# Patient Record
Sex: MALE | Race: WHITE | Employment: OTHER | URBAN - METROPOLITAN AREA
[De-identification: names, ages, dates, MRNs, and addresses within clinical notes are randomized per-mention and may not be internally consistent; named-entity substitution may affect disease eponyms.]

---

## 2021-09-14 ENCOUNTER — HOSPITAL ENCOUNTER (OUTPATIENT)
Dept: GENERAL RADIOLOGY | Age: 71
Discharge: HOME | End: 2021-09-16
Payer: MEDICARE

## 2021-09-14 ENCOUNTER — HOSPITAL ENCOUNTER (OUTPATIENT)
Age: 71
Discharge: HOME | End: 2021-09-16
Payer: MEDICARE

## 2021-09-14 DIAGNOSIS — R22.2 MASS IN CHEST: ICD-10-CM

## 2021-09-14 PROCEDURE — 71046 X-RAY EXAM CHEST 2 VIEWS: CPT

## 2021-10-08 ENCOUNTER — HOSPITAL ENCOUNTER (OUTPATIENT)
Dept: NON INVASIVE DIAGNOSTICS | Age: 71
Discharge: HOME | End: 2021-10-08
Payer: MEDICARE

## 2021-10-08 LAB
LV EF: 65 %
LVEF MODALITY: NORMAL

## 2021-10-08 PROCEDURE — 93306 TTE W/DOPPLER COMPLETE: CPT

## 2021-10-13 ENCOUNTER — HOSPITAL ENCOUNTER (OUTPATIENT)
Dept: CT IMAGING | Age: 71
Discharge: HOME | End: 2021-10-13
Payer: MEDICARE

## 2021-10-13 DIAGNOSIS — R22.2 CHEST WALL MASS: ICD-10-CM

## 2021-10-13 PROCEDURE — 6360000004 HC RX CONTRAST MEDICATION: Performed by: RADIOLOGY

## 2021-10-13 PROCEDURE — 71260 CT THORAX DX C+: CPT

## 2021-10-13 RX ADMIN — IOPAMIDOL 75 ML: 755 INJECTION, SOLUTION INTRAVENOUS at 11:00

## 2021-10-14 ENCOUNTER — OFFICE VISIT (OUTPATIENT)
Dept: ONCOLOGY | Age: 71
End: 2021-10-14
Payer: MEDICARE

## 2021-10-14 ENCOUNTER — TELEPHONE (OUTPATIENT)
Dept: CASE MANAGEMENT | Age: 71
End: 2021-10-14

## 2021-10-14 ENCOUNTER — HOSPITAL ENCOUNTER (OUTPATIENT)
Dept: INFUSION THERAPY | Age: 71
Discharge: HOME | End: 2021-10-14
Payer: MEDICARE

## 2021-10-14 VITALS
OXYGEN SATURATION: 96 % | WEIGHT: 120 LBS | BODY MASS INDEX: 18.19 KG/M2 | HEART RATE: 57 BPM | HEIGHT: 68 IN | TEMPERATURE: 97.8 F

## 2021-10-14 DIAGNOSIS — C64.1 RENAL CELL CANCER, RIGHT (HCC): ICD-10-CM

## 2021-10-14 DIAGNOSIS — C64.1 RENAL CELL CANCER, RIGHT (HCC): Primary | ICD-10-CM

## 2021-10-14 LAB
ALBUMIN SERPL-MCNC: 4 G/DL (ref 3.5–5.2)
ALP BLD-CCNC: 135 U/L (ref 40–129)
ALT SERPL-CCNC: 13 U/L (ref 0–40)
ANION GAP SERPL CALCULATED.3IONS-SCNC: 10 MMOL/L (ref 7–16)
AST SERPL-CCNC: 14 U/L (ref 0–39)
BASOPHILS ABSOLUTE: 0.09 E9/L (ref 0–0.2)
BASOPHILS RELATIVE PERCENT: 1.1 % (ref 0–2)
BILIRUB SERPL-MCNC: <0.2 MG/DL (ref 0–1.2)
BUN BLDV-MCNC: 4 MG/DL (ref 6–23)
CALCIUM SERPL-MCNC: 9.1 MG/DL (ref 8.6–10.2)
CHLORIDE BLD-SCNC: 102 MMOL/L (ref 98–107)
CO2: 27 MMOL/L (ref 22–29)
CREAT SERPL-MCNC: 0.8 MG/DL (ref 0.7–1.2)
EOSINOPHILS ABSOLUTE: 0.19 E9/L (ref 0.05–0.5)
EOSINOPHILS RELATIVE PERCENT: 2.3 % (ref 0–6)
GFR AFRICAN AMERICAN: >60
GFR NON-AFRICAN AMERICAN: >60 ML/MIN/1.73
GLUCOSE BLD-MCNC: 102 MG/DL (ref 74–99)
HCT VFR BLD CALC: 38.8 % (ref 37–54)
HEMOGLOBIN: 13 G/DL (ref 12.5–16.5)
IMMATURE GRANULOCYTES #: 0.01 E9/L
IMMATURE GRANULOCYTES %: 0.1 % (ref 0–5)
LACTATE DEHYDROGENASE: 176 U/L (ref 135–225)
LYMPHOCYTES ABSOLUTE: 2.63 E9/L (ref 1.5–4)
LYMPHOCYTES RELATIVE PERCENT: 32.2 % (ref 20–42)
MCH RBC QN AUTO: 32.3 PG (ref 26–35)
MCHC RBC AUTO-ENTMCNC: 33.5 % (ref 32–34.5)
MCV RBC AUTO: 96.3 FL (ref 80–99.9)
MONOCYTES ABSOLUTE: 0.51 E9/L (ref 0.1–0.95)
MONOCYTES RELATIVE PERCENT: 6.3 % (ref 2–12)
NEUTROPHILS ABSOLUTE: 4.73 E9/L (ref 1.8–7.3)
NEUTROPHILS RELATIVE PERCENT: 58 % (ref 43–80)
PDW BLD-RTO: 14.9 FL (ref 11.5–15)
PLATELET # BLD: 224 E9/L (ref 130–450)
PMV BLD AUTO: 10.2 FL (ref 7–12)
POTASSIUM SERPL-SCNC: 3.9 MMOL/L (ref 3.5–5)
RBC # BLD: 4.03 E12/L (ref 3.8–5.8)
SODIUM BLD-SCNC: 139 MMOL/L (ref 132–146)
TOTAL PROTEIN: 7 G/DL (ref 6.4–8.3)
WBC # BLD: 8.2 E9/L (ref 4.5–11.5)

## 2021-10-14 PROCEDURE — 85025 COMPLETE CBC W/AUTO DIFF WBC: CPT

## 2021-10-14 PROCEDURE — 36415 COLL VENOUS BLD VENIPUNCTURE: CPT

## 2021-10-14 PROCEDURE — 99214 OFFICE O/P EST MOD 30 MIN: CPT | Performed by: INTERNAL MEDICINE

## 2021-10-14 PROCEDURE — 80053 COMPREHEN METABOLIC PANEL: CPT

## 2021-10-14 PROCEDURE — 83615 LACTATE (LD) (LDH) ENZYME: CPT

## 2021-10-14 RX ORDER — ASPIRIN 81 MG/1
81 TABLET ORAL DAILY
COMMUNITY

## 2021-10-14 RX ORDER — LISINOPRIL 20 MG/1
20 TABLET ORAL DAILY
COMMUNITY

## 2021-10-14 RX ORDER — SIMVASTATIN 80 MG
80 TABLET ORAL NIGHTLY
COMMUNITY

## 2021-10-14 NOTE — PROGRESS NOTES
[address]  [place] , [facility]   Hematology/Oncology  Consult      Patient Name: Chester Black  YOB: 1950  PCP: SHI Eduardo NP   Referring Provider:      Reason for Consultation:   Chief Complaint   Patient presents with    New Patient        History of Present Illness:  73years old male referred for possible metastatic renal cell carcinoma. Patient presented to his PCP, HETAL Pathak, complaining of right side chest wall mass which he noticed about a month ago and had been gradually increasing in size. CT chest from October 2021 showed mltiple heterogenous and necrotic masses in the right kidney with possible tumor invasion of the right renal vein. In addition to this there was a large 4 by 9 cm soft tissue necrotic mass involving the fourth rib. There were other lytic destructive lesions in the right eighth and ninth rib. There were multiple right lung nodules ranging upto 1.5 cm. Few liver nodules were noted as well the largest being 7 mm. 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 Patient reports pain in the right side chest wall mass which is well controlled with Tylenol as needed. Denies recent weight loss, loss of appetite night sweats, back pain. Review of systems: Over 10 systems were reviewed and all were negative except as mentioned above. Past medical history: Hypertension. Coronary artery disease, peripheral arterial disease, hyperlipidemia. Past surgical history: History of CABG, femoral arterial bypass, hernia repair. Social history: Smokes half a pack a day, denies alcohol or drug abuse. Family history Sister had breast cancer, father had colon cancer. Diagnostic Data:     History reviewed. No pertinent past medical history. There are no problems to display for this patient. History reviewed. No pertinent surgical history. Family History  History reviewed.  No pertinent family history. Social History    TOBACCO:   has no history on file for tobacco use. ETOH:   has no history on file for alcohol use. Home Medications  Prior to Admission medications    Medication Sig Start Date End Date Taking? Authorizing Provider   aspirin 81 MG EC tablet Take 81 mg by mouth daily   Yes Historical Provider, MD   simvastatin (ZOCOR) 80 MG tablet Take 80 mg by mouth nightly   Yes Historical Provider, MD   lisinopril (PRINIVIL;ZESTRIL) 20 MG tablet Take 20 mg by mouth daily   Yes Historical Provider, MD       Allergies  No Known Allergies    Review of Systems:      Objective  Pulse 57   Temp 97.8 °F (36.6 °C)   Ht 5' 8\" (1.727 m)   Wt 120 lb (54.4 kg)   SpO2 96%   BMI 18.25 kg/m²     Physical Performance Status    Physical Exam:  General: AAO to person, place, time, and purpose, appears stated age, cooperative, no acute distress, pleasant   Head and neck : PERRLA, EOMI . Sclera non icteric. Oropharynx : Clear  Neck: no JVD,  no adenopathy, no carotid bruit  LYMPHATICS : no lap  Lungs: Clear to auscultation. Right side chest wall globular mass, 4-5 cm in diamter, soft. Heart: Regular rate and regular rhythm, no murmur, normal S1, S2  Abdomen: Soft, non-tender;no masses, no organomegaly  Extremities: No edema,no cyanosis, no clubbing. Skin:  No rash. Neurologic:Cranial nerves grossly intact. No focal motor or sensory deficits .     Recent Laboratory Data-   No results found for: WBC, HGB, HCT, MCV, PLT, LABLYMP, MID, GRAN, LYMPHOPCT, MIDPERCENT, GRANULOCYTES, RBC, MCH, MCHC, RDW, NEUTOPHILPCT, MONOPCT, EOSPCT, BASOPCT, NEUTROABS, LYMPHSABS, MONOSABS, EOSABS, BASOSABS    No results found for: NA, K, CL, CO2, BUN, CREATININE, GLUCOSE, CALCIUM, PROT, LABALBU, BILITOT, ALKPHOS, AST, ALT, LABGLOM, GFRAA, AGRATIO, GLOB    No results found for: IRON, TIBC, FERRITIN        Radiology-    Echo Complete    Result Date: 10/8/2021  Transthoracic Echocardiography Report (TTE)  Demographics   Patient Name    [facility]        Gender            Male                  [facility]   Medical Record  [id number]      Room Number  Number   Account #       [de-identified]     Procedure Date    10/08/2021   Corporate ID                  Ordering          Yoana Garcia DO                                Physician   Accession       [id number]    Referring         Yoana Garcia DO  Number                        Physician   Date of Birth   1950    Sonographer       Darlyn Kumari RDCS   Age             73 year(s)    Interpreting      Himanshu 66                                Physician         Physician Cardiology                                                  Biju Childs MD                                 Any Other  Procedure Type of Study   TTE procedure:Echo Complete W/Doppler & Color Flow. Procedure Date Date: 10/08/2021 Start: 09:57 AM Study Location: Echo Lab Technical Quality: Poor visualization due to poor acoustical window. Indications:Heart murmur. Patient Status: Routine Height: 68 inches Weight: 120 pounds BSA: 1.65 m^2 BMI: 18.25 kg/m^2  Findings   Left Ventricle  Normal left ventricular chamber size. Normal left ventricular systolic function. Visually estimated LVEF 65%. Mild left ventricular concentric hypertrophy noted. Normal diastolic function. Right Ventricle  Normal right ventricle size and function. Left Atrium  The left atrium is mildly dilated. Mildly increased left atrial volume. Interatrial septum not well visualized but appears intact. Right Atrium  Normal right atrium. Mitral Valve  Normal mitral valve structure. There is moderate mitral regurgitation - ERO 0.23cm2, PISA 0.9cm, RV 51cc. No mitral valve prolapse. Tricuspid Valve  Normal tricuspid valve structure. There is mild tricuspid regurgitation. Mild pulmonary hypertension, RVSP 39mmHg. Aortic Valve  Normal aortic valve structure. There is trace to mild aortic regurgitation, pressure 1/2 time 718ms.    Pulmonic Valve  The pulmonic valve was not well visualized. Pericardial Effusion  No evidence of pericardial effusion. Pericardium appears normal.   Pleural Effusion  No evidence of pleural effusion. Aorta  Aortic root 3.5cm. Miscellaneous  The inferior vena cava diameter is normal with normal respiratory  variation. Conclusions   Summary  Normal left ventricular chamber size. Normal left ventricular systolic function, LVEF 84%. Mild left ventricular concentric hypertrophy noted. Normal diastolic function. The left atrium is mildly dilated. Mildly increased left atrial volume. Interatrial septum not well visualized but appears intact. Normal right ventricle size and function. There is moderate mitral regurgitation - ERO 0.23cm2, PISA 0.9cm, RV 51cc. No mitral valve prolapse. There is trace to mild aortic regurgitation, pressure 1/2 time 718ms. There is mild tricuspid regurgitation. Mild pulmonary hypertension, RVSP 39mmHg. The pulmonic valve was not well visualized. Aortic root 3.5cm. No evidence of pericardial effusion. No intra cardiac mass or thrombus. No comparison study available.    Signature   ----------------------------------------------------------------  Electronically signed by Debora Balderas MD(Interpreting  physician) on 10/08/2021 03:52 PM  ----------------------------------------------------------------  M-Mode/2D Measurements & Calculations   LV Diastolic    LV Systolic Dimension: 3.2   AV Cusp Separation: 1.9 cmLA  Dimension: 5.3  cm                           Dimension: 3.9 cmAO Root  cm              LV Volume Diastolic: 203.3   Dimension: 3.5 cm  LV FS:39.6 %    ml  LV PW           LV Volume Systolic: 73.2 ml  Diastolic: 1.2  LV EDV/LV EDV Index: 134.5  cm              ml/82 ml/m^2LV ESV/LV ESV    RV Diastolic Dimension: 2.8  LV PW Systolic: Index: 88.6 CQ/94NP/ m^2     cm  1.5 cm          EF Calculated: 70 %          RV Systolic Dimension: 2.4 cm  Septum          LV Mass Index: 147 l/min*m^2 LA/Aorta: 1.2  Diastolic: 1.1  cm                                           LA volume/Index: 79.7 ml  Septum          LVOT: 2 cm  Systolic: 1.2  cm   LV Mass: 241.96  g  Doppler Measurements & Calculations   MV Peak E-Wave: 1.42   AV Peak Velocity: 1.87 m/s  LVOT Peak Velocity:  m/s                    AV Peak Gradient: 14.05     1.55 m/s  MV Peak A-Wave: 0.82   mmHg                        LVOT Mean Velocity:  m/s                    AV Mean Velocity: 1.27 m/s  0.81 m/s  MV E/A Ratio: 1.73     AV Mean Gradient: 7.2 mmHg  LVOT Peak Gradient: 9.6  MV Peak Gradient: 9.2  AV VTI: 42.1 cm             mmHgLVOT Mean Gradient:  mmHg                   AV Area (Continuity):2.37   3.5 mmHg  MV Mean Gradient: 2.9  cm^2                        Estimated RVSP: 39 mmHg  mmHg                   AV Deceleration Time:       Estimated RAP:10 mmHg  MV Mean Velocity: 0.75 2474.3 msec  m/s                    LVOT VTI: 31.8 cm  MV Deceleration Time:                              TR Velocity:2.69 m/s  271.4 msec             Estimated PASP: 39.03 mmHg  TR Gradient:29.03 mmHg  MV P1/2t: 101.8 msec   Pulm. Vein A Reversal       PV Peak Velocity: 0.51  MVA by PHT:2.16 cm^2   Duration:175.3 msec         m/s  MV Area (continuity):  Pulm. Vein D Velocity:0.74  PV Peak Gradient: 1.02  1.9 cm^2               m/sPulm. Vein A Reversal    mmHg                         Velocity:0.38 m/s           PV Mean Velocity: 0.36                         Pulm. Vein S Velocity: 0.44 m/s  MR Velocity: 5.37 m/s  m/s                         PV Mean Gradient: 0.6  MV RICARDO PISA: 0.23 cm^2                             mmHg  MR VTI: 221.9 cm  Alias Velocity: 0.25  m/sPISA Radius: 0.9 cm   PISA area: 5.09 cm^2MR  flow rate: 125.72  ml/sMR volume:51.04 ml  [email or web address] [id number]    CT CHEST W CONTRAST    Addendum Date: 10/13/2021    ADDENDUM: 6 mm indeterminate hypodense lesions are identified in the body and tail of the pancreas. Consider surveillance     Result Date: 10/13/2021  EXAMINATION: CT OF THE CHEST WITH CONTRAST 10/13/2021 10:59 am TECHNIQUE: CT of the chest was performed with the administration of intravenous contrast. Multiplanar reformatted images are provided for review. Dose modulation, iterative reconstruction, and/or weight based adjustment of the mA/kV was utilized to reduce the radiation dose to as low as reasonably achievable. COMPARISON: None. HISTORY: ORDERING SYSTEM PROVIDED HISTORY: Chest wall mass FINDINGS: There is cardiomegaly. There is coronary artery calcification. The great vessels are normal.  Moderately enlarged mediastinal and hilar lymph nodes are noted with lymph nodes measuring up to 1.3 cm in the right hilum. Trachea and major bronchi are patent. Multiple bilateral pulmonary nodules are identified with nodules measuring up to 1.5 cm in the right lower lobe and smaller ones in the right middle lobe and right upper lobe. The pulmonary nodules in the left lower lobe ranges from 5 mm up to 8 mm. There is no focal consolidation or pleural effusion. Punctate enhancing nodules are identified in the posterior right hepatic lobe, largest 1 measuring up to 7 mm. Multiple heterogeneously enhancing right renal masses are identified largest 1 measuring 3.8 x 3.3 cm which is partially necrotic with additional smaller nodules concerning for multifocal malignancy like renal cell carcinoma. There is filling defects in the right renal vein concerning for tumor invasion versus tumor embolism of the renal vein. There is bilateral adrenal metastasis with the largest 1 in the left adrenal gland measuring 1.6 x 2.7 cm. There is bone metastasis with the destructive soft tissue mass involving the right 4th rib anterolaterally with adjacent soft tissue mass which is partially necrotic measuring 4.5 x 9.2 cm.   Lytic destructive lesions are also identified in the right 8th and 9th rib near the costo vertebral junction. Multiple heterogeneous  enhancing and necrotic masses in the right kidney concerning for multifocal renal cell carcinoma with  possible tumor invasion or tumor embolism of the right renal vein. There is diffuse metastasis with the involvement of the right and left adrenal glands, possible hepatic metastasis, widespread pulmonary and rib involvement as noted. Further assessment by PET-CT scan is recommended. Findings were telephoned to licensed caregiver. ASSESSMENT/PLAN :    Kvng Rodriguez was seen today for new patient. Diagnoses and all orders for this visit:    Renal cell cancer, right ([address]  -     CBC Auto Differential; Future  -     Comprehensive Metabolic Panel; Future  -     LACTATE DEHYDROGENASE; Future  -     PET CT SKULL BASE TO MID THIGH; Future  -     MRI BRAIN W WO CONTRAST; Future  -     [address]    Other orders  -     CT GUIDED NEEDLE PLACEMENT; Standing  -     CT GUIDED NEEDLE PLACEMENT    73years old male with recently noted right chest wall mass found to have multiple right kidney necrotic lesions in addition to large soft tissue destructive mass of right fourth rib, multiple lung nodules up to 1.5 cm, bilateral adrenal metastasis up to 2.5 cm, possible liver metastasis. Reviewed the CT scan images and agree with the reported findings. Discussed findings with the patient. Imaging consistent with stage IV renal cell carcinoma. Performance status seems fair Karnofsky performance status 80/70. We will get CBC CMP LDH to help assess MSKCC risk factors. The disease seems to have progressed rapidly over the past month. His rapid progression as well as renal vein involvement probably precludes debulking nephrectomy. We will get a percutaneous biopsy of the right-sided chest wall mass. PET scan, MRI brain.   Pain at the right side chest wall mass is well controlled with Tylenol as needed. Put in a palliative consult. Return to clinic in 1 to 2 weeks to review biopsy and imaging. Thank you for the consult. Return in about 2 weeks (around 10/28/2021).      Ines Villasenor MD   Electronically signed 10/14/2021 at 9:37 AM

## 2021-10-14 NOTE — PROGRESS NOTES
Transportation (Non-Medical):    Physical Activity:     Days of Exercise per Week:     Minutes of Exercise per Session:    Stress:     Feeling of Stress :    Social Connections:     Frequency of Communication with Friends and Family:     Frequency of Social Gatherings with Friends and Family:     Attends Baptism Services:     Active Member of Clubs or Organizations:     Attends Club or Organization Meetings:     Marital Status:    Intimate Partner Violence:     Fear of Current or Ex-Partner:     Emotionally Abused:     Physically Abused:     Sexually Abused:            Occupation:   Retired:  YES: Patient is retired from [employer]. REVIEW OF SYSTEMS:     Pacemaker/Defibulator/ICD:  No    Mediport: No           FALLS RISK SCREENING ASSESSMENT    Instructions:  Assess the patient and [place] the appropriate indicators that are present for fall risk identification. Total the numbers circled and assign a fall risk score from Table 2.  Reassess patient at a minimum every 12 weeks or with status change. Assessment   Date  10/14/2021     1. Mental Ability: confusion/cognitively impaired No - 0       2. Elimination Issues: incontinence, frequency No - 0       3. Ambulatory: use of assistive devices (walker, cane, off-loading devices), attached to equipment (IV pole, oxygen) No - 0     4. Sensory Limitations: dizziness, vertigo, impaired vision No - 0       5. Age 72 years or greater - 1       10. Medication: diuretics, strong analgesics, hypnotics, sedatives, antihypertensive agents   Yes - 3   7. Falls:  recent history of falls within the last 3 months (not to include slipping or tripping)   No - 0   TOTAL 4    If score of 4 or greater was education given? Yes       TABLE 2   Risk Score Risk Level Plan of Care   0-3 Little or  No Risk 1. Provide assistance as indicated for ambulation activities  2. Reorient confused/cognitively impaired patient  3.   Call-light/bell within patient's reach  4. Chair/bed in low position, stretcher/bed with siderails up except when performing patient care activities  5. Educate patient/family/caregiver on falls prevention  6.  Reassess in 12 weeks or with any noted change in patient condition which places them at a risk for a fall   4-6 Moderate Risk 1. Provide assistance as indicated for ambulation activities  2. Reorient confused/cognitively impaired patient  3. Call-light/bell within patient's reach  4. Chair/bed in low position, stretcher/bed with siderails up except when performing patient care activities  5. Educate patient/family/caregiver on falls prevention  6. Falls risk precaution (Yellow sticker Level II) placed on patient chart   7 or   Higher High Risk 1. Place patient in easily observable treatment room  2. Patient attended at all times by family member or staff  3. Provide assistance as indicated for ambulation activities  4. Reorient confused/cognitively impaired patient  5. Call-light/bell within patient's reach  6. Chair/bed in low position, stretcher/bed with siderails up except when performing patient care activities  7. Educate patient/family/caregiver on falls prevention  8. Falls risk precaution (Yellow sticker Level III) placed on patient chart           MALNUTRITION RISK SCREENING ASSESSMENT    Instructions:  Assess the patient and enter the appropriate indicators that are present for nutrition risk identification. Total the numbers entered and assign a risk score. Follow the appropriate action for total score listed below. Assessment   Date  10/14/2021     1. Have you lost weight without trying? 1- Yes, 0.5 kg to 5 kg     2. Have you been eating poorly because of a decreased appetite? 0- No   3. Do you have a diagnosis of head and neck cancer?       0- No                                                                                    TOTAL 1        Score of 0-1: No action  Score 2 or greater:  · For Non-Diabetic Patient: Recommend adding Ensure Enlive 2 x daily and provide patient with Ensure wellness bag with coupons  · For Diabetic Patient: Recommend adding Glucerna Shake 2 x daily and provide patient with Glucerna Wellness bag with coupons  · Route to the dietitian via salgomed    · Are you having  difficulty performing daily routine tasks  due to fatigue or weakness (ie: bathing/showering, dressing, housework, meal prep, work, child Erika Quivers): No     · Do you have any arm flexibility/ROM restrictions, swelling or pain that limit activity: No     · Any changes in memory, attention/focus that impact daily activities: No     · Do you avoid participation in leisure/social activity due weakness, fatigue or pain: No     ARE ANY OF THE ABOVE ARE ANSWERED YES: No          PT ASSESSMENT FOR REFERRAL    · Have you had any recent falls in past 2 months: No     · Do you have difficulty  going up/down stairs: No     · Are you having difficulty walking: No     · Do you often hold onto furniture/environmental supports or feel off balance when you are walking: No     · Do you need to take rest breaks when you are walking: No     · Any pain on scale of 1-10 that limits your mobility: No 0/10    ARE ANY OF THE ABOVE ARE ANSWERED YES: No           PREHAB AUDIOLOGY REFERRAL    - Is patient planned to receive Cisplatin? No. This patient is not planned to start Cisplatin. - Is patient complaining of new onset hearing loss? No. Patient is not complaining of new onset hearing loss. Has hearing loss bilaterally for about 1 year, wears bilateral hearing aides.         Denver Bridges, RN

## 2021-10-14 NOTE — TELEPHONE ENCOUNTER
Met with patient during his initial consult with Dr. Marce Matamoros for right chest wall mass. Introduced nurse navigator role, gave contact information and informed of other supportive services in clinic: , nutrition and financial navigator. Patient is supported by his family and denied issues with living arrangements, transportation, insurance and prescription coverage. He reported his appetite as \"good\", has had 10 lb unplanned weight loss in 1 month. Informed the clinic dietitian will be consulted for additional support, patient was agreeable. Patient smokes, encouraged to stop, informed of [facility] Cessation Program and gave pamphlet. He stated he's ready to quit. Gave patient a list of orders Dr. Marce Matamoros wants done prior to returning to the clinic on 10/28/2021. These include a biopsy of the chest wall, PET scan and brain MRI and palliative medicine. He would like his niece contacted with his appointments. Reviewed and gave patient the PET scan prep instructions, thoroughly reviewed all dietary instructions with him. Patient will be scheduled to follow up with Dr. Marce Matamoros on 10/28/2021 to review scan results and discuss his plan of care. Patient denied needs today, encouraged him to call should any arise, he verbalized understanding. Marck Villafana RN, ADN, BSE, OCN Patient Nurse Navigator

## 2021-10-19 ENCOUNTER — TELEPHONE (OUTPATIENT)
Dept: PALLATIVE CARE | Age: 71
End: 2021-10-19

## 2021-10-19 NOTE — TELEPHONE ENCOUNTER
Called and spoke with Jose zapata regarding referral for Palliative medicine. Leela sets up appointments for her Uncle and is his transportation. Leela shares that patient is Developmentally disabled. He is having a hard time understanding that he has stage IV cancer when he doesn't feel sick. Appointment set for 10/27/21.

## 2021-10-20 DIAGNOSIS — C64.1 RENAL CELL CANCER, RIGHT (HCC): Primary | ICD-10-CM

## 2021-10-22 ENCOUNTER — TELEPHONE (OUTPATIENT)
Dept: CASE MANAGEMENT | Age: 71
End: 2021-10-22

## 2021-10-22 NOTE — TELEPHONE ENCOUNTER
Patient's niece, Anca Mg, called stating patient's CT guide biopsy is scheduled for 11/02/2021 and asked if his 10/28/2021 appointment will need moved out. Informed her since patient is having is PET scan and brain MRI done on 10/25/2021, he can keep his 10/28/2021 appointment with Dr. Ralph Funez so he can discuss the results. Informed her as soon as the biopsy results are available, patient will be scheduled to see Dr. Ralph Funez appropriately, Leela verbalized understanding. Samantha Landin  RN, ADN, BSE, OCN  Patient Nurse Navigator
I have personally evaluated and examined the patient. The Attending was available to me as a supervising provider if needed.

## 2021-10-25 ENCOUNTER — HOSPITAL ENCOUNTER (OUTPATIENT)
Dept: NUCLEAR MEDICINE | Age: 71
Discharge: HOME | End: 2021-10-25
Payer: MEDICARE

## 2021-10-25 ENCOUNTER — HOSPITAL ENCOUNTER (OUTPATIENT)
Dept: MRI IMAGING | Age: 71
Discharge: HOME | End: 2021-10-27
Payer: MEDICARE

## 2021-10-25 DIAGNOSIS — R22.2 CHEST WALL MASS: Primary | ICD-10-CM

## 2021-10-25 DIAGNOSIS — C64.1 RENAL CELL CANCER, RIGHT (HCC): ICD-10-CM

## 2021-10-25 PROCEDURE — 78815 PET IMAGE W/CT SKULL-THIGH: CPT

## 2021-10-25 PROCEDURE — 70553 MRI BRAIN STEM W/O & W/DYE: CPT

## 2021-10-25 PROCEDURE — A9577 INJ MULTIHANCE: HCPCS | Performed by: RADIOLOGY

## 2021-10-25 PROCEDURE — A9552 F18 FDG: HCPCS | Performed by: RADIOLOGY

## 2021-10-25 PROCEDURE — 3430000000 HC RX DIAGNOSTIC RADIOPHARMACEUTICAL: Performed by: RADIOLOGY

## 2021-10-25 PROCEDURE — 6360000004 HC RX CONTRAST MEDICATION: Performed by: RADIOLOGY

## 2021-10-25 RX ORDER — FLUDEOXYGLUCOSE F 18 200 MCI/ML
15 INJECTION, SOLUTION INTRAVENOUS
Status: COMPLETED | OUTPATIENT
Start: 2021-10-25 | End: 2021-10-25

## 2021-10-25 RX ADMIN — GADOBENATE DIMEGLUMINE 11 ML: 529 INJECTION, SOLUTION INTRAVENOUS at 14:40

## 2021-10-25 RX ADMIN — FLUDEOXYGLUCOSE F 18 15 MILLICURIE: 200 INJECTION, SOLUTION INTRAVENOUS at 08:52

## 2021-10-26 ENCOUNTER — HOSPITAL ENCOUNTER (OUTPATIENT)
Age: 71
Discharge: HOME | End: 2021-10-28
Payer: MEDICARE

## 2021-10-26 PROCEDURE — U0003 INFECTIOUS AGENT DETECTION BY NUCLEIC ACID (DNA OR RNA); SEVERE ACUTE RESPIRATORY SYNDROME CORONAVIRUS 2 (SARS-COV-2) (CORONAVIRUS DISEASE [COVID-19]), AMPLIFIED PROBE TECHNIQUE, MAKING USE OF HIGH THROUGHPUT TECHNOLOGIES AS DESCRIBED BY CMS-2020-01-R: HCPCS

## 2021-10-26 PROCEDURE — U0005 INFEC AGEN DETEC AMPLI PROBE: HCPCS

## 2021-10-27 ENCOUNTER — OFFICE VISIT (OUTPATIENT)
Dept: PALLATIVE CARE | Age: 71
End: 2021-10-27
Payer: MEDICARE

## 2021-10-27 VITALS
HEART RATE: 65 BPM | BODY MASS INDEX: 18.61 KG/M2 | DIASTOLIC BLOOD PRESSURE: 60 MMHG | SYSTOLIC BLOOD PRESSURE: 144 MMHG | WEIGHT: 122.4 LBS

## 2021-10-27 DIAGNOSIS — Z51.5 PALLIATIVE CARE BY SPECIALIST: ICD-10-CM

## 2021-10-27 PROCEDURE — 99203 OFFICE O/P NEW LOW 30 MIN: CPT | Performed by: NURSE PRACTITIONER

## 2021-10-27 NOTE — PROGRESS NOTES
Department of Palliative Medicine  Ambulatory Note  Provider: SHI Vargas - CNP      Location of service: [place] [address]  Chief Complaint: Vero Paul is a 73 y.o. male with no complaints    Assessment/Plan      Possible metastatic renal cell carcinoma  -Follows with Dr. Re Buchanan  -Recently diagnosed has an upcoming appointment with oncologist to determine plan    Palliative Medicine Encounter   Goals: The patient expressed that his goal is to live longer and maintain quality of life    Follow Up:  PRN. Encouraged to call with any questions, concerns, needs, or changes in symptoms. Subjective:     Vero Paul is a 73 y.o. male with significant past medical history of hypertension, hyperlipidemia, coronary artery disease who was referred to [address] [place] [address]. Patient had went to his PCP with right-sided chest pain and mass that was growing. 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 MRI of the brain showed a concerning lesion of the right parietal skull. Met with Adonay Ponce and his niece and introduced palliative medicine. His niece explains that he has an appointment with the oncologist tomorrow to go over his scans and discuss treatment options. Currently Adonay Ponce is feeling great, he states that he can't even believe that he has cancer because he feels so good. He denies any pain, nausea, depression, anxiety, loss of appetite, shortness of breath, or constipation. He started taking Tylenol for the chest pain few months ago, which helped relieve his pain. He states that now he is no longer getting that pain. His niece brought up that they have been trying to discuss quality versus quantity of life with him and asked to help him understand the side effects of treatment.  We discussed side effects of treatments and how it may affect his quality of life. Weighing whether treatments will cause more harmful effects than benefit. I explained that the oncologist will explain his options, and that comfort care is always an option. We discussed hospice care and the difference between palliative care. He expressed that he has a lot to think about and speak to with his wife. Encouraged him to call and we can talk further after their appointment with the oncologist to assist with future goals. He has a good support system from his family. Past Medical History:   Diagnosis Date    Anticoagulant long-term use 2007    aspirin    CAD (coronary artery disease)     Deep vein thrombosis (DVT) (HCC)     leg    [place] (hard of hearing)     Hyperlipidemia     Hypertension     Mentally challenged     information per patient's niece. Past Surgical History:   Procedure Laterality Date   Yonathan Méndez    patient's sister said they were told a twin was removed from paitent's abdomen.     CARDIAC SURGERY  2007    bypass    FEMORAL BYPASS      bifemoral       Family History   Problem Relation Age of Onset    High Blood Pressure Mother     Heart Disease Mother     Heart Attack Mother     Cancer Father 64        colon    Arthritis Sister     Heart Attack Brother     No Known Problems Maternal Uncle     No Known Problems Paternal Aunt     No Known Problems Paternal Uncle     No Known Problems Maternal Grandmother     Cancer Maternal Grandfather         throat    Heart Attack Paternal Grandmother     No Known Problems Paternal Grandfather     Cirrhosis Paternal Cousin     Deep Vein Thrombosis Sister     Diabetes Sister     Heart Disease Sister         triple bypass    Breast Cancer Sister 61    High Blood Pressure Sister     Diabetes Sister     High Cholesterol Sister     Arthritis Sister        ROS: UNLESS STATED ABOVE PATIENT DENIES:  CONSTITUTIONAL:  fever, chill, rigors, nausea, vomiting, fatigue. HEENT: blurry vision, double vision, hearing problem, tinnitus, hoarseness, dysphagia, odynophagia  RESPIRATORY: cough, shortness of breath, sputum expectoration. CARDIOVASCULAR:  Chest pain/pressure, palpitation, syncope, irregular beats  GASTROINTESTINAL:  abdominal or rectal pain, diarrhea, constipation, . GENITOURINARY:  Burning, frequency, urgency, incontinence, discharge  INTEGUMENTARY: rash, wound, pruritis  HEMATOLOGIC/LYMPHATIC:  Swelling, sores, gum bleeding, easy bruising, pica. MUSCULOSKELETAL:  pain, edema, joint swelling or redness  NEUROLOGICAL:  light headed, dizziness, loss of consciousness, weakness, change in memory, seizures, tremors    Objective:     Physical Exam  Wt Readings from Last 3 Encounters:   10/27/21 122 lb 6.4 oz (55.5 kg)   10/14/21 120 lb (54.4 kg)     BP (!) 144/60   Pulse 65   Wt 122 lb 6.4 oz (55.5 kg)   BMI 18.61 kg/m²     Gen:  Alert, appears stated age, thin, in no acute distress  HEENT:  Normocephalic, no drainage,  Neck:  Supple  Lungs:  non labored breathing  Heart[de-identified]  Regular rate  Abd:  Soft,   M/S/Ext:  moving all extremities   Skin:  no visible lesions  Neuro:  PERRL, Alert, oriented x 3; following commands    [place] Symptom Assessment Score   [place] Score 10/27/2021   Pain Score No pain   Tiredness Score Not tired   Nausea Score Not nauseated   Depression Score Not depressed   Anxiety Score Not anxious   Drowsiness Score Not drowsy   Appetite Score Best appetite   Wellbeing Score Best feeling of wellbeing   Dyspnea Score No shortness of breath   Other Problem Score Best possible response   Total Assessment Score(calculated) 0     Assessed by: patient and provider. Current Medications:  Medications reviewed: yes    Controlled Substances Monitoring: [place]AR[place] reviewed 10/27/21. No flowsheet data found.       SHI Saravia - CNP   Palliative Care Department     Time/Communication  Greater than 50% of time spent, total 40 minutes in face-to-face counseling and coordination of care regarding goals of care and symptom management. Note: This report was completed using computerJellycoaster voiced recognition software. Every effort has been made to ensure accuracy; however, inadvertent computerized transcription errors may be present.

## 2021-10-28 ENCOUNTER — OFFICE VISIT (OUTPATIENT)
Dept: ONCOLOGY | Age: 71
End: 2021-10-28
Payer: MEDICARE

## 2021-10-28 VITALS
TEMPERATURE: 96.8 F | BODY MASS INDEX: 18.11 KG/M2 | SYSTOLIC BLOOD PRESSURE: 135 MMHG | DIASTOLIC BLOOD PRESSURE: 73 MMHG | HEIGHT: 68 IN | OXYGEN SATURATION: 99 % | HEART RATE: 65 BPM | WEIGHT: 119.5 LBS

## 2021-10-28 DIAGNOSIS — C64.1 RENAL CELL CANCER, RIGHT (HCC): Primary | ICD-10-CM

## 2021-10-28 PROCEDURE — 99212 OFFICE O/P EST SF 10 MIN: CPT

## 2021-10-28 NOTE — PROGRESS NOTES
[address]  [id number]   Hematology/Oncology  Consult      Patient Name: Tonio Owens  YOB: 1950  PCP: SHI Mancuso NP   Referring Provider:      Reason for Consultation:   Chief Complaint   Patient presents with    Follow-up     RENAL CELL CANCER, RIGHT ([address]        History of Present Illness:  73years old male referred for possible metastatic renal cell carcinoma. Patient presented to his PCP, HETAL Pathak, complaining of right side chest wall mass which he noticed about a month ago and had been gradually increasing in size. CT chest from October 2021 showed mltiple heterogenous and necrotic masses in the right kidney with possible tumor invasion of the right renal vein. In addition to this there was a large 4 by 9 cm soft tissue necrotic mass involving the fourth rib. There were other lytic destructive lesions in the right eighth and ninth rib. There were multiple right lung nodules ranging upto 1.5 cm. Few liver nodules were noted as well the largest being 7 mm. 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 Patient reports pain in the right side chest wall mass which is well controlled with Tylenol as needed. Denies recent weight loss, loss of appetite night sweats, back pain. Review of systems: Over 10 systems were reviewed and all were negative except as mentioned above. Past medical history: Hypertension. Coronary artery disease, peripheral arterial disease, hyperlipidemia. Past surgical history: History of CABG, femoral arterial bypass, hernia repair. Social history: Smokes half a pack a day, denies alcohol or drug abuse. Family history Sister had breast cancer, father had colon cancer.       Diagnostic Data:     Past Medical History:   Diagnosis Date    Anticoagulant long-term use 2007    aspirin    CAD (coronary artery disease)     Deep vein thrombosis (DVT) (HCC)     leg    [place] (hard of hearing)     Hyperlipidemia     Hypertension     Mentally challenged     information per patient's niece. There are no problems to display for this patient. Past Surgical History:   Procedure Laterality Date   Yonathan Méndez    patient's sister said they were told a twin was removed from paitent's abdomen.  CARDIAC SURGERY  2007    bypass    FEMORAL BYPASS      bifemoral       Family History  Family History   Problem Relation Age of Onset    High Blood Pressure Mother     Heart Disease Mother     Heart Attack Mother     Cancer Father 64        colon    Arthritis Sister     Heart Attack Brother     No Known Problems Maternal Uncle     No Known Problems Paternal Aunt     No Known Problems Paternal Uncle     No Known Problems Maternal Grandmother     Cancer Maternal Grandfather         throat    Heart Attack Paternal Grandmother     No Known Problems Paternal Grandfather     Cirrhosis Paternal Cousin     Deep Vein Thrombosis Sister     Diabetes Sister     Heart Disease Sister         triple bypass    Breast Cancer Sister 61    High Blood Pressure Sister     Diabetes Sister     High Cholesterol Sister     Arthritis Sister        Social History    TOBACCO:   reports that he has been smoking cigarettes. He has a 27.00 pack-year smoking history. He has never used smokeless tobacco.  ETOH:   reports previous alcohol use. Home Medications  Prior to Admission medications    Medication Sig Start Date End Date Taking?  Authorizing Provider   metoprolol tartrate (LOPRESSOR) 25 MG tablet Take 25 mg by mouth 2 times daily   Yes Historical Provider, MD   aspirin 81 MG EC tablet Take 81 mg by mouth daily   Yes Historical Provider, MD   simvastatin (ZOCOR) 80 MG tablet Take 80 mg by mouth nightly   Yes Historical Provider, MD   lisinopril (PRINIVIL;ZESTRIL) 20 MG tablet Take 20 mg by mouth daily   Yes Historical Provider, MD       Allergies  No Known Allergies    Review of Systems:      Objective  /73   Pulse 65   Temp 96.8 °F (36 °C)   Ht 5' 8\" (1.727 m)   Wt 119 lb 8 oz (54.2 kg)   SpO2 99%   BMI 18.17 kg/m²     Physical Performance Status    Physical Exam:  General: AAO to person, place, time, and purpose, appears stated age, cooperative, no acute distress, pleasant   Head and neck : PERRLA, EOMI . Sclera non icteric. Oropharynx : Clear  Neck: no JVD,  no adenopathy, no carotid bruit  LYMPHATICS : no lap  Lungs: Clear to auscultation. Right side chest wall globular mass, 4-5 cm in diamter, soft. Heart: Regular rate and regular rhythm, no murmur, normal S1, S2  Abdomen: Soft, non-tender;no masses, no organomegaly  Extremities: No edema,no cyanosis, no clubbing. Skin:  No rash. Neurologic:Cranial nerves grossly intact. No focal motor or sensory deficits .     Recent Laboratory Data-   Lab Results   Component Value Date    WBC 8.2 10/14/2021    HGB 13.0 10/14/2021    HCT 38.8 10/14/2021    MCV 96.3 10/14/2021     10/14/2021    LYMPHOPCT 32.2 10/14/2021    RBC 4.03 10/14/2021    MCH 32.3 10/14/2021    MCHC 33.5 10/14/2021    RDW 14.9 10/14/2021    NEUTOPHILPCT 58.0 10/14/2021    MONOPCT 6.3 10/14/2021    BASOPCT 1.1 10/14/2021    NEUTROABS 4.73 10/14/2021    LYMPHSABS 2.63 10/14/2021    MONOSABS 0.51 10/14/2021    EOSABS 0.19 10/14/2021    BASOSABS 0.09 10/14/2021       Lab Results   Component Value Date     10/14/2021    K 3.9 10/14/2021     10/14/2021    CO2 27 10/14/2021    BUN 4 (L) 10/14/2021    CREATININE 0.8 10/14/2021    GLUCOSE 102 (H) 10/14/2021    CALCIUM 9.1 10/14/2021    PROT 7.0 10/14/2021    LABALBU 4.0 10/14/2021    BILITOT <0.2 10/14/2021    ALKPHOS 135 (H) 10/14/2021    AST 14 10/14/2021    ALT 13 10/14/2021    LABGLOM >60 10/14/2021    GFRAA >60 10/14/2021       No results found for: IRON, TIBC, FERRITIN        Radiology-    Echo Complete    Result Date: 10/8/2021  Transthoracic Echocardiography Report (TTE)  Demographics   Patient Name    [facility]        Gender            Male                  [facility]   Medical Record  [id number]      Room Number  Number   Account #       [de-identified]     Procedure Date    10/08/2021   Corporate ID                  Ordering          Yoana Garcia DO                                Physician   Accession       [id number]    Referring         Yoana Garcia DO  Number                        Physician   Date of Birth   1950    Sonographer       Darlyn Kumari RDCS   Age             73 year(s)    Interpreting      Himanshu 66                                Physician         Physician Cardiology                                                  Biju Childs MD                                 Any Other  Procedure Type of Study   TTE procedure:Echo Complete W/Doppler & Color Flow. Procedure Date Date: 10/08/2021 Start: 09:57 AM Study Location: Echo Lab Technical Quality: Poor visualization due to poor acoustical window. Indications:Heart murmur. Patient Status: Routine Height: 68 inches Weight: 120 pounds BSA: 1.65 m^2 BMI: 18.25 kg/m^2  Findings   Left Ventricle  Normal left ventricular chamber size. Normal left ventricular systolic function. Visually estimated LVEF 65%. Mild left ventricular concentric hypertrophy noted. Normal diastolic function. Right Ventricle  Normal right ventricle size and function. Left Atrium  The left atrium is mildly dilated. Mildly increased left atrial volume. Interatrial septum not well visualized but appears intact. Right Atrium  Normal right atrium. Mitral Valve  Normal mitral valve structure. There is moderate mitral regurgitation - ERO 0.23cm2, PISA 0.9cm, RV 51cc. No mitral valve prolapse. Tricuspid Valve  Normal tricuspid valve structure. There is mild tricuspid regurgitation. Mild pulmonary hypertension, RVSP 39mmHg. Aortic Valve  Normal aortic valve structure. There is trace to mild aortic regurgitation, pressure 1/2 time 718ms.    Pulmonic Valve  The pulmonic valve was not well visualized. Pericardial Effusion  No evidence of pericardial effusion. Pericardium appears normal.   Pleural Effusion  No evidence of pleural effusion. Aorta  Aortic root 3.5cm. Miscellaneous  The inferior vena cava diameter is normal with normal respiratory  variation. Conclusions   Summary  Normal left ventricular chamber size. Normal left ventricular systolic function, LVEF 90%. Mild left ventricular concentric hypertrophy noted. Normal diastolic function. The left atrium is mildly dilated. Mildly increased left atrial volume. Interatrial septum not well visualized but appears intact. Normal right ventricle size and function. There is moderate mitral regurgitation - ERO 0.23cm2, PISA 0.9cm, RV 51cc. No mitral valve prolapse. There is trace to mild aortic regurgitation, pressure 1/2 time 718ms. There is mild tricuspid regurgitation. Mild pulmonary hypertension, RVSP 39mmHg. The pulmonic valve was not well visualized. Aortic root 3.5cm. No evidence of pericardial effusion. No intra cardiac mass or thrombus. No comparison study available.    Signature   ----------------------------------------------------------------  Electronically signed by Domenica Gallegos MD(Interpreting  physician) on 10/08/2021 03:52 PM  ----------------------------------------------------------------  M-Mode/2D Measurements & Calculations   LV Diastolic    LV Systolic Dimension: 3.2   AV Cusp Separation: 1.9 cmLA  Dimension: 5.3  cm                           Dimension: 3.9 cmAO Root  cm              LV Volume Diastolic: 123.7   Dimension: 3.5 cm  LV FS:39.6 %    ml  LV PW           LV Volume Systolic: 13.1 ml  Diastolic: 1.2  LV EDV/LV EDV Index: 134.5  cm              ml/82 ml/m^2LV ESV/LV ESV    RV Diastolic Dimension: 2.8  LV PW Systolic: Index: 17.1 AG/70WC/ m^2     cm  1.5 cm          EF Calculated: 70 %          RV Systolic Dimension: 2.4 cm  Septum          LV Mass Index: 147 l/min*m^2 LA/Aorta: 1.2  Diastolic: 1.1  cm                                           LA volume/Index: 79.7 ml  Septum          LVOT: 2 cm  Systolic: 1.2  cm   LV Mass: 241.96  g  Doppler Measurements & Calculations   MV Peak E-Wave: 1.42   AV Peak Velocity: 1.87 m/s  LVOT Peak Velocity:  m/s                    AV Peak Gradient: 14.05     1.55 m/s  MV Peak A-Wave: 0.82   mmHg                        LVOT Mean Velocity:  m/s                    AV Mean Velocity: 1.27 m/s  0.81 m/s  MV E/A Ratio: 1.73     AV Mean Gradient: 7.2 mmHg  LVOT Peak Gradient: 9.6  MV Peak Gradient: 9.2  AV VTI: 42.1 cm             mmHgLVOT Mean Gradient:  mmHg                   AV Area (Continuity):2.37   3.5 mmHg  MV Mean Gradient: 2.9  cm^2                        Estimated RVSP: 39 mmHg  mmHg                   AV Deceleration Time:       Estimated RAP:10 mmHg  MV Mean Velocity: 0.75 2474.3 msec  m/s                    LVOT VTI: 31.8 cm  MV Deceleration Time:                              TR Velocity:2.69 m/s  271.4 msec             Estimated PASP: 39.03 mmHg  TR Gradient:29.03 mmHg  MV P1/2t: 101.8 msec   Pulm. Vein A Reversal       PV Peak Velocity: 0.51  MVA by PHT:2.16 cm^2   Duration:175.3 msec         m/s  MV Area (continuity):  Pulm. Vein D Velocity:0.74  PV Peak Gradient: 1.02  1.9 cm^2               m/sPulm. Vein A Reversal    mmHg                         Velocity:0.38 m/s           PV Mean Velocity: 0.36                         Pulm. Vein S Velocity: 0.44 m/s  MR Velocity: 5.37 m/s  m/s                         PV Mean Gradient: 0.6  MV RICARDO PISA: 0.23 cm^2                             mmHg  MR VTI: 221.9 cm  Alias Velocity: 0.25  m/sPISA Radius: 0.9 cm   PISA area: 5.09 cm^2MR  flow rate: 125.72  ml/sMR volume:51.04 ml  [email or web address] [id number]    CT CHEST W CONTRAST    Addendum Date: 10/13/2021    ADDENDUM: 6 mm indeterminate hypodense lesions are identified in the body and tail of the pancreas. Consider surveillance     Result Date: 10/13/2021  EXAMINATION: CT OF THE CHEST WITH CONTRAST 10/13/2021 10:59 am TECHNIQUE: CT of the chest was performed with the administration of intravenous contrast. Multiplanar reformatted images are provided for review. Dose modulation, iterative reconstruction, and/or weight based adjustment of the mA/kV was utilized to reduce the radiation dose to as low as reasonably achievable. COMPARISON: None. HISTORY: ORDERING SYSTEM PROVIDED HISTORY: Chest wall mass FINDINGS: There is cardiomegaly. There is coronary artery calcification. The great vessels are normal.  Moderately enlarged mediastinal and hilar lymph nodes are noted with lymph nodes measuring up to 1.3 cm in the right hilum. Trachea and major bronchi are patent. Multiple bilateral pulmonary nodules are identified with nodules measuring up to 1.5 cm in the right lower lobe and smaller ones in the right middle lobe and right upper lobe. The pulmonary nodules in the left lower lobe ranges from 5 mm up to 8 mm. There is no focal consolidation or pleural effusion. Punctate enhancing nodules are identified in the posterior right hepatic lobe, largest 1 measuring up to 7 mm. Multiple heterogeneously enhancing right renal masses are identified largest 1 measuring 3.8 x 3.3 cm which is partially necrotic with additional smaller nodules concerning for multifocal malignancy like renal cell carcinoma. There is filling defects in the right renal vein concerning for tumor invasion versus tumor embolism of the renal vein. There is bilateral adrenal metastasis with the largest 1 in the left adrenal gland measuring 1.6 x 2.7 cm. There is bone metastasis with the destructive soft tissue mass involving the right 4th rib anterolaterally with adjacent soft tissue mass which is partially necrotic measuring 4.5 x 9.2 cm.   Lytic destructive lesions are also identified in the right 8th and 9th rib near the costo vertebral junction. Multiple heterogeneous  enhancing and necrotic masses in the right kidney concerning for multifocal renal cell carcinoma with  possible tumor invasion or tumor embolism of the right renal vein. There is diffuse metastasis with the involvement of the right and left adrenal glands, possible hepatic metastasis, widespread pulmonary and rib involvement as noted. Further assessment by PET-CT scan is recommended. Findings were telephoned to licensed caregiver. ASSESSMENT/PLAN :    There are no diagnoses linked to this encounter. 73years old male with recently noted right chest wall mass found to have multiple right kidney necrotic lesions in addition to large soft tissue destructive mass of right fourth rib, multiple lung nodules up to 1.5 cm, bilateral adrenal metastasis up to 2.5 cm, possible liver metastasis. CT biopsy of chest wall lesion was requested. Pending. Scheduled for next week. PET scan reviewed which showed hypermetabolic lesions in the right kidney, left adrenal diffuse skeletal metastasis, scattered pulmonary nodules most of them subcentimeter except for 1 right lung hypermetabolic nodule. MRI brain reviewed no intracranial metastasis. Imaging consistent with stage IV renal cell carcinoma. Performance status seems fair Karnofsky performance status 80/70. Low risk based on [facility] risk factors although the disease seems to have progressed rapidly over the past month. His rapid progression as well as renal vein involvement probably precludes debulking nephrectomy. Pain at the right side chest wall mass is well controlled with Tylenol as needed. Follows with palliative consult. Return to clinic in 2 weeks. Return in about 2 weeks (around 11/11/2021).      Dorene Pretty MD   Electronically signed 10/28/2021 at 1:26 PM

## 2021-11-02 ENCOUNTER — HOSPITAL ENCOUNTER (OUTPATIENT)
Dept: CT IMAGING | Age: 71
Discharge: HOME | End: 2021-11-02
Payer: MEDICARE

## 2021-11-02 VITALS
RESPIRATION RATE: 20 BRPM | OXYGEN SATURATION: 98 % | SYSTOLIC BLOOD PRESSURE: 160 MMHG | TEMPERATURE: 97.2 F | BODY MASS INDEX: 18.09 KG/M2 | HEIGHT: 68 IN | HEART RATE: 65 BPM | DIASTOLIC BLOOD PRESSURE: 71 MMHG

## 2021-11-02 DIAGNOSIS — R22.2 CHEST WALL MASS: ICD-10-CM

## 2021-11-02 LAB
APTT: 34.1 SEC (ref 24.5–35.1)
HCT VFR BLD CALC: 40.4 % (ref 37–54)
HEMOGLOBIN: 13.1 G/DL (ref 12.5–16.5)
INR BLD: 1
MCH RBC QN AUTO: 32.2 PG (ref 26–35)
MCHC RBC AUTO-ENTMCNC: 32.4 % (ref 32–34.5)
MCV RBC AUTO: 99.3 FL (ref 80–99.9)
PDW BLD-RTO: 15.3 FL (ref 11.5–15)
PLATELET # BLD: 233 E9/L (ref 130–450)
PMV BLD AUTO: 9.9 FL (ref 7–12)
PROTHROMBIN TIME: 11.2 SEC (ref 9.3–12.4)
RBC # BLD: 4.07 E12/L (ref 3.8–5.8)
WBC # BLD: 8.6 E9/L (ref 4.5–11.5)

## 2021-11-02 PROCEDURE — 20206 BIOPSY MUSCLE PERQ NEEDLE: CPT | Performed by: RADIOLOGY

## 2021-11-02 PROCEDURE — 77012 CT SCAN FOR NEEDLE BIOPSY: CPT | Performed by: RADIOLOGY

## 2021-11-02 PROCEDURE — 88341 IMHCHEM/IMCYTCHM EA ADD ANTB: CPT

## 2021-11-02 PROCEDURE — 7100000011 HC PHASE II RECOVERY - ADDTL 15 MIN

## 2021-11-02 PROCEDURE — 88305 TISSUE EXAM BY PATHOLOGIST: CPT

## 2021-11-02 PROCEDURE — 88342 IMHCHEM/IMCYTCHM 1ST ANTB: CPT

## 2021-11-02 PROCEDURE — 6360000002 HC RX W HCPCS: Performed by: RADIOLOGY

## 2021-11-02 PROCEDURE — 32408 CORE NDL BX LNG/MED PERQ: CPT

## 2021-11-02 PROCEDURE — 7100000010 HC PHASE II RECOVERY - FIRST 15 MIN

## 2021-11-02 PROCEDURE — 85610 PROTHROMBIN TIME: CPT

## 2021-11-02 PROCEDURE — 36415 COLL VENOUS BLD VENIPUNCTURE: CPT

## 2021-11-02 PROCEDURE — 85730 THROMBOPLASTIN TIME PARTIAL: CPT

## 2021-11-02 PROCEDURE — 85027 COMPLETE CBC AUTOMATED: CPT

## 2021-11-02 PROCEDURE — 2580000003 HC RX 258: Performed by: RADIOLOGY

## 2021-11-02 RX ORDER — SODIUM CHLORIDE 0.9 % (FLUSH) 0.9 %
5-40 SYRINGE (ML) INJECTION PRN
Status: DISCONTINUED | OUTPATIENT
Start: 2021-11-02 | End: 2021-11-03 | Stop reason: HOSPADM

## 2021-11-02 RX ORDER — FENTANYL CITRATE 50 UG/ML
INJECTION, SOLUTION INTRAMUSCULAR; INTRAVENOUS
Status: COMPLETED | OUTPATIENT
Start: 2021-11-02 | End: 2021-11-02

## 2021-11-02 RX ORDER — MIDAZOLAM HYDROCHLORIDE 1 MG/ML
INJECTION INTRAMUSCULAR; INTRAVENOUS
Status: COMPLETED | OUTPATIENT
Start: 2021-11-02 | End: 2021-11-02

## 2021-11-02 RX ADMIN — MIDAZOLAM 1 MG: 1 INJECTION INTRAMUSCULAR; INTRAVENOUS at 08:54

## 2021-11-02 RX ADMIN — MIDAZOLAM 1 MG: 1 INJECTION INTRAMUSCULAR; INTRAVENOUS at 08:52

## 2021-11-02 RX ADMIN — FENTANYL CITRATE 50 MCG: 50 INJECTION INTRAMUSCULAR; INTRAVENOUS at 08:52

## 2021-11-02 RX ADMIN — SODIUM CHLORIDE, PRESERVATIVE FREE 10 ML: 5 INJECTION INTRAVENOUS at 07:32

## 2021-11-02 ASSESSMENT — PAIN SCALES - GENERAL
PAINLEVEL_OUTOF10: 0

## 2021-11-02 ASSESSMENT — PAIN - FUNCTIONAL ASSESSMENT: PAIN_FUNCTIONAL_ASSESSMENT: 0-10

## 2021-11-11 ENCOUNTER — TELEPHONE (OUTPATIENT)
Dept: CASE MANAGEMENT | Age: 71
End: 2021-11-11

## 2021-11-11 ENCOUNTER — OFFICE VISIT (OUTPATIENT)
Dept: ONCOLOGY | Age: 71
End: 2021-11-11
Payer: MEDICARE

## 2021-11-11 VITALS
HEIGHT: 68 IN | BODY MASS INDEX: 18.87 KG/M2 | WEIGHT: 124.5 LBS | OXYGEN SATURATION: 98 % | TEMPERATURE: 97.9 F | HEART RATE: 63 BPM | DIASTOLIC BLOOD PRESSURE: 71 MMHG | SYSTOLIC BLOOD PRESSURE: 156 MMHG

## 2021-11-11 DIAGNOSIS — C64.1 RENAL CELL CANCER, RIGHT (HCC): Primary | ICD-10-CM

## 2021-11-11 PROCEDURE — 99213 OFFICE O/P EST LOW 20 MIN: CPT

## 2021-11-11 RX ORDER — METHYLPREDNISOLONE SODIUM SUCCINATE 125 MG/2ML
125 INJECTION, POWDER, LYOPHILIZED, FOR SOLUTION INTRAMUSCULAR; INTRAVENOUS ONCE
Status: CANCELLED | OUTPATIENT
Start: 2021-11-18 | End: 2021-11-18

## 2021-11-11 RX ORDER — EPINEPHRINE 1 MG/ML
0.3 INJECTION, SOLUTION, CONCENTRATE INTRAVENOUS PRN
Status: CANCELLED | OUTPATIENT
Start: 2021-11-18

## 2021-11-11 RX ORDER — MEPERIDINE HYDROCHLORIDE 25 MG/ML
12.5 INJECTION INTRAMUSCULAR; INTRAVENOUS; SUBCUTANEOUS ONCE
Status: CANCELLED | OUTPATIENT
Start: 2021-11-18 | End: 2021-11-18

## 2021-11-11 RX ORDER — SODIUM CHLORIDE 9 MG/ML
20 INJECTION, SOLUTION INTRAVENOUS ONCE
Status: CANCELLED | OUTPATIENT
Start: 2021-11-18 | End: 2021-11-18

## 2021-11-11 RX ORDER — SODIUM CHLORIDE 0.9 % (FLUSH) 0.9 %
5-40 SYRINGE (ML) INJECTION PRN
Status: CANCELLED | OUTPATIENT
Start: 2021-11-18

## 2021-11-11 RX ORDER — SODIUM CHLORIDE 9 MG/ML
25 INJECTION, SOLUTION INTRAVENOUS PRN
Status: CANCELLED | OUTPATIENT
Start: 2021-11-18

## 2021-11-11 RX ORDER — DIPHENHYDRAMINE HYDROCHLORIDE 50 MG/ML
50 INJECTION INTRAMUSCULAR; INTRAVENOUS ONCE
Status: CANCELLED | OUTPATIENT
Start: 2021-11-18 | End: 2021-11-18

## 2021-11-11 RX ORDER — PROCHLORPERAZINE EDISYLATE 5 MG/ML
10 INJECTION INTRAMUSCULAR; INTRAVENOUS ONCE
Status: CANCELLED | OUTPATIENT
Start: 2021-11-18

## 2021-11-11 RX ORDER — HEPARIN SODIUM (PORCINE) LOCK FLUSH IV SOLN 100 UNIT/ML 100 UNIT/ML
500 SOLUTION INTRAVENOUS PRN
Status: CANCELLED | OUTPATIENT
Start: 2021-11-18

## 2021-11-11 RX ORDER — SODIUM CHLORIDE 9 MG/ML
5-40 INJECTION INTRAVENOUS PRN
Status: CANCELLED | OUTPATIENT
Start: 2021-11-18

## 2021-11-11 RX ORDER — PROCHLORPERAZINE EDISYLATE 5 MG/ML
10 INJECTION INTRAMUSCULAR; INTRAVENOUS
Status: CANCELLED | OUTPATIENT
Start: 2021-11-18

## 2021-11-11 RX ORDER — SODIUM CHLORIDE 9 MG/ML
INJECTION, SOLUTION INTRAVENOUS CONTINUOUS
Status: CANCELLED | OUTPATIENT
Start: 2021-11-18

## 2021-11-11 NOTE — TELEPHONE ENCOUNTER
Met with patient and his family during his office visit with Dr. Jess Balderas as they discussed his plan of care. Patient stated he would like time to think and discuss wit his family about receiving the immunotherapy treatments. Patient was agreeable with his niece, Kitty Marin, calling the clinic with his decision, provided her the contact number. Patient denied issues with his appetite or losing additional weight. He stated he's unable to drink Ensure/Boost because it causes diarrhea, but is open with suggestions from the clinic dietitian. Informed she will be updated, he asked for her to call his niece Leela. Prior to patient leaving the clinic, his niece stated he is starting to receive medical bills and asked for the financial navigator to look into it. Made a copy of the bill she had with her, placed it on the financial navigator's desk. Patient's family is very supportive and they all denied further needs at this time. Encouraged to call clinic prior to next week's appointment if they have any questions, they verbalized understanding . Isak Bishop  RN, ADN, BSE, OCN  Patient Nurse Navigator

## 2021-11-11 NOTE — PROGRESS NOTES
Sister had breast cancer, father had colon cancer. Diagnostic Data:     Past Medical History:   Diagnosis Date    Anticoagulant long-term use 2007    aspirin    CAD (coronary artery disease)     Cancer (HCC)     Deep vein thrombosis (DVT) (HCC)     leg    [place] (hard of hearing)     Hx of blood clots     Hyperlipidemia     Hypertension     Mentally challenged     information per patient's niece. There are no problems to display for this patient. Past Surgical History:   Procedure Laterality Date   Yonathan Méndez    patient's sister said they were told a twin was removed from paitent's abdomen. Aasa 45  2007    bypass    FEMORAL BYPASS      bifemoral    IR PERCUT BX LUNG/MEDIASTINUM  11/2/2021    IR PERCUT BX LUNG/MEDIASTINUM 11/2/2021 [facility] CT       Family History  Family History   Problem Relation Age of Onset    High Blood Pressure Mother     Heart Disease Mother     Heart Attack Mother     Cancer Father 64        colon    Arthritis Sister     Heart Attack Brother     No Known Problems Maternal Uncle     No Known Problems Paternal Aunt     No Known Problems Paternal Uncle     No Known Problems Maternal Grandmother     Cancer Maternal Grandfather         throat    Heart Attack Paternal Grandmother     No Known Problems Paternal Grandfather     Cirrhosis Paternal Cousin     Deep Vein Thrombosis Sister     Diabetes Sister     Heart Disease Sister         triple bypass    Breast Cancer Sister 61    High Blood Pressure Sister     Diabetes Sister     High Cholesterol Sister     Arthritis Sister        Social History    TOBACCO:   reports that he has been smoking cigarettes. He has a 27.00 pack-year smoking history. He has never used smokeless tobacco.  ETOH:   reports previous alcohol use. Home Medications  Prior to Admission medications    Medication Sig Start Date End Date Taking?  Authorizing Provider   metoprolol tartrate (LOPRESSOR) 25 MG tablet Take 25 mg by mouth 2 times daily   Yes Historical Provider, MD   aspirin 81 MG EC tablet Take 81 mg by mouth daily LD 10/26/21   Yes Historical Provider, MD   simvastatin (ZOCOR) 80 MG tablet Take 80 mg by mouth nightly   Yes Historical Provider, MD   lisinopril (PRINIVIL;ZESTRIL) 20 MG tablet Take 20 mg by mouth daily   Yes Historical Provider, MD       Allergies  No Known Allergies    Review of Systems:      Objective  BP (!) 156/71   Pulse 63   Temp 97.9 °F (36.6 °C)   Ht 5' 8\" (1.727 m)   Wt 124 lb 8 oz (56.5 kg)   SpO2 98%   BMI 18.93 kg/m²     Physical Performance Status    Physical Exam:  General: AAO to person, place, time, and purpose, appears stated age, cooperative, no acute distress, pleasant   Head and neck : PERRLA, EOMI . Sclera non icteric. Oropharynx : Clear  Neck: no JVD,  no adenopathy, no carotid bruit  LYMPHATICS : no lap  Lungs: Clear to auscultation. Right side chest wall globular mass, 4-5 cm in diamter, soft. Heart: Regular rate and regular rhythm, no murmur, normal S1, S2  Abdomen: Soft, non-tender;no masses, no organomegaly  Extremities: No edema,no cyanosis, no clubbing. Skin:  No rash. Neurologic:Cranial nerves grossly intact. No focal motor or sensory deficits .     Recent Laboratory Data-   Lab Results   Component Value Date    WBC 8.6 11/02/2021    HGB 13.1 11/02/2021    HCT 40.4 11/02/2021    MCV 99.3 11/02/2021     11/02/2021    LYMPHOPCT 32.2 10/14/2021    RBC 4.07 11/02/2021    MCH 32.2 11/02/2021    MCHC 32.4 11/02/2021    RDW 15.3 (H) 11/02/2021    NEUTOPHILPCT 58.0 10/14/2021    MONOPCT 6.3 10/14/2021    BASOPCT 1.1 10/14/2021    NEUTROABS 4.73 10/14/2021    LYMPHSABS 2.63 10/14/2021    MONOSABS 0.51 10/14/2021    EOSABS 0.19 10/14/2021    BASOSABS 0.09 10/14/2021       Lab Results   Component Value Date     10/14/2021    K 3.9 10/14/2021     10/14/2021    CO2 27 10/14/2021    BUN 4 (L) 10/14/2021    CREATININE 0.8 10/14/2021    GLUCOSE 102 (H) 10/14/2021    CALCIUM 9.1 10/14/2021    PROT 7.0 10/14/2021    LABALBU 4.0 10/14/2021    BILITOT <0.2 10/14/2021    ALKPHOS 135 (H) 10/14/2021    AST 14 10/14/2021    ALT 13 10/14/2021    LABGLOM >60 10/14/2021    GFRAA >60 10/14/2021       No results found for: IRON, TIBC, FERRITIN        Radiology-    Echo Complete    Result Date: 10/8/2021  Transthoracic Echocardiography Report (TTE)  Demographics   Patient Name    [facility]        Gender            Male                  Marcio Prater  [id number]      Room Number  Number   Account #       [de-identified]     Procedure Date    10/08/2021   Corporate ID                  Ordering          Jennifer Faustin                                 Physician   Accession       [id number]    Referring         Jennifer Faustin   Number                        Physician   Date of Birth   1950    Sonographer       Joe Sweeney RDCS   Age             73 year(s)    Interpreting      Himanshu 66                                Physician         Physician Cardiology                                                  Adam Cruz MD                                 Any Other  Procedure Type of Study   TTE procedure:Echo Complete W/Doppler & Color Flow. Procedure Date Date: 10/08/2021 Start: 09:57 AM Study Location: Echo Lab Technical Quality: Poor visualization due to poor acoustical window. Indications:Heart murmur. Patient Status: Routine Height: 68 inches Weight: 120 pounds BSA: 1.65 m^2 BMI: 18.25 kg/m^2  Findings   Left Ventricle  Normal left ventricular chamber size. Normal left ventricular systolic function. Visually estimated LVEF 65%. Mild left ventricular concentric hypertrophy noted. Normal diastolic function. Right Ventricle  Normal right ventricle size and function. Left Atrium  The left atrium is mildly dilated. Mildly increased left atrial volume. Interatrial septum not well visualized but appears intact. Right Atrium  Normal right atrium. Mitral Valve  Normal mitral valve structure. There is moderate mitral regurgitation - ERO 0.23cm2, PISA 0.9cm, RV 51cc. No mitral valve prolapse. Tricuspid Valve  Normal tricuspid valve structure. There is mild tricuspid regurgitation. Mild pulmonary hypertension, RVSP 39mmHg. Aortic Valve  Normal aortic valve structure. There is trace to mild aortic regurgitation, pressure 1/2 time 718ms. Pulmonic Valve  The pulmonic valve was not well visualized. Pericardial Effusion  No evidence of pericardial effusion. Pericardium appears normal.   Pleural Effusion  No evidence of pleural effusion. Aorta  Aortic root 3.5cm. Miscellaneous  The inferior vena cava diameter is normal with normal respiratory  variation. Conclusions   Summary  Normal left ventricular chamber size. Normal left ventricular systolic function, LVEF 35%. Mild left ventricular concentric hypertrophy noted. Normal diastolic function. The left atrium is mildly dilated. Mildly increased left atrial volume. Interatrial septum not well visualized but appears intact. Normal right ventricle size and function. There is moderate mitral regurgitation - ERO 0.23cm2, PISA 0.9cm, RV 51cc. No mitral valve prolapse. There is trace to mild aortic regurgitation, pressure 1/2 time 718ms. There is mild tricuspid regurgitation. Mild pulmonary hypertension, RVSP 39mmHg. The pulmonic valve was not well visualized. Aortic root 3.5cm. No evidence of pericardial effusion. No intra cardiac mass or thrombus. No comparison study available.    Signature   ----------------------------------------------------------------  Electronically signed by Angelica Goodwin MD(Interpreting  physician) on 10/08/2021 03:52 PM  ----------------------------------------------------------------  M-Mode/2D Measurements & Calculations   LV Diastolic    LV Systolic Dimension: 3.2   AV Cusp Separation: 1.9 cmLA  Dimension: 5.3  cm                           Dimension: 3.9 cmAO Root  cm              LV Volume Diastolic: 413.4   Dimension: 3.5 cm  LV FS:39.6 %    ml  LV PW           LV Volume Systolic: 72.9 ml  Diastolic: 1.2  LV EDV/LV EDV Index: 134.5  cm              ml/82 ml/m^2LV ESV/LV ESV    RV Diastolic Dimension: 2.8  LV PW Systolic: Index: 27.2 KS/78KT/ m^2     cm  1.5 cm          EF Calculated: 70 %          RV Systolic Dimension: 2.4 cm  Septum          LV Mass Index: 147 l/min*m^2 LA/Aorta: 1.2  Diastolic: 1.1  cm                                           LA volume/Index: 79.7 ml  Septum          LVOT: 2 cm  Systolic: 1.2  cm   LV Mass: 241.96  g  Doppler Measurements & Calculations   MV Peak E-Wave: 1.42   AV Peak Velocity: 1.87 m/s  LVOT Peak Velocity:  m/s                    AV Peak Gradient: 14.05     1.55 m/s  MV Peak A-Wave: 0.82   mmHg                        LVOT Mean Velocity:  m/s                    AV Mean Velocity: 1.27 m/s  0.81 m/s  MV E/A Ratio: 1.73     AV Mean Gradient: 7.2 mmHg  LVOT Peak Gradient: 9.6  MV Peak Gradient: 9.2  AV VTI: 42.1 cm             mmHgLVOT Mean Gradient:  mmHg                   AV Area (Continuity):2.37   3.5 mmHg  MV Mean Gradient: 2.9  cm^2                        Estimated RVSP: 39 mmHg  mmHg                   AV Deceleration Time:       Estimated RAP:10 mmHg  MV Mean Velocity: 0.75 2474.3 msec  m/s                    LVOT VTI: 31.8 cm  MV Deceleration Time:                              TR Velocity:2.69 m/s  271.4 msec             Estimated PASP: 39.03 mmHg  TR Gradient:29.03 mmHg  MV P1/2t: 101.8 msec   Pulm. Vein A Reversal       PV Peak Velocity: 0.51  MVA by PHT:2.16 cm^2   Duration:175.3 msec         m/s  MV Area (continuity):  Pulm. Vein D Velocity:0.74  PV Peak Gradient: 1.02  1.9 cm^2               m/sPulm. Vein A Reversal    mmHg                         Velocity:0.38 m/s           PV Mean Velocity: 0.36                         Pulm.  Vein S Velocity: 0.44 m/s  MR Velocity: 5.37 m/s  m/s                         PV Mean Gradient: 0.6  MV RICARDO PISA: 0.23 cm^2                             mmHg  MR VTI: 221.9 cm  Alias Velocity: 0.25  m/sPISA Radius: 0.9 cm   PISA area: 5.09 cm^2MR  flow rate: 125.72  ml/sMR volume:51.04 ml  [id number]    CT CHEST W CONTRAST    Addendum Date: 10/13/2021    ADDENDUM: 6 mm indeterminate hypodense lesions are identified in the body and tail of the pancreas. Consider surveillance     Result Date: 10/13/2021  EXAMINATION: CT OF THE CHEST WITH CONTRAST 10/13/2021 10:59 am TECHNIQUE: CT of the chest was performed with the administration of intravenous contrast. Multiplanar reformatted images are provided for review. Dose modulation, iterative reconstruction, and/or weight based adjustment of the mA/kV was utilized to reduce the radiation dose to as low as reasonably achievable. COMPARISON: None. HISTORY: ORDERING SYSTEM PROVIDED HISTORY: Chest wall mass FINDINGS: There is cardiomegaly. There is coronary artery calcification. The great vessels are normal.  Moderately enlarged mediastinal and hilar lymph nodes are noted with lymph nodes measuring up to 1.3 cm in the right hilum. Trachea and major bronchi are patent. Multiple bilateral pulmonary nodules are identified with nodules measuring up to 1.5 cm in the right lower lobe and smaller ones in the right middle lobe and right upper lobe. The pulmonary nodules in the left lower lobe ranges from 5 mm up to 8 mm. There is no focal consolidation or pleural effusion. Punctate enhancing nodules are identified in the posterior right hepatic lobe, largest 1 measuring up to 7 mm. Multiple heterogeneously enhancing right renal masses are identified largest 1 measuring 3.8 x 3.3 cm which is partially necrotic with additional smaller nodules concerning for multifocal malignancy like renal cell carcinoma.   There is filling defects in the right renal vein concerning for tumor invasion versus tumor embolism of the renal vein. There is bilateral adrenal metastasis with the largest 1 in the left adrenal gland measuring 1.6 x 2.7 cm. There is bone metastasis with the destructive soft tissue mass involving the right 4th rib anterolaterally with adjacent soft tissue mass which is partially necrotic measuring 4.5 x 9.2 cm. Lytic destructive lesions are also identified in the right 8th and 9th rib near the costo vertebral junction. Multiple heterogeneous  enhancing and necrotic masses in the right kidney concerning for multifocal renal cell carcinoma with  possible tumor invasion or tumor embolism of the right renal vein. There is diffuse metastasis with the involvement of the right and left adrenal glands, possible hepatic metastasis, widespread pulmonary and rib involvement as noted. Further assessment by PET-CT scan is recommended. Findings were telephoned to licensed caregiver. ASSESSMENT/PLAN :    Fabiola Aguirre was seen today for cancer. 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 Stage IV renal cell carcinoma. ECOG 1/2. S/p right chest wall mass biopsy on 11/2/2021. Consistent with clear-cell renal cell carcinoma. Performance status seems fair Karnofsky performance status 80/70. Intermediate risk based on [facility] risk factors although the disease seems to have progressed rapidly over the past month. His rapid progression as well as renal vein involvement probably precludes debulking nephrectomy. Tumor burden is not high and patient is asymptomatic. Recommended treatment with ipilimumab/nivolumab combination. Discussed pathology results, treatment options risks and benefits associated with immunotherapy. Patient wants to go home and decide after discussing with his family members if he wants to go ahead with treatment. Pain at the right side chest wall mass is well controlled with Tylenol as needed. Return to clinic in a week. Will start Xgeva if he decides to start treatment. He is edentulous. Return in about 1 week (around 11/18/2021).      Page Palafox MD   Electronically signed 11/11/2021 at 10:08 AM

## 2021-11-12 ENCOUNTER — TELEPHONE (OUTPATIENT)
Dept: SURGERY | Age: 71
End: 2021-11-12

## 2021-11-12 NOTE — TELEPHONE ENCOUNTER
Prior Authorization Form:      DEMOGRAPHICS:                     Patient Name:  Epi Connelly  Patient :  1950            Insurance:  Payor: Evita Parker / Plan: Alex Barker ESSENTIAL/PLUS / Product Type: *No Product type* /   Insurance ID Number:    Payor/Plan Subscr  Sex Relation Sub. Ins. ID Effective Group Num   1.  [id number] 1950 Male [facility] [id number] [id number]                                    [id number]         DIAGNOSIS & PROCEDURE:                       Procedure/Operation: mediport insertion           CPT Code: 40296    Diagnosis:  Poor venous access    ICD10 Code: I87.8    [facility]  [facility]    Surgeon:  Becki Necessary INFORMATION:                          Date: 2021    Time:               Anesthesia:                                                         Status:  Outpatient        Special Comments:           Electronically signed by Jeni Esvin, [address] on 2021 at 3:07 PM

## 2021-11-15 ENCOUNTER — PREP FOR PROCEDURE (OUTPATIENT)
Dept: SURGERY | Age: 71
End: 2021-11-15

## 2021-11-15 ENCOUNTER — FOLLOWUP TELEPHONE ENCOUNTER (OUTPATIENT)
Dept: INFUSION THERAPY | Age: 71
End: 2021-11-15

## 2021-11-15 RX ORDER — SODIUM CHLORIDE 0.9 % (FLUSH) 0.9 %
5-40 SYRINGE (ML) INJECTION PRN
Status: CANCELLED | OUTPATIENT
Start: 2021-11-15

## 2021-11-15 RX ORDER — SODIUM CHLORIDE 0.9 % (FLUSH) 0.9 %
5-40 SYRINGE (ML) INJECTION EVERY 12 HOURS SCHEDULED
Status: CANCELLED | OUTPATIENT
Start: 2021-11-15

## 2021-11-15 RX ORDER — SODIUM CHLORIDE, SODIUM LACTATE, POTASSIUM CHLORIDE, CALCIUM CHLORIDE 600; 310; 30; 20 MG/100ML; MG/100ML; MG/100ML; MG/100ML
INJECTION, SOLUTION INTRAVENOUS CONTINUOUS
Status: CANCELLED | OUTPATIENT
Start: 2021-11-15

## 2021-11-15 RX ORDER — SODIUM CHLORIDE 9 MG/ML
25 INJECTION, SOLUTION INTRAVENOUS PRN
Status: CANCELLED | OUTPATIENT
Start: 2021-11-15

## 2021-11-15 NOTE — TELEPHONE ENCOUNTER
I reached out to patient based off of a referral from Bria Brooks, RN Nurse Navigator, who informed me that patient has some financial concerns. I called to see what I could help them with and they were interested in filing for financial assistance. Patient's niece was provided with the information that was needed in order to file. She is going to gather the information and call me back to schedule an appointment to complete the application process.  Electronically signed by Allison Triplett on 11/15/2021 at 11:23 AM

## 2021-11-15 NOTE — PROGRESS NOTES
[address] [facility]                                                                                                                    PRE OP INSTRUCTIONS FOR  Chester Black        Date: 11/15/2021    Date of surgery: 11/16/21  Arrival Time: Hospital will call you between 5pm and 7pm with your final arrival time for surgery    1. Do not eat or drink anything after 0630 prior to surgery. This includes no water, chewing gum, mints or ice chips. 2. Take the following medications with a small sip of water on the morning of Surgery: Metoprolol     3. Diabetics may take evening dose of insulin but none after midnight. If you feel symptomatic or low blood sugar morning of surgery drink 1-2 ounces of apple juice only. 4. Aspirin, Ibuprofen, Advil, Naproxen, Vitamin E and other Anti-inflammatory products should be stopped  before surgery  as directed by your physician. Take Tylenol only unless instructed otherwise by your surgeon. 5. Check with your Doctor regarding stopping Plavix, Coumadin, Lovenox, Eliquis, Effient, or other blood thinners. 6. Do not smoke,use illicit drugs and do not drink any alcoholic beverages 24 hours prior to surgery. 7. You may brush your teeth the morning of surgery. DO NOT SWALLOW WATER    8. You MUST make arrangements for a responsible adult to take you home after your surgery. You will not be allowed to leave alone or drive yourself home. It is strongly suggested someone stay with you the first 24 hrs. Your surgery will be cancelled if you do not have a ride home. 9. PEDIATRIC PATIENTS ONLY:  A parent/legal guardian must accompany a child scheduled for surgery and plan to stay at the hospital until the child is discharged. Please do not bring other children with you.     10. Please wear simple, loose fitting clothing to the hospital.  Dorinda Borjas not bring valuables (money, credit cards, checkbooks, etc.) Do not wear any makeup (including no eye makeup) or nail

## 2021-11-16 ENCOUNTER — HOSPITAL ENCOUNTER (OUTPATIENT)
Dept: GENERAL RADIOLOGY | Age: 71
Discharge: HOME | End: 2021-11-18
Attending: SURGERY
Payer: MEDICARE

## 2021-11-16 ENCOUNTER — APPOINTMENT (OUTPATIENT)
Dept: GENERAL RADIOLOGY | Age: 71
End: 2021-11-16
Attending: SURGERY
Payer: MEDICARE

## 2021-11-16 ENCOUNTER — ANESTHESIA EVENT (OUTPATIENT)
Dept: OPERATING ROOM | Age: 71
End: 2021-11-16
Payer: MEDICARE

## 2021-11-16 ENCOUNTER — ANESTHESIA (OUTPATIENT)
Dept: OPERATING ROOM | Age: 71
End: 2021-11-16
Payer: MEDICARE

## 2021-11-16 ENCOUNTER — HOSPITAL ENCOUNTER (OUTPATIENT)
Age: 71
Setting detail: OUTPATIENT SURGERY
Discharge: HOME | End: 2021-11-16
Attending: SURGERY | Admitting: SURGERY
Payer: MEDICARE

## 2021-11-16 VITALS
SYSTOLIC BLOOD PRESSURE: 104 MMHG | DIASTOLIC BLOOD PRESSURE: 52 MMHG | OXYGEN SATURATION: 98 % | RESPIRATION RATE: 19 BRPM

## 2021-11-16 VITALS
TEMPERATURE: 97 F | HEIGHT: 68 IN | SYSTOLIC BLOOD PRESSURE: 157 MMHG | WEIGHT: 121 LBS | DIASTOLIC BLOOD PRESSURE: 63 MMHG | OXYGEN SATURATION: 97 % | HEART RATE: 71 BPM | RESPIRATION RATE: 16 BRPM | BODY MASS INDEX: 18.34 KG/M2

## 2021-11-16 DIAGNOSIS — I87.8 POOR VENOUS ACCESS: ICD-10-CM

## 2021-11-16 DIAGNOSIS — I87.8 POOR VENOUS ACCESS: Primary | ICD-10-CM

## 2021-11-16 LAB
ANION GAP SERPL CALCULATED.3IONS-SCNC: 12 MMOL/L (ref 7–16)
APTT: 35.1 SEC (ref 24.5–35.1)
BUN BLDV-MCNC: 6 MG/DL (ref 6–23)
CALCIUM SERPL-MCNC: 9.7 MG/DL (ref 8.6–10.2)
CHLORIDE BLD-SCNC: 102 MMOL/L (ref 98–107)
CO2: 27 MMOL/L (ref 22–29)
CREAT SERPL-MCNC: 0.8 MG/DL (ref 0.7–1.2)
EKG ATRIAL RATE: 60 BPM
EKG P AXIS: 68 DEGREES
EKG P-R INTERVAL: 156 MS
EKG Q-T INTERVAL: 428 MS
EKG QRS DURATION: 92 MS
EKG QTC CALCULATION (BAZETT): 428 MS
EKG R AXIS: 57 DEGREES
EKG T AXIS: 64 DEGREES
EKG VENTRICULAR RATE: 60 BPM
GFR AFRICAN AMERICAN: >60
GFR NON-AFRICAN AMERICAN: >60 ML/MIN/1.73
GLUCOSE BLD-MCNC: 92 MG/DL (ref 74–99)
INR BLD: 1
POTASSIUM REFLEX MAGNESIUM: 3.9 MMOL/L (ref 3.5–5)
PROTHROMBIN TIME: 11.8 SEC (ref 9.3–12.4)
SARS-COV-2, NAAT: NOT DETECTED
SODIUM BLD-SCNC: 141 MMOL/L (ref 132–146)

## 2021-11-16 PROCEDURE — 6360000002 HC RX W HCPCS: Performed by: NURSE ANESTHETIST, CERTIFIED REGISTERED

## 2021-11-16 PROCEDURE — 2709999900 HC NON-CHARGEABLE SUPPLY: Performed by: SURGERY

## 2021-11-16 PROCEDURE — 99203 OFFICE O/P NEW LOW 30 MIN: CPT | Performed by: SURGERY

## 2021-11-16 PROCEDURE — 6360000002 HC RX W HCPCS: Performed by: SURGERY

## 2021-11-16 PROCEDURE — 7100000011 HC PHASE II RECOVERY - ADDTL 15 MIN: Performed by: SURGERY

## 2021-11-16 PROCEDURE — 36415 COLL VENOUS BLD VENIPUNCTURE: CPT

## 2021-11-16 PROCEDURE — 3600000013 HC SURGERY LEVEL 3 ADDTL 15MIN: Performed by: SURGERY

## 2021-11-16 PROCEDURE — 80048 BASIC METABOLIC PNL TOTAL CA: CPT

## 2021-11-16 PROCEDURE — 85730 THROMBOPLASTIN TIME PARTIAL: CPT

## 2021-11-16 PROCEDURE — 93005 ELECTROCARDIOGRAM TRACING: CPT | Performed by: ANESTHESIOLOGY

## 2021-11-16 PROCEDURE — C1788 PORT, INDWELLING, IMP: HCPCS | Performed by: SURGERY

## 2021-11-16 PROCEDURE — 77001 FLUOROGUIDE FOR VEIN DEVICE: CPT | Performed by: SURGERY

## 2021-11-16 PROCEDURE — 2580000003 HC RX 258: Performed by: SURGERY

## 2021-11-16 PROCEDURE — 2500000003 HC RX 250 WO HCPCS: Performed by: SURGERY

## 2021-11-16 PROCEDURE — 36561 INSERT TUNNELED CV CATH: CPT | Performed by: SURGERY

## 2021-11-16 PROCEDURE — 7100000010 HC PHASE II RECOVERY - FIRST 15 MIN: Performed by: SURGERY

## 2021-11-16 PROCEDURE — C1881 DIALYSIS ACCESS SYSTEM: HCPCS | Performed by: SURGERY

## 2021-11-16 PROCEDURE — 6370000000 HC RX 637 (ALT 250 FOR IP): Performed by: SURGERY

## 2021-11-16 PROCEDURE — 87635 SARS-COV-2 COVID-19 AMP PRB: CPT

## 2021-11-16 PROCEDURE — 3209999900 FLUORO FOR SURGICAL PROCEDURES

## 2021-11-16 PROCEDURE — 3700000000 HC ANESTHESIA ATTENDED CARE: Performed by: SURGERY

## 2021-11-16 PROCEDURE — 71045 X-RAY EXAM CHEST 1 VIEW: CPT

## 2021-11-16 PROCEDURE — 85610 PROTHROMBIN TIME: CPT

## 2021-11-16 PROCEDURE — 3600000003 HC SURGERY LEVEL 3 BASE: Performed by: SURGERY

## 2021-11-16 PROCEDURE — 3700000001 HC ADD 15 MINUTES (ANESTHESIA): Performed by: SURGERY

## 2021-11-16 PROCEDURE — 2580000003 HC RX 258: Performed by: NURSE ANESTHETIST, CERTIFIED REGISTERED

## 2021-11-16 DEVICE — PORT SMARTPORT 8FR CT TI W/VLV SHTH: Type: IMPLANTABLE DEVICE | Site: CHEST | Status: FUNCTIONAL

## 2021-11-16 RX ORDER — SODIUM CHLORIDE, SODIUM LACTATE, POTASSIUM CHLORIDE, CALCIUM CHLORIDE 600; 310; 30; 20 MG/100ML; MG/100ML; MG/100ML; MG/100ML
INJECTION, SOLUTION INTRAVENOUS CONTINUOUS
Status: DISCONTINUED | OUTPATIENT
Start: 2021-11-16 | End: 2021-11-16 | Stop reason: HOSPADM

## 2021-11-16 RX ORDER — ACETAMINOPHEN 650 MG/1
TABLET, FILM COATED, EXTENDED RELEASE ORAL
COMMUNITY
Start: 2021-09-21

## 2021-11-16 RX ORDER — SODIUM CHLORIDE, SODIUM LACTATE, POTASSIUM CHLORIDE, CALCIUM CHLORIDE 600; 310; 30; 20 MG/100ML; MG/100ML; MG/100ML; MG/100ML
INJECTION, SOLUTION INTRAVENOUS CONTINUOUS PRN
Status: DISCONTINUED | OUTPATIENT
Start: 2021-11-16 | End: 2021-11-16 | Stop reason: SDUPTHER

## 2021-11-16 RX ORDER — TRAMADOL HYDROCHLORIDE 50 MG/1
50 TABLET ORAL EVERY 6 HOURS PRN
Qty: 6 TABLET | Refills: 0 | Status: SHIPPED | OUTPATIENT
Start: 2021-11-16 | End: 2021-11-19

## 2021-11-16 RX ORDER — HEPARIN SODIUM (PORCINE) LOCK FLUSH IV SOLN 100 UNIT/ML 100 UNIT/ML
SOLUTION INTRAVENOUS PRN
Status: DISCONTINUED | OUTPATIENT
Start: 2021-11-16 | End: 2021-11-16 | Stop reason: ALTCHOICE

## 2021-11-16 RX ORDER — FENTANYL CITRATE 50 UG/ML
INJECTION, SOLUTION INTRAMUSCULAR; INTRAVENOUS PRN
Status: DISCONTINUED | OUTPATIENT
Start: 2021-11-16 | End: 2021-11-16 | Stop reason: SDUPTHER

## 2021-11-16 RX ORDER — LIDOCAINE HYDROCHLORIDE 20 MG/ML
INJECTION, SOLUTION INTRAVENOUS PRN
Status: DISCONTINUED | OUTPATIENT
Start: 2021-11-16 | End: 2021-11-16 | Stop reason: SDUPTHER

## 2021-11-16 RX ORDER — SODIUM CHLORIDE 9 MG/ML
INJECTION INTRAVENOUS PRN
Status: DISCONTINUED | OUTPATIENT
Start: 2021-11-16 | End: 2021-11-16 | Stop reason: ALTCHOICE

## 2021-11-16 RX ORDER — MIDAZOLAM HYDROCHLORIDE 1 MG/ML
INJECTION INTRAMUSCULAR; INTRAVENOUS PRN
Status: DISCONTINUED | OUTPATIENT
Start: 2021-11-16 | End: 2021-11-16 | Stop reason: SDUPTHER

## 2021-11-16 RX ORDER — BUPIVACAINE HYDROCHLORIDE AND EPINEPHRINE 2.5; 5 MG/ML; UG/ML
INJECTION, SOLUTION EPIDURAL; INFILTRATION; INTRACAUDAL; PERINEURAL PRN
Status: DISCONTINUED | OUTPATIENT
Start: 2021-11-16 | End: 2021-11-16 | Stop reason: ALTCHOICE

## 2021-11-16 RX ORDER — SODIUM CHLORIDE 0.9 % (FLUSH) 0.9 %
5-40 SYRINGE (ML) INJECTION EVERY 12 HOURS SCHEDULED
Status: DISCONTINUED | OUTPATIENT
Start: 2021-11-16 | End: 2021-11-16 | Stop reason: HOSPADM

## 2021-11-16 RX ORDER — PROPOFOL 10 MG/ML
INJECTION, EMULSION INTRAVENOUS PRN
Status: DISCONTINUED | OUTPATIENT
Start: 2021-11-16 | End: 2021-11-16

## 2021-11-16 RX ORDER — SODIUM CHLORIDE 9 MG/ML
25 INJECTION, SOLUTION INTRAVENOUS PRN
Status: DISCONTINUED | OUTPATIENT
Start: 2021-11-16 | End: 2021-11-16 | Stop reason: HOSPADM

## 2021-11-16 RX ORDER — PROPOFOL 10 MG/ML
INJECTION, EMULSION INTRAVENOUS CONTINUOUS PRN
Status: DISCONTINUED | OUTPATIENT
Start: 2021-11-16 | End: 2021-11-16 | Stop reason: SDUPTHER

## 2021-11-16 RX ORDER — SODIUM CHLORIDE 0.9 % (FLUSH) 0.9 %
SYRINGE (ML) INJECTION
Status: DISCONTINUED
Start: 2021-11-16 | End: 2021-11-16 | Stop reason: HOSPADM

## 2021-11-16 RX ORDER — SODIUM CHLORIDE 0.9 % (FLUSH) 0.9 %
5-40 SYRINGE (ML) INJECTION PRN
Status: DISCONTINUED | OUTPATIENT
Start: 2021-11-16 | End: 2021-11-16 | Stop reason: HOSPADM

## 2021-11-16 RX ADMIN — CEFAZOLIN 2000 MG: 10 INJECTION, POWDER, FOR SOLUTION INTRAVENOUS at 14:29

## 2021-11-16 RX ADMIN — PROPOFOL 75 MCG/KG/MIN: 10 INJECTION, EMULSION INTRAVENOUS at 14:30

## 2021-11-16 RX ADMIN — MIDAZOLAM 2 MG: 1 INJECTION INTRAMUSCULAR; INTRAVENOUS at 14:21

## 2021-11-16 RX ADMIN — LIDOCAINE HYDROCHLORIDE 30 MG: 20 INJECTION, SOLUTION INTRAVENOUS at 14:30

## 2021-11-16 RX ADMIN — SODIUM CHLORIDE, POTASSIUM CHLORIDE, SODIUM LACTATE AND CALCIUM CHLORIDE: 600; 310; 30; 20 INJECTION, SOLUTION INTRAVENOUS at 12:50

## 2021-11-16 RX ADMIN — SODIUM CHLORIDE, POTASSIUM CHLORIDE, SODIUM LACTATE AND CALCIUM CHLORIDE: 600; 310; 30; 20 INJECTION, SOLUTION INTRAVENOUS at 14:20

## 2021-11-16 RX ADMIN — FENTANYL CITRATE 50 MCG: 50 INJECTION, SOLUTION INTRAMUSCULAR; INTRAVENOUS at 14:30

## 2021-11-16 ASSESSMENT — PULMONARY FUNCTION TESTS
PIF_VALUE: 0
PIF_VALUE: 19
PIF_VALUE: 1
PIF_VALUE: 17
PIF_VALUE: 7
PIF_VALUE: 17
PIF_VALUE: 19
PIF_VALUE: 1
PIF_VALUE: 0
PIF_VALUE: 17
PIF_VALUE: 20
PIF_VALUE: 0
PIF_VALUE: 0
PIF_VALUE: 17
PIF_VALUE: 0
PIF_VALUE: 17
PIF_VALUE: 19
PIF_VALUE: 17
PIF_VALUE: 18

## 2021-11-16 ASSESSMENT — LIFESTYLE VARIABLES: SMOKING_STATUS: 1

## 2021-11-16 NOTE — H&P
Done day of due to covid 19 risk  General Surgery History and Physical    Patient's Name/Date of Birth: Rick Case / 1950    Date: November 16, 2021     Surgeon: Tobias Argueta M.D.    PCP: SHI Perrin NP     Chief Complaint: poor venous access with renal cancer     HPI:   Rick Case is a 73 y.o. male who presents for evaluation of cancer and poor venous access for port placement. Past Medical History:   Diagnosis Date    Anticoagulant long-term use 2007    aspirin    CAD (coronary artery disease)     Cancer (HCC)     kidney    Deep vein thrombosis (DVT) (HCC)     leg    [place] (hard of hearing)     Hx of blood clots     Hyperlipidemia     Hypertension     Mentally challenged     information per patient's niece. Past Surgical History:   Procedure Laterality Date   Yonathan Méndez    patient's sister said they were told a twin was removed from paitent's abdomen. [address] 45  2007    bypass    FEMORAL BYPASS      bifemoral    IR PERCUT BX LUNG/MEDIASTINUM  11/2/2021    IR PERCUT BX LUNG/MEDIASTINUM 11/2/2021 [facility] CT       Current Facility-Administered Medications   Medication Dose Route Frequency Provider Last Rate Last Admin    0.9 % sodium chloride infusion  25 mL IntraVENous PRN Jesika Cook MD        ceFAZolin (ANCEF) 2000 mg in sterile water 20 mL IV syringe  2,000 mg IntraVENous On Call to [address] [place] [address], MD        lactated ringers infusion   IntraVENous Continuous Jesika Cook MD        sodium chloride flush 0.9 % injection 5-40 mL  5-40 mL IntraVENous 2 times per day Jesika Cook MD        sodium chloride flush 0.9 % injection 5-40 mL  5-40 mL IntraVENous PRN Jesika Cook MD           No Known Allergies    The patient has a family history that is negative for severe cardiovascular or respiratory issues, negative for reaction to anesthesia.     Social History     Socioeconomic History    Marital status:      Spouse name: Not on file    Number of children: Not on file    Years of education: Not on file    Highest education level: Not on file   Occupational History    Not on file   Tobacco Use    Smoking status: Current Every Day Smoker     Packs/day: 0.25     Years: 54.00     Pack years: 13.50     Types: Cigarettes    Smokeless tobacco: Never Used    Tobacco comment: Informed patient of [place] Tobacco Cessation program, gave pamphlet. Vaping Use    Vaping Use: Never used   Substance and Sexual Activity    Alcohol use: Not Currently    Drug use: Not Currently    Sexual activity: Not on file   Other Topics Concern    Not on file   Social History Narrative    Not on file     Social Determinants of Health     Financial Resource Strain:     Difficulty of Paying Living Expenses: Not on file   Food Insecurity:     Worried About Running Out of Food in the Last Year: Not on file    Evelin of Food in the Last Year: Not on file   Transportation Needs:     Lack of Transportation (Medical): Not on file    Lack of Transportation (Non-Medical):  Not on file   Physical Activity:     Days of Exercise per Week: Not on file    Minutes of Exercise per Session: Not on file   Stress:     Feeling of Stress : Not on file   Social Connections:     Frequency of Communication with Friends and Family: Not on file    Frequency of Social Gatherings with Friends and Family: Not on file    Attends Taoist Services: Not on file    Active Member of [address] [place] [address] or Organizations: Not on file    Attends Club or Organization Meetings: Not on file    Marital Status: Not on file   Intimate Partner Violence:     Fear of Current or Ex-Partner: Not on file    Emotionally Abused: Not on file    Physically Abused: Not on file    Sexually Abused: Not on file   Housing Stability:     Unable to Pay for Housing in the Last Year: Not on file    Number of [place] in the Last Year: Not on file    Unstable Housing in the Last Year: Not on file Review of Systems  Review of Systems -  General ROS: negative for - chills, fatigue or malaise  ENT ROS: negative for - hearing change, nasal congestion or nasal discharge  Allergy and Immunology ROS: negative for - hives, itchy/watery eyes or nasal congestion  Hematological and Lymphatic ROS: negative for - blood clots, blood transfusions, bruising or fatigue  Endocrine ROS: negative for - malaise/lethargy, mood swings, palpitations or polydipsia/polyuria  Breast ROS: negative for - new or changing breast lumps or nipple changes  Respiratory ROS: negative for - sputum changes, stridor, tachypnea or wheezing  Cardiovascular ROS: negative for - irregular heartbeat, loss of consciousness, murmur or orthopnea  Gastrointestinal ROS: negative for - constipation, diarrhea, gas/bloating, heartburn or hematemesis  Genito-Urinary ROS: negative for -  genital discharge, genital ulcers or hematuria  Musculoskeletal ROS: negative for - gait disturbance, muscle pain or muscular weakness    Physical exam:   BP (!) 156/71   Pulse 63   Temp 97.9 °F (36.6 °C)   Ht 5' 8\" (1.727 m)   Wt 124 lb 8 oz (56.5 kg)   SpO2 98%   BMI 18.93 kg/m²  General appearance:  NAD  Pyscho/social: negative for tremors and hallucinations  Head: NCAT, PERRLA, EOMI, red conjunctiva  Neck: supple, no masses  Lungs: CTAB, equal chest rise bilateral  Heart: Reg rate  Abdomen: soft, nontender, nondistended  Skin; no lesions  Gu: no cva tenderness  Extremities: extremities normal, atraumatic, no cyanosis or edema      Assessment:  73 y.o. male with poor venous access and renal cancer for mediport     Plan: To OR for placement  Discussed the risk, benefits and alternatives of surgery including wound infections, bleeding, scar  and the risks of  anesthetic including MI, CVA, sudden death or reactions to anesthetic medications. The patient understands the risks and alternatives.  All questions were answered to the patient's satisfaction and they freely signed the consent.     Preston Valdez MD  12:27 PM  11/16/2021

## 2021-11-16 NOTE — ANESTHESIA PRE PROCEDURE
Department of Anesthesiology  Preprocedure Note       Name:  Antonella Boss   Age:  73 y.o.  :  1950                                          MRN:  [id number]         Date:  2021      Surgeon: Petr Floyd):  Jerry Benitez MD    Procedure: Procedure(s): MEDIPORT CATHETER INSERTION    Medications prior to admission:   Prior to Admission medications    Medication Sig Start Date End Date Taking?  Authorizing Provider   metoprolol tartrate (LOPRESSOR) 25 MG tablet Take 25 mg by mouth 2 times daily   Yes Historical Provider, MD   aspirin 81 MG EC tablet Take 81 mg by mouth daily LD 10/26/21   Yes Historical Provider, MD   simvastatin (ZOCOR) 80 MG tablet Take 80 mg by mouth nightly   Yes Historical Provider, MD   lisinopril (PRINIVIL;ZESTRIL) 20 MG tablet Take 20 mg by mouth daily   Yes Historical Provider, MD   ARTHRITIS PAIN RELIEF 650 MG extended release tablet TAKE TWO TABLETS BY MOUTH AT BEDTIME 21   Historical Provider, MD       Current medications:    Current Facility-Administered Medications   Medication Dose Route Frequency Provider Last Rate Last Admin    0.9 % sodium chloride infusion  25 mL IntraVENous PRN Jerry Benitez MD        ceFAZolin (ANCEF) 2000 mg in sterile water 20 mL IV syringe  2,000 mg IntraVENous On Call to [address] [place] [address], MD        lactated ringers infusion   IntraVENous Continuous Jerry Benitez  mL/hr at 21 1250 New Bag at 21 1250    sodium chloride flush 0.9 % injection 5-40 mL  5-40 mL IntraVENous 2 times per day Jerry Benitez MD        sodium chloride flush 0.9 % injection 5-40 mL  5-40 mL IntraVENous PRN Jerry Benitez MD           Allergies:  No Known Allergies    Problem List:    Patient Active Problem List   Diagnosis Code    Renal cell cancer, right ([facility] [address] C64.1       Past Medical History:        Diagnosis Date    Anticoagulant long-term use     aspirin    CAD (coronary artery disease)     Cancer ([facility] [address]     kidney    Deep vein thrombosis (DVT) (HCC)     leg    [place]     Hx of blood clots     Hyperlipidemia     Hypertension     Mentally challenged     information per patient's niece. Past Surgical History:        Procedure Laterality Date   Yonathan Méndez    patient's sister said they were told a twin was removed from paitent's abdomen. [address]  2007    bypass    FEMORAL BYPASS      bifemoral    IR PERCUT BX LUNG/MEDIASTINUM  11/2/2021    IR PERCUT BX LUNG/MEDIASTINUM 11/2/2021 [facility]       Social History:    Social History     Tobacco Use    Smoking status: Current Every Day Smoker     Packs/day: 0.25     Years: 54.00     Pack years: 13.50     Types: Cigarettes    Smokeless tobacco: Never Used    Tobacco comment: Informed patient of [facility] Tobacco Cessation program, gave pamphlet. Substance Use Topics    Alcohol use: Not Currently                                Ready to quit: Not Answered  Counseling given: Not Answered  Comment: Informed patient of [facility] Tobacco Cessation program, gave pamphlet. Vital Signs (Current):   Vitals:    11/16/21 1240   BP: 132/64   Pulse: 61   Resp: 16   Temp: 97.9 °F (36.6 °C)   SpO2: 99%   Weight: 121 lb (54.9 kg)   Height: 5' 8\" (1.727 m)                                              BP Readings from Last 3 Encounters:   11/16/21 132/64   11/11/21 (!) 156/71   11/02/21 (!) 160/71       NPO Status: Time of last liquid consumption: 0001                        Time of last solid consumption: 1700                        Date of last liquid consumption: 11/16/21                        Date of last solid food consumption: 11/15/21    BMI:   Wt Readings from Last 3 Encounters:   11/16/21 121 lb (54.9 kg)   11/11/21 124 lb 8 oz (56.5 kg)   11/01/21 119 lb (54 kg)     Body mass index is 18.4 kg/m².     CBC:   Lab Results   Component Value Date    WBC 8.6 11/02/2021    RBC 4.07 11/02/2021    HGB 13.1 11/02/2021    HCT 40.4 11/02/2021    MCV 99.3 11/02/2021    RDW 15.3 11/02/2021     11/02/2021       CMP:   Lab Results   Component Value Date     11/16/2021    K 3.9 11/16/2021     11/16/2021    CO2 27 11/16/2021    BUN 6 11/16/2021    CREATININE 0.8 11/16/2021    GFRAA >60 11/16/2021    LABGLOM >60 11/16/2021    GLUCOSE 92 11/16/2021    PROT 7.0 10/14/2021    CALCIUM 9.7 11/16/2021    BILITOT <0.2 10/14/2021    ALKPHOS 135 10/14/2021    AST 14 10/14/2021    ALT 13 10/14/2021       POC Tests: No results for input(s): POCGLU, POCNA, POCK, POCCL, POCBUN, POCHEMO, POCHCT in the last 72 hours. Coags:   Lab Results   Component Value Date    PROTIME 11.8 11/16/2021    INR 1.0 11/16/2021    APTT 35.1 11/16/2021       HCG (If Applicable): No results found for: PREGTESTUR, PREGSERUM, HCG, HCGQUANT     ABGs: No results found for: PHART, PO2ART, MNT8IAP, APJ2NIM, BEART, O7DFZGNG     Type & Screen (If Applicable):  No results found for: LABABO, LABRH    Drug/Infectious Status (If Applicable):  No results found for: HIV, HEPCAB    COVID-19 Screening (If Applicable):   Lab Results   Component Value Date    COVID19 Not Detected 11/16/2021    COVID19 Not Detected 10/26/2021           Anesthesia Evaluation  Patient summary reviewed  Airway: Mallampati: II  TM distance: >3 FB   Neck ROM: full  Mouth opening: > = 3 FB Dental:          Pulmonary: breath sounds clear to auscultation  (+) COPD:  current smoker          Patient smoked on day of surgery. Cardiovascular:    (+) hypertension:, CAD:,         Rhythm: regular  Rate: normal           Beta Blocker:  Dose within 24 Hrs         Neuro/Psych:   (+) psychiatric history:            GI/Hepatic/Renal: Neg GI/Hepatic/Renal ROS            Endo/Other:    (+) malignancy/cancer. Abdominal:       Abdomen: soft. Vascular: Other Findings:             Anesthesia Plan      MAC     ASA 3       Induction: intravenous.       Anesthetic plan and risks discussed with patient. Plan discussed with CRNA.                   Anastasiya Aldridge MD   11/16/2021

## 2021-11-16 NOTE — OP NOTE
Mayoaries Orozco  1950      DATE OF PROCEDURE: 11/16/2021     SURGEON: LEN Wang     PREOPERATIVE DIAGNOSIS: poor venous access and renal cancer. POSTOPERATIVE DIAGNOSIS: Same    OPERATION: Insertion of left subclavian central venous catheter with subcutaneous reservoir, fluoroscopy     ANESTHESIA: LMAC     ESTIMATED BLOOD LOSS: Minimal     COMPLICATIONS: None    DESCRIPTION OF PROCEDURE: The patient was identified and the procedure was confirmed. The left neck and chest were prepped and draped in the usual sterile fashion. Next, local anesthesic was used for local anesthesia. The left subclavian vein was percutaneously entered and a guidewire was advanced into the superior vena cava under fluoroscopic guidance. A small incision was made around the wire. A second skin incision was made below the clavicle and a pocket was made in the subcutaneous tissue for the reservoir. The catheter was pulled through a subcutaneous tunnel from the inferior chest incision to the access incision. The introducer was passed over the wire then the catheter was passed through the introducer and the tip was positioned in the superior vena cava right atrial junction under fluoroscopic guidance. The catheter was connected to the reservoir and the locking hub was engaged. The port was noted to withdrawal and flush blood easily and was flushed with heparinized saline solution. The reservoir was secured in the pocket with vicryl sutures. Hemostasis was assured and the incisions were irrigated. The incisions were approximated with Vicryl sutures and Dermabond was applied to the incisions in the operating room. Needle, sponge, and instrument counts were reported as correct times two. The patient tolerated the procedure and was transferred back to the floor in satisfactory condition.        Alla Christie MD  2:40 PM  11/16/2021

## 2021-11-16 NOTE — ANESTHESIA POSTPROCEDURE EVALUATION
Department of Anesthesiology  Postprocedure Note    Patient: Kasey García  MRN: [id number]  YOB: 1950  Date of evaluation: 11/16/2021  Time:  6:01 PM     Procedure Summary     Date: 11/16/21 Room / Location: [address] [place] [address] / [facility] AT [facility] ([facility])    Anesthesia Start: 4376 Anesthesia Stop: 9223    Procedure: [address] (Left Chest) Diagnosis: (POOR VENOUS ACCESS)    Surgeons: Gill Banegas MD Responsible Provider: Silvia Taylor MD    Anesthesia Type: MAC ASA Status: 3          Anesthesia Type: MAC    Cathie Phase I: Cathie Score: 10    Cathie Phase II: Cathie Score: 10    Last vitals: Reviewed and per EMR flowsheets.        Anesthesia Post Evaluation    Patient location during evaluation: PACU  Patient participation: complete - patient participated  Level of consciousness: awake  Pain score: 2  Nausea & Vomiting: no nausea  Complications: no  Cardiovascular status: blood pressure returned to baseline  Respiratory status: acceptable  Hydration status: euvolemic

## 2021-11-17 ENCOUNTER — TELEPHONE (OUTPATIENT)
Dept: INFUSION THERAPY | Age: 71
End: 2021-11-17

## 2021-11-17 ENCOUNTER — HOSPITAL ENCOUNTER (OUTPATIENT)
Dept: INFUSION THERAPY | Age: 71
Discharge: HOME | End: 2021-11-17
Payer: MEDICARE

## 2021-11-17 DIAGNOSIS — C64.1 RENAL CELL CANCER, RIGHT (HCC): ICD-10-CM

## 2021-11-17 LAB
ALBUMIN SERPL-MCNC: 3.9 G/DL (ref 3.5–5.2)
ALP BLD-CCNC: 151 U/L (ref 40–129)
ALT SERPL-CCNC: 11 U/L (ref 0–40)
ANION GAP SERPL CALCULATED.3IONS-SCNC: 9 MMOL/L (ref 7–16)
AST SERPL-CCNC: 14 U/L (ref 0–39)
BASOPHILS ABSOLUTE: 0.07 E9/L (ref 0–0.2)
BASOPHILS RELATIVE PERCENT: 0.8 % (ref 0–2)
BILIRUB SERPL-MCNC: 0.4 MG/DL (ref 0–1.2)
BUN BLDV-MCNC: 5 MG/DL (ref 6–23)
CALCIUM SERPL-MCNC: 9.5 MG/DL (ref 8.6–10.2)
CHLORIDE BLD-SCNC: 100 MMOL/L (ref 98–107)
CO2: 30 MMOL/L (ref 22–29)
CREAT SERPL-MCNC: 0.8 MG/DL (ref 0.7–1.2)
EOSINOPHILS ABSOLUTE: 0.2 E9/L (ref 0.05–0.5)
EOSINOPHILS RELATIVE PERCENT: 2.3 % (ref 0–6)
GFR AFRICAN AMERICAN: >60
GFR NON-AFRICAN AMERICAN: >60 ML/MIN/1.73
GLUCOSE BLD-MCNC: 93 MG/DL (ref 74–99)
HCT VFR BLD CALC: 37.3 % (ref 37–54)
HEMOGLOBIN: 12.3 G/DL (ref 12.5–16.5)
IMMATURE GRANULOCYTES #: 0.02 E9/L
IMMATURE GRANULOCYTES %: 0.2 % (ref 0–5)
LYMPHOCYTES ABSOLUTE: 2.95 E9/L (ref 1.5–4)
LYMPHOCYTES RELATIVE PERCENT: 33.6 % (ref 20–42)
MCH RBC QN AUTO: 31.9 PG (ref 26–35)
MCHC RBC AUTO-ENTMCNC: 33 % (ref 32–34.5)
MCV RBC AUTO: 96.6 FL (ref 80–99.9)
MONOCYTES ABSOLUTE: 0.81 E9/L (ref 0.1–0.95)
MONOCYTES RELATIVE PERCENT: 9.2 % (ref 2–12)
NEUTROPHILS ABSOLUTE: 4.73 E9/L (ref 1.8–7.3)
NEUTROPHILS RELATIVE PERCENT: 53.9 % (ref 43–80)
PDW BLD-RTO: 15.6 FL (ref 11.5–15)
PLATELET # BLD: 246 E9/L (ref 130–450)
PMV BLD AUTO: 10 FL (ref 7–12)
POTASSIUM SERPL-SCNC: 3.8 MMOL/L (ref 3.5–5)
RBC # BLD: 3.86 E12/L (ref 3.8–5.8)
SODIUM BLD-SCNC: 139 MMOL/L (ref 132–146)
TOTAL PROTEIN: 6.8 G/DL (ref 6.4–8.3)
WBC # BLD: 8.8 E9/L (ref 4.5–11.5)

## 2021-11-17 PROCEDURE — 85025 COMPLETE CBC W/AUTO DIFF WBC: CPT

## 2021-11-17 PROCEDURE — 36415 COLL VENOUS BLD VENIPUNCTURE: CPT

## 2021-11-17 PROCEDURE — 80053 COMPREHEN METABOLIC PANEL: CPT

## 2021-11-17 NOTE — PROGRESS NOTES
Spoke with patient about treatment medications (nivolumab/ipilimumab). We went over the protocol to be used, what to expect as far as side effects, and when to call with problems. This was intended to reinforce the education done by Dr. Sylvia Jones. Patient was provided medication handout to take home and patient was told to call if there are any questions once they had a chance to absorb the information. Patient had the opportunity to ask questions with all questions being answered to their satisfaction. The patient expressed understanding and acceptance of instructions.     [place], [place] 11/17/2021 3:08 PM

## 2021-11-18 ENCOUNTER — HOSPITAL ENCOUNTER (OUTPATIENT)
Dept: INFUSION THERAPY | Age: 71
Discharge: HOME | End: 2021-11-18
Payer: MEDICARE

## 2021-11-18 ENCOUNTER — OFFICE VISIT (OUTPATIENT)
Dept: ONCOLOGY | Age: 71
End: 2021-11-18

## 2021-11-18 VITALS
SYSTOLIC BLOOD PRESSURE: 157 MMHG | TEMPERATURE: 96.6 F | HEART RATE: 74 BPM | WEIGHT: 123.1 LBS | BODY MASS INDEX: 18.66 KG/M2 | DIASTOLIC BLOOD PRESSURE: 70 MMHG | OXYGEN SATURATION: 96 % | HEIGHT: 68 IN

## 2021-11-18 VITALS — HEART RATE: 58 BPM | SYSTOLIC BLOOD PRESSURE: 128 MMHG | DIASTOLIC BLOOD PRESSURE: 60 MMHG

## 2021-11-18 DIAGNOSIS — C64.1 RENAL CELL CANCER, RIGHT (HCC): Primary | ICD-10-CM

## 2021-11-18 PROCEDURE — 2580000003 HC RX 258: Performed by: INTERNAL MEDICINE

## 2021-11-18 PROCEDURE — 96417 CHEMO IV INFUS EACH ADDL SEQ: CPT

## 2021-11-18 PROCEDURE — 6360000002 HC RX W HCPCS: Performed by: INTERNAL MEDICINE

## 2021-11-18 PROCEDURE — 96375 TX/PRO/DX INJ NEW DRUG ADDON: CPT

## 2021-11-18 PROCEDURE — 96413 CHEMO IV INFUSION 1 HR: CPT

## 2021-11-18 PROCEDURE — 96372 THER/PROPH/DIAG INJ SC/IM: CPT

## 2021-11-18 RX ORDER — SODIUM CHLORIDE 9 MG/ML
20 INJECTION, SOLUTION INTRAVENOUS ONCE
Status: COMPLETED | OUTPATIENT
Start: 2021-11-18 | End: 2021-11-18

## 2021-11-18 RX ORDER — PROCHLORPERAZINE EDISYLATE 5 MG/ML
10 INJECTION INTRAMUSCULAR; INTRAVENOUS ONCE
Status: COMPLETED | OUTPATIENT
Start: 2021-11-18 | End: 2021-11-18

## 2021-11-18 RX ADMIN — DENOSUMAB 120 MG: 120 INJECTION SUBCUTANEOUS at 13:36

## 2021-11-18 RX ADMIN — SODIUM CHLORIDE 180 MG: 9 INJECTION, SOLUTION INTRAVENOUS at 11:26

## 2021-11-18 RX ADMIN — PROCHLORPERAZINE EDISYLATE 10 MG: 5 INJECTION INTRAMUSCULAR; INTRAVENOUS at 10:28

## 2021-11-18 RX ADMIN — SODIUM CHLORIDE 50 MG: 9 INJECTION, SOLUTION INTRAVENOUS at 12:18

## 2021-11-18 RX ADMIN — SODIUM CHLORIDE 20 ML/HR: 9 INJECTION, SOLUTION INTRAVENOUS at 10:05

## 2021-11-18 NOTE — PROGRESS NOTES
history: History of CABG, femoral arterial bypass, hernia repair. Social history: Smokes half a pack a day, denies alcohol or drug abuse. Family history Sister had breast cancer, father had colon cancer. Diagnostic Data:     Past Medical History:   Diagnosis Date    Anticoagulant long-term use 2007    aspirin    CAD (coronary artery disease)     Cancer (HCC)     kidney    Deep vein thrombosis (DVT) (HCC)     leg    [place] IRA (hard of hearing)     Hx of blood clots     Hyperlipidemia     Hypertension     Mentally challenged     information per patient's niece. Patient Active Problem List    Diagnosis Date Noted    Poor venous access     Renal cell cancer, right ([address] 11/11/2021        Past Surgical History:   Procedure Laterality Date   Yonathan Méndez    patient's sister said they were told a twin was removed from paitent's abdomen.    [address] 45  2007    bypass    CATHETER INSERTION Left 11/16/2021    MEDIPORT CATHETER INSERTION performed by Bernice Jensen MD at [facility]      bifemoral    IR PERCUT BX LUNG/MEDIASTINUM  11/2/2021    IR PERCUT BX LUNG/MEDIASTINUM 11/2/2021 [facility]       Family History  Family History   Problem Relation Age of Onset    High Blood Pressure Mother     Heart Disease Mother     Heart Attack Mother     Cancer Father 64        colon    Arthritis Sister     Heart Attack Brother     No Known Problems Maternal Uncle     No Known Problems Paternal Aunt     No Known Problems Paternal Uncle     No Known Problems Maternal Grandmother     Cancer Maternal Grandfather         throat    Heart Attack Paternal Grandmother     No Known Problems Paternal Grandfather     Cirrhosis Paternal Cousin     Deep Vein Thrombosis Sister     Diabetes Sister     Heart Disease Sister         triple bypass    Breast Cancer Sister 61    High Blood Pressure Sister     Diabetes Sister     High Cholesterol Sister     Arthritis Sister        Social History    TOBACCO:   reports that he has been smoking cigarettes. He has a 13.50 pack-year smoking history. He has never used smokeless tobacco.  ETOH:   reports previous alcohol use. Home Medications  Prior to Admission medications    Medication Sig Start Date End Date Taking? Authorizing Provider   ARTHRITIS PAIN RELIEF 650 MG extended release tablet TAKE TWO TABLETS BY MOUTH AT BEDTIME 9/21/21  Yes Historical Provider, MD   traMADol (ULTRAM) 50 MG tablet Take 1 tablet by mouth every 6 hours as needed for Pain for up to 3 days. Intended supply: 3 days. Take lowest dose possible to manage pain 11/16/21 11/19/21 Yes Marc Villalta MD   metoprolol tartrate (LOPRESSOR) 25 MG tablet Take 25 mg by mouth 2 times daily   Yes Historical Provider, MD   aspirin 81 MG EC tablet Take 81 mg by mouth daily LD 10/26/21   Yes Historical Provider, MD   simvastatin (ZOCOR) 80 MG tablet Take 80 mg by mouth nightly   Yes Historical Provider, MD   lisinopril (PRINIVIL;ZESTRIL) 20 MG tablet Take 20 mg by mouth daily   Yes Historical Provider, MD       Allergies  No Known Allergies    Review of Systems:      Objective  BP (!) 157/70   Pulse 74   Temp 96.6 °F (35.9 °C)   Ht 5' 8\" (1.727 m)   Wt 123 lb 1.6 oz (55.8 kg)   SpO2 96%   BMI 18.72 kg/m²     Physical Performance Status    Physical Exam:  General: AAO to person, place, time, and purpose, appears stated age, cooperative, no acute distress, pleasant   Head and neck : PERRLA, EOMI . Sclera non icteric. Oropharynx : Clear  Neck: no JVD,  no adenopathy, no carotid bruit  LYMPHATICS : no lap  Lungs: Clear to auscultation. Right side chest wall globular mass, 4-5 cm in diamter, soft. Heart: Regular rate and regular rhythm, no murmur, normal S1, S2  Abdomen: Soft, non-tender;no masses, no organomegaly  Extremities: No edema,no cyanosis, no clubbing. Skin:  No rash. Neurologic:Cranial nerves grossly intact. No focal motor or sensory deficits .     Recent Laboratory Data- Lab Results   Component Value Date    WBC 8.8 11/17/2021    HGB 12.3 (L) 11/17/2021    HCT 37.3 11/17/2021    MCV 96.6 11/17/2021     11/17/2021    LYMPHOPCT 33.6 11/17/2021    RBC 3.86 11/17/2021    MCH 31.9 11/17/2021    MCHC 33.0 11/17/2021    RDW 15.6 (H) 11/17/2021    NEUTOPHILPCT 53.9 11/17/2021    MONOPCT 9.2 11/17/2021    BASOPCT 0.8 11/17/2021    NEUTROABS 4.73 11/17/2021    LYMPHSABS 2.95 11/17/2021    MONOSABS 0.81 11/17/2021    EOSABS 0.20 11/17/2021    BASOSABS 0.07 11/17/2021       Lab Results   Component Value Date     11/17/2021    K 3.8 11/17/2021     11/17/2021    CO2 30 (H) 11/17/2021    BUN 5 (L) 11/17/2021    CREATININE 0.8 11/17/2021    GLUCOSE 93 11/17/2021    CALCIUM 9.5 11/17/2021    PROT 6.8 11/17/2021    LABALBU 3.9 11/17/2021    BILITOT 0.4 11/17/2021    ALKPHOS 151 (H) 11/17/2021    AST 14 11/17/2021    ALT 11 11/17/2021    LABGLOM >60 11/17/2021    GFRAA >60 11/17/2021       No results found for: IRON, TIBC, FERRITIN        Radiology-    Echo Complete    Result Date: 10/8/2021  Transthoracic Echocardiography Report (TTE)  Demographics   Patient Name    [facility]        Gender            Male                  Marcio Prater  [id number]      Room Number  Number   Account #       [de-identified]     Procedure Date    10/08/2021   Corporate ID                  Ordering          Watt Sarah DO                                Physician   Accession       [id number]    Referring         Watt Sarah DO  Number                        Physician   Date of Birth   1950    Sonographer       Arnoldo Roth RDCS   Age             73 year(s)    Interpreting      Himanshu 66                                Physician         Physician Cardiology                                                  Azell Cassette MD                                 Any Other  Procedure Type of Study   TTE procedure:Echo Complete W/Doppler & Color Flow.   Procedure Date Date: 10/08/2021 Start: 09:57 AM Study Location: Echo Lab Technical Quality: Poor visualization due to poor acoustical window. Indications:Heart murmur. Patient Status: Routine Height: 68 inches Weight: 120 pounds BSA: 1.65 m^2 BMI: 18.25 kg/m^2  Findings   Left Ventricle  Normal left ventricular chamber size. Normal left ventricular systolic function. Visually estimated LVEF 65%. Mild left ventricular concentric hypertrophy noted. Normal diastolic function. Right Ventricle  Normal right ventricle size and function. Left Atrium  The left atrium is mildly dilated. Mildly increased left atrial volume. Interatrial septum not well visualized but appears intact. Right Atrium  Normal right atrium. Mitral Valve  Normal mitral valve structure. There is moderate mitral regurgitation - ERO 0.23cm2, PISA 0.9cm, RV 51cc. No mitral valve prolapse. Tricuspid Valve  Normal tricuspid valve structure. There is mild tricuspid regurgitation. Mild pulmonary hypertension, RVSP 39mmHg. Aortic Valve  Normal aortic valve structure. There is trace to mild aortic regurgitation, pressure 1/2 time 718ms. Pulmonic Valve  The pulmonic valve was not well visualized. Pericardial Effusion  No evidence of pericardial effusion. Pericardium appears normal.   Pleural Effusion  No evidence of pleural effusion. Aorta  Aortic root 3.5cm. Miscellaneous  The inferior vena cava diameter is normal with normal respiratory  variation. Conclusions   Summary  Normal left ventricular chamber size. Normal left ventricular systolic function, LVEF 83%. Mild left ventricular concentric hypertrophy noted. Normal diastolic function. The left atrium is mildly dilated. Mildly increased left atrial volume. Interatrial septum not well visualized but appears intact. Normal right ventricle size and function. There is moderate mitral regurgitation - ERO 0.23cm2, PISA 0.9cm, RV 51cc. No mitral valve prolapse.   There is trace to mild aortic regurgitation, pressure 1/2 time 718ms. There is mild tricuspid regurgitation. Mild pulmonary hypertension, RVSP 39mmHg. The pulmonic valve was not well visualized. Aortic root 3.5cm. No evidence of pericardial effusion. No intra cardiac mass or thrombus. No comparison study available.    Signature   ----------------------------------------------------------------  Electronically signed by Sara Prado MD(Interpreting  physician) on 10/08/2021 03:52 PM  ----------------------------------------------------------------  M-Mode/2D Measurements & Calculations   LV Diastolic    LV Systolic Dimension: 3.2   AV Cusp Separation: 1.9 cmLA  Dimension: 5.3  cm                           Dimension: 3.9 cmAO Root  cm              LV Volume Diastolic: 566.5   Dimension: 3.5 cm  LV FS:39.6 %    ml  LV PW           LV Volume Systolic: 25.6 ml  Diastolic: 1.2  LV EDV/LV EDV Index: 134.5  cm              ml/82 ml/m^2LV ESV/LV ESV    RV Diastolic Dimension: 2.8  LV PW Systolic: Index: 10.9 BS/08YD/ m^2     cm  1.5 cm          EF Calculated: 70 %          RV Systolic Dimension: 2.4 cm  Septum          LV Mass Index: 147 l/min*m^2 LA/Aorta: 1.2  Diastolic: 1.1  cm                                           LA volume/Index: 79.7 ml  Septum          LVOT: 2 cm  Systolic: 1.2  cm   LV Mass: 241.96  g  Doppler Measurements & Calculations   MV Peak E-Wave: 1.42   AV Peak Velocity: 1.87 m/s  LVOT Peak Velocity:  m/s                    AV Peak Gradient: 14.05     1.55 m/s  MV Peak A-Wave: 0.82   mmHg                        LVOT Mean Velocity:  m/s                    AV Mean Velocity: 1.27 m/s  0.81 m/s  MV E/A Ratio: 1.73     AV Mean Gradient: 7.2 mmHg  LVOT Peak Gradient: 9.6  MV Peak Gradient: 9.2  AV VTI: 42.1 cm             mmHgLVOT Mean Gradient:  mmHg                   AV Area (Continuity):2.37   3.5 mmHg  MV Mean Gradient: 2.9  cm^2                        Estimated RVSP: 39 mmHg  mmHg                   AV Deceleration Time: Estimated RAP:10 mmHg  MV Mean Velocity: 0.75 2474.3 msec  m/s                    LVOT VTI: 31.8 cm  MV Deceleration Time:                              TR Velocity:2.69 m/s  271.4 msec             Estimated PASP: 39.03 mmHg  TR Gradient:29.03 mmHg  MV P1/2t: 101.8 msec   Pulm. Vein A Reversal       PV Peak Velocity: 0.51  MVA by PHT:2.16 cm^2   Duration:175.3 msec         m/s  MV Area (continuity):  Pulm. Vein D Velocity:0.74  PV Peak Gradient: 1.02  1.9 cm^2               m/sPulm. Vein A Reversal    mmHg                         Velocity:0.38 m/s           PV Mean Velocity: 0.36                         Pulm. Vein S Velocity: 0.44 m/s  MR Velocity: 5.37 m/s  m/s                         PV Mean Gradient: 0.6  MV RICARDO PISA: 0.23 cm^2                             mmHg  MR VTI: 221.9 cm  Alias Velocity: 0.25  m/sPISA Radius: 0.9 cm   PISA area: 5.09 cm^2MR  flow rate: 125.72  ml/sMR volume:51.04 ml  [email or web address] [id number]    CT CHEST W CONTRAST    Addendum Date: 10/13/2021    ADDENDUM: 6 mm indeterminate hypodense lesions are identified in the body and tail of the pancreas. Consider surveillance     Result Date: 10/13/2021  EXAMINATION: CT OF THE CHEST WITH CONTRAST 10/13/2021 10:59 am TECHNIQUE: CT of the chest was performed with the administration of intravenous contrast. Multiplanar reformatted images are provided for review. Dose modulation, iterative reconstruction, and/or weight based adjustment of the mA/kV was utilized to reduce the radiation dose to as low as reasonably achievable. COMPARISON: None. HISTORY: ORDERING SYSTEM PROVIDED HISTORY: Chest wall mass FINDINGS: There is cardiomegaly. There is coronary artery calcification. The great vessels are normal.  Moderately enlarged mediastinal and hilar lymph nodes are noted with lymph nodes measuring up to 1.3 cm in the right hilum.  Trachea and major bronchi are patent. Multiple bilateral pulmonary nodules are identified with nodules measuring up to 1.5 cm in the right lower lobe and smaller ones in the right middle lobe and right upper lobe. The pulmonary nodules in the left lower lobe ranges from 5 mm up to 8 mm. There is no focal consolidation or pleural effusion. Punctate enhancing nodules are identified in the posterior right hepatic lobe, largest 1 measuring up to 7 mm. Multiple heterogeneously enhancing right renal masses are identified largest 1 measuring 3.8 x 3.3 cm which is partially necrotic with additional smaller nodules concerning for multifocal malignancy like renal cell carcinoma. There is filling defects in the right renal vein concerning for tumor invasion versus tumor embolism of the renal vein. There is bilateral adrenal metastasis with the largest 1 in the left adrenal gland measuring 1.6 x 2.7 cm. There is bone metastasis with the destructive soft tissue mass involving the right 4th rib anterolaterally with adjacent soft tissue mass which is partially necrotic measuring 4.5 x 9.2 cm. Lytic destructive lesions are also identified in the right 8th and 9th rib near the costo vertebral junction. Multiple heterogeneous  enhancing and necrotic masses in the right kidney concerning for multifocal renal cell carcinoma with  possible tumor invasion or tumor embolism of the right renal vein. There is diffuse metastasis with the involvement of the right and left adrenal glands, possible hepatic metastasis, widespread pulmonary and rib involvement as noted. Further assessment by PET-CT scan is recommended. Findings were telephoned to licensed caregiver. ASSESSMENT/PLAN :    Donis Sharma was seen today for follow-up. 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 ECOG 1/2. Performance status seems fair Karnofsky performance status 80/70. Intermediate risk based on [facility] risk factors although the disease seems to have progressed rapidly over the past month. His rapid progression as well as renal vein involvement probably precludes debulking nephrectomy. Tumor burden is not high and patient is asymptomatic. Recommended treatment with ipilimumab/nivolumab combination. Discussed pathology results, treatment options risks and benefits associated with immunotherapy. Patient understood and agreed for treatment. Reviewed labs from yesterday which were unremarkable. We will start treatment today. Xgeva to start today. Patient is edentulous. Pain at the right side chest wall mass is well controlled with Tylenol as needed. Return to clinic in 3 weeks. Return in about 3 weeks (around 12/9/2021).      Imtiaz Bejarano MD   Electronically signed 11/18/2021 at 9:46 AM

## 2021-11-18 NOTE — PROGRESS NOTES
Patient presents to clinic today for Xgeva injection. Patient's labs monitored, specifically, Calcium level, to ensure no increased risk of hypocalcemia with administration of the medication. Patient's calcium level is 9.5 mg/dL. Patient received therapy and will continue to be monitored prior to each dose.

## 2021-11-19 ENCOUNTER — TELEPHONE (OUTPATIENT)
Dept: INFUSION THERAPY | Age: 71
End: 2021-11-19

## 2021-11-19 NOTE — TELEPHONE ENCOUNTER
Called pt's niece, Arnulfo Rudolph, today per referral for dietary questions. Leela stated that today is busy for her and requested that this clinician call her back next Tuesday. Will call Leela next Tuesday.  Electronically signed by Steven Stein RD, LD on 11/19/2021 at 1:44 PM

## 2021-11-23 ENCOUNTER — TELEPHONE (OUTPATIENT)
Dept: SURGERY | Age: 71
End: 2021-11-23

## 2021-11-23 NOTE — TELEPHONE ENCOUNTER
Per Dr. An Chin, it is acceptable to use the port as is.     Electronically signed by Cammy Eddy MA on 11/23/2021 at 9:29 AM

## 2021-11-24 ENCOUNTER — TELEPHONE (OUTPATIENT)
Dept: INFUSION THERAPY | Age: 71
End: 2021-11-24

## 2021-11-24 NOTE — TELEPHONE ENCOUNTER
Jamil Orozco  11/24/2021  Wt Readings from Last 10 Encounters:   11/18/21 123 lb 1.6 oz (55.8 kg)   11/16/21 121 lb (54.9 kg)   11/11/21 124 lb 8 oz (56.5 kg)   11/01/21 119 lb (54 kg)   10/28/21 119 lb 8 oz (54.2 kg)   10/27/21 122 lb 6.4 oz (55.5 kg)   10/14/21 120 lb (54.4 kg)     Ht Readings from Last 1 Encounters:   11/18/21 5' 8\" (1.727 m)     Called pt's niece, Phillip Rojas, per referral for pt having difficulty maintaining his weight. Pt is receiving Xgeva for renal cancer. She noted that pt has always been thin and is now having increased difficulty maintaining his wt. Leela reported that the pt consumes 3 meals and a couple snacks daily. She was not sure what pt generally eats for breakfast but stated that he usually has ramen noodles for lunch and a balanced dinner. He snacks on typical snack foods such as crackers and chips between meals. He did try Boost but commented that this caused diarrhea. He does not care for water and drinks diet Pepsi as well as hot tea instead. Leela was unsure of how much of these he drinks in a day. Discussed increased nutritional needs and increasing calorie/protein content of meals. Leela noted that the pt and the pt's wife are both developmentally disabled but that the pt's wife is able to prepare meals for the pt. Leela reported interest in information regarding meal programs in the area. Will send handouts with recipes, shakes/smoothies, snack ideas, pt's estimated nutritional needs, and meal programs in the area to Leela so that she can discuss these with the pt and pt's wife. Leela was receptive of information provided, all questions were answered, and will continue to follow.      Weight change: Pt more or less maintaining his wt, however pt is 80% of IBW  Appetite: good  N/V/D/C: pt denying any at this time per Leela   Calculated Needs if applicable: 8508-6652 kcal (33-35 kcal/kg),  gm protein (1.5-1.8 gm/kg), 0191-8467 ml FW (1 ml/kcal)    Recommendations: Pt is to consume at least 3 meals and 2 snacks/shakes daily with high calorie/protein foods at every meal/snack. Pt is also to drink at least 64 oz of fluid daily. [employer] GUIDELINES FOR CLINICAL CHARACTERISTICS OF MALNUTRITION IN CHRONIC ILLNESS     Moderate Malnutrition  Severe Malnutrition    Energy intake  <75% energy intake compared to estimated needs for >1month <75% energy intake compared to estimated needs for >1month   Weight changes  5% x 1 month  7.5% x 3 months   10% x 6 months   20% x 1 year  >5% x 1 month  >7.5% x 3 months   >10% x 6 months   >20% x 1 year    Physical findings  Mild   Decrease subcutaneous fat    Decrease muscle mass     Increase fluid/edema   Severe  Decrease subcutaneous fat    Decrease muscle mass     Increase fluid/edema    Insufficient information to diagnose malnutrition at this time.      Leah Rogers, RD, LD

## 2021-11-29 ENCOUNTER — OFFICE VISIT (OUTPATIENT)
Dept: SURGERY | Age: 71
End: 2021-11-29

## 2021-11-29 VITALS
SYSTOLIC BLOOD PRESSURE: 125 MMHG | DIASTOLIC BLOOD PRESSURE: 67 MMHG | WEIGHT: 123 LBS | TEMPERATURE: 98.1 F | HEART RATE: 62 BPM | HEIGHT: 68 IN | BODY MASS INDEX: 18.64 KG/M2

## 2021-11-29 DIAGNOSIS — L76.82 INCISIONAL PAIN: Primary | ICD-10-CM

## 2021-11-29 PROCEDURE — 99024 POSTOP FOLLOW-UP VISIT: CPT | Performed by: SURGERY

## 2021-11-29 NOTE — PROGRESS NOTES
Surgery Progress Note            Chief complaint:   Patient Active Problem List   Diagnosis    Renal cell cancer, right ([address]    Poor venous access       S: doing well    O:   Vitals:    11/29/21 1517   BP: 125/67   Pulse: 62   Temp: 98.1 °F (36.7 °C)     No intake or output data in the 24 hours ending 11/29/21 1527        Labs:  No results for input(s): WBC, HGB, HCT in the last 72 hours. Invalid input(s): PLR  Lab Results   Component Value Date    CREATININE 0.8 11/17/2021    BUN 5 (L) 11/17/2021     11/17/2021    K 3.8 11/17/2021     11/17/2021    CO2 30 (H) 11/17/2021     No results for input(s): LIPASE, AMYLASE in the last 72 hours. Physical exam:   /67 (Site: Right Upper Arm, Position: Sitting, Cuff Size: Medium Adult)   Pulse 62   Temp 98.1 °F (36.7 °C)   Ht 5' 8\" (1.727 m)   Wt 123 lb (55.8 kg)   BMI 18.70 kg/m²   General appearance: NAD  Head: NCAT  Neck: supple, no masses  Lungs: equal chest rise bilateral  Heart: S1S2 present  Abdomen: soft, nontender, nondistended  Skin; no new lesions, incisions clean and intact  Gu: no cva tenderness  Extremities: extremities normal, atraumatic, no cyanosis or edema    A:  Post op mediport with some old blood at incision but no signs of infection or skin breakdown, ok to  Use. P: follow up as needed.      Tamika Hayden MD, MD  11/29/2021

## 2021-12-08 ENCOUNTER — HOSPITAL ENCOUNTER (OUTPATIENT)
Dept: INFUSION THERAPY | Age: 71
Discharge: HOME | End: 2021-12-08
Payer: MEDICARE

## 2021-12-08 DIAGNOSIS — C64.1 RENAL CELL CANCER, RIGHT (HCC): ICD-10-CM

## 2021-12-08 LAB
ALBUMIN SERPL-MCNC: 4.1 G/DL (ref 3.5–5.2)
ALP BLD-CCNC: 156 U/L (ref 40–129)
ALT SERPL-CCNC: 7 U/L (ref 0–40)
ANION GAP SERPL CALCULATED.3IONS-SCNC: 13 MMOL/L (ref 7–16)
AST SERPL-CCNC: 10 U/L (ref 0–39)
BASOPHILS ABSOLUTE: 0.13 E9/L (ref 0–0.2)
BASOPHILS RELATIVE PERCENT: 1.6 % (ref 0–2)
BILIRUB SERPL-MCNC: 0.3 MG/DL (ref 0–1.2)
BUN BLDV-MCNC: 5 MG/DL (ref 6–23)
CALCIUM SERPL-MCNC: 8.8 MG/DL (ref 8.6–10.2)
CHLORIDE BLD-SCNC: 96 MMOL/L (ref 98–107)
CO2: 23 MMOL/L (ref 22–29)
CREAT SERPL-MCNC: 0.7 MG/DL (ref 0.7–1.2)
EOSINOPHILS ABSOLUTE: 0.36 E9/L (ref 0.05–0.5)
EOSINOPHILS RELATIVE PERCENT: 4.4 % (ref 0–6)
GFR AFRICAN AMERICAN: >60
GFR NON-AFRICAN AMERICAN: >60 ML/MIN/1.73
GLUCOSE BLD-MCNC: 100 MG/DL (ref 74–99)
HCT VFR BLD CALC: 39.7 % (ref 37–54)
HEMOGLOBIN: 13.3 G/DL (ref 12.5–16.5)
IMMATURE GRANULOCYTES #: 0.03 E9/L
IMMATURE GRANULOCYTES %: 0.4 % (ref 0–5)
LYMPHOCYTES ABSOLUTE: 2.6 E9/L (ref 1.5–4)
LYMPHOCYTES RELATIVE PERCENT: 32.1 % (ref 20–42)
MCH RBC QN AUTO: 32.6 PG (ref 26–35)
MCHC RBC AUTO-ENTMCNC: 33.5 % (ref 32–34.5)
MCV RBC AUTO: 97.3 FL (ref 80–99.9)
MONOCYTES ABSOLUTE: 0.65 E9/L (ref 0.1–0.95)
MONOCYTES RELATIVE PERCENT: 8 % (ref 2–12)
NEUTROPHILS ABSOLUTE: 4.32 E9/L (ref 1.8–7.3)
NEUTROPHILS RELATIVE PERCENT: 53.5 % (ref 43–80)
PDW BLD-RTO: 14.7 FL (ref 11.5–15)
PLATELET # BLD: 226 E9/L (ref 130–450)
PMV BLD AUTO: 9.4 FL (ref 7–12)
POTASSIUM SERPL-SCNC: 4.1 MMOL/L (ref 3.5–5)
RBC # BLD: 4.08 E12/L (ref 3.8–5.8)
SODIUM BLD-SCNC: 132 MMOL/L (ref 132–146)
TOTAL PROTEIN: 6.9 G/DL (ref 6.4–8.3)
TSH SERPL DL<=0.05 MIU/L-ACNC: 0.15 UIU/ML (ref 0.27–4.2)
WBC # BLD: 8.1 E9/L (ref 4.5–11.5)

## 2021-12-08 PROCEDURE — 36415 COLL VENOUS BLD VENIPUNCTURE: CPT

## 2021-12-08 PROCEDURE — 80053 COMPREHEN METABOLIC PANEL: CPT

## 2021-12-08 PROCEDURE — 84443 ASSAY THYROID STIM HORMONE: CPT

## 2021-12-08 PROCEDURE — 85025 COMPLETE CBC W/AUTO DIFF WBC: CPT

## 2021-12-09 ENCOUNTER — HOSPITAL ENCOUNTER (OUTPATIENT)
Dept: INFUSION THERAPY | Age: 71
Discharge: HOME | End: 2021-12-09
Payer: MEDICARE

## 2021-12-09 ENCOUNTER — OFFICE VISIT (OUTPATIENT)
Dept: ONCOLOGY | Age: 71
End: 2021-12-09

## 2021-12-09 ENCOUNTER — TELEPHONE (OUTPATIENT)
Dept: CASE MANAGEMENT | Age: 71
End: 2021-12-09

## 2021-12-09 VITALS
DIASTOLIC BLOOD PRESSURE: 48 MMHG | HEART RATE: 73 BPM | SYSTOLIC BLOOD PRESSURE: 123 MMHG | WEIGHT: 118.9 LBS | HEIGHT: 68 IN | TEMPERATURE: 97.1 F | OXYGEN SATURATION: 96 % | BODY MASS INDEX: 18.02 KG/M2

## 2021-12-09 VITALS — HEART RATE: 62 BPM | SYSTOLIC BLOOD PRESSURE: 122 MMHG | RESPIRATION RATE: 18 BRPM | DIASTOLIC BLOOD PRESSURE: 71 MMHG

## 2021-12-09 DIAGNOSIS — C64.1 RENAL CELL CANCER, RIGHT (HCC): Primary | ICD-10-CM

## 2021-12-09 PROCEDURE — 96417 CHEMO IV INFUS EACH ADDL SEQ: CPT

## 2021-12-09 PROCEDURE — 6360000002 HC RX W HCPCS: Performed by: INTERNAL MEDICINE

## 2021-12-09 PROCEDURE — 96374 THER/PROPH/DIAG INJ IV PUSH: CPT

## 2021-12-09 PROCEDURE — 96413 CHEMO IV INFUSION 1 HR: CPT

## 2021-12-09 PROCEDURE — 2580000003 HC RX 258: Performed by: INTERNAL MEDICINE

## 2021-12-09 RX ORDER — HEPARIN SODIUM (PORCINE) LOCK FLUSH IV SOLN 100 UNIT/ML 100 UNIT/ML
500 SOLUTION INTRAVENOUS PRN
Status: DISCONTINUED | OUTPATIENT
Start: 2021-12-09 | End: 2021-12-10 | Stop reason: HOSPADM

## 2021-12-09 RX ORDER — PROCHLORPERAZINE EDISYLATE 5 MG/ML
10 INJECTION INTRAMUSCULAR; INTRAVENOUS ONCE
Status: COMPLETED | OUTPATIENT
Start: 2021-12-09 | End: 2021-12-09

## 2021-12-09 RX ORDER — EPINEPHRINE 1 MG/ML
0.3 INJECTION, SOLUTION, CONCENTRATE INTRAVENOUS PRN
Status: CANCELLED | OUTPATIENT
Start: 2021-12-09

## 2021-12-09 RX ORDER — SODIUM CHLORIDE 0.9 % (FLUSH) 0.9 %
5-40 SYRINGE (ML) INJECTION PRN
Status: CANCELLED | OUTPATIENT
Start: 2021-12-09

## 2021-12-09 RX ORDER — SODIUM CHLORIDE 9 MG/ML
20 INJECTION, SOLUTION INTRAVENOUS ONCE
Status: COMPLETED | OUTPATIENT
Start: 2021-12-09 | End: 2021-12-09

## 2021-12-09 RX ORDER — PROCHLORPERAZINE EDISYLATE 5 MG/ML
10 INJECTION INTRAMUSCULAR; INTRAVENOUS ONCE
Status: CANCELLED | OUTPATIENT
Start: 2021-12-09

## 2021-12-09 RX ORDER — SODIUM CHLORIDE 9 MG/ML
25 INJECTION, SOLUTION INTRAVENOUS PRN
Status: CANCELLED | OUTPATIENT
Start: 2021-12-09

## 2021-12-09 RX ORDER — SODIUM CHLORIDE 9 MG/ML
20 INJECTION, SOLUTION INTRAVENOUS ONCE
Status: CANCELLED | OUTPATIENT
Start: 2021-12-09 | End: 2021-12-09

## 2021-12-09 RX ORDER — MEPERIDINE HYDROCHLORIDE 25 MG/ML
12.5 INJECTION INTRAMUSCULAR; INTRAVENOUS; SUBCUTANEOUS ONCE
Status: CANCELLED | OUTPATIENT
Start: 2021-12-09 | End: 2021-12-09

## 2021-12-09 RX ORDER — SODIUM CHLORIDE 9 MG/ML
5-40 INJECTION INTRAVENOUS PRN
Status: CANCELLED | OUTPATIENT
Start: 2021-12-09

## 2021-12-09 RX ORDER — DIPHENHYDRAMINE HYDROCHLORIDE 50 MG/ML
50 INJECTION INTRAMUSCULAR; INTRAVENOUS ONCE
Status: CANCELLED | OUTPATIENT
Start: 2021-12-09 | End: 2021-12-09

## 2021-12-09 RX ORDER — METHYLPREDNISOLONE SODIUM SUCCINATE 125 MG/2ML
125 INJECTION, POWDER, LYOPHILIZED, FOR SOLUTION INTRAMUSCULAR; INTRAVENOUS ONCE
Status: CANCELLED | OUTPATIENT
Start: 2021-12-09 | End: 2021-12-09

## 2021-12-09 RX ORDER — LIDOCAINE AND PRILOCAINE 25; 25 MG/G; MG/G
CREAM TOPICAL
Qty: 30 G | Refills: 1 | Status: SHIPPED | OUTPATIENT
Start: 2021-12-09

## 2021-12-09 RX ORDER — SODIUM CHLORIDE 9 MG/ML
INJECTION, SOLUTION INTRAVENOUS CONTINUOUS
Status: CANCELLED | OUTPATIENT
Start: 2021-12-09

## 2021-12-09 RX ORDER — HEPARIN SODIUM (PORCINE) LOCK FLUSH IV SOLN 100 UNIT/ML 100 UNIT/ML
500 SOLUTION INTRAVENOUS PRN
Status: CANCELLED | OUTPATIENT
Start: 2021-12-09

## 2021-12-09 RX ADMIN — SODIUM CHLORIDE 20 ML/HR: 9 INJECTION, SOLUTION INTRAVENOUS at 10:00

## 2021-12-09 RX ADMIN — SODIUM CHLORIDE 50 MG: 9 INJECTION, SOLUTION INTRAVENOUS at 11:34

## 2021-12-09 RX ADMIN — SODIUM CHLORIDE 180 MG: 9 INJECTION, SOLUTION INTRAVENOUS at 10:43

## 2021-12-09 RX ADMIN — PROCHLORPERAZINE EDISYLATE 10 MG: 5 INJECTION INTRAMUSCULAR; INTRAVENOUS at 10:26

## 2021-12-09 RX ADMIN — Medication 500 UNITS: at 12:22

## 2021-12-09 NOTE — TELEPHONE ENCOUNTER
Met with patient and his wife during cycle 2 day 1 Opdivo and Yervoy treatment to review his treatment regime and provide a Patient Resource booklet on Renal Cancer and Immunotherapy and a hydration option list prepared by the clinic dietitians. Patient has an immunotherapy wallet card, reinforced to notify ER staff if he needs to go there for adverse effects, he verbalized understanding. Patient denied issues with shortness of breath or diarrhea after his 1st Opdivo and Yervoy treatment, but reported feeling very achy for about 2 days. He denied medical bills, transportation or his appetite. He stated his niece, Anca Mg, will be bringing in his paperwork for the financial navigator so he can apply for financial assistance for medical bills he has issues paying. Patient expressed concerns over what he should be eating even after speaking with the clinic dietitian yesterday. Read her note and reinforced what she recommended. Patient asked if he's able to add extra calories and protein to the food his wife prepares after she eats what she wants so she's not constantly cooking separate meals. Informed him this should be fine and that the clinic dietitian will be updated for additional recommendations. Contacted corazon Chakraborty, clinic dietitian, she gave additional food suggestions what patient can add to his wife's cooking, provided patient with this information. Reviewed patient's updated clinic calendar with him and his wife and when he'll return to the clinic for his next appointments, they verbalized understanding. Patient and his wife denied further needs today, encouraged them to contact the clinic with questions, they verbalized understanding. Samantha Landin  RN, ADN, BSE, OCN  Patient Nurse Navigator

## 2021-12-09 NOTE — PROGRESS NOTES
[address]  [address] [place] [address]   Hematology/Oncology  Consult      Patient Name: Doreen Nearing  YOB: 1950  PCP: SHI Leon NP   Referring Provider:      Reason for Consultation:   Chief Complaint   Patient presents with    Follow-up     Renal cell ancer, right [facility])    Cancer      Interval history: No new complaints. History of Present Illness:  73years old male referred for possible metastatic renal cell carcinoma. Patient presented to his PCP, HETAL Pathak, complaining of right side chest wall mass which he noticed about a month ago and had been gradually increasing in size. CT chest from October 2021 showed mltiple heterogenous and necrotic masses in the right kidney with possible tumor invasion of the right renal vein. In addition to this there was a large 4 by 9 cm soft tissue necrotic mass involving the fourth rib. There were other lytic destructive lesions in the right eighth and ninth rib. There were multiple right lung nodules ranging upto 1.5 cm. Few liver nodules were noted as well the largest being 7 mm. 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 PET scan showed hypermetabolic lesions in the right kidney, left adrenal diffuse skeletal metastasis, scattered pulmonary nodules most of them subcentimeter except for 1 right lung hypermetabolic nodule. MRI brain reviewed no intracranial metastasis. S/p right chest wall mass biopsy on 11/2/2021. Consistent with clear-cell renal cell carcinoma. Performance status seems fair Karnofsky performance status 80/70. Intermediate risk based on MSKCC risk factors although the disease seems to have progressed rapidly over the past month. His rapid progression as well as renal vein involvement probably precludes debulking nephrectomy. Tumor burden is not high and patient is asymptomatic. Recommended treatment with ipilimumab/nivolumab combination. Started ipilimumab/nivolumab in November 2021. Patient reports pain in the right side chest wall mass which is well controlled with Tylenol as needed. Denies recent weight loss, loss of appetite night sweats, back pain. Review of systems: Over 10 systems were reviewed and all were negative except as mentioned above. Past medical history: Hypertension. Coronary artery disease, peripheral arterial disease, hyperlipidemia. Past surgical history: History of CABG, femoral arterial bypass, hernia repair. Social history: Smokes half a pack a day, denies alcohol or drug abuse. Family history Sister had breast cancer, father had colon cancer. Diagnostic Data:     Past Medical History:   Diagnosis Date    Anticoagulant long-term use 2007    aspirin    CAD (coronary artery disease)     Cancer (HCC)     kidney    Deep vein thrombosis (DVT) (HCC)     leg    Ramona (hard of hearing)     Hx of blood clots     Hyperlipidemia     Hypertension     Mentally challenged     information per patient's niece. Patient Active Problem List    Diagnosis Date Noted    Poor venous access     Renal cell cancer, right ([address] 11/11/2021        Past Surgical History:   Procedure Laterality Date   [facility]    patient's sister said they were told a twin was removed from paitent's abdomen.    [address]  2007    bypass    CATHETER INSERTION Left 11/16/2021    MEDIPORT CATHETER [facility]ION [facility]      bifemoral    IR PERCUT BX LUNG/MEDIASTINUM  11/2/2021    IR PERCUT BX LUNG/MEDIASTINUM 11/2/2021 W CT       Family History  Family History   Problem Relation Age of Onset    High Blood Pressure Mother     Heart Disease Mother     Heart Attack Mother     Cancer Father 64        colon    Arthritis Sister     Heart Attack Brother     No Known Problems Maternal Uncle     No Known Problems Paternal Aunt     No Known Problems Paternal Uncle     No Known Problems Maternal Grandmother     Cancer Maternal Grandfather         throat    Heart Attack Paternal Grandmother     No Known Problems Paternal Grandfather     Cirrhosis Paternal Cousin     Deep Vein Thrombosis Sister     Diabetes Sister     Heart Disease Sister         triple bypass    Breast Cancer Sister 61    High Blood Pressure Sister     Diabetes Sister     High Cholesterol Sister     Arthritis Sister        Social History    TOBACCO:   reports that he has been smoking cigarettes. He has a 13.50 pack-year smoking history. He has never used smokeless tobacco.  ETOH:   reports previous alcohol use. Home Medications  Prior to Admission medications    Medication Sig Start Date End Date Taking? Authorizing Provider   ARTHRITIS PAIN RELIEF 650 MG extended release tablet TAKE TWO TABLETS BY MOUTH AT BEDTIME 9/21/21  Yes Historical Provider, MD   metoprolol tartrate (LOPRESSOR) 25 MG tablet Take 25 mg by mouth 2 times daily   Yes Historical Provider, MD   aspirin 81 MG EC tablet Take 81 mg by mouth daily LD 10/26/21   Yes Historical Provider, MD   simvastatin (ZOCOR) 80 MG tablet Take 80 mg by mouth nightly   Yes Historical Provider, MD   lisinopril (PRINIVIL;ZESTRIL) 20 MG tablet Take 20 mg by mouth daily   Yes Historical Provider, MD       Allergies  No Known Allergies    Review of Systems:      Objective  BP (!) 123/48   Pulse 73   Temp 97.1 °F (36.2 °C)   Ht 5' 8\" (1.727 m)   Wt 118 lb 14.4 oz (53.9 kg)   SpO2 96%   BMI 18.08 kg/m²     Physical Performance Status    Physical Exam:  General: AAO to person, place, time, and purpose, appears stated age, cooperative, no acute distress, pleasant   Head and neck : PERRLA, EOMI . Sclera non icteric. Oropharynx : Clear  Neck: no JVD,  no adenopathy, no carotid bruit  LYMPHATICS : no lap  Lungs: Clear to auscultation. Right side chest wall globular mass, 4-5 cm in diamter, soft.    Heart: Regular rate and regular rhythm, no murmur, normal S1, S2  Abdomen: Soft, non-tender;no masses, no organomegaly  Extremities: No edema,no cyanosis, no clubbing. Skin:  No rash. Neurologic:Cranial nerves grossly intact. No focal motor or sensory deficits .     Recent Laboratory Data-   Lab Results   Component Value Date    WBC 8.1 12/08/2021    HGB 13.3 12/08/2021    HCT 39.7 12/08/2021    MCV 97.3 12/08/2021     12/08/2021    LYMPHOPCT 32.1 12/08/2021    RBC 4.08 12/08/2021    MCH 32.6 12/08/2021    MCHC 33.5 12/08/2021    RDW 14.7 12/08/2021    NEUTOPHILPCT 53.5 12/08/2021    MONOPCT 8.0 12/08/2021    BASOPCT 1.6 12/08/2021    NEUTROABS 4.32 12/08/2021    LYMPHSABS 2.60 12/08/2021    MONOSABS 0.65 12/08/2021    EOSABS 0.36 12/08/2021    BASOSABS 0.13 12/08/2021       Lab Results   Component Value Date     12/08/2021    K 4.1 12/08/2021    CL 96 (L) 12/08/2021    CO2 23 12/08/2021    BUN 5 (L) 12/08/2021    CREATININE 0.7 12/08/2021    GLUCOSE 100 (H) 12/08/2021    CALCIUM 8.8 12/08/2021    PROT 6.9 12/08/2021    LABALBU 4.1 12/08/2021    BILITOT 0.3 12/08/2021    ALKPHOS 156 (H) 12/08/2021    AST 10 12/08/2021    ALT 7 12/08/2021    LABGLOM >60 12/08/2021    GFRAA >60 12/08/2021       No results found for: IRON, TIBC, FERRITIN        Radiology-    Echo Complete    Result Date: 10/8/2021  Transthoracic Echocardiography Report (TTE)  Demographics   Patient Name    [facility]        Gender            Male                  Marcio Prater  [id number]      Room Number  Number   Account #       [de-identified]     Procedure Date    10/08/2021   Corporate ID                  Ordering          Mikie Ruck DO                                Physician   Accession       [id number]    Referring         Mikie Hughes DO  Number                        Physician   Date of Birth   1950    Sonographer       Tej Keys RDCS   Age             73 year(s)    Interpreting      Himanshu Do                                Physician Physician Cardiology                                                  Maxwell Baird MD                                 Any Other  Procedure Type of Study   TTE procedure:Echo Complete W/Doppler & Color Flow. Procedure Date Date: 10/08/2021 Start: 09:57 AM Study Location: Echo Lab Technical Quality: Poor visualization due to poor acoustical window. Indications:Heart murmur. Patient Status: Routine Height: 68 inches Weight: 120 pounds BSA: 1.65 m^2 BMI: 18.25 kg/m^2  Findings   Left Ventricle  Normal left ventricular chamber size. Normal left ventricular systolic function. Visually estimated LVEF 65%. Mild left ventricular concentric hypertrophy noted. Normal diastolic function. Right Ventricle  Normal right ventricle size and function. Left Atrium  The left atrium is mildly dilated. Mildly increased left atrial volume. Interatrial septum not well visualized but appears intact. Right Atrium  Normal right atrium. Mitral Valve  Normal mitral valve structure. There is moderate mitral regurgitation - ERO 0.23cm2, PISA 0.9cm, RV 51cc. No mitral valve prolapse. Tricuspid Valve  Normal tricuspid valve structure. There is mild tricuspid regurgitation. Mild pulmonary hypertension, RVSP 39mmHg. Aortic Valve  Normal aortic valve structure. There is trace to mild aortic regurgitation, pressure 1/2 time 718ms. Pulmonic Valve  The pulmonic valve was not well visualized. Pericardial Effusion  No evidence of pericardial effusion. Pericardium appears normal.   Pleural Effusion  No evidence of pleural effusion. Aorta  Aortic root 3.5cm. Miscellaneous  The inferior vena cava diameter is normal with normal respiratory  variation. Conclusions   Summary  Normal left ventricular chamber size. Normal left ventricular systolic function, LVEF 02%. Mild left ventricular concentric hypertrophy noted. Normal diastolic function. The left atrium is mildly dilated. Mildly increased left atrial volume. Interatrial septum not well visualized but appears intact. Normal right ventricle size and function. There is moderate mitral regurgitation - ERO 0.23cm2, PISA 0.9cm, RV 51cc. No mitral valve prolapse. There is trace to mild aortic regurgitation, pressure 1/2 time 718ms. There is mild tricuspid regurgitation. Mild pulmonary hypertension, RVSP 39mmHg. The pulmonic valve was not well visualized. Aortic root 3.5cm. No evidence of pericardial effusion. No intra cardiac mass or thrombus. No comparison study available.    Signature   ----------------------------------------------------------------  Electronically signed by Reema Fay MD(Interpreting  physician) on 10/08/2021 03:52 PM  ----------------------------------------------------------------  M-Mode/2D Measurements & Calculations   LV Diastolic    LV Systolic Dimension: 3.2   AV Cusp Separation: 1.9 cmLA  Dimension: 5.3  cm                           Dimension: 3.9 cmAO Root  cm              LV Volume Diastolic: 035.0   Dimension: 3.5 cm  LV FS:39.6 %    ml  LV PW           LV Volume Systolic: 74.9 ml  Diastolic: 1.2  LV EDV/LV EDV Index: 134.5  cm              ml/82 ml/m^2LV ESV/LV ESV    RV Diastolic Dimension: 2.8  LV PW Systolic: Index: 43.9 MI/67IC/ m^2     cm  1.5 cm          EF Calculated: 70 %          RV Systolic Dimension: 2.4 cm  Septum          LV Mass Index: 147 l/min*m^2 LA/Aorta: 1.2  Diastolic: 1.1  cm                                           LA volume/Index: 79.7 ml  Septum          LVOT: 2 cm  Systolic: 1.2  cm   LV Mass: 241.96  g  Doppler Measurements & Calculations   MV Peak E-Wave: 1.42   AV Peak Velocity: 1.87 m/s  LVOT Peak Velocity:  m/s                    AV Peak Gradient: 14.05     1.55 m/s  MV Peak A-Wave: 0.82   mmHg                        LVOT Mean Velocity:  m/s                    AV Mean Velocity: 1.27 m/s  0.81 m/s  MV E/A Ratio: 1.73     AV Mean Gradient: 7.2 mmHg  LVOT Peak Gradient: 9.6  MV Peak Gradient: 9.2  AV VTI: 42.1 cm             mmHgLVOT Mean Gradient:  mmHg                   AV Area (Continuity):2.37   3.5 mmHg  MV Mean Gradient: 2.9  cm^2                        Estimated RVSP: 39 mmHg  mmHg                   AV Deceleration Time:       Estimated RAP:10 mmHg  MV Mean Velocity: 0.75 2474.3 msec  m/s                    LVOT VTI: 31.8 cm  MV Deceleration Time:                              TR Velocity:2.69 m/s  271.4 msec             Estimated PASP: 39.03 mmHg  TR Gradient:29.03 mmHg  MV P1/2t: 101.8 msec   Pulm. Vein A Reversal       PV Peak Velocity: 0.51  MVA by PHT:2.16 cm^2   Duration:175.3 msec         m/s  MV Area (continuity):  Pulm. Vein D Velocity:0.74  PV Peak Gradient: 1.02  1.9 cm^2               m/sPulm. Vein A Reversal    mmHg                         Velocity:0.38 m/s           PV Mean Velocity: 0.36                         Pulm. Vein S Velocity: 0.44 m/s  MR Velocity: 5.37 m/s  m/s                         PV Mean Gradient: 0.6  MV RICARDO PISA: 0.23 cm^2                             mmHg  MR VTI: 221.9 cm  Alias Velocity: 0.25  m/sPISA Radius: 0.9 cm   PISA area: 5.09 cm^2MR  flow rate: 125.72  ml/sMR volume:51.04 ml  [email or web address] [id number]    CT CHEST W CONTRAST    Addendum Date: 10/13/2021    ADDENDUM: 6 mm indeterminate hypodense lesions are identified in the body and tail of the pancreas. Consider surveillance     Result Date: 10/13/2021  EXAMINATION: CT OF THE CHEST WITH CONTRAST 10/13/2021 10:59 am TECHNIQUE: CT of the chest was performed with the administration of intravenous contrast. Multiplanar reformatted images are provided for review. Dose modulation, iterative reconstruction, and/or weight based adjustment of the mA/kV was utilized to reduce the radiation dose to as low as reasonably achievable. COMPARISON: None.  HISTORY: ORDERING SYSTEM PROVIDED HISTORY: Chest wall mass FINDINGS: There is cardiomegaly. There is coronary artery calcification. The great vessels are normal.  Moderately enlarged mediastinal and hilar lymph nodes are noted with lymph nodes measuring up to 1.3 cm in the right hilum. Trachea and major bronchi are patent. Multiple bilateral pulmonary nodules are identified with nodules measuring up to 1.5 cm in the right lower lobe and smaller ones in the right middle lobe and right upper lobe. The pulmonary nodules in the left lower lobe ranges from 5 mm up to 8 mm. There is no focal consolidation or pleural effusion. Punctate enhancing nodules are identified in the posterior right hepatic lobe, largest 1 measuring up to 7 mm. Multiple heterogeneously enhancing right renal masses are identified largest 1 measuring 3.8 x 3.3 cm which is partially necrotic with additional smaller nodules concerning for multifocal malignancy like renal cell carcinoma. There is filling defects in the right renal vein concerning for tumor invasion versus tumor embolism of the renal vein. There is bilateral adrenal metastasis with the largest 1 in the left adrenal gland measuring 1.6 x 2.7 cm. There is bone metastasis with the destructive soft tissue mass involving the right 4th rib anterolaterally with adjacent soft tissue mass which is partially necrotic measuring 4.5 x 9.2 cm. Lytic destructive lesions are also identified in the right 8th and 9th rib near the costo vertebral junction. Multiple heterogeneous  enhancing and necrotic masses in the right kidney concerning for multifocal renal cell carcinoma with  possible tumor invasion or tumor embolism of the right renal vein. There is diffuse metastasis with the involvement of the right and left adrenal glands, possible hepatic metastasis, widespread pulmonary and rib involvement as noted. Further assessment by PET-CT scan is recommended. Findings were telephoned to licensed caregiver.          ASSESSMENT/PLAN :    Tg Willams was seen today for follow-up and cancer. Diagnoses and all orders for this visit:    Renal cell cancer, right ([facility] [address]  -     CBC Auto Differential; Future  -     Comprehensive Metabolic Panel; Future  -     TSH; Future         73years old male with Stage IV renal cell carcinoma (diagnosed 11/2021- chest wall mass biopsy) -right chest wall mass, multiple right kidney necrotic lesions in addition to large soft tissue destructive mass of right fourth rib, multiple lung nodules up to 1.5 cm, bilateral adrenal metastasis up to 2.5 cm. ECOG 1/2. Performance status fair Karnofsky performance status 80/70. Intermediate risk based on MSKCC risk factors although the disease seems to have progressed rapidly over the past month. His rapid progression as well as renal vein involvement probably precludes debulking nephrectomy. Tumor burden is not high and patient is asymptomatic. Recommended treatment with ipilimumab/nivolumab combination. Started in November 2021. S/p 1 cycle. Tolerated treatment well. No new complaints. Reviewed blood work from yesterday-unremarkable CBC CMP. Mildly low TSH. Asymptomatic. Continue Xgeva every 4 weeks. Calcium normal.   Patient is edentulous. Pain at the right side chest wall mass is well controlled with Tylenol as needed. Return to clinic in 3 weeks. Return in about 3 weeks (around 12/30/2021).      Radha Grullon MD   Electronically signed 12/9/2021 at 9:36 AM

## 2021-12-16 ENCOUNTER — HOSPITAL ENCOUNTER (OUTPATIENT)
Dept: INFUSION THERAPY | Age: 71
Discharge: HOME | End: 2021-12-16
Payer: MEDICARE

## 2021-12-16 DIAGNOSIS — C64.1 RENAL CELL CANCER, RIGHT (HCC): Primary | ICD-10-CM

## 2021-12-16 PROCEDURE — 6360000002 HC RX W HCPCS: Performed by: INTERNAL MEDICINE

## 2021-12-16 PROCEDURE — 96372 THER/PROPH/DIAG INJ SC/IM: CPT

## 2021-12-16 RX ADMIN — DENOSUMAB 120 MG: 120 INJECTION SUBCUTANEOUS at 11:40

## 2021-12-16 NOTE — PROGRESS NOTES
Patient presents to clinic today for Xgeva injection. Patient's labs monitored, specifically, Calcium level, to ensure no increased risk of hypocalcemia with administration of the medication. Patient's calcium level is 8.8 mg/dL. Patient received therapy and will continue to be monitored prior to each dose.     Charlotte Alex, [address] 12/16/2021 11:40 AM

## 2021-12-29 ENCOUNTER — HOSPITAL ENCOUNTER (OUTPATIENT)
Dept: INFUSION THERAPY | Age: 71
Discharge: HOME | End: 2021-12-29
Payer: MEDICARE

## 2021-12-29 DIAGNOSIS — C64.1 RENAL CELL CANCER, RIGHT (HCC): ICD-10-CM

## 2021-12-29 LAB
ALBUMIN SERPL-MCNC: 3.7 G/DL (ref 3.5–5.2)
ALP BLD-CCNC: 159 U/L (ref 40–129)
ALT SERPL-CCNC: 20 U/L (ref 0–40)
ANION GAP SERPL CALCULATED.3IONS-SCNC: 10 MMOL/L (ref 7–16)
AST SERPL-CCNC: 23 U/L (ref 0–39)
BASOPHILS ABSOLUTE: 0.08 E9/L (ref 0–0.2)
BASOPHILS RELATIVE PERCENT: 0.9 % (ref 0–2)
BILIRUB SERPL-MCNC: 0.3 MG/DL (ref 0–1.2)
BUN BLDV-MCNC: 8 MG/DL (ref 6–23)
CALCIUM SERPL-MCNC: 8.5 MG/DL (ref 8.6–10.2)
CHLORIDE BLD-SCNC: 99 MMOL/L (ref 98–107)
CO2: 23 MMOL/L (ref 22–29)
CREAT SERPL-MCNC: 0.7 MG/DL (ref 0.7–1.2)
EOSINOPHILS ABSOLUTE: 0.27 E9/L (ref 0.05–0.5)
EOSINOPHILS RELATIVE PERCENT: 3.1 % (ref 0–6)
GFR AFRICAN AMERICAN: >60
GFR NON-AFRICAN AMERICAN: >60 ML/MIN/1.73
GLUCOSE BLD-MCNC: 106 MG/DL (ref 74–99)
HCT VFR BLD CALC: 36.5 % (ref 37–54)
HEMOGLOBIN: 11.9 G/DL (ref 12.5–16.5)
IMMATURE GRANULOCYTES #: 0.02 E9/L
IMMATURE GRANULOCYTES %: 0.2 % (ref 0–5)
LYMPHOCYTES ABSOLUTE: 2.65 E9/L (ref 1.5–4)
LYMPHOCYTES RELATIVE PERCENT: 30.8 % (ref 20–42)
MCH RBC QN AUTO: 31.6 PG (ref 26–35)
MCHC RBC AUTO-ENTMCNC: 32.6 % (ref 32–34.5)
MCV RBC AUTO: 97.1 FL (ref 80–99.9)
MONOCYTES ABSOLUTE: 0.99 E9/L (ref 0.1–0.95)
MONOCYTES RELATIVE PERCENT: 11.5 % (ref 2–12)
NEUTROPHILS ABSOLUTE: 4.6 E9/L (ref 1.8–7.3)
NEUTROPHILS RELATIVE PERCENT: 53.5 % (ref 43–80)
PDW BLD-RTO: 14.6 FL (ref 11.5–15)
PLATELET # BLD: 253 E9/L (ref 130–450)
PMV BLD AUTO: 9.6 FL (ref 7–12)
POTASSIUM SERPL-SCNC: 4.9 MMOL/L (ref 3.5–5)
RBC # BLD: 3.76 E12/L (ref 3.8–5.8)
SODIUM BLD-SCNC: 132 MMOL/L (ref 132–146)
TOTAL PROTEIN: 6.7 G/DL (ref 6.4–8.3)
TSH SERPL DL<=0.05 MIU/L-ACNC: <0.01 UIU/ML (ref 0.27–4.2)
WBC # BLD: 8.6 E9/L (ref 4.5–11.5)

## 2021-12-29 PROCEDURE — 36415 COLL VENOUS BLD VENIPUNCTURE: CPT

## 2021-12-29 PROCEDURE — 85025 COMPLETE CBC W/AUTO DIFF WBC: CPT

## 2021-12-29 PROCEDURE — 84443 ASSAY THYROID STIM HORMONE: CPT

## 2021-12-29 PROCEDURE — 80053 COMPREHEN METABOLIC PANEL: CPT

## 2021-12-30 ENCOUNTER — OFFICE VISIT (OUTPATIENT)
Dept: ONCOLOGY | Age: 71
End: 2021-12-30

## 2021-12-30 ENCOUNTER — HOSPITAL ENCOUNTER (OUTPATIENT)
Dept: INFUSION THERAPY | Age: 71
Discharge: HOME | End: 2021-12-30
Payer: MEDICARE

## 2021-12-30 VITALS — HEART RATE: 66 BPM | SYSTOLIC BLOOD PRESSURE: 123 MMHG | DIASTOLIC BLOOD PRESSURE: 52 MMHG

## 2021-12-30 VITALS
TEMPERATURE: 97.5 F | BODY MASS INDEX: 18.6 KG/M2 | HEIGHT: 68 IN | HEART RATE: 69 BPM | WEIGHT: 122.7 LBS | OXYGEN SATURATION: 98 % | DIASTOLIC BLOOD PRESSURE: 68 MMHG | SYSTOLIC BLOOD PRESSURE: 136 MMHG

## 2021-12-30 DIAGNOSIS — C64.1 RENAL CELL CANCER, RIGHT (HCC): Primary | ICD-10-CM

## 2021-12-30 DIAGNOSIS — R52 PAIN: ICD-10-CM

## 2021-12-30 DIAGNOSIS — R53.82 CHRONIC FATIGUE: ICD-10-CM

## 2021-12-30 PROCEDURE — 96413 CHEMO IV INFUSION 1 HR: CPT

## 2021-12-30 PROCEDURE — 96417 CHEMO IV INFUS EACH ADDL SEQ: CPT

## 2021-12-30 PROCEDURE — 2580000003 HC RX 258: Performed by: NURSE PRACTITIONER

## 2021-12-30 PROCEDURE — 96375 TX/PRO/DX INJ NEW DRUG ADDON: CPT

## 2021-12-30 PROCEDURE — 6360000002 HC RX W HCPCS: Performed by: NURSE PRACTITIONER

## 2021-12-30 RX ORDER — SODIUM CHLORIDE 9 MG/ML
20 INJECTION, SOLUTION INTRAVENOUS ONCE
Status: CANCELLED | OUTPATIENT
Start: 2021-12-30 | End: 2021-12-30

## 2021-12-30 RX ORDER — HEPARIN SODIUM (PORCINE) LOCK FLUSH IV SOLN 100 UNIT/ML 100 UNIT/ML
500 SOLUTION INTRAVENOUS PRN
Status: DISCONTINUED | OUTPATIENT
Start: 2021-12-30 | End: 2021-12-31 | Stop reason: HOSPADM

## 2021-12-30 RX ORDER — SODIUM CHLORIDE 9 MG/ML
INJECTION, SOLUTION INTRAVENOUS CONTINUOUS
Status: CANCELLED | OUTPATIENT
Start: 2021-12-30

## 2021-12-30 RX ORDER — SODIUM CHLORIDE 9 MG/ML
20 INJECTION, SOLUTION INTRAVENOUS ONCE
Status: COMPLETED | OUTPATIENT
Start: 2021-12-30 | End: 2021-12-30

## 2021-12-30 RX ORDER — DIPHENHYDRAMINE HYDROCHLORIDE 50 MG/ML
50 INJECTION INTRAMUSCULAR; INTRAVENOUS ONCE
Status: CANCELLED | OUTPATIENT
Start: 2021-12-30 | End: 2021-12-30

## 2021-12-30 RX ORDER — PROCHLORPERAZINE EDISYLATE 5 MG/ML
10 INJECTION INTRAMUSCULAR; INTRAVENOUS ONCE
Status: CANCELLED | OUTPATIENT
Start: 2021-12-30

## 2021-12-30 RX ORDER — SODIUM CHLORIDE 0.9 % (FLUSH) 0.9 %
5-40 SYRINGE (ML) INJECTION PRN
Status: DISCONTINUED | OUTPATIENT
Start: 2021-12-30 | End: 2021-12-31 | Stop reason: HOSPADM

## 2021-12-30 RX ORDER — EPINEPHRINE 1 MG/ML
0.3 INJECTION, SOLUTION, CONCENTRATE INTRAVENOUS PRN
Status: CANCELLED | OUTPATIENT
Start: 2021-12-30

## 2021-12-30 RX ORDER — MEPERIDINE HYDROCHLORIDE 25 MG/ML
12.5 INJECTION INTRAMUSCULAR; INTRAVENOUS; SUBCUTANEOUS ONCE
Status: CANCELLED | OUTPATIENT
Start: 2021-12-30 | End: 2021-12-30

## 2021-12-30 RX ORDER — SODIUM CHLORIDE 9 MG/ML
5-40 INJECTION INTRAVENOUS PRN
Status: CANCELLED | OUTPATIENT
Start: 2021-12-30

## 2021-12-30 RX ORDER — SODIUM CHLORIDE 0.9 % (FLUSH) 0.9 %
5-40 SYRINGE (ML) INJECTION PRN
Status: CANCELLED | OUTPATIENT
Start: 2021-12-30

## 2021-12-30 RX ORDER — SODIUM CHLORIDE 9 MG/ML
25 INJECTION, SOLUTION INTRAVENOUS PRN
Status: CANCELLED | OUTPATIENT
Start: 2021-12-30

## 2021-12-30 RX ORDER — PROCHLORPERAZINE EDISYLATE 5 MG/ML
10 INJECTION INTRAMUSCULAR; INTRAVENOUS ONCE
Status: COMPLETED | OUTPATIENT
Start: 2021-12-30 | End: 2021-12-30

## 2021-12-30 RX ORDER — HEPARIN SODIUM (PORCINE) LOCK FLUSH IV SOLN 100 UNIT/ML 100 UNIT/ML
500 SOLUTION INTRAVENOUS PRN
Status: CANCELLED | OUTPATIENT
Start: 2021-12-30

## 2021-12-30 RX ORDER — METHYLPREDNISOLONE SODIUM SUCCINATE 125 MG/2ML
125 INJECTION, POWDER, LYOPHILIZED, FOR SOLUTION INTRAMUSCULAR; INTRAVENOUS ONCE
Status: CANCELLED | OUTPATIENT
Start: 2021-12-30 | End: 2021-12-30

## 2021-12-30 RX ADMIN — Medication 500 UNITS: at 11:58

## 2021-12-30 RX ADMIN — SODIUM CHLORIDE 180 MG: 9 INJECTION, SOLUTION INTRAVENOUS at 10:21

## 2021-12-30 RX ADMIN — PROCHLORPERAZINE EDISYLATE 10 MG: 5 INJECTION INTRAMUSCULAR; INTRAVENOUS at 10:04

## 2021-12-30 RX ADMIN — SODIUM CHLORIDE, PRESERVATIVE FREE 10 ML: 5 INJECTION INTRAVENOUS at 11:58

## 2021-12-30 RX ADMIN — SODIUM CHLORIDE 50 MG: 9 INJECTION, SOLUTION INTRAVENOUS at 11:02

## 2021-12-30 RX ADMIN — SODIUM CHLORIDE 20 ML/HR: 9 INJECTION, SOLUTION INTRAVENOUS at 10:00

## 2021-12-30 NOTE — PROGRESS NOTES
ONCOLOGY  NP VISIT         12/30/2021      NAME:  Luly Lincoln    YOB: 1950      ALLERGIES:  Patient has no known allergies. Current Outpatient Medications   Medication Sig Dispense Refill    lidocaine-prilocaine (EMLA) 2.5-2.5 % cream Apply topically as needed. 30 g 1    ARTHRITIS PAIN RELIEF 650 MG extended release tablet TAKE TWO TABLETS BY MOUTH AT BEDTIME      metoprolol tartrate (LOPRESSOR) 25 MG tablet Take 25 mg by mouth 2 times daily      aspirin 81 MG EC tablet Take 81 mg by mouth daily LD 10/26/21      simvastatin (ZOCOR) 80 MG tablet Take 80 mg by mouth nightly      lisinopril (PRINIVIL;ZESTRIL) 20 MG tablet Take 20 mg by mouth daily       No current facility-administered medications for this visit. SUBJECTIVE:  Here for treatment and follow up. Feels well today. Tolerating treatments well. C/O achy hips, radiating down legs to feet. OBJECTIVE:   /68   Pulse 69   Temp 97.5 °F (36.4 °C)   Ht 5' 8\" (1.727 m)   Wt 122 lb 11.2 oz (55.7 kg)   SpO2 98%   BMI 18.66 kg/m²   Physical Examination: Performance Status 0  General: AAO to person, place, time,  No no acute distress. Head and neck: PERRLA, EOMI. Sclera non icteric. Oropharynx: Clear. Neck: no JVD. Heart: Regular rate and regular rhythm, Grade II/VI  murmur. Lungs: Clear to auscultation. Abdomen: Soft, non-tender; no masses. Extremities: No edema. Neurologic:Cranial nerves grossly intact. No focal motor or sensory deficits. Skin:  No rash.         LAB RESULTS:    Lab Results   Component Value Date    WBC 8.6 12/29/2021    HGB 11.9 (L) 12/29/2021    HCT 36.5 (L) 12/29/2021    MCV 97.1 12/29/2021     12/29/2021     Lab Results   Component Value Date    ALT 20 12/29/2021    AST 23 12/29/2021    ALKPHOS 159 (H) 12/29/2021    BILITOT 0.3 12/29/2021     Lab Results   Component Value Date    LABALBU 3.7 12/29/2021       Recent Labs     12/29/21  1126      CO2 23   BUN 8   CREATININE 0.7   K+                      4.9  ANC                   4.6  Cr CL            ASSESSMENT/PLAN:  A 73years old male with:     Stage IV renal cell carcinoma (diagnosed 11/2021- chest wall mass biopsy) -right chest wall mass, multiple right kidney necrotic lesions in addition to large soft tissue destructive mass of right fourth rib, multiple lung nodules up to 1.5 cm, bilateral adrenal metastasis up to 2.5 cm. ECOG 1/2.      Performance status fair, Karnofsky performance status 80/70. Intermediate risk based on MSKCC risk factors although the disease seems to have progressed rapidly over the past month. His rapid progression as well as renal vein involvement probably precludes debulking nephrectomy. Tumor burden is not high and patient is asymptomatic. Recommended treatment with ipilimumab/nivolumab combination. Started in November 2021. S/p 1 cycle.     Tolerated treatment well. No new complaints. Reviewed blood work from yesterday-unremarkable CBC CMP. Mildly low TSH. Asymptomatic.     Continue Xgeva every 4 weeks. Calcium normal.   Patient is edentulous.     Pain at the right side chest wall mass is well controlled with Tylenol as needed.     Return to clinic in 3 weeks.     Renal cell cancer, right (HCC)  -     CBC Auto Differential; Future  -     Comprehensive Metabolic Panel; Future  -     TSH; Future      PLAN:    To receive Cycle #3 Opdivo and Adriane  Lab work reviewed, okay to proceed with chemo treatment. Referral to Palliative Care placed. Return on 1/20/2022 for OV with Dr. Jess Balderas, Marlys/Kathy. Labs prior.          Joshua Blackmarkel, [address] [place] [address] Road:  [phone]

## 2022-01-07 ENCOUNTER — TELEPHONE (OUTPATIENT)
Dept: PALLATIVE CARE | Age: 72
End: 2022-01-07

## 2022-01-07 NOTE — TELEPHONE ENCOUNTER
Received another referral for Mal Concepcion, He was seen by Palliative care Oct 2021. Called to patient's niece Leela, no answer left message with contact number if she would like to schedule follow up familia.

## 2022-01-20 ENCOUNTER — HOSPITAL ENCOUNTER (OUTPATIENT)
Dept: INFUSION THERAPY | Age: 72
End: 2022-01-20

## 2022-02-02 ENCOUNTER — HOSPITAL ENCOUNTER (OUTPATIENT)
Dept: INFUSION THERAPY | Age: 72
Discharge: HOME | End: 2022-02-02
Payer: MEDICARE

## 2022-02-02 DIAGNOSIS — C64.1 RENAL CELL CANCER, RIGHT (HCC): Primary | ICD-10-CM

## 2022-02-02 DIAGNOSIS — R53.82 CHRONIC FATIGUE: ICD-10-CM

## 2022-02-02 LAB
ALBUMIN SERPL-MCNC: 3.7 G/DL (ref 3.5–5.2)
ALP BLD-CCNC: 186 U/L (ref 40–129)
ALT SERPL-CCNC: 20 U/L (ref 0–40)
ANION GAP SERPL CALCULATED.3IONS-SCNC: 10 MMOL/L (ref 7–16)
AST SERPL-CCNC: 21 U/L (ref 0–39)
BASOPHILS ABSOLUTE: 0.12 E9/L (ref 0–0.2)
BASOPHILS RELATIVE PERCENT: 1.2 % (ref 0–2)
BILIRUB SERPL-MCNC: 0.3 MG/DL (ref 0–1.2)
BUN BLDV-MCNC: 7 MG/DL (ref 6–23)
CALCIUM SERPL-MCNC: 8.7 MG/DL (ref 8.6–10.2)
CHLORIDE BLD-SCNC: 100 MMOL/L (ref 98–107)
CO2: 22 MMOL/L (ref 22–29)
CREAT SERPL-MCNC: 0.7 MG/DL (ref 0.7–1.2)
EOSINOPHILS ABSOLUTE: 0.59 E9/L (ref 0.05–0.5)
EOSINOPHILS RELATIVE PERCENT: 6 % (ref 0–6)
GFR AFRICAN AMERICAN: >60
GFR NON-AFRICAN AMERICAN: >60 ML/MIN/1.73
GLUCOSE BLD-MCNC: 123 MG/DL (ref 74–99)
HCT VFR BLD CALC: 36.7 % (ref 37–54)
HEMOGLOBIN: 12 G/DL (ref 12.5–16.5)
IMMATURE GRANULOCYTES #: 0.03 E9/L
IMMATURE GRANULOCYTES %: 0.3 % (ref 0–5)
LYMPHOCYTES ABSOLUTE: 3.61 E9/L (ref 1.5–4)
LYMPHOCYTES RELATIVE PERCENT: 36.4 % (ref 20–42)
MCH RBC QN AUTO: 30.4 PG (ref 26–35)
MCHC RBC AUTO-ENTMCNC: 32.7 % (ref 32–34.5)
MCV RBC AUTO: 92.9 FL (ref 80–99.9)
MONOCYTES ABSOLUTE: 1 E9/L (ref 0.1–0.95)
MONOCYTES RELATIVE PERCENT: 10.1 % (ref 2–12)
NEUTROPHILS ABSOLUTE: 4.56 E9/L (ref 1.8–7.3)
NEUTROPHILS RELATIVE PERCENT: 46 % (ref 43–80)
PDW BLD-RTO: 14.9 FL (ref 11.5–15)
PLATELET # BLD: 305 E9/L (ref 130–450)
PMV BLD AUTO: 9.9 FL (ref 7–12)
POTASSIUM SERPL-SCNC: 4.6 MMOL/L (ref 3.5–5)
RBC # BLD: 3.95 E12/L (ref 3.8–5.8)
SODIUM BLD-SCNC: 132 MMOL/L (ref 132–146)
TOTAL PROTEIN: 7 G/DL (ref 6.4–8.3)
TSH SERPL DL<=0.05 MIU/L-ACNC: 0.04 UIU/ML (ref 0.27–4.2)
WBC # BLD: 9.9 E9/L (ref 4.5–11.5)

## 2022-02-02 PROCEDURE — 2580000003 HC RX 258: Performed by: INTERNAL MEDICINE

## 2022-02-02 PROCEDURE — 84443 ASSAY THYROID STIM HORMONE: CPT

## 2022-02-02 PROCEDURE — 6360000002 HC RX W HCPCS: Performed by: INTERNAL MEDICINE

## 2022-02-02 PROCEDURE — 80053 COMPREHEN METABOLIC PANEL: CPT

## 2022-02-02 PROCEDURE — 85025 COMPLETE CBC W/AUTO DIFF WBC: CPT

## 2022-02-02 PROCEDURE — 36591 DRAW BLOOD OFF VENOUS DEVICE: CPT

## 2022-02-02 RX ORDER — HEPARIN SODIUM (PORCINE) LOCK FLUSH IV SOLN 100 UNIT/ML 100 UNIT/ML
500 SOLUTION INTRAVENOUS PRN
Status: DISCONTINUED | OUTPATIENT
Start: 2022-02-02 | End: 2022-02-03 | Stop reason: HOSPADM

## 2022-02-02 RX ORDER — HEPARIN SODIUM (PORCINE) LOCK FLUSH IV SOLN 100 UNIT/ML 100 UNIT/ML
500 SOLUTION INTRAVENOUS PRN
Status: CANCELLED | OUTPATIENT
Start: 2022-02-02

## 2022-02-02 RX ORDER — SODIUM CHLORIDE 0.9 % (FLUSH) 0.9 %
5-40 SYRINGE (ML) INJECTION PRN
Status: DISCONTINUED | OUTPATIENT
Start: 2022-02-02 | End: 2022-02-03 | Stop reason: HOSPADM

## 2022-02-02 RX ORDER — SODIUM CHLORIDE 0.9 % (FLUSH) 0.9 %
5-40 SYRINGE (ML) INJECTION PRN
Status: CANCELLED | OUTPATIENT
Start: 2022-02-02

## 2022-02-02 RX ADMIN — SODIUM CHLORIDE, PRESERVATIVE FREE 10 ML: 5 INJECTION INTRAVENOUS at 10:47

## 2022-02-02 RX ADMIN — Medication 500 UNITS: at 10:47

## 2022-02-02 NOTE — PROGRESS NOTES
Patient presents to clinic for labs today. Left chest  SQ port accessed per policy using 20 G .75 inch Zapata needle for good blood return. Aspirate for waste and specimen sent to lab. Site flushed easily with 10 mL NSS followed by 5 mL Heparin solution 100 units/ml rinse prior to de-access. Dry sterile dressing to area. Tolerated procedure well. Encouraged to schedule port flush every 4 weeks.

## 2022-02-03 ENCOUNTER — HOSPITAL ENCOUNTER (OUTPATIENT)
Dept: INFUSION THERAPY | Age: 72
End: 2022-02-03
Payer: MEDICARE

## 2022-02-10 ENCOUNTER — HOSPITAL ENCOUNTER (OUTPATIENT)
Dept: INFUSION THERAPY | Age: 72
Discharge: HOME | End: 2022-02-10
Payer: MEDICARE

## 2022-02-10 ENCOUNTER — OFFICE VISIT (OUTPATIENT)
Dept: ONCOLOGY | Age: 72
End: 2022-02-10
Payer: MEDICARE

## 2022-02-10 VITALS
SYSTOLIC BLOOD PRESSURE: 144 MMHG | OXYGEN SATURATION: 92 % | TEMPERATURE: 98.2 F | DIASTOLIC BLOOD PRESSURE: 71 MMHG | HEIGHT: 68 IN | WEIGHT: 117.9 LBS | HEART RATE: 68 BPM | BODY MASS INDEX: 17.87 KG/M2

## 2022-02-10 DIAGNOSIS — C64.1 RENAL CELL CANCER, RIGHT (HCC): Primary | ICD-10-CM

## 2022-02-10 LAB
ALBUMIN SERPL-MCNC: 3.8 G/DL (ref 3.5–5.2)
ALP BLD-CCNC: 167 U/L (ref 40–129)
ALT SERPL-CCNC: 23 U/L (ref 0–40)
ANION GAP SERPL CALCULATED.3IONS-SCNC: 10 MMOL/L (ref 7–16)
AST SERPL-CCNC: 23 U/L (ref 0–39)
BASOPHILS ABSOLUTE: 0.1 E9/L (ref 0–0.2)
BASOPHILS RELATIVE PERCENT: 1 % (ref 0–2)
BILIRUB SERPL-MCNC: 0.2 MG/DL (ref 0–1.2)
BUN BLDV-MCNC: 4 MG/DL (ref 6–23)
CALCIUM SERPL-MCNC: 8.5 MG/DL (ref 8.6–10.2)
CHLORIDE BLD-SCNC: 99 MMOL/L (ref 98–107)
CO2: 24 MMOL/L (ref 22–29)
CREAT SERPL-MCNC: 0.7 MG/DL (ref 0.7–1.2)
EOSINOPHILS ABSOLUTE: 0.57 E9/L (ref 0.05–0.5)
EOSINOPHILS RELATIVE PERCENT: 5.7 % (ref 0–6)
GFR AFRICAN AMERICAN: >60
GFR NON-AFRICAN AMERICAN: >60 ML/MIN/1.73
GLUCOSE BLD-MCNC: 123 MG/DL (ref 74–99)
HCT VFR BLD CALC: 36.2 % (ref 37–54)
HEMOGLOBIN: 11.7 G/DL (ref 12.5–16.5)
IMMATURE GRANULOCYTES #: 0.04 E9/L
IMMATURE GRANULOCYTES %: 0.4 % (ref 0–5)
LYMPHOCYTES ABSOLUTE: 3.97 E9/L (ref 1.5–4)
LYMPHOCYTES RELATIVE PERCENT: 39.9 % (ref 20–42)
MCH RBC QN AUTO: 30.6 PG (ref 26–35)
MCHC RBC AUTO-ENTMCNC: 32.3 % (ref 32–34.5)
MCV RBC AUTO: 94.8 FL (ref 80–99.9)
MONOCYTES ABSOLUTE: 0.9 E9/L (ref 0.1–0.95)
MONOCYTES RELATIVE PERCENT: 9 % (ref 2–12)
NEUTROPHILS ABSOLUTE: 4.37 E9/L (ref 1.8–7.3)
NEUTROPHILS RELATIVE PERCENT: 44 % (ref 43–80)
PDW BLD-RTO: 15.5 FL (ref 11.5–15)
PLATELET # BLD: 281 E9/L (ref 130–450)
PMV BLD AUTO: 9.4 FL (ref 7–12)
POTASSIUM SERPL-SCNC: 4 MMOL/L (ref 3.5–5)
RBC # BLD: 3.82 E12/L (ref 3.8–5.8)
SODIUM BLD-SCNC: 133 MMOL/L (ref 132–146)
TOTAL PROTEIN: 7.2 G/DL (ref 6.4–8.3)
TSH SERPL DL<=0.05 MIU/L-ACNC: 0.17 UIU/ML (ref 0.27–4.2)
WBC # BLD: 10 E9/L (ref 4.5–11.5)

## 2022-02-10 PROCEDURE — 85025 COMPLETE CBC W/AUTO DIFF WBC: CPT

## 2022-02-10 PROCEDURE — 80053 COMPREHEN METABOLIC PANEL: CPT

## 2022-02-10 PROCEDURE — 99213 OFFICE O/P EST LOW 20 MIN: CPT

## 2022-02-10 PROCEDURE — 36415 COLL VENOUS BLD VENIPUNCTURE: CPT

## 2022-02-10 PROCEDURE — 84443 ASSAY THYROID STIM HORMONE: CPT

## 2022-02-10 RX ORDER — EPINEPHRINE 1 MG/ML
0.3 INJECTION, SOLUTION, CONCENTRATE INTRAVENOUS PRN
Status: CANCELLED | OUTPATIENT
Start: 2022-02-11

## 2022-02-10 RX ORDER — SODIUM CHLORIDE 9 MG/ML
INJECTION, SOLUTION INTRAVENOUS CONTINUOUS
Status: CANCELLED | OUTPATIENT
Start: 2022-02-11

## 2022-02-10 RX ORDER — SODIUM CHLORIDE 0.9 % (FLUSH) 0.9 %
5-40 SYRINGE (ML) INJECTION PRN
Status: CANCELLED | OUTPATIENT
Start: 2022-02-11

## 2022-02-10 RX ORDER — SODIUM CHLORIDE 9 MG/ML
5-40 INJECTION INTRAVENOUS PRN
Status: CANCELLED | OUTPATIENT
Start: 2022-02-11

## 2022-02-10 RX ORDER — SODIUM CHLORIDE 9 MG/ML
20 INJECTION, SOLUTION INTRAVENOUS ONCE
Status: CANCELLED | OUTPATIENT
Start: 2022-02-11 | End: 2022-02-11

## 2022-02-10 RX ORDER — MEPERIDINE HYDROCHLORIDE 25 MG/ML
12.5 INJECTION INTRAMUSCULAR; INTRAVENOUS; SUBCUTANEOUS ONCE
Status: CANCELLED | OUTPATIENT
Start: 2022-02-11 | End: 2022-02-11

## 2022-02-10 RX ORDER — METHYLPREDNISOLONE SODIUM SUCCINATE 125 MG/2ML
125 INJECTION, POWDER, LYOPHILIZED, FOR SOLUTION INTRAMUSCULAR; INTRAVENOUS ONCE
Status: CANCELLED | OUTPATIENT
Start: 2022-02-11 | End: 2022-02-11

## 2022-02-10 RX ORDER — SODIUM CHLORIDE 9 MG/ML
25 INJECTION, SOLUTION INTRAVENOUS PRN
Status: CANCELLED | OUTPATIENT
Start: 2022-02-11

## 2022-02-10 RX ORDER — HEPARIN SODIUM (PORCINE) LOCK FLUSH IV SOLN 100 UNIT/ML 100 UNIT/ML
500 SOLUTION INTRAVENOUS PRN
Status: CANCELLED | OUTPATIENT
Start: 2022-02-11

## 2022-02-10 RX ORDER — DIPHENHYDRAMINE HYDROCHLORIDE 50 MG/ML
50 INJECTION INTRAMUSCULAR; INTRAVENOUS ONCE
Status: CANCELLED | OUTPATIENT
Start: 2022-02-11 | End: 2022-02-11

## 2022-02-10 RX ORDER — PROCHLORPERAZINE EDISYLATE 5 MG/ML
10 INJECTION INTRAMUSCULAR; INTRAVENOUS ONCE
Status: CANCELLED | OUTPATIENT
Start: 2022-02-11

## 2022-02-10 NOTE — PROGRESS NOTES
[address]  [address] [place] [address]   Hematology/Oncology  Consult      Patient Name: Angelito Hopkins  YOB: 1950  PCP: SHI Marino NP   Referring Provider:      Reason for Consultation:   Chief Complaint   Patient presents with    Follow-up     RENAL CELL CANCER, RIGHT ([address]    Cancer    Chemotherapy      Interval history: No new complaints. Right chest wall mass has resolved. History of Present Illness:  73years old male referred for possible metastatic renal cell carcinoma. Patient presented to his PCP, HETAL Pathak, complaining of right side chest wall mass which he noticed about a month ago and had been gradually increasing in size. CT chest from October 2021 showed mltiple heterogenous and necrotic masses in the right kidney with possible tumor invasion of the right renal vein. In addition to this there was a large 4 by 9 cm soft tissue necrotic mass involving the fourth rib. There were other lytic destructive lesions in the right eighth and ninth rib. There were multiple right lung nodules ranging upto 1.5 cm. Few liver nodules were noted as well the largest being 7 mm. 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 PET scan showed hypermetabolic lesions in the right kidney, left adrenal diffuse skeletal metastasis, scattered pulmonary nodules most of them subcentimeter except for 1 right lung hypermetabolic nodule. MRI brain reviewed no intracranial metastasis. S/p right chest wall mass biopsy on 11/2/2021. Consistent with clear-cell renal cell carcinoma. Performance status seems fair Karnofsky performance status 80/70. Intermediate risk based on [facility]KCC risk factors although the disease seems to have progressed rapidly over the past month. His rapid progression as well as renal vein involvement probably precludes debulking nephrectomy. Tumor burden is not high and patient is asymptomatic. Known Problems Paternal Aunt     No Known Problems Paternal Uncle     No Known Problems Maternal Grandmother     Cancer Maternal Grandfather         throat    Heart Attack Paternal Grandmother     No Known Problems Paternal Grandfather     Cirrhosis Paternal Cousin     Deep Vein Thrombosis Sister     Diabetes Sister     Heart Disease Sister         triple bypass    Breast Cancer Sister 61    High Blood Pressure Sister     Diabetes Sister     High Cholesterol Sister     Arthritis Sister        Social History    TOBACCO:   reports that he has been smoking cigarettes. He has a 13.50 pack-year smoking history. He has never used smokeless tobacco.  ETOH:   reports previous alcohol use. Home Medications  Prior to Admission medications    Medication Sig Start Date End Date Taking? Authorizing Provider   lidocaine-prilocaine (EMLA) 2.5-2.5 % cream Apply topically as needed. 12/9/21  Yes SHI Wiley CNP   ARTHRITIS PAIN RELIEF 650 MG extended release tablet TAKE TWO TABLETS BY MOUTH AT BEDTIME 9/21/21  Yes Historical Provider, MD   metoprolol tartrate (LOPRESSOR) 25 MG tablet Take 25 mg by mouth 2 times daily   Yes Historical Provider, MD   aspirin 81 MG EC tablet Take 81 mg by mouth daily LD 10/26/21   Yes Historical Provider, MD   simvastatin (ZOCOR) 80 MG tablet Take 80 mg by mouth nightly   Yes Historical Provider, MD   lisinopril (PRINIVIL;ZESTRIL) 20 MG tablet Take 20 mg by mouth daily   Yes Historical Provider, MD       Allergies  No Known Allergies    Review of Systems:      Objective  BP (!) 144/71   Pulse 68   Temp 98.2 °F (36.8 °C)   Ht 5' 8\" (1.727 m)   Wt 117 lb 14.4 oz (53.5 kg)   SpO2 92%   BMI 17.93 kg/m²     Physical Performance Status    Physical Exam:  General: AAO to person, place, time, and purpose, appears stated age, cooperative, no acute distress, pleasant   Head and neck : PERRLA, EOMI . Sclera non icteric.   Oropharynx : Clear  Neck: no JVD,  no adenopathy, no carotid bruit  LYMPHATICS : no lap  Lungs: Clear to auscultation. Right side chest wall globular mass, 4-5 cm in diamter has completely resolved. Heart: Regular rate and regular rhythm, no murmur, normal S1, S2  Abdomen: Soft, non-tender;no masses, no organomegaly  Extremities: No edema,no cyanosis, no clubbing. Skin:  No rash. Neurologic:Cranial nerves grossly intact. No focal motor or sensory deficits .     Recent Laboratory Data-   Lab Results   Component Value Date    WBC 10.0 02/10/2022    HGB 11.7 (L) 02/10/2022    HCT 36.2 (L) 02/10/2022    MCV 94.8 02/10/2022     02/10/2022    LYMPHOPCT 39.9 02/10/2022    RBC 3.82 02/10/2022    MCH 30.6 02/10/2022    MCHC 32.3 02/10/2022    RDW 15.5 (H) 02/10/2022    NEUTOPHILPCT 44.0 02/10/2022    MONOPCT 9.0 02/10/2022    BASOPCT 1.0 02/10/2022    NEUTROABS 4.37 02/10/2022    LYMPHSABS 3.97 02/10/2022    MONOSABS 0.90 02/10/2022    EOSABS 0.57 (H) 02/10/2022    BASOSABS 0.10 02/10/2022       Lab Results   Component Value Date     02/02/2022    K 4.6 02/02/2022     02/02/2022    CO2 22 02/02/2022    BUN 7 02/02/2022    CREATININE 0.7 02/02/2022    GLUCOSE 123 (H) 02/02/2022    CALCIUM 8.7 02/02/2022    PROT 7.0 02/02/2022    LABALBU 3.7 02/02/2022    BILITOT 0.3 02/02/2022    ALKPHOS 186 (H) 02/02/2022    AST 21 02/02/2022    ALT 20 02/02/2022    LABGLOM >60 02/02/2022    GFRAA >60 02/02/2022       No results found for: IRON, TIBC, FERRITIN        Radiology-    Echo Complete    Result Date: 10/8/2021  Transthoracic Echocardiography Report (TTE)  Demographics   Patient Name    [facility]        Gender            Male                  Marcio Prater  [id number]      Room Number  Number   Account #       [de-identified]     Procedure Date    10/08/2021   Corporate ID                  Ordering          Tunde Parham DO                                Physician   Accession       [id number]    Referring         Tunde Parham DO  Number Physician   Date of Birth   1950    Sonographer       Stephen Vasquez [facility]   Age             73 year(s)    Interpreting      [address]                                Physician         Physician Cardiology                                                  Adolph Rodriguez MD                                 Any Other  Procedure Type of Study   TTE procedure:Echo Complete W/Doppler & Color Flow. Procedure Date Date: 10/08/2021 Start: 09:57 AM Study Location: Echo Lab Technical Quality: Poor visualization due to poor acoustical window. Indications:Heart murmur. Patient Status: Routine Height: 68 inches Weight: 120 pounds BSA: 1.65 m^2 BMI: 18.25 kg/m^2  Findings   Left Ventricle  Normal left ventricular chamber size. Normal left ventricular systolic function. Visually estimated LVEF 65%. Mild left ventricular concentric hypertrophy noted. Normal diastolic function. Right Ventricle  Normal right ventricle size and function. Left Atrium  The left atrium is mildly dilated. Mildly increased left atrial volume. Interatrial septum not well visualized but appears intact. Right Atrium  Normal right atrium. Mitral Valve  Normal mitral valve structure. There is moderate mitral regurgitation - ERO 0.23cm2, PISA 0.9cm, RV 51cc. No mitral valve prolapse. Tricuspid Valve  Normal tricuspid valve structure. There is mild tricuspid regurgitation. Mild pulmonary hypertension, RVSP 39mmHg. Aortic Valve  Normal aortic valve structure. There is trace to mild aortic regurgitation, pressure 1/2 time 718ms. Pulmonic Valve  The pulmonic valve was not well visualized. Pericardial Effusion  No evidence of pericardial effusion. Pericardium appears normal.   Pleural Effusion  No evidence of pleural effusion. Aorta  Aortic root 3.5cm. Miscellaneous  The inferior vena cava diameter is normal with normal respiratory  variation. Conclusions   Summary  Normal left ventricular chamber size.   Normal left ventricular systolic function, LVEF 85%. Mild left ventricular concentric hypertrophy noted. Normal diastolic function. The left atrium is mildly dilated. Mildly increased left atrial volume. Interatrial septum not well visualized but appears intact. Normal right ventricle size and function. There is moderate mitral regurgitation - ERO 0.23cm2, PISA 0.9cm, RV 51cc. No mitral valve prolapse. There is trace to mild aortic regurgitation, pressure 1/2 time 718ms. There is mild tricuspid regurgitation. Mild pulmonary hypertension, RVSP 39mmHg. The pulmonic valve was not well visualized. Aortic root 3.5cm. No evidence of pericardial effusion. No intra cardiac mass or thrombus. No comparison study available.    Signature   ----------------------------------------------------------------  Electronically signed by Ric Tristan MD(Interpreting  physician) on 10/08/2021 03:52 PM  ----------------------------------------------------------------  M-Mode/2D Measurements & Calculations   LV Diastolic    LV Systolic Dimension: 3.2   AV Cusp Separation: 1.9 cmLA  Dimension: 5.3  cm                           Dimension: 3.9 cmAO Root  cm              LV Volume Diastolic: 622.5   Dimension: 3.5 cm  LV FS:39.6 %    ml  LV PW           LV Volume Systolic: 60.8 ml  Diastolic: 1.2  LV EDV/LV EDV Index: 134.5  cm              ml/82 ml/m^2LV ESV/LV ESV    RV Diastolic Dimension: 2.8  LV PW Systolic: Index: 14.6 NB/03OF/ m^2     cm  1.5 cm          EF Calculated: 70 %          RV Systolic Dimension: 2.4 cm  Septum          LV Mass Index: 147 l/min*m^2 LA/Aorta: 1.2  Diastolic: 1.1  cm                                           LA volume/Index: 79.7 ml  Septum          LVOT: 2 cm  Systolic: 1.2  cm   LV Mass: 241.96  g  Doppler Measurements & Calculations   MV Peak E-Wave: 1.42   AV Peak Velocity: 1.87 m/s  LVOT Peak Velocity:  m/s                    AV Peak Gradient: 14.05     1.55 m/s  MV Peak A-Wave: 0.82   mmHg LVOT Mean Velocity:  m/s                    AV Mean Velocity: 1.27 m/s  0.81 m/s  MV E/A Ratio: 1.73     AV Mean Gradient: 7.2 mmHg  LVOT Peak Gradient: 9.6  MV Peak Gradient: 9.2  AV VTI: 42.1 cm             mmHgLVOT Mean Gradient:  mmHg                   AV Area (Continuity):2.37   3.5 mmHg  MV Mean Gradient: 2.9  cm^2                        Estimated RVSP: 39 mmHg  mmHg                   AV Deceleration Time:       Estimated RAP:10 mmHg  MV Mean Velocity: 0.75 2474.3 msec  m/s                    LVOT VTI: 31.8 cm  MV Deceleration Time:                              TR Velocity:2.69 m/s  271.4 msec             Estimated PASP: 39.03 mmHg  TR Gradient:29.03 mmHg  MV P1/2t: 101.8 msec   Pulm. Vein A Reversal       PV Peak Velocity: 0.51  MVA by PHT:2.16 cm^2   Duration:175.3 msec         m/s  MV Area (continuity):  Pulm. Vein D Velocity:0.74  PV Peak Gradient: 1.02  1.9 cm^2               m/sPulm. Vein A Reversal    mmHg                         Velocity:0.38 m/s           PV Mean Velocity: 0.36                         Pulm. Vein S Velocity: 0.44 m/s  MR Velocity: 5.37 m/s  m/s                         PV Mean Gradient: 0.6  MV RICARDO PISA: 0.23 cm^2                             mmHg  MR VTI: 221.9 cm  Alias Velocity: 0.25  m/sPISA Radius: 0.9 cm   PISA area: 5.09 cm^2MR  flow rate: 125.72  ml/sMR volume:51.04 ml  [email or web address] [id number]    CT CHEST W CONTRAST    Addendum Date: 10/13/2021    ADDENDUM: 6 mm indeterminate hypodense lesions are identified in the body and tail of the pancreas. Consider surveillance     Result Date: 10/13/2021  EXAMINATION: CT OF THE CHEST WITH CONTRAST 10/13/2021 10:59 am TECHNIQUE: CT of the chest was performed with the administration of intravenous contrast. Multiplanar reformatted images are provided for review.  Dose modulation, iterative reconstruction, and/or weight based adjustment of the mA/kV was utilized to reduce the radiation dose to as low as reasonably achievable. COMPARISON: None. HISTORY: ORDERING SYSTEM PROVIDED HISTORY: Chest wall mass FINDINGS: There is cardiomegaly. There is coronary artery calcification. The great vessels are normal.  Moderately enlarged mediastinal and hilar lymph nodes are noted with lymph nodes measuring up to 1.3 cm in the right hilum. Trachea and major bronchi are patent. Multiple bilateral pulmonary nodules are identified with nodules measuring up to 1.5 cm in the right lower lobe and smaller ones in the right middle lobe and right upper lobe. The pulmonary nodules in the left lower lobe ranges from 5 mm up to 8 mm. There is no focal consolidation or pleural effusion. Punctate enhancing nodules are identified in the posterior right hepatic lobe, largest 1 measuring up to 7 mm. Multiple heterogeneously enhancing right renal masses are identified largest 1 measuring 3.8 x 3.3 cm which is partially necrotic with additional smaller nodules concerning for multifocal malignancy like renal cell carcinoma. There is filling defects in the right renal vein concerning for tumor invasion versus tumor embolism of the renal vein. There is bilateral adrenal metastasis with the largest 1 in the left adrenal gland measuring 1.6 x 2.7 cm. There is bone metastasis with the destructive soft tissue mass involving the right 4th rib anterolaterally with adjacent soft tissue mass which is partially necrotic measuring 4.5 x 9.2 cm. Lytic destructive lesions are also identified in the right 8th and 9th rib near the costo vertebral junction. Multiple heterogeneous  enhancing and necrotic masses in the right kidney concerning for multifocal renal cell carcinoma with  possible tumor invasion or tumor embolism of the right renal vein.   There is diffuse metastasis with the involvement of the right and left adrenal glands, possible hepatic metastasis, widespread pulmonary and rib involvement as noted. Further assessment by PET-CT scan is recommended. Findings were telephoned to licensed caregiver. ASSESSMENT/PLAN :    Rosina Alejo was seen today for follow-up, cancer and chemotherapy. Diagnoses and all orders for this visit:    Renal cell cancer, right ([address]  -     CBC Auto Differential; Future  -     Comprehensive Metabolic Panel; Future  -     CBC Auto Differential; Future  -     Comprehensive Metabolic Panel; Future  -     TSH; Future  -     PET CT SKULL BASE TO MID THIGH; Future         73years old male with Stage IV renal cell carcinoma (diagnosed 11/2021- chest wall mass biopsy) -right chest wall mass, multiple right kidney necrotic lesions in addition to large soft tissue destructive mass of right fourth rib, multiple lung nodules up to 1.5 cm, bilateral adrenal metastasis up to 2.5 cm. ECOG 1/2. Performance status fair Karnofsky performance status 80/70. Intermediate risk based on MSKCC risk factors although the disease seems to have progressed rapidly over the past month. His rapid progression as well as renal vein involvement probably precludes debulking nephrectomy. Tumor burden is not high and patient is asymptomatic. Recommended treatment with ipilimumab/nivolumab combination. Started in November 2021. S/p 3 cycle. Tolerated treatment well. No new complaints. Right-sided chest wall mass has resolved. Reviewed blood work from last week-unremarkable CBC CMP. low TSH. Asymptomatic. We will continue with the fourth cycle. He wants treated tomorrow morning. After the cycle will continue Opdivo 240 mg every 2 weeks to progression. PET scan ordered. Continue Xgeva every 4 weeks. Calcium normal.   Patient is edentulous. Pain at the right side chest wall mass is well controlled with Tylenol as needed. Patient was concerned about weight loss. He has lost 1 to 2 pounds in past 3 months.     Return to clinic in 3 weeks. Return in about 1 week (around 2/17/2022).      Olya Nava MD   Electronically signed 2/10/2022 at 2:05 PM

## 2022-02-11 ENCOUNTER — HOSPITAL ENCOUNTER (OUTPATIENT)
Dept: INFUSION THERAPY | Age: 72
Discharge: HOME | End: 2022-02-11
Payer: MEDICARE

## 2022-02-11 VITALS — DIASTOLIC BLOOD PRESSURE: 72 MMHG | SYSTOLIC BLOOD PRESSURE: 170 MMHG | HEART RATE: 56 BPM

## 2022-02-11 DIAGNOSIS — C64.1 RENAL CELL CANCER, RIGHT (HCC): Primary | ICD-10-CM

## 2022-02-11 PROCEDURE — 6360000002 HC RX W HCPCS: Performed by: INTERNAL MEDICINE

## 2022-02-11 PROCEDURE — 96413 CHEMO IV INFUSION 1 HR: CPT

## 2022-02-11 PROCEDURE — 96417 CHEMO IV INFUS EACH ADDL SEQ: CPT

## 2022-02-11 PROCEDURE — 2580000003 HC RX 258: Performed by: INTERNAL MEDICINE

## 2022-02-11 RX ORDER — HEPARIN SODIUM (PORCINE) LOCK FLUSH IV SOLN 100 UNIT/ML 100 UNIT/ML
500 SOLUTION INTRAVENOUS PRN
Status: DISCONTINUED | OUTPATIENT
Start: 2022-02-11 | End: 2022-02-12 | Stop reason: HOSPADM

## 2022-02-11 RX ORDER — SODIUM CHLORIDE 9 MG/ML
20 INJECTION, SOLUTION INTRAVENOUS ONCE
Status: COMPLETED | OUTPATIENT
Start: 2022-02-11 | End: 2022-02-11

## 2022-02-11 RX ORDER — SODIUM CHLORIDE 0.9 % (FLUSH) 0.9 %
5-40 SYRINGE (ML) INJECTION PRN
Status: DISCONTINUED | OUTPATIENT
Start: 2022-02-11 | End: 2022-02-12 | Stop reason: HOSPADM

## 2022-02-11 RX ORDER — PROCHLORPERAZINE EDISYLATE 5 MG/ML
10 INJECTION INTRAMUSCULAR; INTRAVENOUS ONCE
Status: DISCONTINUED | OUTPATIENT
Start: 2022-02-11 | End: 2022-02-11 | Stop reason: CLARIF

## 2022-02-11 RX ADMIN — SODIUM CHLORIDE 180 MG: 9 INJECTION, SOLUTION INTRAVENOUS at 10:43

## 2022-02-11 RX ADMIN — SODIUM CHLORIDE, PRESERVATIVE FREE 10 ML: 5 INJECTION INTRAVENOUS at 10:35

## 2022-02-11 RX ADMIN — SODIUM CHLORIDE 20 ML/HR: 9 INJECTION, SOLUTION INTRAVENOUS at 10:40

## 2022-02-11 RX ADMIN — SODIUM CHLORIDE 50 MG: 9 INJECTION, SOLUTION INTRAVENOUS at 11:27

## 2022-02-11 RX ADMIN — Medication 500 UNITS: at 12:09

## 2022-02-23 ENCOUNTER — HOSPITAL ENCOUNTER (OUTPATIENT)
Dept: INFUSION THERAPY | Age: 72
Discharge: HOME | End: 2022-02-23
Payer: MEDICARE

## 2022-02-23 DIAGNOSIS — C64.1 RENAL CELL CANCER, RIGHT (HCC): ICD-10-CM

## 2022-02-23 LAB
ALBUMIN SERPL-MCNC: 4 G/DL (ref 3.5–5.2)
ALP BLD-CCNC: 153 U/L (ref 40–129)
ALT SERPL-CCNC: 17 U/L (ref 0–40)
ANION GAP SERPL CALCULATED.3IONS-SCNC: 10 MMOL/L (ref 7–16)
AST SERPL-CCNC: 20 U/L (ref 0–39)
BASOPHILS ABSOLUTE: 0.15 E9/L (ref 0–0.2)
BASOPHILS RELATIVE PERCENT: 1.5 % (ref 0–2)
BILIRUB SERPL-MCNC: 0.2 MG/DL (ref 0–1.2)
BUN BLDV-MCNC: 9 MG/DL (ref 6–23)
CALCIUM SERPL-MCNC: 8.9 MG/DL (ref 8.6–10.2)
CHLORIDE BLD-SCNC: 99 MMOL/L (ref 98–107)
CO2: 26 MMOL/L (ref 22–29)
CREAT SERPL-MCNC: 0.8 MG/DL (ref 0.7–1.2)
EOSINOPHILS ABSOLUTE: 0.64 E9/L (ref 0.05–0.5)
EOSINOPHILS RELATIVE PERCENT: 6.5 % (ref 0–6)
GFR AFRICAN AMERICAN: >60
GFR NON-AFRICAN AMERICAN: >60 ML/MIN/1.73
GLUCOSE BLD-MCNC: 105 MG/DL (ref 74–99)
HCT VFR BLD CALC: 40.5 % (ref 37–54)
HEMOGLOBIN: 13 G/DL (ref 12.5–16.5)
IMMATURE GRANULOCYTES #: 0.03 E9/L
IMMATURE GRANULOCYTES %: 0.3 % (ref 0–5)
LYMPHOCYTES ABSOLUTE: 3.96 E9/L (ref 1.5–4)
LYMPHOCYTES RELATIVE PERCENT: 40.4 % (ref 20–42)
MCH RBC QN AUTO: 30.7 PG (ref 26–35)
MCHC RBC AUTO-ENTMCNC: 32.1 % (ref 32–34.5)
MCV RBC AUTO: 95.5 FL (ref 80–99.9)
MONOCYTES ABSOLUTE: 0.77 E9/L (ref 0.1–0.95)
MONOCYTES RELATIVE PERCENT: 7.9 % (ref 2–12)
NEUTROPHILS ABSOLUTE: 4.25 E9/L (ref 1.8–7.3)
NEUTROPHILS RELATIVE PERCENT: 43.4 % (ref 43–80)
PDW BLD-RTO: 17.1 FL (ref 11.5–15)
PLATELET # BLD: 295 E9/L (ref 130–450)
PMV BLD AUTO: 9.7 FL (ref 7–12)
POTASSIUM SERPL-SCNC: 4.5 MMOL/L (ref 3.5–5)
RBC # BLD: 4.24 E12/L (ref 3.8–5.8)
SODIUM BLD-SCNC: 135 MMOL/L (ref 132–146)
TOTAL PROTEIN: 7.2 G/DL (ref 6.4–8.3)
TSH SERPL DL<=0.05 MIU/L-ACNC: 6.41 UIU/ML (ref 0.27–4.2)
WBC # BLD: 9.8 E9/L (ref 4.5–11.5)

## 2022-02-23 PROCEDURE — 36415 COLL VENOUS BLD VENIPUNCTURE: CPT

## 2022-02-23 PROCEDURE — 80053 COMPREHEN METABOLIC PANEL: CPT

## 2022-02-23 PROCEDURE — 85025 COMPLETE CBC W/AUTO DIFF WBC: CPT

## 2022-02-23 PROCEDURE — 84443 ASSAY THYROID STIM HORMONE: CPT

## 2022-02-24 ENCOUNTER — HOSPITAL ENCOUNTER (OUTPATIENT)
Dept: INFUSION THERAPY | Age: 72
Discharge: HOME | End: 2022-02-24

## 2022-02-24 ENCOUNTER — OFFICE VISIT (OUTPATIENT)
Dept: ONCOLOGY | Age: 72
End: 2022-02-24
Payer: MEDICARE

## 2022-02-24 VITALS
BODY MASS INDEX: 18.5 KG/M2 | HEART RATE: 63 BPM | WEIGHT: 122.1 LBS | SYSTOLIC BLOOD PRESSURE: 144 MMHG | OXYGEN SATURATION: 100 % | TEMPERATURE: 98.3 F | HEIGHT: 68 IN | DIASTOLIC BLOOD PRESSURE: 70 MMHG

## 2022-02-24 DIAGNOSIS — C64.1 RENAL CELL CANCER, RIGHT (HCC): Primary | ICD-10-CM

## 2022-02-24 PROCEDURE — 99212 OFFICE O/P EST SF 10 MIN: CPT

## 2022-02-24 NOTE — PROGRESS NOTES
[address]  [address] [place] [address]   Hematology/Oncology  Consult      Patient Name: Natan Pierce  YOB: 1950  PCP: SHI Ferguson NP   Referring Provider:      Reason for Consultation:   Chief Complaint   Patient presents with    Follow-up     RENAL CELL CANCER, RIGHT ([address]    Cancer    Chemotherapy      Interval history: No new complaints. Right chest wall mass has resolved. History of Present Illness:  73years old male referred for possible metastatic renal cell carcinoma. Patient presented to his PCP, HETAL Pathak, complaining of right side chest wall mass which he noticed about a month ago and had been gradually increasing in size. CT chest from October 2021 showed mltiple heterogenous and necrotic masses in the right kidney with possible tumor invasion of the right renal vein. In addition to this there was a large 4 by 9 cm soft tissue necrotic mass involving the fourth rib. There were other lytic destructive lesions in the right eighth and ninth rib. There were multiple right lung nodules ranging upto 1.5 cm. Few liver nodules were noted as well the largest being 7 mm. 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 PET scan showed hypermetabolic lesions in the right kidney, left adrenal diffuse skeletal metastasis, scattered pulmonary nodules most of them subcentimeter except for 1 right lung hypermetabolic nodule. MRI brain reviewed no intracranial metastasis. S/p right chest wall mass biopsy on 11/2/2021. Consistent with clear-cell renal cell carcinoma. Performance status seems fair Karnofsky performance status 80/70. Intermediate risk based on MSKCC risk factors although the disease seems to have progressed rapidly over the past month. His rapid progression as well as renal vein involvement probably precludes debulking nephrectomy. Tumor burden is not high and patient is asymptomatic. Recommended treatment with ipilimumab/nivolumab combination. Started ipilimumab/nivolumab in November 2021. Patient reports pain in the right side chest wall mass which is well controlled with Tylenol as needed. Denies recent weight loss, loss of appetite night sweats, back pain. Review of systems: Over 10 systems were reviewed and all were negative except as mentioned above. Past medical history: Hypertension. Coronary artery disease, peripheral arterial disease, hyperlipidemia. Past surgical history: History of CABG, femoral arterial bypass, hernia repair. Social history: Smokes half a pack a day, denies alcohol or drug abuse. Family history Sister had breast cancer, father had colon cancer. Diagnostic Data:     Past Medical History:   Diagnosis Date    Anticoagulant long-term use 2007    aspirin    CAD (coronary artery disease)     Cancer (HCC)     kidney    Deep vein thrombosis (DVT) (HCC)     leg    [place] (hard of hearing)     Hx of blood clots     Hyperlipidemia     Hypertension     Mentally challenged     information per patient's niece. Patient Active Problem List    Diagnosis Date Noted    Poor venous access     Renal cell cancer, right ([address] 11/11/2021        Past Surgical History:   Procedure Laterality Date   [facility]    patient's sister said they were told a twin was removed from paitent's abdomen.    [address]  2007    bypass    CATHETER INSERTION Left 11/16/2021    MEDIPORT CATHETER INSERTION performed by Fabiola Amador MD at [facility] [address]      bifemoral    IR PERCUT BX LUNG/MEDIASTINUM  11/2/2021    IR PERCUT BX LUNG/MEDIASTINUM 11/2/2021 [facility] CT       Family History  Family History   Problem Relation Age of Onset    High Blood Pressure Mother     Heart Disease Mother     Heart Attack Mother     Cancer Father 64        colon    Arthritis Sister     Heart Attack Brother     No Known Problems Maternal Uncle     No Known Problems Paternal Aunt     No Known Problems Paternal Uncle     No Known Problems Maternal Grandmother     Cancer Maternal Grandfather         throat    Heart Attack Paternal Grandmother     No Known Problems Paternal Grandfather     Cirrhosis Paternal Cousin     Deep Vein Thrombosis Sister     Diabetes Sister     Heart Disease Sister         triple bypass    Breast Cancer Sister 61    High Blood Pressure Sister     Diabetes Sister     High Cholesterol Sister     Arthritis Sister        Social History    TOBACCO:   reports that he has been smoking cigarettes. He has a 13.50 pack-year smoking history. He has never used smokeless tobacco.  ETOH:   reports previous alcohol use. Home Medications  Prior to Admission medications    Medication Sig Start Date End Date Taking? Authorizing Provider   lidocaine-prilocaine (EMLA) 2.5-2.5 % cream Apply topically as needed. 12/9/21  Yes SHI Caldera CNP   ARTHRITIS PAIN RELIEF 650 MG extended release tablet TAKE TWO TABLETS BY MOUTH AT BEDTIME 9/21/21  Yes Historical Provider, MD   metoprolol tartrate (LOPRESSOR) 25 MG tablet Take 25 mg by mouth 2 times daily   Yes Historical Provider, MD   aspirin 81 MG EC tablet Take 81 mg by mouth daily LD 10/26/21   Yes Historical Provider, MD   simvastatin (ZOCOR) 80 MG tablet Take 80 mg by mouth nightly   Yes Historical Provider, MD   lisinopril (PRINIVIL;ZESTRIL) 20 MG tablet Take 20 mg by mouth daily   Yes Historical Provider, MD       Allergies  No Known Allergies    Review of Systems:      Objective  BP (!) 144/70   Pulse 63   Temp 98.3 °F (36.8 °C)   Ht 5' 8\" (1.727 m)   Wt 122 lb 1.6 oz (55.4 kg)   SpO2 100%   BMI 18.57 kg/m²     Physical Performance Status    Physical Exam:  General: AAO to person, place, time, and purpose, appears stated age, cooperative, no acute distress, pleasant   Head and neck : PERRLA, EOMI . Sclera non icteric.   Oropharynx : Clear  Neck: no JVD,  no adenopathy, no carotid bruit  LYMPHATICS : no lap  Lungs: Clear to auscultation. Right side chest wall globular mass, 4-5 cm in diamter has completely resolved. Heart: Regular rate and regular rhythm, no murmur, normal S1, S2  Abdomen: Soft, non-tender;no masses, no organomegaly  Extremities: No edema,no cyanosis, no clubbing. Skin:  No rash. Neurologic:Cranial nerves grossly intact. No focal motor or sensory deficits .     Recent Laboratory Data-   Lab Results   Component Value Date    WBC 9.8 02/23/2022    HGB 13.0 02/23/2022    HCT 40.5 02/23/2022    MCV 95.5 02/23/2022     02/23/2022    LYMPHOPCT 40.4 02/23/2022    RBC 4.24 02/23/2022    MCH 30.7 02/23/2022    MCHC 32.1 02/23/2022    RDW 17.1 (H) 02/23/2022    NEUTOPHILPCT 43.4 02/23/2022    MONOPCT 7.9 02/23/2022    BASOPCT 1.5 02/23/2022    NEUTROABS 4.25 02/23/2022    LYMPHSABS 3.96 02/23/2022    MONOSABS 0.77 02/23/2022    EOSABS 0.64 (H) 02/23/2022    BASOSABS 0.15 02/23/2022       Lab Results   Component Value Date     02/23/2022    K 4.5 02/23/2022    CL 99 02/23/2022    CO2 26 02/23/2022    BUN 9 02/23/2022    CREATININE 0.8 02/23/2022    GLUCOSE 105 (H) 02/23/2022    CALCIUM 8.9 02/23/2022    PROT 7.2 02/23/2022    LABALBU 4.0 02/23/2022    BILITOT 0.2 02/23/2022    ALKPHOS 153 (H) 02/23/2022    AST 20 02/23/2022    ALT 17 02/23/2022    LABGLOM >60 02/23/2022    GFRAA >60 02/23/2022       No results found for: IRON, TIBC, FERRITIN        Radiology-    Echo Complete    Result Date: 10/8/2021  Transthoracic Echocardiography Report (TTE)  Demographics   Patient Name    [facility]        Gender            Male                  Marcio Prater  [id number]      Room Number  Number   Account #       [de-identified]     Procedure Date    10/08/2021   Corporate ID                  Ordering          Theresa Bassett DO                                Physician   Accession       [id number]    Referring         Theresa Bassett DO  Number Physician   Date of Birth   1950    Sonographer       Stephen Vasquez [facility]   Age             73 year(s)    Interpreting      [address]                                Physician         Physician Cardiology                                                  Gosia Hester MD                                 Any Other  Procedure Type of Study   TTE procedure:Echo Complete W/Doppler & Color Flow. Procedure Date Date: 10/08/2021 Start: 09:57 AM Study Location: Echo Lab Technical Quality: Poor visualization due to poor acoustical window. Indications:Heart murmur. Patient Status: Routine Height: 68 inches Weight: 120 pounds BSA: 1.65 m^2 BMI: 18.25 kg/m^2  Findings   Left Ventricle  Normal left ventricular chamber size. Normal left ventricular systolic function. Visually estimated LVEF 65%. Mild left ventricular concentric hypertrophy noted. Normal diastolic function. Right Ventricle  Normal right ventricle size and function. Left Atrium  The left atrium is mildly dilated. Mildly increased left atrial volume. Interatrial septum not well visualized but appears intact. Right Atrium  Normal right atrium. Mitral Valve  Normal mitral valve structure. There is moderate mitral regurgitation - ERO 0.23cm2, PISA 0.9cm, RV 51cc. No mitral valve prolapse. Tricuspid Valve  Normal tricuspid valve structure. There is mild tricuspid regurgitation. Mild pulmonary hypertension, RVSP 39mmHg. Aortic Valve  Normal aortic valve structure. There is trace to mild aortic regurgitation, pressure 1/2 time 718ms. Pulmonic Valve  The pulmonic valve was not well visualized. Pericardial Effusion  No evidence of pericardial effusion. Pericardium appears normal.   Pleural Effusion  No evidence of pleural effusion. Aorta  Aortic root 3.5cm. Miscellaneous  The inferior vena cava diameter is normal with normal respiratory  variation. Conclusions   Summary  Normal left ventricular chamber size.   Normal left ventricular systolic function, LVEF 24%. Mild left ventricular concentric hypertrophy noted. Normal diastolic function. The left atrium is mildly dilated. Mildly increased left atrial volume. Interatrial septum not well visualized but appears intact. Normal right ventricle size and function. There is moderate mitral regurgitation - ERO 0.23cm2, PISA 0.9cm, RV 51cc. No mitral valve prolapse. There is trace to mild aortic regurgitation, pressure 1/2 time 718ms. There is mild tricuspid regurgitation. Mild pulmonary hypertension, RVSP 39mmHg. The pulmonic valve was not well visualized. Aortic root 3.5cm. No evidence of pericardial effusion. No intra cardiac mass or thrombus. No comparison study available.    Signature   ----------------------------------------------------------------  Electronically signed by Tati Henao MD(Interpreting  physician) on 10/08/2021 03:52 PM  ----------------------------------------------------------------  M-Mode/2D Measurements & Calculations   LV Diastolic    LV Systolic Dimension: 3.2   AV Cusp Separation: 1.9 cmLA  Dimension: 5.3  cm                           Dimension: 3.9 cmAO Root  cm              LV Volume Diastolic: 245.3   Dimension: 3.5 cm  LV FS:39.6 %    ml  LV PW           LV Volume Systolic: 06.6 ml  Diastolic: 1.2  LV EDV/LV EDV Index: 134.5  cm              ml/82 ml/m^2LV ESV/LV ESV    RV Diastolic Dimension: 2.8  LV PW Systolic: Index: 81.5 ZQ/89ND/ m^2     cm  1.5 cm          EF Calculated: 70 %          RV Systolic Dimension: 2.4 cm  Septum          LV Mass Index: 147 l/min*m^2 LA/Aorta: 1.2  Diastolic: 1.1  cm                                           LA volume/Index: 79.7 ml  Septum          LVOT: 2 cm  Systolic: 1.2  cm   LV Mass: 241.96  g  Doppler Measurements & Calculations   MV Peak E-Wave: 1.42   AV Peak Velocity: 1.87 m/s  LVOT Peak Velocity:  m/s                    AV Peak Gradient: 14.05     1.55 m/s  MV Peak A-Wave: 0.82   mmHg LVOT Mean Velocity:  m/s                    AV Mean Velocity: 1.27 m/s  0.81 m/s  MV E/A Ratio: 1.73     AV Mean Gradient: 7.2 mmHg  LVOT Peak Gradient: 9.6  MV Peak Gradient: 9.2  AV VTI: 42.1 cm             mmHgLVOT Mean Gradient:  mmHg                   AV Area (Continuity):2.37   3.5 mmHg  MV Mean Gradient: 2.9  cm^2                        Estimated RVSP: 39 mmHg  mmHg                   AV Deceleration Time:       Estimated RAP:10 mmHg  MV Mean Velocity: 0.75 2474.3 msec  m/s                    LVOT VTI: 31.8 cm  MV Deceleration Time:                              TR Velocity:2.69 m/s  271.4 msec             Estimated PASP: 39.03 mmHg  TR Gradient:29.03 mmHg  MV P1/2t: 101.8 msec   Pulm. Vein A Reversal       PV Peak Velocity: 0.51  MVA by PHT:2.16 cm^2   Duration:175.3 msec         m/s  MV Area (continuity):  Pulm. Vein D Velocity:0.74  PV Peak Gradient: 1.02  1.9 cm^2               m/sPulm. Vein A Reversal    mmHg                         Velocity:0.38 m/s           PV Mean Velocity: 0.36                         Pulm. Vein S Velocity: 0.44 m/s  MR Velocity: 5.37 m/s  m/s                         PV Mean Gradient: 0.6  MV RICARDO PISA: 0.23 cm^2                             mmHg  MR VTI: 221.9 cm  Alias Velocity: 0.25  m/sPISA Radius: 0.9 cm   PISA area: 5.09 cm^2MR  flow rate: 125.72  ml/sMR volume:51.04 ml  [email or web address] [id number]    CT CHEST W CONTRAST    Addendum Date: 10/13/2021    ADDENDUM: 6 mm indeterminate hypodense lesions are identified in the body and tail of the pancreas. Consider surveillance     Result Date: 10/13/2021  EXAMINATION: CT OF THE CHEST WITH CONTRAST 10/13/2021 10:59 am TECHNIQUE: CT of the chest was performed with the administration of intravenous contrast. Multiplanar reformatted images are provided for review.  Dose modulation, iterative reconstruction, and/or weight based adjustment of the mA/kV was utilized to reduce the radiation dose to as low as reasonably achievable. COMPARISON: None. HISTORY: ORDERING SYSTEM PROVIDED HISTORY: Chest wall mass FINDINGS: There is cardiomegaly. There is coronary artery calcification. The great vessels are normal.  Moderately enlarged mediastinal and hilar lymph nodes are noted with lymph nodes measuring up to 1.3 cm in the right hilum. Trachea and major bronchi are patent. Multiple bilateral pulmonary nodules are identified with nodules measuring up to 1.5 cm in the right lower lobe and smaller ones in the right middle lobe and right upper lobe. The pulmonary nodules in the left lower lobe ranges from 5 mm up to 8 mm. There is no focal consolidation or pleural effusion. Punctate enhancing nodules are identified in the posterior right hepatic lobe, largest 1 measuring up to 7 mm. Multiple heterogeneously enhancing right renal masses are identified largest 1 measuring 3.8 x 3.3 cm which is partially necrotic with additional smaller nodules concerning for multifocal malignancy like renal cell carcinoma. There is filling defects in the right renal vein concerning for tumor invasion versus tumor embolism of the renal vein. There is bilateral adrenal metastasis with the largest 1 in the left adrenal gland measuring 1.6 x 2.7 cm. There is bone metastasis with the destructive soft tissue mass involving the right 4th rib anterolaterally with adjacent soft tissue mass which is partially necrotic measuring 4.5 x 9.2 cm. Lytic destructive lesions are also identified in the right 8th and 9th rib near the costo vertebral junction. Multiple heterogeneous  enhancing and necrotic masses in the right kidney concerning for multifocal renal cell carcinoma with  possible tumor invasion or tumor embolism of the right renal vein.   There is diffuse metastasis with the involvement of the right and left adrenal glands, possible hepatic metastasis, widespread pulmonary and rib involvement as noted. Further assessment by PET-CT scan is recommended. Findings were telephoned to licensed caregiver. ASSESSMENT/PLAN :    Mike Alexis was seen today for follow-up, cancer and chemotherapy. Diagnoses and all orders for this visit:    Renal cell cancer, right ([address]  -     CBC with Auto Differential; Future  -     Comprehensive Metabolic Panel; Future  -     TSH; Future         73years old male with Stage IV renal cell carcinoma (diagnosed 11/2021- chest wall mass biopsy) -right chest wall mass, multiple right kidney necrotic lesions in addition to large soft tissue destructive mass of right fourth rib, multiple lung nodules up to 1.5 cm, bilateral adrenal metastasis up to 2.5 cm. ECOG 1/2. Performance status fair Karnofsky performance status 80/70. Intermediate risk based on MSKCC risk factors although the disease seems to have progressed rapidly over the past month. His rapid progression as well as renal vein involvement probably precludes debulking nephrectomy. Tumor burden is not high and patient is asymptomatic. Recommended treatment with ipilimumab/nivolumab combination. Started in November 2021. S/p 4 cycles. Tolerated treatment well. No new complaints. Right-sided chest wall mass has resolved. Reviewed blood work from last week-unremarkable CBC CMP. TSH was low, now high. Asymptomatic. We will continue Opdivo 240 mg every 2 weeks to progression (starting next week). PET scan ordered- not done. Continue Xgeva every 4 weeks. Calcium normal.   Patient is edentulous. Pain at the right side chest wall mass is well controlled with Tylenol as needed. Patient was concerned about weight loss. Weight has been stable for past 4 months. Return to clinic in 3 weeks. Return in about 3 weeks (around 3/17/2022).      Maryam Andrade MD   Electronically signed 2/24/2022 at 12:08 PM

## 2022-03-02 ENCOUNTER — HOSPITAL ENCOUNTER (OUTPATIENT)
Dept: INFUSION THERAPY | Age: 72
Discharge: HOME | End: 2022-03-02
Payer: MEDICARE

## 2022-03-02 DIAGNOSIS — C64.1 RENAL CELL CANCER, RIGHT (HCC): ICD-10-CM

## 2022-03-02 LAB
ALBUMIN SERPL-MCNC: 4.2 G/DL (ref 3.5–5.2)
ALP BLD-CCNC: 132 U/L (ref 40–129)
ALT SERPL-CCNC: 16 U/L (ref 0–40)
ANION GAP SERPL CALCULATED.3IONS-SCNC: 11 MMOL/L (ref 7–16)
ANISOCYTOSIS: ABNORMAL
AST SERPL-CCNC: 19 U/L (ref 0–39)
BASOPHILS ABSOLUTE: 0.1 E9/L (ref 0–0.2)
BASOPHILS RELATIVE PERCENT: 1 % (ref 0–2)
BILIRUB SERPL-MCNC: 0.2 MG/DL (ref 0–1.2)
BUN BLDV-MCNC: 10 MG/DL (ref 6–23)
CALCIUM SERPL-MCNC: 9.1 MG/DL (ref 8.6–10.2)
CHLORIDE BLD-SCNC: 99 MMOL/L (ref 98–107)
CO2: 25 MMOL/L (ref 22–29)
CREAT SERPL-MCNC: 0.9 MG/DL (ref 0.7–1.2)
EOSINOPHILS ABSOLUTE: 0.51 E9/L (ref 0.05–0.5)
EOSINOPHILS RELATIVE PERCENT: 5 % (ref 0–6)
GFR AFRICAN AMERICAN: >60
GFR NON-AFRICAN AMERICAN: >60 ML/MIN/1.73
GLUCOSE BLD-MCNC: 103 MG/DL (ref 74–99)
HCT VFR BLD CALC: 40.4 % (ref 37–54)
HEMOGLOBIN: 12.8 G/DL (ref 12.5–16.5)
LYMPHOCYTES ABSOLUTE: 4.04 E9/L (ref 1.5–4)
LYMPHOCYTES RELATIVE PERCENT: 40 % (ref 20–42)
MCH RBC QN AUTO: 30.5 PG (ref 26–35)
MCHC RBC AUTO-ENTMCNC: 31.7 % (ref 32–34.5)
MCV RBC AUTO: 96.4 FL (ref 80–99.9)
MONOCYTES ABSOLUTE: 0.91 E9/L (ref 0.1–0.95)
MONOCYTES RELATIVE PERCENT: 9 % (ref 2–12)
NEUTROPHILS ABSOLUTE: 4.55 E9/L (ref 1.8–7.3)
NEUTROPHILS RELATIVE PERCENT: 45 % (ref 43–80)
PDW BLD-RTO: 17.5 FL (ref 11.5–15)
PLATELET # BLD: 290 E9/L (ref 130–450)
PMV BLD AUTO: 9.8 FL (ref 7–12)
POTASSIUM SERPL-SCNC: 4.3 MMOL/L (ref 3.5–5)
RBC # BLD: 4.19 E12/L (ref 3.8–5.8)
SODIUM BLD-SCNC: 135 MMOL/L (ref 132–146)
TOTAL PROTEIN: 7.5 G/DL (ref 6.4–8.3)
TSH SERPL DL<=0.05 MIU/L-ACNC: 27.67 UIU/ML (ref 0.27–4.2)
WBC # BLD: 10.1 E9/L (ref 4.5–11.5)

## 2022-03-02 PROCEDURE — 80053 COMPREHEN METABOLIC PANEL: CPT

## 2022-03-02 PROCEDURE — 85025 COMPLETE CBC W/AUTO DIFF WBC: CPT

## 2022-03-02 PROCEDURE — 36415 COLL VENOUS BLD VENIPUNCTURE: CPT

## 2022-03-02 PROCEDURE — 84443 ASSAY THYROID STIM HORMONE: CPT

## 2022-03-03 ENCOUNTER — HOSPITAL ENCOUNTER (OUTPATIENT)
Dept: INFUSION THERAPY | Age: 72
Discharge: HOME | End: 2022-03-03
Payer: MEDICARE

## 2022-03-03 ENCOUNTER — OFFICE VISIT (OUTPATIENT)
Dept: ONCOLOGY | Age: 72
End: 2022-03-03

## 2022-03-03 VITALS — SYSTOLIC BLOOD PRESSURE: 134 MMHG | HEART RATE: 57 BPM | RESPIRATION RATE: 18 BRPM | DIASTOLIC BLOOD PRESSURE: 78 MMHG

## 2022-03-03 VITALS
OXYGEN SATURATION: 100 % | DIASTOLIC BLOOD PRESSURE: 67 MMHG | HEIGHT: 68 IN | BODY MASS INDEX: 18.38 KG/M2 | HEART RATE: 57 BPM | WEIGHT: 121.3 LBS | SYSTOLIC BLOOD PRESSURE: 131 MMHG | TEMPERATURE: 97.7 F

## 2022-03-03 DIAGNOSIS — C64.1 RENAL CELL CANCER, RIGHT (HCC): Primary | ICD-10-CM

## 2022-03-03 LAB — T4 TOTAL: 3.4 MCG/DL (ref 4.5–11.7)

## 2022-03-03 PROCEDURE — 84436 ASSAY OF TOTAL THYROXINE: CPT

## 2022-03-03 PROCEDURE — 6360000002 HC RX W HCPCS: Performed by: INTERNAL MEDICINE

## 2022-03-03 PROCEDURE — 96413 CHEMO IV INFUSION 1 HR: CPT

## 2022-03-03 PROCEDURE — 2580000003 HC RX 258: Performed by: INTERNAL MEDICINE

## 2022-03-03 PROCEDURE — 96372 THER/PROPH/DIAG INJ SC/IM: CPT

## 2022-03-03 RX ORDER — SODIUM CHLORIDE 9 MG/ML
20 INJECTION, SOLUTION INTRAVENOUS ONCE
Status: CANCELLED | OUTPATIENT
Start: 2022-03-03 | End: 2022-03-03

## 2022-03-03 RX ORDER — HEPARIN SODIUM (PORCINE) LOCK FLUSH IV SOLN 100 UNIT/ML 100 UNIT/ML
500 SOLUTION INTRAVENOUS PRN
Status: DISCONTINUED | OUTPATIENT
Start: 2022-03-03 | End: 2022-03-04 | Stop reason: HOSPADM

## 2022-03-03 RX ORDER — DIPHENHYDRAMINE HYDROCHLORIDE 50 MG/ML
50 INJECTION INTRAMUSCULAR; INTRAVENOUS ONCE
Status: CANCELLED | OUTPATIENT
Start: 2022-03-03 | End: 2022-03-03

## 2022-03-03 RX ORDER — LEVOTHYROXINE SODIUM 0.05 MG/1
50 TABLET ORAL DAILY
Qty: 30 TABLET | Refills: 1 | Status: SHIPPED | OUTPATIENT
Start: 2022-03-03 | End: 2022-07-07

## 2022-03-03 RX ORDER — SODIUM CHLORIDE 0.9 % (FLUSH) 0.9 %
5-40 SYRINGE (ML) INJECTION PRN
Status: DISCONTINUED | OUTPATIENT
Start: 2022-03-03 | End: 2022-03-04 | Stop reason: HOSPADM

## 2022-03-03 RX ORDER — SODIUM CHLORIDE 9 MG/ML
20 INJECTION, SOLUTION INTRAVENOUS ONCE
Status: COMPLETED | OUTPATIENT
Start: 2022-03-03 | End: 2022-03-03

## 2022-03-03 RX ORDER — HEPARIN SODIUM (PORCINE) LOCK FLUSH IV SOLN 100 UNIT/ML 100 UNIT/ML
500 SOLUTION INTRAVENOUS PRN
Status: CANCELLED | OUTPATIENT
Start: 2022-03-03

## 2022-03-03 RX ORDER — EPINEPHRINE 1 MG/ML
0.3 INJECTION, SOLUTION, CONCENTRATE INTRAVENOUS PRN
Status: CANCELLED | OUTPATIENT
Start: 2022-03-03

## 2022-03-03 RX ORDER — SODIUM CHLORIDE 9 MG/ML
25 INJECTION, SOLUTION INTRAVENOUS PRN
Status: CANCELLED | OUTPATIENT
Start: 2022-03-03

## 2022-03-03 RX ORDER — SODIUM CHLORIDE 9 MG/ML
INJECTION, SOLUTION INTRAVENOUS CONTINUOUS
Status: CANCELLED | OUTPATIENT
Start: 2022-03-03

## 2022-03-03 RX ORDER — MEPERIDINE HYDROCHLORIDE 25 MG/ML
12.5 INJECTION INTRAMUSCULAR; INTRAVENOUS; SUBCUTANEOUS ONCE
Status: CANCELLED | OUTPATIENT
Start: 2022-03-03 | End: 2022-03-03

## 2022-03-03 RX ORDER — SODIUM CHLORIDE 9 MG/ML
5-40 INJECTION INTRAVENOUS PRN
Status: CANCELLED | OUTPATIENT
Start: 2022-03-03

## 2022-03-03 RX ORDER — METHYLPREDNISOLONE SODIUM SUCCINATE 125 MG/2ML
125 INJECTION, POWDER, LYOPHILIZED, FOR SOLUTION INTRAMUSCULAR; INTRAVENOUS ONCE
Status: CANCELLED | OUTPATIENT
Start: 2022-03-03 | End: 2022-03-03

## 2022-03-03 RX ORDER — SODIUM CHLORIDE 0.9 % (FLUSH) 0.9 %
5-40 SYRINGE (ML) INJECTION PRN
Status: CANCELLED | OUTPATIENT
Start: 2022-03-03

## 2022-03-03 RX ADMIN — DENOSUMAB 120 MG: 120 INJECTION SUBCUTANEOUS at 10:54

## 2022-03-03 RX ADMIN — SODIUM CHLORIDE, PRESERVATIVE FREE 10 ML: 5 INJECTION INTRAVENOUS at 10:42

## 2022-03-03 RX ADMIN — SODIUM CHLORIDE 20 ML/HR: 9 INJECTION, SOLUTION INTRAVENOUS at 10:45

## 2022-03-03 RX ADMIN — SODIUM CHLORIDE 240 MG: 9 INJECTION, SOLUTION INTRAVENOUS at 11:05

## 2022-03-03 RX ADMIN — Medication 500 UNITS: at 11:52

## 2022-03-03 NOTE — PROGRESS NOTES
Patient presents to clinic today for Xgeva injection. Patient's labs monitored, specifically, Calcium level, to ensure no increased risk of hypocalcemia with administration of the medication. Patient's calcium level is 9.1. Patient received therapy and will continue to be monitored prior to each dose.

## 2022-03-03 NOTE — PROGRESS NOTES
[address]  [address] [place] [address]   Hematology/Oncology  Consult      Patient Name: Maryan aMrte  YOB: 1950  PCP: SHI Browning NP   Referring Provider:      Reason for Consultation:   Chief Complaint   Patient presents with    Cancer     renal cancer      Interval history: No new complaints. Right chest wall mass has resolved. Feels well. History of Present Illness:  73years old male referred for possible metastatic renal cell carcinoma. Patient presented to his PCP, HETAL Pathak, complaining of right side chest wall mass which he noticed about a month ago and had been gradually increasing in size. CT chest from October 2021 showed mltiple heterogenous and necrotic masses in the right kidney with possible tumor invasion of the right renal vein. In addition to this there was a large 4 by 9 cm soft tissue necrotic mass involving the fourth rib. There were other lytic destructive lesions in the right eighth and ninth rib. There were multiple right lung nodules ranging upto 1.5 cm. Few liver nodules were noted as well the largest being 7 mm. 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 PET scan showed hypermetabolic lesions in the right kidney, left adrenal diffuse skeletal metastasis, scattered pulmonary nodules most of them subcentimeter except for 1 right lung hypermetabolic nodule. MRI brain reviewed no intracranial metastasis. S/p right chest wall mass biopsy on 11/2/2021. Consistent with clear-cell renal cell carcinoma. Performance status seems fair Karnofsky performance status 80/70. Intermediate risk based on MSKCC risk factors although the disease seems to have progressed rapidly over the past month. His rapid progression as well as renal vein involvement probably precludes debulking nephrectomy. Tumor burden is not high and patient is asymptomatic.   Recommended treatment with ipilimumab/nivolumab combination. Started ipilimumab/nivolumab in November 2021. Patient reports pain in the right side chest wall mass which is well controlled with Tylenol as needed. Denies recent weight loss, loss of appetite night sweats, back pain. Review of systems: Over 10 systems were reviewed and all were negative except as mentioned above. Past medical history: Hypertension. Coronary artery disease, peripheral arterial disease, hyperlipidemia. Past surgical history: History of CABG, femoral arterial bypass, hernia repair. Social history: Smokes half a pack a day, denies alcohol or drug abuse. Family history Sister had breast cancer, father had colon cancer. Diagnostic Data:     Past Medical History:   Diagnosis Date    Anticoagulant long-term use 2007    aspirin    CAD (coronary artery disease)     Cancer (HCC)     kidney    Deep vein thrombosis (DVT) (HCC)     leg    [place] (hard of hearing)     Hx of blood clots     Hyperlipidemia     Hypertension     Mentally challenged     information per patient's niece. Patient Active Problem List    Diagnosis Date Noted    Poor venous access     Renal cell cancer, right ([address] 11/11/2021        Past Surgical History:   Procedure Laterality Date   [facility]    patient's sister said they were told a twin was removed from paitent's abdomen.    [address]  2007    bypass    CATHETER INSERTION Left 11/16/2021    MEDIPORT CATHETER INSERTION performed by Corbin Carrillo MD at [facility] [address]      bifemoral    IR PERCUT BX LUNG/MEDIASTINUM  11/2/2021    IR PERCUT BX LUNG/MEDIASTINUM 11/2/2021 [facility] CT       Family History  Family History   Problem Relation Age of Onset    High Blood Pressure Mother     Heart Disease Mother     Heart Attack Mother     Cancer Father 64        colon    Arthritis Sister     Heart Attack Brother     No Known Problems Maternal Uncle     No Known Problems Paternal Aunt  No Known Problems Paternal Uncle     No Known Problems Maternal Grandmother     Cancer Maternal Grandfather         throat    Heart Attack Paternal Grandmother     No Known Problems Paternal Grandfather     Cirrhosis Paternal Cousin     Deep Vein Thrombosis Sister     Diabetes Sister     Heart Disease Sister         triple bypass    Breast Cancer Sister 61    High Blood Pressure Sister     Diabetes Sister     High Cholesterol Sister     Arthritis Sister        Social History    TOBACCO:   reports that he has been smoking cigarettes. He has a 13.50 pack-year smoking history. He has never used smokeless tobacco.  ETOH:   reports previous alcohol use. Home Medications  Prior to Admission medications    Medication Sig Start Date End Date Taking? Authorizing Provider   levothyroxine (SYNTHROID) 50 MCG tablet Take 1 tablet by mouth daily 3/3/22 4/2/22 Yes Connor Lutz MD   lidocaine-prilocaine (EMLA) 2.5-2.5 % cream Apply topically as needed. 12/9/21  Yes SHI Stanley CNP   ARTHRITIS PAIN RELIEF 650 MG extended release tablet TAKE TWO TABLETS BY MOUTH AT BEDTIME 9/21/21  Yes Historical Provider, MD   metoprolol tartrate (LOPRESSOR) 25 MG tablet Take 25 mg by mouth 2 times daily   Yes Historical Provider, MD   aspirin 81 MG EC tablet Take 81 mg by mouth daily LD 10/26/21   Yes Historical Provider, MD   simvastatin (ZOCOR) 80 MG tablet Take 80 mg by mouth nightly   Yes Historical Provider, MD   lisinopril (PRINIVIL;ZESTRIL) 20 MG tablet Take 20 mg by mouth daily   Yes Historical Provider, MD       Allergies  No Known Allergies    Review of Systems:      Objective  /67   Pulse 57   Temp 97.7 °F (36.5 °C)   Ht 5' 8\" (1.727 m)   Wt 121 lb 4.8 oz (55 kg)   SpO2 100%   BMI 18.44 kg/m²     Physical Performance Status    Physical Exam:  General: AAO to person, place, time, and purpose, appears stated age, cooperative, no acute distress, pleasant   Head and neck : VERONICA THOMPSON . Sclera non icteric. Oropharynx : Clear  Neck: no JVD,  no adenopathy, no carotid bruit  LYMPHATICS : no lap  Lungs: Clear to auscultation. Right side chest wall globular mass, 4-5 cm in diamter has completely resolved. Heart: Regular rate and regular rhythm, no murmur, normal S1, S2  Abdomen: Soft, non-tender;no masses, no organomegaly  Extremities: No edema,no cyanosis, no clubbing. Skin:  No rash. Neurologic:Cranial nerves grossly intact. No focal motor or sensory deficits .     Recent Laboratory Data-   Lab Results   Component Value Date    WBC 10.1 03/02/2022    HGB 12.8 03/02/2022    HCT 40.4 03/02/2022    MCV 96.4 03/02/2022     03/02/2022    LYMPHOPCT 40.0 03/02/2022    RBC 4.19 03/02/2022    MCH 30.5 03/02/2022    MCHC 31.7 (L) 03/02/2022    RDW 17.5 (H) 03/02/2022    NEUTOPHILPCT 45.0 03/02/2022    MONOPCT 9.0 03/02/2022    BASOPCT 1.0 03/02/2022    NEUTROABS 4.55 03/02/2022    LYMPHSABS 4.04 (H) 03/02/2022    MONOSABS 0.91 03/02/2022    EOSABS 0.51 (H) 03/02/2022    BASOSABS 0.10 03/02/2022       Lab Results   Component Value Date     03/02/2022    K 4.3 03/02/2022    CL 99 03/02/2022    CO2 25 03/02/2022    BUN 10 03/02/2022    CREATININE 0.9 03/02/2022    GLUCOSE 103 (H) 03/02/2022    CALCIUM 9.1 03/02/2022    PROT 7.5 03/02/2022    LABALBU 4.2 03/02/2022    BILITOT 0.2 03/02/2022    ALKPHOS 132 (H) 03/02/2022    AST 19 03/02/2022    ALT 16 03/02/2022    LABGLOM >60 03/02/2022    GFRAA >60 03/02/2022       No results found for: IRON, TIBC, FERRITIN        Radiology-    Echo Complete    Result Date: 10/8/2021  Transthoracic Echocardiography Report (TTE)  Demographics   Patient Name    [facility]        Gender            Male                  Marcio Prater  [id number]      Room Number  Number   Account #       [de-identified]     Procedure Date    10/08/2021   Corporate ID                  Ordering          Jaquelin Montano DO                                Physician   Accession [id number]    Referring         Tunde Parham DO  Number                        Physician   Date of Birth   1950    Sonographer       Eliz Jordan CHIDI   Age             73 year(s)    Interpreting      [address]                                Physician         Physician Cardiology                                                  Tyrell Coleman MD                                 Any Other  Procedure Type of Study   TTE procedure:Echo Complete W/Doppler & Color Flow. Procedure Date Date: 10/08/2021 Start: 09:57 AM Study Location: Echo Lab Technical Quality: Poor visualization due to poor acoustical window. Indications:Heart murmur. Patient Status: Routine Height: 68 inches Weight: 120 pounds BSA: 1.65 m^2 BMI: 18.25 kg/m^2  Findings   Left Ventricle  Normal left ventricular chamber size. Normal left ventricular systolic function. Visually estimated LVEF 65%. Mild left ventricular concentric hypertrophy noted. Normal diastolic function. Right Ventricle  Normal right ventricle size and function. Left Atrium  The left atrium is mildly dilated. Mildly increased left atrial volume. Interatrial septum not well visualized but appears intact. Right Atrium  Normal right atrium. Mitral Valve  Normal mitral valve structure. There is moderate mitral regurgitation - ERO 0.23cm2, PISA 0.9cm, RV 51cc. No mitral valve prolapse. Tricuspid Valve  Normal tricuspid valve structure. There is mild tricuspid regurgitation. Mild pulmonary hypertension, RVSP 39mmHg. Aortic Valve  Normal aortic valve structure. There is trace to mild aortic regurgitation, pressure 1/2 time 718ms. Pulmonic Valve  The pulmonic valve was not well visualized. Pericardial Effusion  No evidence of pericardial effusion. Pericardium appears normal.   Pleural Effusion  No evidence of pleural effusion. Aorta  Aortic root 3.5cm.    Miscellaneous  The inferior vena cava diameter is normal with normal respiratory variation. Conclusions   Summary  Normal left ventricular chamber size. Normal left ventricular systolic function, LVEF 23%. Mild left ventricular concentric hypertrophy noted. Normal diastolic function. The left atrium is mildly dilated. Mildly increased left atrial volume. Interatrial septum not well visualized but appears intact. Normal right ventricle size and function. There is moderate mitral regurgitation - ERO 0.23cm2, PISA 0.9cm, RV 51cc. No mitral valve prolapse. There is trace to mild aortic regurgitation, pressure 1/2 time 718ms. There is mild tricuspid regurgitation. Mild pulmonary hypertension, RVSP 39mmHg. The pulmonic valve was not well visualized. Aortic root 3.5cm. No evidence of pericardial effusion. No intra cardiac mass or thrombus. No comparison study available.    Signature   ----------------------------------------------------------------  Electronically signed by Julio Koch MD(Interpreting  physician) on 10/08/2021 03:52 PM  ----------------------------------------------------------------  M-Mode/2D Measurements & Calculations   LV Diastolic    LV Systolic Dimension: 3.2   AV Cusp Separation: 1.9 cmLA  Dimension: 5.3  cm                           Dimension: 3.9 cmAO Root  cm              LV Volume Diastolic: 106.1   Dimension: 3.5 cm  LV FS:39.6 %    ml  LV PW           LV Volume Systolic: 63.3 ml  Diastolic: 1.2  LV EDV/LV EDV Index: 134.5  cm              ml/82 ml/m^2LV ESV/LV ESV    RV Diastolic Dimension: 2.8  LV PW Systolic: Index: 74.5 UM/74MX/ m^2     cm  1.5 cm          EF Calculated: 70 %          RV Systolic Dimension: 2.4 cm  Septum          LV Mass Index: 147 l/min*m^2 LA/Aorta: 1.2  Diastolic: 1.1  cm                                           LA volume/Index: 79.7 ml  Septum          LVOT: 2 cm  Systolic: 1.2  cm   LV Mass: 241.96  g  Doppler Measurements & Calculations   MV Peak E-Wave: 1.42   AV Peak Velocity: 1.87 m/s  LVOT Peak Velocity:  m/s AV Peak Gradient: 14.05     1.55 m/s  MV Peak A-Wave: 0.82   mmHg                        LVOT Mean Velocity:  m/s                    AV Mean Velocity: 1.27 m/s  0.81 m/s  MV E/A Ratio: 1.73     AV Mean Gradient: 7.2 mmHg  LVOT Peak Gradient: 9.6  MV Peak Gradient: 9.2  AV VTI: 42.1 cm             mmHgLVOT Mean Gradient:  mmHg                   AV Area (Continuity):2.37   3.5 mmHg  MV Mean Gradient: 2.9  cm^2                        Estimated RVSP: 39 mmHg  mmHg                   AV Deceleration Time:       Estimated RAP:10 mmHg  MV Mean Velocity: 0.75 2474.3 msec  m/s                    LVOT VTI: 31.8 cm  MV Deceleration Time:                              TR Velocity:2.69 m/s  271.4 msec             Estimated PASP: 39.03 mmHg  TR Gradient:29.03 mmHg  MV P1/2t: 101.8 msec   Pulm. Vein A Reversal       PV Peak Velocity: 0.51  MVA by PHT:2.16 cm^2   Duration:175.3 msec         m/s  MV Area (continuity):  Pulm. Vein D Velocity:0.74  PV Peak Gradient: 1.02  1.9 cm^2               m/sPulm. Vein A Reversal    mmHg                         Velocity:0.38 m/s           PV Mean Velocity: 0.36                         Pulm. Vein S Velocity: 0.44 m/s  MR Velocity: 5.37 m/s  m/s                         PV Mean Gradient: 0.6  MV RICARDO PISA: 0.23 cm^2                             mmHg  MR VTI: 221.9 cm  Alias Velocity: 0.25  m/sPISA Radius: 0.9 cm   PISA area: 5.09 cm^2MR  flow rate: 125.72  ml/sMR volume:51.04 ml  [email or web address] DocKey=[id number]    CT CHEST W CONTRAST    Addendum Date: 10/13/2021    ADDENDUM: 6 mm indeterminate hypodense lesions are identified in the body and tail of the pancreas.   Consider surveillance     Result Date: 10/13/2021  EXAMINATION: CT OF THE CHEST WITH CONTRAST 10/13/2021 10:59 am TECHNIQUE: CT of the chest was performed with the administration of intravenous contrast. Multiplanar reformatted images are provided for review. Dose modulation, iterative reconstruction, and/or weight based adjustment of the mA/kV was utilized to reduce the radiation dose to as low as reasonably achievable. COMPARISON: None. HISTORY: ORDERING SYSTEM PROVIDED HISTORY: Chest wall mass FINDINGS: There is cardiomegaly. There is coronary artery calcification. The great vessels are normal.  Moderately enlarged mediastinal and hilar lymph nodes are noted with lymph nodes measuring up to 1.3 cm in the right hilum. Trachea and major bronchi are patent. Multiple bilateral pulmonary nodules are identified with nodules measuring up to 1.5 cm in the right lower lobe and smaller ones in the right middle lobe and right upper lobe. The pulmonary nodules in the left lower lobe ranges from 5 mm up to 8 mm. There is no focal consolidation or pleural effusion. Punctate enhancing nodules are identified in the posterior right hepatic lobe, largest 1 measuring up to 7 mm. Multiple heterogeneously enhancing right renal masses are identified largest 1 measuring 3.8 x 3.3 cm which is partially necrotic with additional smaller nodules concerning for multifocal malignancy like renal cell carcinoma. There is filling defects in the right renal vein concerning for tumor invasion versus tumor embolism of the renal vein. There is bilateral adrenal metastasis with the largest 1 in the left adrenal gland measuring 1.6 x 2.7 cm. There is bone metastasis with the destructive soft tissue mass involving the right 4th rib anterolaterally with adjacent soft tissue mass which is partially necrotic measuring 4.5 x 9.2 cm. Lytic destructive lesions are also identified in the right 8th and 9th rib near the costo vertebral junction. Multiple heterogeneous  enhancing and necrotic masses in the right kidney concerning for multifocal renal cell carcinoma with  possible tumor invasion or tumor embolism of the right renal vein.   There is diffuse metastasis with the involvement of the right and left adrenal glands, possible hepatic metastasis, widespread pulmonary and rib involvement as noted. Further assessment by PET-CT scan is recommended. Findings were telephoned to licensed caregiver. ASSESSMENT/PLAN :    Jacqui López was seen today for cancer. Diagnoses and all orders for this visit:    Renal cell cancer, right ([facility] [address]  -     T4; Future  -     T4, Free; Future  -     CBC with Auto Differential; Future  -     Comprehensive Metabolic Panel; Future  -     TSH; Future  -     T4; Future    Other orders  -     levothyroxine (SYNTHROID) 50 MCG tablet; Take 1 tablet by mouth daily         73years old male with Stage IV renal cell carcinoma (diagnosed 11/2021- chest wall mass biopsy) -right chest wall mass, multiple right kidney necrotic lesions in addition to large soft tissue destructive mass of right fourth rib, multiple lung nodules up to 1.5 cm, bilateral adrenal metastasis up to 2.5 cm. ECOG 1/2. Performance status fair Karnofsky performance status 80/70. Intermediate risk based on MSKCC risk factors although the disease seems to have progressed rapidly over the past month. His rapid progression as well as renal vein involvement probably precludes debulking nephrectomy. Tumor burden is not high and patient is asymptomatic. Recommended treatment with ipilimumab/nivolumab combination. Started in November 2021. S/p 4 cycles. Tolerated treatment well. No new complaints. Right-sided chest wall mass has resolved. Reviewed blood work from last week-unremarkable CBC CMP. Primary hypothyroidism TSH 30. Related to immunotherapy. Asymptomatic. We will check a T4. Prescribed Synthroid 50 mcg daily. Will recheck in a couple of weeks. We will continue Opdivo 240 mg every 2 weeks to progression. PET scan ordered- not done. Continue Xgeva every 4 weeks. Calcium normal.   Patient is edentulous.     Pain at the right side chest wall mass is well controlled with Tylenol as needed. Patient was concerned about weight loss. Weight has been stable for past 4 months. Return to clinic in 3 weeks. Return in about 2 weeks (around 3/17/2022).      Meghan Alejandre MD   Electronically signed 3/3/2022 at 12:03 PM

## 2022-03-14 ENCOUNTER — HOSPITAL ENCOUNTER (OUTPATIENT)
Dept: NUCLEAR MEDICINE | Age: 72
Discharge: HOME | End: 2022-03-14
Payer: MEDICARE

## 2022-03-14 DIAGNOSIS — C64.1 RENAL CELL CANCER, RIGHT (HCC): ICD-10-CM

## 2022-03-14 PROCEDURE — 78815 PET IMAGE W/CT SKULL-THIGH: CPT

## 2022-03-14 PROCEDURE — 3430000000 HC RX DIAGNOSTIC RADIOPHARMACEUTICAL: Performed by: RADIOLOGY

## 2022-03-14 PROCEDURE — A9552 F18 FDG: HCPCS | Performed by: RADIOLOGY

## 2022-03-14 RX ORDER — FLUDEOXYGLUCOSE F 18 200 MCI/ML
15 INJECTION, SOLUTION INTRAVENOUS
Status: COMPLETED | OUTPATIENT
Start: 2022-03-14 | End: 2022-03-14

## 2022-03-14 RX ADMIN — FLUDEOXYGLUCOSE F 18 15 MILLICURIE: 200 INJECTION, SOLUTION INTRAVENOUS at 08:23

## 2022-03-16 ENCOUNTER — HOSPITAL ENCOUNTER (OUTPATIENT)
Dept: INFUSION THERAPY | Age: 72
Discharge: HOME | End: 2022-03-16
Payer: MEDICARE

## 2022-03-16 DIAGNOSIS — C64.1 RENAL CELL CANCER, RIGHT (HCC): ICD-10-CM

## 2022-03-16 LAB
ALBUMIN SERPL-MCNC: 4.1 G/DL (ref 3.5–5.2)
ALP BLD-CCNC: 107 U/L (ref 40–129)
ALT SERPL-CCNC: 11 U/L (ref 0–40)
ANION GAP SERPL CALCULATED.3IONS-SCNC: 11 MMOL/L (ref 7–16)
AST SERPL-CCNC: 17 U/L (ref 0–39)
BASOPHILS ABSOLUTE: 0.1 E9/L (ref 0–0.2)
BASOPHILS RELATIVE PERCENT: 1.1 % (ref 0–2)
BILIRUB SERPL-MCNC: 0.2 MG/DL (ref 0–1.2)
BUN BLDV-MCNC: 6 MG/DL (ref 6–23)
CALCIUM SERPL-MCNC: 8.8 MG/DL (ref 8.6–10.2)
CHLORIDE BLD-SCNC: 99 MMOL/L (ref 98–107)
CO2: 25 MMOL/L (ref 22–29)
CREAT SERPL-MCNC: 0.9 MG/DL (ref 0.7–1.2)
EOSINOPHILS ABSOLUTE: 0.28 E9/L (ref 0.05–0.5)
EOSINOPHILS RELATIVE PERCENT: 3 % (ref 0–6)
GFR AFRICAN AMERICAN: >60
GFR NON-AFRICAN AMERICAN: >60 ML/MIN/1.73
GLUCOSE BLD-MCNC: 73 MG/DL (ref 74–99)
HCT VFR BLD CALC: 39.5 % (ref 37–54)
HEMOGLOBIN: 12.8 G/DL (ref 12.5–16.5)
IMMATURE GRANULOCYTES #: 0.03 E9/L
IMMATURE GRANULOCYTES %: 0.3 % (ref 0–5)
LYMPHOCYTES ABSOLUTE: 3.88 E9/L (ref 1.5–4)
LYMPHOCYTES RELATIVE PERCENT: 41.3 % (ref 20–42)
MCH RBC QN AUTO: 31.7 PG (ref 26–35)
MCHC RBC AUTO-ENTMCNC: 32.4 % (ref 32–34.5)
MCV RBC AUTO: 97.8 FL (ref 80–99.9)
MONOCYTES ABSOLUTE: 0.71 E9/L (ref 0.1–0.95)
MONOCYTES RELATIVE PERCENT: 7.6 % (ref 2–12)
NEUTROPHILS ABSOLUTE: 4.39 E9/L (ref 1.8–7.3)
NEUTROPHILS RELATIVE PERCENT: 46.7 % (ref 43–80)
PDW BLD-RTO: 18.6 FL (ref 11.5–15)
PLATELET # BLD: 245 E9/L (ref 130–450)
PMV BLD AUTO: 10.3 FL (ref 7–12)
POTASSIUM SERPL-SCNC: 3.9 MMOL/L (ref 3.5–5)
RBC # BLD: 4.04 E12/L (ref 3.8–5.8)
SODIUM BLD-SCNC: 135 MMOL/L (ref 132–146)
T4 FREE: 0.6 NG/DL (ref 0.93–1.7)
T4 TOTAL: 4.6 MCG/DL (ref 4.5–11.7)
TOTAL PROTEIN: 7 G/DL (ref 6.4–8.3)
TSH SERPL DL<=0.05 MIU/L-ACNC: 57.56 UIU/ML (ref 0.27–4.2)
WBC # BLD: 9.4 E9/L (ref 4.5–11.5)

## 2022-03-16 PROCEDURE — 36415 COLL VENOUS BLD VENIPUNCTURE: CPT

## 2022-03-16 PROCEDURE — 80053 COMPREHEN METABOLIC PANEL: CPT

## 2022-03-16 PROCEDURE — 85025 COMPLETE CBC W/AUTO DIFF WBC: CPT

## 2022-03-16 PROCEDURE — 84443 ASSAY THYROID STIM HORMONE: CPT

## 2022-03-16 PROCEDURE — 84439 ASSAY OF FREE THYROXINE: CPT

## 2022-03-17 ENCOUNTER — FOLLOWUP TELEPHONE ENCOUNTER (OUTPATIENT)
Dept: INFUSION THERAPY | Age: 72
End: 2022-03-17

## 2022-03-17 ENCOUNTER — OFFICE VISIT (OUTPATIENT)
Dept: ONCOLOGY | Age: 72
End: 2022-03-17

## 2022-03-17 ENCOUNTER — HOSPITAL ENCOUNTER (OUTPATIENT)
Dept: INFUSION THERAPY | Age: 72
Discharge: HOME | End: 2022-03-17
Payer: MEDICARE

## 2022-03-17 VITALS
HEART RATE: 59 BPM | SYSTOLIC BLOOD PRESSURE: 162 MMHG | DIASTOLIC BLOOD PRESSURE: 82 MMHG | OXYGEN SATURATION: 96 % | HEIGHT: 68 IN | TEMPERATURE: 97.7 F | BODY MASS INDEX: 19.43 KG/M2 | WEIGHT: 128.2 LBS

## 2022-03-17 VITALS — DIASTOLIC BLOOD PRESSURE: 74 MMHG | SYSTOLIC BLOOD PRESSURE: 133 MMHG | HEART RATE: 53 BPM | TEMPERATURE: 97.7 F

## 2022-03-17 DIAGNOSIS — C64.1 RENAL CELL CANCER, RIGHT (HCC): Primary | ICD-10-CM

## 2022-03-17 PROCEDURE — 6360000002 HC RX W HCPCS: Performed by: INTERNAL MEDICINE

## 2022-03-17 PROCEDURE — 2580000003 HC RX 258: Performed by: INTERNAL MEDICINE

## 2022-03-17 PROCEDURE — 96413 CHEMO IV INFUSION 1 HR: CPT

## 2022-03-17 RX ORDER — MEPERIDINE HYDROCHLORIDE 25 MG/ML
12.5 INJECTION INTRAMUSCULAR; INTRAVENOUS; SUBCUTANEOUS ONCE
Status: CANCELLED | OUTPATIENT
Start: 2022-03-17 | End: 2022-03-17

## 2022-03-17 RX ORDER — HEPARIN SODIUM (PORCINE) LOCK FLUSH IV SOLN 100 UNIT/ML 100 UNIT/ML
500 SOLUTION INTRAVENOUS PRN
Status: CANCELLED | OUTPATIENT
Start: 2022-03-17

## 2022-03-17 RX ORDER — HEPARIN SODIUM (PORCINE) LOCK FLUSH IV SOLN 100 UNIT/ML 100 UNIT/ML
500 SOLUTION INTRAVENOUS PRN
Status: DISCONTINUED | OUTPATIENT
Start: 2022-03-17 | End: 2022-03-18 | Stop reason: HOSPADM

## 2022-03-17 RX ORDER — DIPHENHYDRAMINE HYDROCHLORIDE 50 MG/ML
50 INJECTION INTRAMUSCULAR; INTRAVENOUS ONCE
Status: CANCELLED | OUTPATIENT
Start: 2022-03-17 | End: 2022-03-17

## 2022-03-17 RX ORDER — SODIUM CHLORIDE 0.9 % (FLUSH) 0.9 %
5-40 SYRINGE (ML) INJECTION PRN
Status: CANCELLED | OUTPATIENT
Start: 2022-03-17

## 2022-03-17 RX ORDER — SODIUM CHLORIDE 0.9 % (FLUSH) 0.9 %
5-40 SYRINGE (ML) INJECTION PRN
Status: DISCONTINUED | OUTPATIENT
Start: 2022-03-17 | End: 2022-03-18 | Stop reason: HOSPADM

## 2022-03-17 RX ORDER — SODIUM CHLORIDE 9 MG/ML
5-40 INJECTION INTRAVENOUS PRN
Status: CANCELLED | OUTPATIENT
Start: 2022-03-17

## 2022-03-17 RX ORDER — METHYLPREDNISOLONE SODIUM SUCCINATE 125 MG/2ML
125 INJECTION, POWDER, LYOPHILIZED, FOR SOLUTION INTRAMUSCULAR; INTRAVENOUS ONCE
Status: CANCELLED | OUTPATIENT
Start: 2022-03-17 | End: 2022-03-17

## 2022-03-17 RX ORDER — EPINEPHRINE 1 MG/ML
0.3 INJECTION, SOLUTION, CONCENTRATE INTRAVENOUS PRN
Status: CANCELLED | OUTPATIENT
Start: 2022-03-17

## 2022-03-17 RX ORDER — SODIUM CHLORIDE 9 MG/ML
20 INJECTION, SOLUTION INTRAVENOUS ONCE
Status: COMPLETED | OUTPATIENT
Start: 2022-03-17 | End: 2022-03-17

## 2022-03-17 RX ORDER — SODIUM CHLORIDE 9 MG/ML
20 INJECTION, SOLUTION INTRAVENOUS ONCE
Status: CANCELLED | OUTPATIENT
Start: 2022-03-17 | End: 2022-03-17

## 2022-03-17 RX ORDER — SODIUM CHLORIDE 9 MG/ML
25 INJECTION, SOLUTION INTRAVENOUS PRN
Status: CANCELLED | OUTPATIENT
Start: 2022-03-17

## 2022-03-17 RX ORDER — SODIUM CHLORIDE 9 MG/ML
INJECTION, SOLUTION INTRAVENOUS CONTINUOUS
Status: CANCELLED | OUTPATIENT
Start: 2022-03-17

## 2022-03-17 RX ADMIN — Medication 500 UNITS: at 11:33

## 2022-03-17 RX ADMIN — SODIUM CHLORIDE 20 ML/HR: 9 INJECTION, SOLUTION INTRAVENOUS at 10:52

## 2022-03-17 RX ADMIN — SODIUM CHLORIDE, PRESERVATIVE FREE 10 ML: 5 INJECTION INTRAVENOUS at 11:33

## 2022-03-17 RX ADMIN — SODIUM CHLORIDE 240 MG: 9 INJECTION, SOLUTION INTRAVENOUS at 10:54

## 2022-03-17 NOTE — PROGRESS NOTES
[address]  [address] [place] [address]   Hematology/Oncology  Consult      Patient Name: Helene Lucia  YOB: 1950  PCP: Alpheus Councilman, APRN - NP   Referring Provider:      Reason for Consultation:   Chief Complaint   Patient presents with    Follow-up     Renal cell cancer, right [facility])    Cancer    Chemotherapy      Interval history: No new complaints. Right chest wall mass has resolved. Feels well. Gaining weight. History of Present Illness:  73years old male referred for possible metastatic renal cell carcinoma. Patient presented to his PCP, HETAL Pathak, complaining of right side chest wall mass which he noticed about a month ago and had been gradually increasing in size. CT chest from October 2021 showed mltiple heterogenous and necrotic masses in the right kidney with possible tumor invasion of the right renal vein. In addition to this there was a large 4 by 9 cm soft tissue necrotic mass involving the fourth rib. There were other lytic destructive lesions in the right eighth and ninth rib. There were multiple right lung nodules ranging upto 1.5 cm. Few liver nodules were noted as well the largest being 7 mm. 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 PET scan showed hypermetabolic lesions in the right kidney, left adrenal diffuse skeletal metastasis, scattered pulmonary nodules most of them subcentimeter except for 1 right lung hypermetabolic nodule. MRI brain reviewed no intracranial metastasis. S/p right chest wall mass biopsy on 11/2/2021. Consistent with clear-cell renal cell carcinoma. Performance status seems fair Karnofsky performance status 80/70. Intermediate risk based on [facility] risk factors although the disease seems to have progressed rapidly over the past month. His rapid progression as well as renal vein involvement probably precludes debulking nephrectomy.   Tumor burden is not high and Problems Maternal Uncle     No Known Problems Paternal Aunt     No Known Problems Paternal Uncle     No Known Problems Maternal Grandmother     Cancer Maternal Grandfather         throat    Heart Attack Paternal Grandmother     No Known Problems Paternal Grandfather     Cirrhosis Paternal Cousin     Deep Vein Thrombosis Sister     Diabetes Sister     Heart Disease Sister         triple bypass    Breast Cancer Sister 61    High Blood Pressure Sister     Diabetes Sister     High Cholesterol Sister     Arthritis Sister        Social History    TOBACCO:   reports that he has been smoking cigarettes. He has a 13.50 pack-year smoking history. He has never used smokeless tobacco.  ETOH:   reports previous alcohol use. Home Medications  Prior to Admission medications    Medication Sig Start Date End Date Taking? Authorizing Provider   levothyroxine (SYNTHROID) 50 MCG tablet Take 1 tablet by mouth daily 3/3/22 4/2/22 Yes Maryam Andrade MD   lidocaine-prilocaine (EMLA) 2.5-2.5 % cream Apply topically as needed.  12/9/21  Yes SHI Bucio - CNP   ARTHRITIS PAIN RELIEF 650 MG extended release tablet TAKE TWO TABLETS BY MOUTH AT BEDTIME 9/21/21  Yes Historical Provider, MD   metoprolol tartrate (LOPRESSOR) 25 MG tablet Take 25 mg by mouth 2 times daily   Yes Historical Provider, MD   aspirin 81 MG EC tablet Take 81 mg by mouth daily LD 10/26/21   Yes Historical Provider, MD   simvastatin (ZOCOR) 80 MG tablet Take 80 mg by mouth nightly   Yes Historical Provider, MD   lisinopril (PRINIVIL;ZESTRIL) 20 MG tablet Take 20 mg by mouth daily   Yes Historical Provider, MD       Allergies  No Known Allergies    Review of Systems:      Objective  BP (!) 162/82   Pulse 59   Temp 97.7 °F (36.5 °C)   Ht 5' 8\" (1.727 m)   Wt 128 lb 3.2 oz (58.2 kg)   SpO2 96%   BMI 19.49 kg/m²     Physical Performance Status    Physical Exam:  General: AAO to person, place, time, and purpose, appears stated age, cooperative, no acute distress, pleasant   Head and neck : PERRLA, EOMI . Sclera non icteric. Oropharynx : Clear  Neck: no JVD,  no adenopathy, no carotid bruit  LYMPHATICS : no lap  Lungs: Clear to auscultation. Right side chest wall globular mass, 4-5 cm in diamter has completely resolved. Heart: Regular rate and regular rhythm, no murmur, normal S1, S2  Abdomen: Soft, non-tender;no masses, no organomegaly  Extremities: No edema,no cyanosis, no clubbing. Skin:  No rash. Neurologic:Cranial nerves grossly intact. No focal motor or sensory deficits .     Recent Laboratory Data-   Lab Results   Component Value Date    WBC 9.4 03/16/2022    HGB 12.8 03/16/2022    HCT 39.5 03/16/2022    MCV 97.8 03/16/2022     03/16/2022    LYMPHOPCT 41.3 03/16/2022    RBC 4.04 03/16/2022    MCH 31.7 03/16/2022    MCHC 32.4 03/16/2022    RDW 18.6 (H) 03/16/2022    NEUTOPHILPCT 46.7 03/16/2022    MONOPCT 7.6 03/16/2022    BASOPCT 1.1 03/16/2022    NEUTROABS 4.39 03/16/2022    LYMPHSABS 3.88 03/16/2022    MONOSABS 0.71 03/16/2022    EOSABS 0.28 03/16/2022    BASOSABS 0.10 03/16/2022       Lab Results   Component Value Date     03/16/2022    K 3.9 03/16/2022    CL 99 03/16/2022    CO2 25 03/16/2022    BUN 6 03/16/2022    CREATININE 0.9 03/16/2022    GLUCOSE 73 (L) 03/16/2022    CALCIUM 8.8 03/16/2022    PROT 7.0 03/16/2022    LABALBU 4.1 03/16/2022    BILITOT 0.2 03/16/2022    ALKPHOS 107 03/16/2022    AST 17 03/16/2022    ALT 11 03/16/2022    LABGLOM >60 03/16/2022    GFRAA >60 03/16/2022       No results found for: IRON, TIBC, FERRITIN        Radiology-    Echo Complete    Result Date: 10/8/2021  Transthoracic Echocardiography Report (TTE)  Demographics   Patient Name    [facility]        Gender            Male                  Marcio Prater  [id number]      Room Number  Number   Account #       [de-identified]     Procedure Date    10/08/2021   Corporate ID                  Ordering          Frank Belcher DO Physician   Accession       [id number]    Referring         Nayeli Villafuerte DO  Number                        Physician   Date of Birth   1950    Sonographer       Seng Rose RDCS   Age             73 year(s)    Interpreting      Himanshu 66                                Physician         Physician Cardiology                                                  David Jim MD                                 Any Other  Procedure Type of Study   TTE procedure:Echo Complete W/Doppler & Color Flow. Procedure Date Date: 10/08/2021 Start: 09:57 AM Study Location: Echo Lab Technical Quality: Poor visualization due to poor acoustical window. Indications:Heart murmur. Patient Status: Routine Height: 68 inches Weight: 120 pounds BSA: 1.65 m^2 BMI: 18.25 kg/m^2  Findings   Left Ventricle  Normal left ventricular chamber size. Normal left ventricular systolic function. Visually estimated LVEF 65%. Mild left ventricular concentric hypertrophy noted. Normal diastolic function. Right Ventricle  Normal right ventricle size and function. Left Atrium  The left atrium is mildly dilated. Mildly increased left atrial volume. Interatrial septum not well visualized but appears intact. Right Atrium  Normal right atrium. Mitral Valve  Normal mitral valve structure. There is moderate mitral regurgitation - ERO 0.23cm2, PISA 0.9cm, RV 51cc. No mitral valve prolapse. Tricuspid Valve  Normal tricuspid valve structure. There is mild tricuspid regurgitation. Mild pulmonary hypertension, RVSP 39mmHg. Aortic Valve  Normal aortic valve structure. There is trace to mild aortic regurgitation, pressure 1/2 time 718ms. Pulmonic Valve  The pulmonic valve was not well visualized. Pericardial Effusion  No evidence of pericardial effusion. Pericardium appears normal.   Pleural Effusion  No evidence of pleural effusion. Aorta  Aortic root 3.5cm.    Miscellaneous  The inferior vena cava diameter is normal with normal respiratory  variation. Conclusions   Summary  Normal left ventricular chamber size. Normal left ventricular systolic function, LVEF 48%. Mild left ventricular concentric hypertrophy noted. Normal diastolic function. The left atrium is mildly dilated. Mildly increased left atrial volume. Interatrial septum not well visualized but appears intact. Normal right ventricle size and function. There is moderate mitral regurgitation - ERO 0.23cm2, PISA 0.9cm, RV 51cc. No mitral valve prolapse. There is trace to mild aortic regurgitation, pressure 1/2 time 718ms. There is mild tricuspid regurgitation. Mild pulmonary hypertension, RVSP 39mmHg. The pulmonic valve was not well visualized. Aortic root 3.5cm. No evidence of pericardial effusion. No intra cardiac mass or thrombus. No comparison study available.    Signature   ----------------------------------------------------------------  Electronically signed by Buck Darden MD(Interpreting  physician) on 10/08/2021 03:52 PM  ----------------------------------------------------------------  M-Mode/2D Measurements & Calculations   LV Diastolic    LV Systolic Dimension: 3.2   AV Cusp Separation: 1.9 cmLA  Dimension: 5.3  cm                           Dimension: 3.9 cmAO Root  cm              LV Volume Diastolic: 863.1   Dimension: 3.5 cm  LV FS:39.6 %    ml  LV PW           LV Volume Systolic: 41.6 ml  Diastolic: 1.2  LV EDV/LV EDV Index: 134.5  cm              ml/82 ml/m^2LV ESV/LV ESV    RV Diastolic Dimension: 2.8  LV PW Systolic: Index: 64.5 NN/44OW/ m^2     cm  1.5 cm          EF Calculated: 70 %          RV Systolic Dimension: 2.4 cm  Septum          LV Mass Index: 147 l/min*m^2 LA/Aorta: 1.2  Diastolic: 1.1  cm                                           LA volume/Index: 79.7 ml  Septum          LVOT: 2 cm  Systolic: 1.2  cm   LV Mass: 241.96  g  Doppler Measurements & Calculations   MV Peak E-Wave: 1.42   AV Peak Velocity: 1.87 m/s LVOT Peak Velocity:  m/s                    AV Peak Gradient: 14.05     1.55 m/s  MV Peak A-Wave: 0.82   mmHg                        LVOT Mean Velocity:  m/s                    AV Mean Velocity: 1.27 m/s  0.81 m/s  MV E/A Ratio: 1.73     AV Mean Gradient: 7.2 mmHg  LVOT Peak Gradient: 9.6  MV Peak Gradient: 9.2  AV VTI: 42.1 cm             mmHgLVOT Mean Gradient:  mmHg                   AV Area (Continuity):2.37   3.5 mmHg  MV Mean Gradient: 2.9  cm^2                        Estimated RVSP: 39 mmHg  mmHg                   AV Deceleration Time:       Estimated RAP:10 mmHg  MV Mean Velocity: 0.75 2474.3 msec  m/s                    LVOT VTI: 31.8 cm  MV Deceleration Time:                              TR Velocity:2.69 m/s  271.4 msec             Estimated PASP: 39.03 mmHg  TR Gradient:29.03 mmHg  MV P1/2t: 101.8 msec   Pulm. Vein A Reversal       PV Peak Velocity: 0.51  MVA by PHT:2.16 cm^2   Duration:175.3 msec         m/s  MV Area (continuity):  Pulm. Vein D Velocity:0.74  PV Peak Gradient: 1.02  1.9 cm^2               m/sPulm. Vein A Reversal    mmHg                         Velocity:0.38 m/s           PV Mean Velocity: 0.36                         Pulm. Vein S Velocity: 0.44 m/s  MR Velocity: 5.37 m/s  m/s                         PV Mean Gradient: 0.6  MV RICARDO PISA: 0.23 cm^2                             mmHg  MR VTI: 221.9 cm  Alias Velocity: 0.25  m/sPISA Radius: 0.9 cm   PISA area: 5.09 cm^2MR  flow rate: 125.72  ml/sMR volume:51.04 ml  [email or web address] [id number]    CT CHEST W CONTRAST    Addendum Date: 10/13/2021    ADDENDUM: 6 mm indeterminate hypodense lesions are identified in the body and tail of the pancreas.   Consider surveillance     Result Date: 10/13/2021  EXAMINATION: CT OF THE CHEST WITH CONTRAST 10/13/2021 10:59 am TECHNIQUE: CT of the chest was performed with the administration of intravenous contrast. Multiplanar reformatted images are provided for review. Dose modulation, iterative reconstruction, and/or weight based adjustment of the mA/kV was utilized to reduce the radiation dose to as low as reasonably achievable. COMPARISON: None. HISTORY: ORDERING SYSTEM PROVIDED HISTORY: Chest wall mass FINDINGS: There is cardiomegaly. There is coronary artery calcification. The great vessels are normal.  Moderately enlarged mediastinal and hilar lymph nodes are noted with lymph nodes measuring up to 1.3 cm in the right hilum. Trachea and major bronchi are patent. Multiple bilateral pulmonary nodules are identified with nodules measuring up to 1.5 cm in the right lower lobe and smaller ones in the right middle lobe and right upper lobe. The pulmonary nodules in the left lower lobe ranges from 5 mm up to 8 mm. There is no focal consolidation or pleural effusion. Punctate enhancing nodules are identified in the posterior right hepatic lobe, largest 1 measuring up to 7 mm. Multiple heterogeneously enhancing right renal masses are identified largest 1 measuring 3.8 x 3.3 cm which is partially necrotic with additional smaller nodules concerning for multifocal malignancy like renal cell carcinoma. There is filling defects in the right renal vein concerning for tumor invasion versus tumor embolism of the renal vein. There is bilateral adrenal metastasis with the largest 1 in the left adrenal gland measuring 1.6 x 2.7 cm. There is bone metastasis with the destructive soft tissue mass involving the right 4th rib anterolaterally with adjacent soft tissue mass which is partially necrotic measuring 4.5 x 9.2 cm. Lytic destructive lesions are also identified in the right 8th and 9th rib near the costo vertebral junction.      Multiple heterogeneous  enhancing and necrotic masses in the right kidney concerning for multifocal renal cell carcinoma with  possible tumor invasion or tumor embolism of the right renal vein. There is diffuse metastasis with the involvement of the right and left adrenal glands, possible hepatic metastasis, widespread pulmonary and rib involvement as noted. Further assessment by PET-CT scan is recommended. Findings were telephoned to licensed caregiver. ASSESSMENT/PLAN :    Juwan Fournier was seen today for follow-up, cancer and chemotherapy. Diagnoses and all orders for this visit:    Renal cell cancer, right ([address]  -     CBC with Auto Differential; Future  -     Comprehensive Metabolic Panel; Future  -     TSH; Future  -     T4; Future         73years old male with Stage IV renal cell carcinoma (diagnosed 11/2021- chest wall mass biopsy) -right chest wall mass, multiple right kidney necrotic lesions in addition to large soft tissue destructive mass of right fourth rib, multiple lung nodules up to 1.5 cm, bilateral adrenal metastasis up to 2.5 cm. ECOG 1/2. Performance status fair Karnofsky performance status 80/70. Intermediate risk based on [facility]KCC risk factors although the disease seems to have progressed rapidly over the past month. His rapid progression as well as renal vein involvement probably precludes debulking nephrectomy. Tumor burden is not high and patient is asymptomatic. Recommended treatment with ipilimumab/nivolumab combination. Started in November 2021. S/p 4 cycles. Switch to single agent Opdivo in March 2022. We will continue Opdivo 240 mg every 2 weeks to progression. Reviewed recent PET scan which showed marked improvement in the lung and bone lesions. Tolerated treatment well. No new complaints. Right-sided chest wall mass has resolved. Reviewed blood work from yesterday-unremarkable CBC CMP. Primary hypothyroidism: With low normal T4, elevated TSH. Related to immunotherapy. Asymptomatic. Prescribed Synthroid 50 mcg daily-he has been on it for a week now. TSH continues to be high.   We will continue the same dose and reassess in 2 weeks. Continue Xgeva every 4 weeks. Calcium normal.   Patient is edentulous. Pain at the right side chest wall mass is well controlled with Tylenol as needed. Return to clinic in 2 weeks. Return in about 2 weeks (around 3/31/2022).      Brittni Diehl MD   Electronically signed 3/17/2022 at 10:27 AM

## 2022-03-17 NOTE — TELEPHONE ENCOUNTER
Financial assistance applications were completed and submitted on the following accounts:    [de-identified]   [id number]   [id number]   [id number]   [id number]   [id number]   [id number]   [id number]   [id number]   [id number]   [id number]   [id number]     Accounts were added to my spreadsheet for tracking of determination.  Electronically signed by Noemí Singh on 3/17/2022 at 1:46 PM

## 2022-03-30 ENCOUNTER — HOSPITAL ENCOUNTER (OUTPATIENT)
Dept: INFUSION THERAPY | Age: 72
Discharge: HOME | End: 2022-03-30
Payer: MEDICARE

## 2022-03-30 DIAGNOSIS — C64.1 RENAL CELL CANCER, RIGHT (HCC): ICD-10-CM

## 2022-03-30 LAB
ALBUMIN SERPL-MCNC: 4.3 G/DL (ref 3.5–5.2)
ALP BLD-CCNC: 109 U/L (ref 40–129)
ALT SERPL-CCNC: 12 U/L (ref 0–40)
ANION GAP SERPL CALCULATED.3IONS-SCNC: 9 MMOL/L (ref 7–16)
AST SERPL-CCNC: 17 U/L (ref 0–39)
BASOPHILS ABSOLUTE: 0.1 E9/L (ref 0–0.2)
BASOPHILS RELATIVE PERCENT: 1.1 % (ref 0–2)
BILIRUB SERPL-MCNC: <0.2 MG/DL (ref 0–1.2)
BUN BLDV-MCNC: 6 MG/DL (ref 6–23)
CALCIUM SERPL-MCNC: 8.5 MG/DL (ref 8.6–10.2)
CHLORIDE BLD-SCNC: 102 MMOL/L (ref 98–107)
CO2: 25 MMOL/L (ref 22–29)
CREAT SERPL-MCNC: 0.9 MG/DL (ref 0.7–1.2)
EOSINOPHILS ABSOLUTE: 0.31 E9/L (ref 0.05–0.5)
EOSINOPHILS RELATIVE PERCENT: 3.5 % (ref 0–6)
GFR AFRICAN AMERICAN: >60
GFR NON-AFRICAN AMERICAN: >60 ML/MIN/1.73
GLUCOSE BLD-MCNC: 89 MG/DL (ref 74–99)
HCT VFR BLD CALC: 40.5 % (ref 37–54)
HEMOGLOBIN: 13.2 G/DL (ref 12.5–16.5)
IMMATURE GRANULOCYTES #: 0.03 E9/L
IMMATURE GRANULOCYTES %: 0.3 % (ref 0–5)
LYMPHOCYTES ABSOLUTE: 3.55 E9/L (ref 1.5–4)
LYMPHOCYTES RELATIVE PERCENT: 40.2 % (ref 20–42)
MCH RBC QN AUTO: 32.6 PG (ref 26–35)
MCHC RBC AUTO-ENTMCNC: 32.6 % (ref 32–34.5)
MCV RBC AUTO: 100 FL (ref 80–99.9)
MONOCYTES ABSOLUTE: 0.64 E9/L (ref 0.1–0.95)
MONOCYTES RELATIVE PERCENT: 7.2 % (ref 2–12)
NEUTROPHILS ABSOLUTE: 4.2 E9/L (ref 1.8–7.3)
NEUTROPHILS RELATIVE PERCENT: 47.7 % (ref 43–80)
PDW BLD-RTO: 19.3 FL (ref 11.5–15)
PLATELET # BLD: 253 E9/L (ref 130–450)
PMV BLD AUTO: 9.9 FL (ref 7–12)
POTASSIUM SERPL-SCNC: 4.6 MMOL/L (ref 3.5–5)
RBC # BLD: 4.05 E12/L (ref 3.8–5.8)
SODIUM BLD-SCNC: 136 MMOL/L (ref 132–146)
T4 TOTAL: 5.3 MCG/DL (ref 4.5–11.7)
TOTAL PROTEIN: 7.2 G/DL (ref 6.4–8.3)
TSH SERPL DL<=0.05 MIU/L-ACNC: 50.59 UIU/ML (ref 0.27–4.2)
WBC # BLD: 8.8 E9/L (ref 4.5–11.5)

## 2022-03-30 PROCEDURE — 36415 COLL VENOUS BLD VENIPUNCTURE: CPT

## 2022-03-30 PROCEDURE — 84436 ASSAY OF TOTAL THYROXINE: CPT

## 2022-03-30 PROCEDURE — 80053 COMPREHEN METABOLIC PANEL: CPT

## 2022-03-30 PROCEDURE — 84443 ASSAY THYROID STIM HORMONE: CPT

## 2022-03-30 PROCEDURE — 85025 COMPLETE CBC W/AUTO DIFF WBC: CPT

## 2022-03-31 ENCOUNTER — OFFICE VISIT (OUTPATIENT)
Dept: ONCOLOGY | Age: 72
End: 2022-03-31

## 2022-03-31 ENCOUNTER — HOSPITAL ENCOUNTER (OUTPATIENT)
Dept: INFUSION THERAPY | Age: 72
Discharge: HOME | End: 2022-03-31
Payer: MEDICARE

## 2022-03-31 VITALS
BODY MASS INDEX: 19.4 KG/M2 | TEMPERATURE: 98.4 F | HEIGHT: 68 IN | SYSTOLIC BLOOD PRESSURE: 142 MMHG | DIASTOLIC BLOOD PRESSURE: 88 MMHG | WEIGHT: 128 LBS | OXYGEN SATURATION: 99 % | HEART RATE: 59 BPM

## 2022-03-31 VITALS — HEART RATE: 60 BPM | SYSTOLIC BLOOD PRESSURE: 140 MMHG | DIASTOLIC BLOOD PRESSURE: 86 MMHG

## 2022-03-31 DIAGNOSIS — C64.1 RENAL CELL CANCER, RIGHT (HCC): Primary | ICD-10-CM

## 2022-03-31 PROCEDURE — 2580000003 HC RX 258: Performed by: INTERNAL MEDICINE

## 2022-03-31 PROCEDURE — 96413 CHEMO IV INFUSION 1 HR: CPT

## 2022-03-31 PROCEDURE — 6360000002 HC RX W HCPCS: Performed by: INTERNAL MEDICINE

## 2022-03-31 RX ORDER — DIPHENHYDRAMINE HYDROCHLORIDE 50 MG/ML
50 INJECTION INTRAMUSCULAR; INTRAVENOUS ONCE
Status: CANCELLED | OUTPATIENT
Start: 2022-03-31 | End: 2022-03-31

## 2022-03-31 RX ORDER — HEPARIN SODIUM (PORCINE) LOCK FLUSH IV SOLN 100 UNIT/ML 100 UNIT/ML
500 SOLUTION INTRAVENOUS PRN
Status: CANCELLED | OUTPATIENT
Start: 2022-03-31

## 2022-03-31 RX ORDER — B-COMPLEX WITH VITAMIN C
2 TABLET ORAL DAILY
Qty: 90 TABLET | Refills: 3 | Status: SHIPPED | OUTPATIENT
Start: 2022-03-31 | End: 2023-03-31

## 2022-03-31 RX ORDER — EPINEPHRINE 1 MG/ML
0.3 INJECTION, SOLUTION, CONCENTRATE INTRAVENOUS PRN
Status: CANCELLED | OUTPATIENT
Start: 2022-03-31

## 2022-03-31 RX ORDER — SODIUM CHLORIDE 0.9 % (FLUSH) 0.9 %
5-40 SYRINGE (ML) INJECTION PRN
Status: CANCELLED | OUTPATIENT
Start: 2022-03-31

## 2022-03-31 RX ORDER — MEPERIDINE HYDROCHLORIDE 25 MG/ML
12.5 INJECTION INTRAMUSCULAR; INTRAVENOUS; SUBCUTANEOUS ONCE
Status: CANCELLED | OUTPATIENT
Start: 2022-03-31 | End: 2022-03-31

## 2022-03-31 RX ORDER — SODIUM CHLORIDE 9 MG/ML
25 INJECTION, SOLUTION INTRAVENOUS PRN
Status: CANCELLED | OUTPATIENT
Start: 2022-03-31

## 2022-03-31 RX ORDER — SODIUM CHLORIDE 9 MG/ML
INJECTION, SOLUTION INTRAVENOUS CONTINUOUS
Status: CANCELLED | OUTPATIENT
Start: 2022-03-31

## 2022-03-31 RX ORDER — SODIUM CHLORIDE 9 MG/ML
20 INJECTION, SOLUTION INTRAVENOUS ONCE
Status: COMPLETED | OUTPATIENT
Start: 2022-03-31 | End: 2022-03-31

## 2022-03-31 RX ORDER — METHYLPREDNISOLONE SODIUM SUCCINATE 125 MG/2ML
125 INJECTION, POWDER, LYOPHILIZED, FOR SOLUTION INTRAMUSCULAR; INTRAVENOUS ONCE
Status: CANCELLED | OUTPATIENT
Start: 2022-03-31 | End: 2022-03-31

## 2022-03-31 RX ORDER — HEPARIN SODIUM (PORCINE) LOCK FLUSH IV SOLN 100 UNIT/ML 100 UNIT/ML
500 SOLUTION INTRAVENOUS PRN
Status: DISCONTINUED | OUTPATIENT
Start: 2022-03-31 | End: 2022-04-01 | Stop reason: HOSPADM

## 2022-03-31 RX ORDER — SODIUM CHLORIDE 9 MG/ML
5-40 INJECTION INTRAVENOUS PRN
Status: CANCELLED | OUTPATIENT
Start: 2022-03-31

## 2022-03-31 RX ORDER — SODIUM CHLORIDE 9 MG/ML
20 INJECTION, SOLUTION INTRAVENOUS ONCE
Status: CANCELLED | OUTPATIENT
Start: 2022-03-31 | End: 2022-03-31

## 2022-03-31 RX ADMIN — SODIUM CHLORIDE 240 MG: 9 INJECTION, SOLUTION INTRAVENOUS at 11:06

## 2022-03-31 RX ADMIN — SODIUM CHLORIDE 20 ML/HR: 9 INJECTION, SOLUTION INTRAVENOUS at 10:44

## 2022-03-31 RX ADMIN — Medication 500 UNITS: at 11:53

## 2022-03-31 NOTE — PROGRESS NOTES
[address]  [address] [place] [address]   Hematology/Oncology  Consult      Patient Name: Marissa Flowers  YOB: 1950  PCP: SHI Giang NP   Referring Provider:      Reason for Consultation:   Chief Complaint   Patient presents with    Cancer     RENAL CELL       Interval history: No new complaints. Right chest wall mass has resolved. Feels well. Gaining weight. History of Present Illness:  73years old male referred for possible metastatic renal cell carcinoma. Patient presented to his PCP, HETAL Pathak, complaining of right side chest wall mass which he noticed about a month ago and had been gradually increasing in size. CT chest from October 2021 showed mltiple heterogenous and necrotic masses in the right kidney with possible tumor invasion of the right renal vein. In addition to this there was a large 4 by 9 cm soft tissue necrotic mass involving the fourth rib. There were other lytic destructive lesions in the right eighth and ninth rib. There were multiple right lung nodules ranging upto 1.5 cm. Few liver nodules were noted as well the largest being 7 mm. 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 PET scan showed hypermetabolic lesions in the right kidney, left adrenal diffuse skeletal metastasis, scattered pulmonary nodules most of them subcentimeter except for 1 right lung hypermetabolic nodule. MRI brain reviewed no intracranial metastasis. S/p right chest wall mass biopsy on 11/2/2021. Consistent with clear-cell renal cell carcinoma. Performance status seems fair Karnofsky performance status 80/70. Intermediate risk based on [facility] risk factors although the disease seems to have progressed rapidly over the past month. His rapid progression as well as renal vein involvement probably precludes debulking nephrectomy. Tumor burden is not high and patient is asymptomatic.   Recommended treatment with ipilimumab/nivolumab combination. Started ipilimumab/nivolumab in November 2021. Switch to nivolumab monotherapy after 4 cycles and 3/2022. Patient reports pain in the right side chest wall mass which is well controlled with Tylenol as needed. Denies recent weight loss, loss of appetite night sweats, back pain. Review of systems: Over 10 systems were reviewed and all were negative except as mentioned above. Past medical history: Hypertension. Coronary artery disease, peripheral arterial disease, hyperlipidemia. Past surgical history: History of CABG, femoral arterial bypass, hernia repair. Social history: Smokes half a pack a day, denies alcohol or drug abuse. Family history Sister had breast cancer, father had colon cancer. Diagnostic Data:     Past Medical History:   Diagnosis Date    Anticoagulant long-term use 2007    aspirin    CAD (coronary artery disease)     Cancer (HCC)     kidney    Deep vein thrombosis (DVT) (HCC)     leg    [place] (hard of hearing)     Hx of blood clots     Hyperlipidemia     Hypertension     Mentally challenged     information per patient's niece. Patient Active Problem List    Diagnosis Date Noted    Poor venous access     Renal cell cancer, right ([address] 11/11/2021        Past Surgical History:   Procedure Laterality Date   [facility]    patient's sister said they were told a twin was removed from paitent's abdomen.    [address]  2007    bypass    CATHETER INSERTION Left 11/16/2021    MEDIPORT CATHETER INSERTION performed by Jelani Willis MD at [facility] [address]      bifemoral    IR PERCUT BX LUNG/MEDIASTINUM  11/2/2021    IR PERCUT BX LUNG/MEDIASTINUM 11/2/2021 [facility] CT       Family History  Family History   Problem Relation Age of Onset    High Blood Pressure Mother     Heart Disease Mother     Heart Attack Mother     Cancer Father 64        colon    Arthritis Sister     Heart Attack Brother     No Known Problems Maternal Uncle     No Known Problems Paternal Aunt     No Known Problems Paternal Uncle     No Known Problems Maternal Grandmother     Cancer Maternal Grandfather         throat    Heart Attack Paternal Grandmother     No Known Problems Paternal Grandfather     Cirrhosis Paternal Cousin     Deep Vein Thrombosis Sister     Diabetes Sister     Heart Disease Sister         triple bypass    Breast Cancer Sister 61    High Blood Pressure Sister     Diabetes Sister     High Cholesterol Sister     Arthritis Sister        Social History    TOBACCO:   reports that he has been smoking cigarettes. He has a 13.50 pack-year smoking history. He has never used smokeless tobacco.  ETOH:   reports previous alcohol use. Home Medications  Prior to Admission medications    Medication Sig Start Date End Date Taking? Authorizing Provider   Calcium Carbonate-Vitamin D (OYSTER SHELL CALCIUM/D) 500-200 MG-UNIT TABS Take 2 tablets by mouth daily 3/31/22 3/31/23 Yes Brittni Diehl MD   levothyroxine (SYNTHROID) 50 MCG tablet Take 1 tablet by mouth daily 3/3/22 4/2/22 Yes Brittni Diehl MD   lidocaine-prilocaine (EMLA) 2.5-2.5 % cream Apply topically as needed.  12/9/21  Yes SHI Fuentes - CNP   ARTHRITIS PAIN RELIEF 650 MG extended release tablet TAKE TWO TABLETS BY MOUTH AT BEDTIME 9/21/21  Yes Historical Provider, MD   metoprolol tartrate (LOPRESSOR) 25 MG tablet Take 25 mg by mouth 2 times daily   Yes Historical Provider, MD   aspirin 81 MG EC tablet Take 81 mg by mouth daily LD 10/26/21   Yes Historical Provider, MD   simvastatin (ZOCOR) 80 MG tablet Take 80 mg by mouth nightly   Yes Historical Provider, MD   lisinopril (PRINIVIL;ZESTRIL) 20 MG tablet Take 20 mg by mouth daily   Yes Historical Provider, MD       Allergies  No Known Allergies    Review of Systems:      Objective  BP (!) 142/88   Pulse 59   Temp 98.4 °F (36.9 °C)   Ht 5' 8\" (1.727 m)   Wt 128 lb (58.1 kg)   SpO2 99%   BMI 19.46 kg/m²     Physical Performance Status    Physical Exam:  General: AAO to person, place, time, and purpose, appears stated age, cooperative, no acute distress, pleasant   Head and neck : PERRLA, EOMI . Sclera non icteric. Oropharynx : Clear  Neck: no JVD,  no adenopathy, no carotid bruit  LYMPHATICS : no lap  Lungs: Clear to auscultation. Right side chest wall globular mass, 4-5 cm in diamter has completely resolved. Heart: Regular rate and regular rhythm, no murmur, normal S1, S2  Abdomen: Soft, non-tender;no masses, no organomegaly  Extremities: No edema,no cyanosis, no clubbing. Skin:  No rash. Neurologic:Cranial nerves grossly intact. No focal motor or sensory deficits .     Recent Laboratory Data-   Lab Results   Component Value Date    WBC 8.8 03/30/2022    HGB 13.2 03/30/2022    HCT 40.5 03/30/2022    .0 (H) 03/30/2022     03/30/2022    LYMPHOPCT 40.2 03/30/2022    RBC 4.05 03/30/2022    MCH 32.6 03/30/2022    MCHC 32.6 03/30/2022    RDW 19.3 (H) 03/30/2022    NEUTOPHILPCT 47.7 03/30/2022    MONOPCT 7.2 03/30/2022    BASOPCT 1.1 03/30/2022    NEUTROABS 4.20 03/30/2022    LYMPHSABS 3.55 03/30/2022    MONOSABS 0.64 03/30/2022    EOSABS 0.31 03/30/2022    BASOSABS 0.10 03/30/2022       Lab Results   Component Value Date     03/30/2022    K 4.6 03/30/2022     03/30/2022    CO2 25 03/30/2022    BUN 6 03/30/2022    CREATININE 0.9 03/30/2022    GLUCOSE 89 03/30/2022    CALCIUM 8.5 (L) 03/30/2022    PROT 7.2 03/30/2022    LABALBU 4.3 03/30/2022    BILITOT <0.2 03/30/2022    ALKPHOS 109 03/30/2022    AST 17 03/30/2022    ALT 12 03/30/2022    LABGLOM >60 03/30/2022    GFRAA >60 03/30/2022       No results found for: IRON, TIBC, FERRITIN        Radiology-    Echo Complete    Result Date: 10/8/2021  Transthoracic Echocardiography Report (TTE)  Demographics   Patient Name    [facility]        Gender            Male                  [address] 70  [id number]      Room Number  Number Account #       [de-identified]     Procedure Date    10/08/2021   Corporate ID                  Ordering          Liat Caruso DO                                Physician   Accession       [id number]    Referring         Liat Caruso DO  Number                        Physician   Date of Birth   1950    Sonographer       Reynaldo Buckley RDCS   Age             73 year(s)    Interpreting      [address]                                Physician         Physician Cardiology                                                  Chante Mccord MD                                 Any Other  Procedure Type of Study   TTE procedure:Echo Complete W/Doppler & Color Flow. Procedure Date Date: 10/08/2021 Start: 09:57 AM Study Location: Echo Lab Technical Quality: Poor visualization due to poor acoustical window. Indications:Heart murmur. Patient Status: Routine Height: 68 inches Weight: 120 pounds BSA: 1.65 m^2 BMI: 18.25 kg/m^2  Findings   Left Ventricle  Normal left ventricular chamber size. Normal left ventricular systolic function. Visually estimated LVEF 65%. Mild left ventricular concentric hypertrophy noted. Normal diastolic function. Right Ventricle  Normal right ventricle size and function. Left Atrium  The left atrium is mildly dilated. Mildly increased left atrial volume. Interatrial septum not well visualized but appears intact. Right Atrium  Normal right atrium. Mitral Valve  Normal mitral valve structure. There is moderate mitral regurgitation - ERO 0.23cm2, PISA 0.9cm, RV 51cc. No mitral valve prolapse. Tricuspid Valve  Normal tricuspid valve structure. There is mild tricuspid regurgitation. Mild pulmonary hypertension, RVSP 39mmHg. Aortic Valve  Normal aortic valve structure. There is trace to mild aortic regurgitation, pressure 1/2 time 718ms. Pulmonic Valve  The pulmonic valve was not well visualized. Pericardial Effusion  No evidence of pericardial effusion.  Pericardium appears normal.   Pleural Effusion  No evidence of pleural effusion. Aorta  Aortic root 3.5cm. Miscellaneous  The inferior vena cava diameter is normal with normal respiratory  variation. Conclusions   Summary  Normal left ventricular chamber size. Normal left ventricular systolic function, LVEF 15%. Mild left ventricular concentric hypertrophy noted. Normal diastolic function. The left atrium is mildly dilated. Mildly increased left atrial volume. Interatrial septum not well visualized but appears intact. Normal right ventricle size and function. There is moderate mitral regurgitation - ERO 0.23cm2, PISA 0.9cm, RV 51cc. No mitral valve prolapse. There is trace to mild aortic regurgitation, pressure 1/2 time 718ms. There is mild tricuspid regurgitation. Mild pulmonary hypertension, RVSP 39mmHg. The pulmonic valve was not well visualized. Aortic root 3.5cm. No evidence of pericardial effusion. No intra cardiac mass or thrombus. No comparison study available.    Signature   ----------------------------------------------------------------  Electronically signed by Tony Zamora MD(Interpreting  physician) on 10/08/2021 03:52 PM  ----------------------------------------------------------------  M-Mode/2D Measurements & Calculations   LV Diastolic    LV Systolic Dimension: 3.2   AV Cusp Separation: 1.9 cmLA  Dimension: 5.3  cm                           Dimension: 3.9 cmAO Root  cm              LV Volume Diastolic: 584.5   Dimension: 3.5 cm  LV FS:39.6 %    ml  LV PW           LV Volume Systolic: 15.4 ml  Diastolic: 1.2  LV EDV/LV EDV Index: 134.5  cm              ml/82 ml/m^2LV ESV/LV ESV    RV Diastolic Dimension: 2.8  LV PW Systolic: Index: 87.1 QU/54OU/ m^2     cm  1.5 cm          EF Calculated: 70 %          RV Systolic Dimension: 2.4 cm  Septum          LV Mass Index: 147 l/min*m^2 LA/Aorta: 1.2  Diastolic: 1.1  cm                                           LA volume/Index: 79.7 ml  Septum LVOT: 2 cm  Systolic: 1.2  cm   LV Mass: 241.96  g  Doppler Measurements & Calculations   MV Peak E-Wave: 1.42   AV Peak Velocity: 1.87 m/s  LVOT Peak Velocity:  m/s                    AV Peak Gradient: 14.05     1.55 m/s  MV Peak A-Wave: 0.82   mmHg                        LVOT Mean Velocity:  m/s                    AV Mean Velocity: 1.27 m/s  0.81 m/s  MV E/A Ratio: 1.73     AV Mean Gradient: 7.2 mmHg  LVOT Peak Gradient: 9.6  MV Peak Gradient: 9.2  AV VTI: 42.1 cm             mmHgLVOT Mean Gradient:  mmHg                   AV Area (Continuity):2.37   3.5 mmHg  MV Mean Gradient: 2.9  cm^2                        Estimated RVSP: 39 mmHg  mmHg                   AV Deceleration Time:       Estimated RAP:10 mmHg  MV Mean Velocity: 0.75 2474.3 msec  m/s                    LVOT VTI: 31.8 cm  MV Deceleration Time:                              TR Velocity:2.69 m/s  271.4 msec             Estimated PASP: 39.03 mmHg  TR Gradient:29.03 mmHg  MV P1/2t: 101.8 msec   Pulm. Vein A Reversal       PV Peak Velocity: 0.51  MVA by PHT:2.16 cm^2   Duration:175.3 msec         m/s  MV Area (continuity):  Pulm. Vein D Velocity:0.74  PV Peak Gradient: 1.02  1.9 cm^2               m/sPulm. Vein A Reversal    mmHg                         Velocity:0.38 m/s           PV Mean Velocity: 0.36                         Pulm. Vein S Velocity: 0.44 m/s  MR Velocity: 5.37 m/s  m/s                         PV Mean Gradient: 0.6  MV RICARDO PISA: 0.23 cm^2                             mmHg  MR VTI: 221.9 cm  Alias Velocity: 0.25  m/sPISA Radius: 0.9 cm   PISA area: 5.09 cm^2MR  flow rate: 125.72  ml/sMR volume:51.04 ml  [email or web address] [id number]    CT CHEST W CONTRAST    Addendum Date: 10/13/2021    ADDENDUM: 6 mm indeterminate hypodense lesions are identified in the body and tail of the pancreas.   Consider surveillance     Result Date: 10/13/2021  EXAMINATION: CT OF THE CHEST WITH CONTRAST 10/13/2021 10:59 am TECHNIQUE: CT of the chest was performed with the administration of intravenous contrast. Multiplanar reformatted images are provided for review. Dose modulation, iterative reconstruction, and/or weight based adjustment of the mA/kV was utilized to reduce the radiation dose to as low as reasonably achievable. COMPARISON: None. HISTORY: ORDERING SYSTEM PROVIDED HISTORY: Chest wall mass FINDINGS: There is cardiomegaly. There is coronary artery calcification. The great vessels are normal.  Moderately enlarged mediastinal and hilar lymph nodes are noted with lymph nodes measuring up to 1.3 cm in the right hilum. Trachea and major bronchi are patent. Multiple bilateral pulmonary nodules are identified with nodules measuring up to 1.5 cm in the right lower lobe and smaller ones in the right middle lobe and right upper lobe. The pulmonary nodules in the left lower lobe ranges from 5 mm up to 8 mm. There is no focal consolidation or pleural effusion. Punctate enhancing nodules are identified in the posterior right hepatic lobe, largest 1 measuring up to 7 mm. Multiple heterogeneously enhancing right renal masses are identified largest 1 measuring 3.8 x 3.3 cm which is partially necrotic with additional smaller nodules concerning for multifocal malignancy like renal cell carcinoma. There is filling defects in the right renal vein concerning for tumor invasion versus tumor embolism of the renal vein. There is bilateral adrenal metastasis with the largest 1 in the left adrenal gland measuring 1.6 x 2.7 cm. There is bone metastasis with the destructive soft tissue mass involving the right 4th rib anterolaterally with adjacent soft tissue mass which is partially necrotic measuring 4.5 x 9.2 cm. Lytic destructive lesions are also identified in the right 8th and 9th rib near the costo vertebral junction.      Multiple heterogeneous  enhancing and necrotic masses in the right kidney concerning for multifocal renal cell carcinoma with  possible tumor invasion or tumor embolism of the right renal vein. There is diffuse metastasis with the involvement of the right and left adrenal glands, possible hepatic metastasis, widespread pulmonary and rib involvement as noted. Further assessment by PET-CT scan is recommended. Findings were telephoned to licensed caregiver. ASSESSMENT/PLAN :    Priscilla Butcher was seen today for cancer. Diagnoses and all orders for this visit:    Renal cell cancer, right ([address]  -     CBC with Auto Differential; Future  -     Comprehensive Metabolic Panel; Future  -     TSH; Future    Other orders  -     Calcium Carbonate-Vitamin D (OYSTER SHELL CALCIUM/D) 500-200 MG-UNIT TABS; Take 2 tablets by mouth daily         73years old male with Stage IV renal cell carcinoma (diagnosed 11/2021- chest wall mass biopsy) -right chest wall mass, multiple right kidney necrotic lesions in addition to large soft tissue destructive mass of right fourth rib, multiple lung nodules up to 1.5 cm, bilateral adrenal metastasis up to 2.5 cm. ECOG 1/2. Started ipilimumab/nivolumab combination in November 2021. S/p 4 cycles. Switch to single agent Opdivo in March 2022. We will continue Opdivo 240 mg every 2 weeks to progression. PET scan 3/2022 showed a good response. Reviewed blood work from yesterday-unremarkable CBC CMP. Primary hypothyroidism: With low normal T4, elevated TSH. Related to immunotherapy. Asymptomatic. Prescribed Synthroid 50 mcg daily-he has been on it for 3 weeks. TSH continues to be high. We will continue the same dose and reassess in 2 weeks. Continue Xgeva every 4 weeks. Calcium normal.   Patient is edentulous. Held today for low ca. Prescribed him ca/vitamin d. Pain at the right side chest wall mass is well controlled with Tylenol as needed. Return to clinic in 2 weeks.     Return in about 2 weeks (around 4/14/2022).      Yazmin Nolasco MD   Electronically signed 3/31/2022 at 10:31 AM

## 2022-03-31 NOTE — PROGRESS NOTES
Patient presents to clinic today for Xgeva injection. Patient's labs monitored, specifically, Calcium level, to ensure no increased risk of hypocalcemia with administration of the medication. Patient's calcium level is 8.5. Patient received therapy and will continue to be monitored prior to each dose.

## 2022-03-31 NOTE — PROGRESS NOTES
Retraction: per t/o -hold denosumab for Ca=8.5 today.   Sydnie Fernandez, [facility]  3/31/2022, 11:01 AM

## 2022-04-13 ENCOUNTER — HOSPITAL ENCOUNTER (OUTPATIENT)
Dept: INFUSION THERAPY | Age: 72
Discharge: HOME | End: 2022-04-13
Payer: MEDICARE

## 2022-04-13 DIAGNOSIS — C64.1 RENAL CELL CANCER, RIGHT (HCC): Primary | ICD-10-CM

## 2022-04-13 LAB
ALBUMIN SERPL-MCNC: 4.1 G/DL (ref 3.5–5.2)
ALP BLD-CCNC: 104 U/L (ref 40–129)
ALT SERPL-CCNC: 10 U/L (ref 0–40)
ANION GAP SERPL CALCULATED.3IONS-SCNC: 11 MMOL/L (ref 7–16)
AST SERPL-CCNC: 14 U/L (ref 0–39)
BASOPHILS ABSOLUTE: 0.09 E9/L (ref 0–0.2)
BASOPHILS RELATIVE PERCENT: 0.9 % (ref 0–2)
BILIRUB SERPL-MCNC: 0.2 MG/DL (ref 0–1.2)
BUN BLDV-MCNC: 7 MG/DL (ref 6–23)
CALCIUM SERPL-MCNC: 8.4 MG/DL (ref 8.6–10.2)
CHLORIDE BLD-SCNC: 101 MMOL/L (ref 98–107)
CO2: 23 MMOL/L (ref 22–29)
CREAT SERPL-MCNC: 0.8 MG/DL (ref 0.7–1.2)
EOSINOPHILS ABSOLUTE: 0.4 E9/L (ref 0.05–0.5)
EOSINOPHILS RELATIVE PERCENT: 4.1 % (ref 0–6)
GFR AFRICAN AMERICAN: >60
GFR NON-AFRICAN AMERICAN: >60 ML/MIN/1.73
GLUCOSE BLD-MCNC: 133 MG/DL (ref 74–99)
HCT VFR BLD CALC: 37.8 % (ref 37–54)
HEMOGLOBIN: 12.4 G/DL (ref 12.5–16.5)
IMMATURE GRANULOCYTES #: 0.04 E9/L
IMMATURE GRANULOCYTES %: 0.4 % (ref 0–5)
LYMPHOCYTES ABSOLUTE: 3.64 E9/L (ref 1.5–4)
LYMPHOCYTES RELATIVE PERCENT: 37.6 % (ref 20–42)
MCH RBC QN AUTO: 32.2 PG (ref 26–35)
MCHC RBC AUTO-ENTMCNC: 32.8 % (ref 32–34.5)
MCV RBC AUTO: 98.2 FL (ref 80–99.9)
MONOCYTES ABSOLUTE: 0.72 E9/L (ref 0.1–0.95)
MONOCYTES RELATIVE PERCENT: 7.4 % (ref 2–12)
NEUTROPHILS ABSOLUTE: 4.78 E9/L (ref 1.8–7.3)
NEUTROPHILS RELATIVE PERCENT: 49.6 % (ref 43–80)
PDW BLD-RTO: 18.7 FL (ref 11.5–15)
PLATELET # BLD: 220 E9/L (ref 130–450)
PMV BLD AUTO: 9.9 FL (ref 7–12)
POTASSIUM SERPL-SCNC: 4.1 MMOL/L (ref 3.5–5)
RBC # BLD: 3.85 E12/L (ref 3.8–5.8)
SODIUM BLD-SCNC: 135 MMOL/L (ref 132–146)
TOTAL PROTEIN: 7 G/DL (ref 6.4–8.3)
TSH SERPL DL<=0.05 MIU/L-ACNC: 50.25 UIU/ML (ref 0.27–4.2)
WBC # BLD: 9.7 E9/L (ref 4.5–11.5)

## 2022-04-13 PROCEDURE — 6360000002 HC RX W HCPCS: Performed by: INTERNAL MEDICINE

## 2022-04-13 PROCEDURE — 85025 COMPLETE CBC W/AUTO DIFF WBC: CPT

## 2022-04-13 PROCEDURE — 84443 ASSAY THYROID STIM HORMONE: CPT

## 2022-04-13 PROCEDURE — 80053 COMPREHEN METABOLIC PANEL: CPT

## 2022-04-13 PROCEDURE — 2580000003 HC RX 258: Performed by: INTERNAL MEDICINE

## 2022-04-13 PROCEDURE — 36591 DRAW BLOOD OFF VENOUS DEVICE: CPT

## 2022-04-13 RX ORDER — SODIUM CHLORIDE 0.9 % (FLUSH) 0.9 %
5-40 SYRINGE (ML) INJECTION PRN
Status: CANCELLED | OUTPATIENT
Start: 2022-04-13

## 2022-04-13 RX ORDER — HEPARIN SODIUM (PORCINE) LOCK FLUSH IV SOLN 100 UNIT/ML 100 UNIT/ML
500 SOLUTION INTRAVENOUS PRN
Status: DISCONTINUED | OUTPATIENT
Start: 2022-04-13 | End: 2022-04-14 | Stop reason: HOSPADM

## 2022-04-13 RX ORDER — SODIUM CHLORIDE 0.9 % (FLUSH) 0.9 %
5-40 SYRINGE (ML) INJECTION PRN
Status: DISCONTINUED | OUTPATIENT
Start: 2022-04-13 | End: 2022-04-14 | Stop reason: HOSPADM

## 2022-04-13 RX ORDER — HEPARIN SODIUM (PORCINE) LOCK FLUSH IV SOLN 100 UNIT/ML 100 UNIT/ML
500 SOLUTION INTRAVENOUS PRN
Status: CANCELLED | OUTPATIENT
Start: 2022-04-13

## 2022-04-13 RX ADMIN — Medication 500 UNITS: at 10:41

## 2022-04-13 RX ADMIN — SODIUM CHLORIDE, PRESERVATIVE FREE 10 ML: 5 INJECTION INTRAVENOUS at 10:41

## 2022-04-13 NOTE — PROGRESS NOTES
Patient presents to clinic for labs today. L  SQ port accessed per policy using 0.92WL 75F Zapata needle for good blood return. Aspirate for waste and specimen sent to lab. Site flushed easily with 10 mL NSS followed by 5 mL Heparin solution 100 units/ml rinse prior to de-access. Dry sterile dressing to area. Tolerated procedure well. Encouraged to schedule port flush every 4 weeks.

## 2022-04-14 ENCOUNTER — OFFICE VISIT (OUTPATIENT)
Dept: ONCOLOGY | Age: 72
End: 2022-04-14

## 2022-04-14 ENCOUNTER — HOSPITAL ENCOUNTER (OUTPATIENT)
Dept: INFUSION THERAPY | Age: 72
Discharge: HOME | End: 2022-04-14
Payer: MEDICARE

## 2022-04-14 VITALS
TEMPERATURE: 97.6 F | RESPIRATION RATE: 16 BRPM | OXYGEN SATURATION: 100 % | DIASTOLIC BLOOD PRESSURE: 79 MMHG | HEART RATE: 57 BPM | SYSTOLIC BLOOD PRESSURE: 138 MMHG

## 2022-04-14 VITALS
DIASTOLIC BLOOD PRESSURE: 75 MMHG | BODY MASS INDEX: 19.64 KG/M2 | OXYGEN SATURATION: 100 % | HEIGHT: 68 IN | HEART RATE: 75 BPM | SYSTOLIC BLOOD PRESSURE: 134 MMHG | TEMPERATURE: 97.4 F | WEIGHT: 129.6 LBS

## 2022-04-14 DIAGNOSIS — C64.1 RENAL CELL CANCER, RIGHT (HCC): Primary | ICD-10-CM

## 2022-04-14 PROCEDURE — 6360000002 HC RX W HCPCS: Performed by: INTERNAL MEDICINE

## 2022-04-14 PROCEDURE — 2580000003 HC RX 258: Performed by: INTERNAL MEDICINE

## 2022-04-14 PROCEDURE — 96413 CHEMO IV INFUSION 1 HR: CPT

## 2022-04-14 RX ORDER — MEPERIDINE HYDROCHLORIDE 25 MG/ML
12.5 INJECTION INTRAMUSCULAR; INTRAVENOUS; SUBCUTANEOUS ONCE
Status: CANCELLED | OUTPATIENT
Start: 2022-04-14 | End: 2022-04-14

## 2022-04-14 RX ORDER — HEPARIN SODIUM (PORCINE) LOCK FLUSH IV SOLN 100 UNIT/ML 100 UNIT/ML
500 SOLUTION INTRAVENOUS PRN
Status: DISCONTINUED | OUTPATIENT
Start: 2022-04-14 | End: 2022-04-15 | Stop reason: HOSPADM

## 2022-04-14 RX ORDER — SODIUM CHLORIDE 9 MG/ML
20 INJECTION, SOLUTION INTRAVENOUS ONCE
Status: COMPLETED | OUTPATIENT
Start: 2022-04-14 | End: 2022-04-14

## 2022-04-14 RX ORDER — SODIUM CHLORIDE 9 MG/ML
25 INJECTION, SOLUTION INTRAVENOUS PRN
Status: CANCELLED | OUTPATIENT
Start: 2022-04-14

## 2022-04-14 RX ORDER — METHYLPREDNISOLONE SODIUM SUCCINATE 125 MG/2ML
125 INJECTION, POWDER, LYOPHILIZED, FOR SOLUTION INTRAMUSCULAR; INTRAVENOUS ONCE
Status: CANCELLED | OUTPATIENT
Start: 2022-04-14 | End: 2022-04-14

## 2022-04-14 RX ORDER — SODIUM CHLORIDE 9 MG/ML
5-40 INJECTION INTRAVENOUS PRN
Status: CANCELLED | OUTPATIENT
Start: 2022-04-14

## 2022-04-14 RX ORDER — SODIUM CHLORIDE 9 MG/ML
20 INJECTION, SOLUTION INTRAVENOUS ONCE
Status: CANCELLED | OUTPATIENT
Start: 2022-04-14 | End: 2022-04-14

## 2022-04-14 RX ORDER — LEVOTHYROXINE SODIUM 0.07 MG/1
75 TABLET ORAL DAILY
Qty: 90 TABLET | Refills: 1 | Status: SHIPPED | OUTPATIENT
Start: 2022-04-14 | End: 2022-07-07

## 2022-04-14 RX ORDER — HEPARIN SODIUM (PORCINE) LOCK FLUSH IV SOLN 100 UNIT/ML 100 UNIT/ML
500 SOLUTION INTRAVENOUS PRN
Status: CANCELLED | OUTPATIENT
Start: 2022-04-14

## 2022-04-14 RX ORDER — SODIUM CHLORIDE 9 MG/ML
INJECTION, SOLUTION INTRAVENOUS CONTINUOUS
Status: CANCELLED | OUTPATIENT
Start: 2022-04-14

## 2022-04-14 RX ORDER — SODIUM CHLORIDE 0.9 % (FLUSH) 0.9 %
5-40 SYRINGE (ML) INJECTION PRN
Status: CANCELLED | OUTPATIENT
Start: 2022-04-14

## 2022-04-14 RX ORDER — EPINEPHRINE 1 MG/ML
0.3 INJECTION, SOLUTION, CONCENTRATE INTRAVENOUS PRN
Status: CANCELLED | OUTPATIENT
Start: 2022-04-14

## 2022-04-14 RX ORDER — DIPHENHYDRAMINE HYDROCHLORIDE 50 MG/ML
50 INJECTION INTRAMUSCULAR; INTRAVENOUS ONCE
Status: CANCELLED | OUTPATIENT
Start: 2022-04-14 | End: 2022-04-14

## 2022-04-14 RX ADMIN — Medication 500 UNITS: at 11:32

## 2022-04-14 RX ADMIN — SODIUM CHLORIDE 20 ML/HR: 9 INJECTION, SOLUTION INTRAVENOUS at 10:34

## 2022-04-14 RX ADMIN — SODIUM CHLORIDE 240 MG: 9 INJECTION, SOLUTION INTRAVENOUS at 10:49

## 2022-04-14 NOTE — PROGRESS NOTES
[address]  [address] [place] [address]   Hematology/Oncology  Consult      Patient Name: Carlos Cherry  YOB: 1950  PCP: SHI Moulton NP   Referring Provider:      Reason for Consultation:   Chief Complaint   Patient presents with    Cancer     renal cell      Interval history: No new complaints. Right chest wall mass has resolved. Feels well. Gaining weight. History of Present Illness:  73years old male referred for possible metastatic renal cell carcinoma. Patient presented to his PCP, HETAL Pathak, complaining of right side chest wall mass which he noticed about a month ago and had been gradually increasing in size. CT chest from October 2021 showed mltiple heterogenous and necrotic masses in the right kidney with possible tumor invasion of the right renal vein. In addition to this there was a large 4 by 9 cm soft tissue necrotic mass involving the fourth rib. There were other lytic destructive lesions in the right eighth and ninth rib. There were multiple right lung nodules ranging upto 1.5 cm. Few liver nodules were noted as well the largest being 7 mm. 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 PET scan showed hypermetabolic lesions in the right kidney, left adrenal diffuse skeletal metastasis, scattered pulmonary nodules most of them subcentimeter except for 1 right lung hypermetabolic nodule. MRI brain reviewed no intracranial metastasis. S/p right chest wall mass biopsy on 11/2/2021. Consistent with clear-cell renal cell carcinoma. Performance status seems fair Karnofsky performance status 80/70. Intermediate risk based on [facility] risk factors although the disease seems to have progressed rapidly over the past month. His rapid progression as well as renal vein involvement probably precludes debulking nephrectomy. Tumor burden is not high and patient is asymptomatic.   Recommended treatment with ipilimumab/nivolumab combination. Started ipilimumab/nivolumab in November 2021. Switch to nivolumab monotherapy after 4 cycles and 3/2022. Patient reports pain in the right side chest wall mass which is well controlled with Tylenol as needed. Denies recent weight loss, loss of appetite night sweats, back pain. Review of systems: Over 10 systems were reviewed and all were negative except as mentioned above. Past medical history: Hypertension. Coronary artery disease, peripheral arterial disease, hyperlipidemia. Past surgical history: History of CABG, femoral arterial bypass, hernia repair. Social history: Smokes half a pack a day, denies alcohol or drug abuse. Family history Sister had breast cancer, father had colon cancer. Diagnostic Data:     Past Medical History:   Diagnosis Date    Anticoagulant long-term use 2007    aspirin    CAD (coronary artery disease)     Cancer (HCC)     kidney    Deep vein thrombosis (DVT) (HCC)     leg    [place] (hard of hearing)     Hx of blood clots     Hyperlipidemia     Hypertension     Mentally challenged     information per patient's niece. Patient Active Problem List    Diagnosis Date Noted    Poor venous access     Renal cell cancer, right ([address] 11/11/2021        Past Surgical History:   Procedure Laterality Date   [facility]    patient's sister said they were told a twin was removed from paitent's abdomen.    [address]  2007    bypass    CATHETER INSERTION Left 11/16/2021    MEDIPORT CATHETER INSERTION performed by Perla Sheppard MD at [facility] [address]      bifemoral    IR PERCUT BX LUNG/MEDIASTINUM  11/2/2021    IR PERCUT BX LUNG/MEDIASTINUM 11/2/2021 [facility] CT       Family History  Family History   Problem Relation Age of Onset    High Blood Pressure Mother     Heart Disease Mother     Heart Attack Mother     Cancer Father 64        colon    Arthritis Sister     Heart Attack Brother     No Known Problems Maternal Uncle     No Known Problems Paternal Aunt     No Known Problems Paternal Uncle     No Known Problems Maternal Grandmother     Cancer Maternal Grandfather         throat    Heart Attack Paternal Grandmother     No Known Problems Paternal Grandfather     Cirrhosis Paternal Cousin     Deep Vein Thrombosis Sister     Diabetes Sister     Heart Disease Sister         triple bypass    Breast Cancer Sister 61    High Blood Pressure Sister     Diabetes Sister     High Cholesterol Sister     Arthritis Sister        Social History    TOBACCO:   reports that he has been smoking cigarettes. He has a 13.50 pack-year smoking history. He has never used smokeless tobacco.  ETOH:   reports previous alcohol use. Home Medications  Prior to Admission medications    Medication Sig Start Date End Date Taking? Authorizing Provider   levothyroxine (SYNTHROID) 75 MCG tablet Take 1 tablet by mouth daily 4/14/22 5/14/22 Yes Dorcas Kessler MD   Calcium Carbonate-Vitamin D (OYSTER SHELL CALCIUM/D) 500-200 MG-UNIT TABS Take 2 tablets by mouth daily 3/31/22 3/31/23 Yes Dorcas Kessler MD   levothyroxine (SYNTHROID) 50 MCG tablet Take 1 tablet by mouth daily 3/3/22 4/14/22 Yes Dorcas Kessler MD   lidocaine-prilocaine (EMLA) 2.5-2.5 % cream Apply topically as needed.  12/9/21  Yes Vista Osgood, APRN - CNP   ARTHRITIS PAIN RELIEF 650 MG extended release tablet TAKE TWO TABLETS BY MOUTH AT BEDTIME 9/21/21  Yes Historical Provider, MD   metoprolol tartrate (LOPRESSOR) 25 MG tablet Take 25 mg by mouth 2 times daily   Yes Historical Provider, MD   aspirin 81 MG EC tablet Take 81 mg by mouth daily LD 10/26/21   Yes Historical Provider, MD   simvastatin (ZOCOR) 80 MG tablet Take 80 mg by mouth nightly   Yes Historical Provider, MD   lisinopril (PRINIVIL;ZESTRIL) 20 MG tablet Take 20 mg by mouth daily   Yes Historical Provider, MD       Allergies  No Known Allergies    Review of Systems:      Objective  /75 Pulse 75   Temp 97.4 °F (36.3 °C)   Ht 5' 8\" (1.727 m)   Wt 129 lb 9.6 oz (58.8 kg)   SpO2 100%   BMI 19.71 kg/m²     Physical Performance Status    Physical Exam:  General: AAO to person, place, time, and purpose, appears stated age, cooperative, no acute distress, pleasant   Head and neck : PERRLA, EOMI . Sclera non icteric. Oropharynx : Clear  Neck: no JVD,  no adenopathy, no carotid bruit  LYMPHATICS : no lap  Lungs: Clear to auscultation. Right side chest wall globular mass, 4-5 cm in diamter has completely resolved. Heart: Regular rate and regular rhythm, no murmur, normal S1, S2  Abdomen: Soft, non-tender;no masses, no organomegaly  Extremities: No edema,no cyanosis, no clubbing. Skin:  No rash. Neurologic:Cranial nerves grossly intact. No focal motor or sensory deficits .     Recent Laboratory Data-   Lab Results   Component Value Date    WBC 9.7 04/13/2022    HGB 12.4 (L) 04/13/2022    HCT 37.8 04/13/2022    MCV 98.2 04/13/2022     04/13/2022    LYMPHOPCT 37.6 04/13/2022    RBC 3.85 04/13/2022    MCH 32.2 04/13/2022    MCHC 32.8 04/13/2022    RDW 18.7 (H) 04/13/2022    NEUTOPHILPCT 49.6 04/13/2022    MONOPCT 7.4 04/13/2022    BASOPCT 0.9 04/13/2022    NEUTROABS 4.78 04/13/2022    LYMPHSABS 3.64 04/13/2022    MONOSABS 0.72 04/13/2022    EOSABS 0.40 04/13/2022    BASOSABS 0.09 04/13/2022       Lab Results   Component Value Date     04/13/2022    K 4.1 04/13/2022     04/13/2022    CO2 23 04/13/2022    BUN 7 04/13/2022    CREATININE 0.8 04/13/2022    GLUCOSE 133 (H) 04/13/2022    CALCIUM 8.4 (L) 04/13/2022    PROT 7.0 04/13/2022    LABALBU 4.1 04/13/2022    BILITOT 0.2 04/13/2022    ALKPHOS 104 04/13/2022    AST 14 04/13/2022    ALT 10 04/13/2022    LABGLOM >60 04/13/2022    GFRAA >60 04/13/2022       No results found for: IRON, TIBC, FERRITIN        Radiology-    Echo Complete    Result Date: 10/8/2021  Transthoracic Echocardiography Report (TTE)  Demographics   Patient Name LEIGH        Gender            Male                  [address] [facility] Record  [id number]      Room Number  Number   Account #       [de-identified]     Procedure Date    10/08/2021   Corporate ID                  Ordering          Kiara Nieto DO                                Physician   Accession       [id number]    Referring         Kiara Nieto DO  Number                        Physician   Date of Birth   1950    Sonographer       Andre Veronica RDCS   Age             73 year(s)    Interpreting      [address]                                Physician         Physician Cardiology                                                  Joshua Espinoza MD                                 Any Other  Procedure Type of Study   TTE procedure:Echo Complete W/Doppler & Color Flow. Procedure Date Date: 10/08/2021 Start: 09:57 AM Study Location: Echo Lab Technical Quality: Poor visualization due to poor acoustical window. Indications:Heart murmur. Patient Status: Routine Height: 68 inches Weight: 120 pounds BSA: 1.65 m^2 BMI: 18.25 kg/m^2  Findings   Left Ventricle  Normal left ventricular chamber size. Normal left ventricular systolic function. Visually estimated LVEF 65%. Mild left ventricular concentric hypertrophy noted. Normal diastolic function. Right Ventricle  Normal right ventricle size and function. Left Atrium  The left atrium is mildly dilated. Mildly increased left atrial volume. Interatrial septum not well visualized but appears intact. Right Atrium  Normal right atrium. Mitral Valve  Normal mitral valve structure. There is moderate mitral regurgitation - ERO 0.23cm2, PISA 0.9cm, RV 51cc. No mitral valve prolapse. Tricuspid Valve  Normal tricuspid valve structure. There is mild tricuspid regurgitation. Mild pulmonary hypertension, RVSP 39mmHg. Aortic Valve  Normal aortic valve structure. There is trace to mild aortic regurgitation, pressure 1/2 time 718ms.    Pulmonic Valve The pulmonic valve was not well visualized. Pericardial Effusion  No evidence of pericardial effusion. Pericardium appears normal.   Pleural Effusion  No evidence of pleural effusion. Aorta  Aortic root 3.5cm. Miscellaneous  The inferior vena cava diameter is normal with normal respiratory  variation. Conclusions   Summary  Normal left ventricular chamber size. Normal left ventricular systolic function, LVEF 63%. Mild left ventricular concentric hypertrophy noted. Normal diastolic function. The left atrium is mildly dilated. Mildly increased left atrial volume. Interatrial septum not well visualized but appears intact. Normal right ventricle size and function. There is moderate mitral regurgitation - ERO 0.23cm2, PISA 0.9cm, RV 51cc. No mitral valve prolapse. There is trace to mild aortic regurgitation, pressure 1/2 time 718ms. There is mild tricuspid regurgitation. Mild pulmonary hypertension, RVSP 39mmHg. The pulmonic valve was not well visualized. Aortic root 3.5cm. No evidence of pericardial effusion. No intra cardiac mass or thrombus. No comparison study available.    Signature   ----------------------------------------------------------------  Electronically signed by Sheridan Morales MD(Interpreting  physician) on 10/08/2021 03:52 PM  ----------------------------------------------------------------  M-Mode/2D Measurements & Calculations   LV Diastolic    LV Systolic Dimension: 3.2   AV Cusp Separation: 1.9 cmLA  Dimension: 5.3  cm                           Dimension: 3.9 cmAO Root  cm              LV Volume Diastolic: 282.4   Dimension: 3.5 cm  LV FS:39.6 %    ml  LV PW           LV Volume Systolic: 32.8 ml  Diastolic: 1.2  LV EDV/LV EDV Index: 134.5  cm              ml/82 ml/m^2LV ESV/LV ESV    RV Diastolic Dimension: 2.8  LV PW Systolic: Index: 53.6 EY/36MX/ m^2     cm  1.5 cm          EF Calculated: 70 %          RV Systolic Dimension: 2.4 cm  Septum          LV Mass Index: 147 l/min*m^2 LA/Aorta: 1.2  Diastolic: 1.1  cm                                           LA volume/Index: 79.7 ml  Septum          LVOT: 2 cm  Systolic: 1.2  cm   LV Mass: 241.96  g  Doppler Measurements & Calculations   MV Peak E-Wave: 1.42   AV Peak Velocity: 1.87 m/s  LVOT Peak Velocity:  m/s                    AV Peak Gradient: 14.05     1.55 m/s  MV Peak A-Wave: 0.82   mmHg                        LVOT Mean Velocity:  m/s                    AV Mean Velocity: 1.27 m/s  0.81 m/s  MV E/A Ratio: 1.73     AV Mean Gradient: 7.2 mmHg  LVOT Peak Gradient: 9.6  MV Peak Gradient: 9.2  AV VTI: 42.1 cm             mmHgLVOT Mean Gradient:  mmHg                   AV Area (Continuity):2.37   3.5 mmHg  MV Mean Gradient: 2.9  cm^2                        Estimated RVSP: 39 mmHg  mmHg                   AV Deceleration Time:       Estimated RAP:10 mmHg  MV Mean Velocity: 0.75 2474.3 msec  m/s                    LVOT VTI: 31.8 cm  MV Deceleration Time:                              TR Velocity:2.69 m/s  271.4 msec             Estimated PASP: 39.03 mmHg  TR Gradient:29.03 mmHg  MV P1/2t: 101.8 msec   Pulm. Vein A Reversal       PV Peak Velocity: 0.51  MVA by PHT:2.16 cm^2   Duration:175.3 msec         m/s  MV Area (continuity):  Pulm. Vein D Velocity:0.74  PV Peak Gradient: 1.02  1.9 cm^2               m/sPulm. Vein A Reversal    mmHg                         Velocity:0.38 m/s           PV Mean Velocity: 0.36                         Pulm. Vein S Velocity: 0.44 m/s  MR Velocity: 5.37 m/s  m/s                         PV Mean Gradient: 0.6  MV RICARDO PISA: 0.23 cm^2                             mmHg  MR VTI: 221.9 cm  Alias Velocity: 0.25  m/sPISA Radius: 0.9 cm   PISA area: 5.09 cm^2MR  flow rate: 125.72  ml/sMR volume:51.04 ml  [email or web address] [id number]    CT CHEST W CONTRAST    Addendum Date: 10/13/2021    ADDENDUM: 6 mm indeterminate hypodense lesions are identified in the body and tail of the pancreas. Consider surveillance     Result Date: 10/13/2021  EXAMINATION: CT OF THE CHEST WITH CONTRAST 10/13/2021 10:59 am TECHNIQUE: CT of the chest was performed with the administration of intravenous contrast. Multiplanar reformatted images are provided for review. Dose modulation, iterative reconstruction, and/or weight based adjustment of the mA/kV was utilized to reduce the radiation dose to as low as reasonably achievable. COMPARISON: None. HISTORY: ORDERING SYSTEM PROVIDED HISTORY: Chest wall mass FINDINGS: There is cardiomegaly. There is coronary artery calcification. The great vessels are normal.  Moderately enlarged mediastinal and hilar lymph nodes are noted with lymph nodes measuring up to 1.3 cm in the right hilum. Trachea and major bronchi are patent. Multiple bilateral pulmonary nodules are identified with nodules measuring up to 1.5 cm in the right lower lobe and smaller ones in the right middle lobe and right upper lobe. The pulmonary nodules in the left lower lobe ranges from 5 mm up to 8 mm. There is no focal consolidation or pleural effusion. Punctate enhancing nodules are identified in the posterior right hepatic lobe, largest 1 measuring up to 7 mm. Multiple heterogeneously enhancing right renal masses are identified largest 1 measuring 3.8 x 3.3 cm which is partially necrotic with additional smaller nodules concerning for multifocal malignancy like renal cell carcinoma. There is filling defects in the right renal vein concerning for tumor invasion versus tumor embolism of the renal vein. There is bilateral adrenal metastasis with the largest 1 in the left adrenal gland measuring 1.6 x 2.7 cm. There is bone metastasis with the destructive soft tissue mass involving the right 4th rib anterolaterally with adjacent soft tissue mass which is partially necrotic measuring 4.5 x 9.2 cm.   Lytic destructive lesions are also identified in the right 8th and 9th rib near the costo vertebral junction. Multiple heterogeneous  enhancing and necrotic masses in the right kidney concerning for multifocal renal cell carcinoma with  possible tumor invasion or tumor embolism of the right renal vein. There is diffuse metastasis with the involvement of the right and left adrenal glands, possible hepatic metastasis, widespread pulmonary and rib involvement as noted. Further assessment by PET-CT scan is recommended. Findings were telephoned to licensed caregiver. ASSESSMENT/PLAN :    Bhakti Gama was seen today for cancer. Diagnoses and all orders for this visit:    Renal cell cancer, right ([address]  -     CBC with Auto Differential; Future  -     Comprehensive Metabolic Panel; Future  -     TSH; Future    Other orders  -     levothyroxine (SYNTHROID) 75 MCG tablet; Take 1 tablet by mouth daily         73years old male with Stage IV renal cell carcinoma (diagnosed 11/2021- chest wall mass biopsy) -right chest wall mass, multiple right kidney necrotic lesions in addition to large soft tissue destructive mass of right fourth rib, multiple lung nodules up to 1.5 cm, bilateral adrenal metastasis up to 2.5 cm. ECOG 1/2. Started ipilimumab/nivolumab combination in November 2021. S/p 4 cycles. Switch to single agent Opdivo in March 2022. We will continue Opdivo 240 mg every 2 weeks to progression-switch to every 4 weeks. PET scan 3/2022 showed a good response. Reviewed blood work from yesterday-unremarkable CBC CMP. Primary hypothyroidism: With low normal T4, elevated TSH. Related to immunotherapy. Asymptomatic. Prescribed Synthroid 50 mcg daily-he has been on it for 5 weeks. TSH continues to be high. Will increase Synthroid to 75 mcg daily. Patient was not sure of his medications. I asked him to give me a call with his meds once he gets home. Continue Xgeva every 4 weeks. Patient is edentulous.  Held today for low ca. Prescribed him ca/vitamin d. Pain at the right side chest wall mass is well controlled with Tylenol as needed. Return to clinic in 4 weeks. Return in about 4 weeks (around 5/12/2022).      Martina Chaves MD   Electronically signed 4/14/2022 at 10:17 AM

## 2022-05-11 ENCOUNTER — HOSPITAL ENCOUNTER (OUTPATIENT)
Dept: INFUSION THERAPY | Age: 72
Discharge: HOME | End: 2022-05-11
Payer: MEDICARE

## 2022-05-11 DIAGNOSIS — C64.1 RENAL CELL CANCER, RIGHT (HCC): Primary | ICD-10-CM

## 2022-05-11 LAB
ALBUMIN SERPL-MCNC: 4.3 G/DL (ref 3.5–5.2)
ALP BLD-CCNC: 96 U/L (ref 40–129)
ALT SERPL-CCNC: 10 U/L (ref 0–40)
ANION GAP SERPL CALCULATED.3IONS-SCNC: 11 MMOL/L (ref 7–16)
AST SERPL-CCNC: 14 U/L (ref 0–39)
BASOPHILS ABSOLUTE: 0.09 E9/L (ref 0–0.2)
BASOPHILS RELATIVE PERCENT: 1 % (ref 0–2)
BILIRUB SERPL-MCNC: <0.2 MG/DL (ref 0–1.2)
BUN BLDV-MCNC: 10 MG/DL (ref 6–23)
CALCIUM SERPL-MCNC: 8.7 MG/DL (ref 8.6–10.2)
CHLORIDE BLD-SCNC: 100 MMOL/L (ref 98–107)
CO2: 24 MMOL/L (ref 22–29)
CREAT SERPL-MCNC: 0.9 MG/DL (ref 0.7–1.2)
EOSINOPHILS ABSOLUTE: 0.4 E9/L (ref 0.05–0.5)
EOSINOPHILS RELATIVE PERCENT: 4.5 % (ref 0–6)
GFR AFRICAN AMERICAN: >60
GFR NON-AFRICAN AMERICAN: >60 ML/MIN/1.73
GLUCOSE BLD-MCNC: 99 MG/DL (ref 74–99)
HCT VFR BLD CALC: 37.9 % (ref 37–54)
HEMOGLOBIN: 12.5 G/DL (ref 12.5–16.5)
IMMATURE GRANULOCYTES #: 0.02 E9/L
IMMATURE GRANULOCYTES %: 0.2 % (ref 0–5)
LYMPHOCYTES ABSOLUTE: 3.32 E9/L (ref 1.5–4)
LYMPHOCYTES RELATIVE PERCENT: 37.1 % (ref 20–42)
MCH RBC QN AUTO: 33.2 PG (ref 26–35)
MCHC RBC AUTO-ENTMCNC: 33 % (ref 32–34.5)
MCV RBC AUTO: 100.8 FL (ref 80–99.9)
MONOCYTES ABSOLUTE: 0.72 E9/L (ref 0.1–0.95)
MONOCYTES RELATIVE PERCENT: 8 % (ref 2–12)
NEUTROPHILS ABSOLUTE: 4.41 E9/L (ref 1.8–7.3)
NEUTROPHILS RELATIVE PERCENT: 49.2 % (ref 43–80)
PDW BLD-RTO: 17.3 FL (ref 11.5–15)
PLATELET # BLD: 204 E9/L (ref 130–450)
PMV BLD AUTO: 10.3 FL (ref 7–12)
POTASSIUM SERPL-SCNC: 3.8 MMOL/L (ref 3.5–5)
RBC # BLD: 3.76 E12/L (ref 3.8–5.8)
SODIUM BLD-SCNC: 135 MMOL/L (ref 132–146)
TOTAL PROTEIN: 7.2 G/DL (ref 6.4–8.3)
TSH SERPL DL<=0.05 MIU/L-ACNC: 36.56 UIU/ML (ref 0.27–4.2)
WBC # BLD: 9 E9/L (ref 4.5–11.5)

## 2022-05-11 PROCEDURE — 85025 COMPLETE CBC W/AUTO DIFF WBC: CPT

## 2022-05-11 PROCEDURE — 84443 ASSAY THYROID STIM HORMONE: CPT

## 2022-05-11 PROCEDURE — 2580000003 HC RX 258: Performed by: INTERNAL MEDICINE

## 2022-05-11 PROCEDURE — 6360000002 HC RX W HCPCS: Performed by: INTERNAL MEDICINE

## 2022-05-11 PROCEDURE — 36591 DRAW BLOOD OFF VENOUS DEVICE: CPT

## 2022-05-11 PROCEDURE — 80053 COMPREHEN METABOLIC PANEL: CPT

## 2022-05-11 RX ORDER — SODIUM CHLORIDE 0.9 % (FLUSH) 0.9 %
5-40 SYRINGE (ML) INJECTION PRN
Status: CANCELLED | OUTPATIENT
Start: 2022-05-11

## 2022-05-11 RX ORDER — SODIUM CHLORIDE 0.9 % (FLUSH) 0.9 %
5-40 SYRINGE (ML) INJECTION PRN
Status: DISCONTINUED | OUTPATIENT
Start: 2022-05-11 | End: 2022-05-12 | Stop reason: HOSPADM

## 2022-05-11 RX ORDER — HEPARIN SODIUM (PORCINE) LOCK FLUSH IV SOLN 100 UNIT/ML 100 UNIT/ML
500 SOLUTION INTRAVENOUS PRN
Status: CANCELLED | OUTPATIENT
Start: 2022-05-11

## 2022-05-11 RX ORDER — HEPARIN SODIUM (PORCINE) LOCK FLUSH IV SOLN 100 UNIT/ML 100 UNIT/ML
500 SOLUTION INTRAVENOUS PRN
Status: DISCONTINUED | OUTPATIENT
Start: 2022-05-11 | End: 2022-05-12 | Stop reason: HOSPADM

## 2022-05-11 RX ADMIN — SODIUM CHLORIDE, PRESERVATIVE FREE 10 ML: 5 INJECTION INTRAVENOUS at 10:02

## 2022-05-11 RX ADMIN — Medication 500 UNITS: at 10:02

## 2022-05-11 NOTE — PROGRESS NOTES
Patient presents to clinic for labs today. Left chest  SQ port accessed per policy using 51P .88LP  [place] needle for good blood return. Aspirate for waste and specimen sent to lab. Site flushed easily with 10 mL NSS followed by 5 mL Heparin solution 100 units/ml rinse prior to de-access. Dry sterile dressing to area. Tolerated procedure well. Encouraged to schedule port flush every 4 weeks.

## 2022-05-12 ENCOUNTER — HOSPITAL ENCOUNTER (OUTPATIENT)
Dept: INFUSION THERAPY | Age: 72
Discharge: HOME | End: 2022-05-12
Payer: MEDICARE

## 2022-05-12 ENCOUNTER — OFFICE VISIT (OUTPATIENT)
Dept: ONCOLOGY | Age: 72
End: 2022-05-12

## 2022-05-12 ENCOUNTER — FOLLOWUP TELEPHONE ENCOUNTER (OUTPATIENT)
Dept: INFUSION THERAPY | Age: 72
End: 2022-05-12

## 2022-05-12 VITALS
DIASTOLIC BLOOD PRESSURE: 82 MMHG | RESPIRATION RATE: 16 BRPM | SYSTOLIC BLOOD PRESSURE: 154 MMHG | TEMPERATURE: 97.9 F | OXYGEN SATURATION: 100 % | HEART RATE: 55 BPM

## 2022-05-12 VITALS
TEMPERATURE: 97.7 F | DIASTOLIC BLOOD PRESSURE: 78 MMHG | SYSTOLIC BLOOD PRESSURE: 145 MMHG | OXYGEN SATURATION: 100 % | HEIGHT: 68 IN | HEART RATE: 89 BPM | BODY MASS INDEX: 20.11 KG/M2 | WEIGHT: 132.7 LBS

## 2022-05-12 DIAGNOSIS — C64.1 RENAL CELL CANCER, RIGHT (HCC): Primary | ICD-10-CM

## 2022-05-12 PROCEDURE — 2580000003 HC RX 258: Performed by: INTERNAL MEDICINE

## 2022-05-12 PROCEDURE — 96413 CHEMO IV INFUSION 1 HR: CPT

## 2022-05-12 PROCEDURE — 96372 THER/PROPH/DIAG INJ SC/IM: CPT

## 2022-05-12 PROCEDURE — 6360000002 HC RX W HCPCS: Performed by: INTERNAL MEDICINE

## 2022-05-12 RX ORDER — SODIUM CHLORIDE 9 MG/ML
25 INJECTION, SOLUTION INTRAVENOUS PRN
Status: CANCELLED | OUTPATIENT
Start: 2022-05-12

## 2022-05-12 RX ORDER — MEPERIDINE HYDROCHLORIDE 25 MG/ML
12.5 INJECTION INTRAMUSCULAR; INTRAVENOUS; SUBCUTANEOUS ONCE
Status: CANCELLED | OUTPATIENT
Start: 2022-05-12 | End: 2022-05-12

## 2022-05-12 RX ORDER — HEPARIN SODIUM (PORCINE) LOCK FLUSH IV SOLN 100 UNIT/ML 100 UNIT/ML
500 SOLUTION INTRAVENOUS PRN
Status: DISCONTINUED | OUTPATIENT
Start: 2022-05-12 | End: 2022-05-13 | Stop reason: HOSPADM

## 2022-05-12 RX ORDER — SODIUM CHLORIDE 9 MG/ML
5-40 INJECTION INTRAVENOUS PRN
Status: CANCELLED | OUTPATIENT
Start: 2022-05-12

## 2022-05-12 RX ORDER — SODIUM CHLORIDE 9 MG/ML
INJECTION, SOLUTION INTRAVENOUS CONTINUOUS
Status: CANCELLED | OUTPATIENT
Start: 2022-05-12

## 2022-05-12 RX ORDER — METHYLPREDNISOLONE SODIUM SUCCINATE 125 MG/2ML
125 INJECTION, POWDER, LYOPHILIZED, FOR SOLUTION INTRAMUSCULAR; INTRAVENOUS ONCE
Status: CANCELLED | OUTPATIENT
Start: 2022-05-12 | End: 2022-05-12

## 2022-05-12 RX ORDER — HEPARIN SODIUM (PORCINE) LOCK FLUSH IV SOLN 100 UNIT/ML 100 UNIT/ML
500 SOLUTION INTRAVENOUS PRN
Status: CANCELLED | OUTPATIENT
Start: 2022-05-12

## 2022-05-12 RX ORDER — EPINEPHRINE 1 MG/ML
0.3 INJECTION, SOLUTION, CONCENTRATE INTRAVENOUS PRN
Status: CANCELLED | OUTPATIENT
Start: 2022-05-12

## 2022-05-12 RX ORDER — SODIUM CHLORIDE 0.9 % (FLUSH) 0.9 %
5-40 SYRINGE (ML) INJECTION PRN
Status: CANCELLED | OUTPATIENT
Start: 2022-05-12

## 2022-05-12 RX ORDER — SODIUM CHLORIDE 9 MG/ML
20 INJECTION, SOLUTION INTRAVENOUS ONCE
Status: COMPLETED | OUTPATIENT
Start: 2022-05-12 | End: 2022-05-12

## 2022-05-12 RX ORDER — DIPHENHYDRAMINE HYDROCHLORIDE 50 MG/ML
50 INJECTION INTRAMUSCULAR; INTRAVENOUS ONCE
Status: CANCELLED | OUTPATIENT
Start: 2022-05-12 | End: 2022-05-12

## 2022-05-12 RX ORDER — SODIUM CHLORIDE 9 MG/ML
20 INJECTION, SOLUTION INTRAVENOUS ONCE
Status: CANCELLED | OUTPATIENT
Start: 2022-05-12 | End: 2022-05-12

## 2022-05-12 RX ORDER — SODIUM CHLORIDE 0.9 % (FLUSH) 0.9 %
5-40 SYRINGE (ML) INJECTION PRN
Status: DISCONTINUED | OUTPATIENT
Start: 2022-05-12 | End: 2022-05-13 | Stop reason: HOSPADM

## 2022-05-12 RX ADMIN — DENOSUMAB 120 MG: 120 INJECTION SUBCUTANEOUS at 11:30

## 2022-05-12 RX ADMIN — SODIUM CHLORIDE 480 MG: 9 INJECTION, SOLUTION INTRAVENOUS at 11:29

## 2022-05-12 RX ADMIN — Medication 500 UNITS: at 12:07

## 2022-05-12 RX ADMIN — SODIUM CHLORIDE 20 ML/HR: 9 INJECTION, SOLUTION INTRAVENOUS at 10:47

## 2022-05-12 RX ADMIN — SODIUM CHLORIDE, PRESERVATIVE FREE 10 ML: 5 INJECTION INTRAVENOUS at 10:45

## 2022-05-12 NOTE — PROGRESS NOTES
[address]  [address] [place] [address]   Hematology/Oncology  Consult      Patient Name: Rosanna Mclaughlin  YOB: 1950  PCP: SHI Torres Cap, NP   Referring Provider:      Reason for Consultation:   Chief Complaint   Patient presents with    Cancer     renal cell cancer      Interval history: No new complaints. Right chest wall mass has resolved. Feels well. Gaining weight. History of Present Illness:  73years old male referred for possible metastatic renal cell carcinoma. Patient presented to his PCP, HETAL Pathak, complaining of right side chest wall mass which he noticed about a month ago and had been gradually increasing in size. CT chest from October 2021 showed mltiple heterogenous and necrotic masses in the right kidney with possible tumor invasion of the right renal vein. In addition to this there was a large 4 by 9 cm soft tissue necrotic mass involving the fourth rib. There were other lytic destructive lesions in the right eighth and ninth rib. There were multiple right lung nodules ranging upto 1.5 cm. Few liver nodules were noted as well the largest being 7 mm. 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 PET scan showed hypermetabolic lesions in the right kidney, left adrenal diffuse skeletal metastasis, scattered pulmonary nodules most of them subcentimeter except for 1 right lung hypermetabolic nodule. MRI brain reviewed no intracranial metastasis. S/p right chest wall mass biopsy on 11/2/2021. Consistent with clear-cell renal cell carcinoma. Performance status seems fair Karnofsky performance status 80/70. Intermediate risk based on [facility] risk factors although the disease seems to have progressed rapidly over the past month. His rapid progression as well as renal vein involvement probably precludes debulking nephrectomy. Tumor burden is not high and patient is asymptomatic.   Recommended treatment with ipilimumab/nivolumab combination. Started ipilimumab/nivolumab in November 2021. Switch to nivolumab monotherapy after 4 cycles and 3/2022. Patient reports pain in the right side chest wall mass which is well controlled with Tylenol as needed. Denies recent weight loss, loss of appetite night sweats, back pain. Review of systems: Over 10 systems were reviewed and all were negative except as mentioned above. Past medical history: Hypertension. Coronary artery disease, peripheral arterial disease, hyperlipidemia. Past surgical history: History of CABG, femoral arterial bypass, hernia repair. Social history: Smokes half a pack a day, denies alcohol or drug abuse. Family history Sister had breast cancer, father had colon cancer. Diagnostic Data:     Past Medical History:   Diagnosis Date    Anticoagulant long-term use 2007    aspirin    CAD (coronary artery disease)     Cancer (HCC)     kidney    Deep vein thrombosis (DVT) (HCC)     leg    [place] (hard of hearing)     Hx of blood clots     Hyperlipidemia     Hypertension     Mentally challenged     information per patient's niece. Patient Active Problem List    Diagnosis Date Noted    Poor venous access     Renal cell cancer, right ([address] 11/11/2021        Past Surgical History:   Procedure Laterality Date   [facility]    patient's sister said they were told a twin was removed from paitent's abdomen.    [address]  2007    bypass    CATHETER INSERTION Left 11/16/2021    MEDIPORT CATHETER INSERTION performed by Quan Osman MD at [facility] [address]      bifemoral    IR PERCUT BX LUNG/MEDIASTINUM  11/2/2021    IR PERCUT BX LUNG/MEDIASTINUM 11/2/2021 [facility] CT       Family History  Family History   Problem Relation Age of Onset    High Blood Pressure Mother     Heart Disease Mother     Heart Attack Mother     Cancer Father 64        colon    Arthritis Sister     Heart Attack Brother     No Known Problems Maternal Uncle     No Known Problems Paternal Aunt     No Known Problems Paternal Uncle     No Known Problems Maternal Grandmother     Cancer Maternal Grandfather         throat    Heart Attack Paternal Grandmother     No Known Problems Paternal Grandfather     Cirrhosis Paternal Cousin     Deep Vein Thrombosis Sister     Diabetes Sister     Heart Disease Sister         triple bypass    Breast Cancer Sister 61    High Blood Pressure Sister     Diabetes Sister     High Cholesterol Sister     Arthritis Sister        Social History    TOBACCO:   reports that he has been smoking cigarettes. He has a 13.50 pack-year smoking history. He has never used smokeless tobacco.  ETOH:   reports previous alcohol use. Home Medications  Prior to Admission medications    Medication Sig Start Date End Date Taking? Authorizing Provider   levothyroxine (SYNTHROID) 75 MCG tablet Take 1 tablet by mouth daily 4/14/22 5/14/22 Yes Alexandra Gonzalez MD   Calcium Carbonate-Vitamin D (OYSTER SHELL CALCIUM/D) 500-200 MG-UNIT TABS Take 2 tablets by mouth daily 3/31/22 3/31/23 Yes Alexandra Gonzalez MD   lidocaine-prilocaine (EMLA) 2.5-2.5 % cream Apply topically as needed.  12/9/21  Yes SHI Perez - CNP   ARTHRITIS PAIN RELIEF 650 MG extended release tablet TAKE TWO TABLETS BY MOUTH AT BEDTIME 9/21/21  Yes Historical Provider, MD   metoprolol tartrate (LOPRESSOR) 25 MG tablet Take 25 mg by mouth 2 times daily   Yes Historical Provider, MD   aspirin 81 MG EC tablet Take 81 mg by mouth daily LD 10/26/21   Yes Historical Provider, MD   simvastatin (ZOCOR) 80 MG tablet Take 80 mg by mouth nightly   Yes Historical Provider, MD   lisinopril (PRINIVIL;ZESTRIL) 20 MG tablet Take 20 mg by mouth daily   Yes Historical Provider, MD   levothyroxine (SYNTHROID) 50 MCG tablet Take 1 tablet by mouth daily 3/3/22 4/14/22  Alexandra Gonzalez MD       Allergies  No Known Allergies    Review of Systems:      Objective  BP (!) 145/78   Pulse 89   Temp 97.7 °F (36.5 °C)   Ht 5' 8\" (1.727 m)   Wt 132 lb 11.2 oz (60.2 kg)   SpO2 100%   BMI 20.18 kg/m²     Physical Performance Status    Physical Exam:  General: AAO to person, place, time, and purpose, appears stated age, cooperative, no acute distress, pleasant   Head and neck : PERRLA, EOMI . Sclera non icteric. Oropharynx : Clear  Neck: no JVD,  no adenopathy, no carotid bruit  LYMPHATICS : no lap  Lungs: Clear to auscultation. Right side chest wall globular mass, 4-5 cm in diamter has completely resolved. Heart: Regular rate and regular rhythm, no murmur, normal S1, S2  Abdomen: Soft, non-tender;no masses, no organomegaly  Extremities: No edema,no cyanosis, no clubbing. Skin:  No rash. Neurologic:Cranial nerves grossly intact. No focal motor or sensory deficits .     Recent Laboratory Data-   Lab Results   Component Value Date    WBC 9.0 05/11/2022    HGB 12.5 05/11/2022    HCT 37.9 05/11/2022    .8 (H) 05/11/2022     05/11/2022    LYMPHOPCT 37.1 05/11/2022    RBC 3.76 (L) 05/11/2022    MCH 33.2 05/11/2022    MCHC 33.0 05/11/2022    RDW 17.3 (H) 05/11/2022    NEUTOPHILPCT 49.2 05/11/2022    MONOPCT 8.0 05/11/2022    BASOPCT 1.0 05/11/2022    NEUTROABS 4.41 05/11/2022    LYMPHSABS 3.32 05/11/2022    MONOSABS 0.72 05/11/2022    EOSABS 0.40 05/11/2022    BASOSABS 0.09 05/11/2022       Lab Results   Component Value Date     05/11/2022    K 3.8 05/11/2022     05/11/2022    CO2 24 05/11/2022    BUN 10 05/11/2022    CREATININE 0.9 05/11/2022    GLUCOSE 99 05/11/2022    CALCIUM 8.7 05/11/2022    PROT 7.2 05/11/2022    LABALBU 4.3 05/11/2022    BILITOT <0.2 05/11/2022    ALKPHOS 96 05/11/2022    AST 14 05/11/2022    ALT 10 05/11/2022    LABGLOM >60 05/11/2022    GFRAA >60 05/11/2022       No results found for: IRON, TIBC, FERRITIN        Radiology-    Echo Complete    Result Date: 10/8/2021  Transthoracic Echocardiography Report (TTE)  Demographics   Patient Name LEIGH        Gender            Male                  [address] [facility] Record  [id number]      Room Number  Number   Account #       [de-identified]     Procedure Date    10/08/2021   Corporate ID                  Ordering          Keren Baker                                 Physician   Accession       [id number]    Referring         Keren Baker DO  Number                        Physician   Date of Birth   1950    Sonographer       Lilia Haynes RDCS   Age             73 year(s)    Interpreting      [facility]                                Physician         Physician Cardiology                                                  Tatyana Magdaleno MD                                 Any Other  Procedure Type of Study   TTE procedure:Echo Complete W/Doppler & Color Flow. Procedure Date Date: 10/08/2021 Start: 09:57 AM Study Location: Echo Lab Technical Quality: Poor visualization due to poor acoustical window. Indications:Heart murmur. Patient Status: Routine Height: 68 inches Weight: 120 pounds BSA: 1.65 m^2 BMI: 18.25 kg/m^2  Findings   Left Ventricle  Normal left ventricular chamber size. Normal left ventricular systolic function. Visually estimated LVEF 65%. Mild left ventricular concentric hypertrophy noted. Normal diastolic function. Right Ventricle  Normal right ventricle size and function. Left Atrium  The left atrium is mildly dilated. Mildly increased left atrial volume. Interatrial septum not well visualized but appears intact. Right Atrium  Normal right atrium. Mitral Valve  Normal mitral valve structure. There is moderate mitral regurgitation - ERO 0.23cm2, PISA 0.9cm, RV 51cc. No mitral valve prolapse. Tricuspid Valve  Normal tricuspid valve structure. There is mild tricuspid regurgitation. Mild pulmonary hypertension, RVSP 39mmHg. Aortic Valve  Normal aortic valve structure. There is trace to mild aortic regurgitation, pressure 1/2 time 718ms.    Pulmonic Valve  The pulmonic valve was not well visualized. Pericardial Effusion  No evidence of pericardial effusion. Pericardium appears normal.   Pleural Effusion  No evidence of pleural effusion. Aorta  Aortic root 3.5cm. Miscellaneous  The inferior vena cava diameter is normal with normal respiratory  variation. Conclusions   Summary  Normal left ventricular chamber size. Normal left ventricular systolic function, LVEF 36%. Mild left ventricular concentric hypertrophy noted. Normal diastolic function. The left atrium is mildly dilated. Mildly increased left atrial volume. Interatrial septum not well visualized but appears intact. Normal right ventricle size and function. There is moderate mitral regurgitation - ERO 0.23cm2, PISA 0.9cm, RV 51cc. No mitral valve prolapse. There is trace to mild aortic regurgitation, pressure 1/2 time 718ms. There is mild tricuspid regurgitation. Mild pulmonary hypertension, RVSP 39mmHg. The pulmonic valve was not well visualized. Aortic root 3.5cm. No evidence of pericardial effusion. No intra cardiac mass or thrombus. No comparison study available.    Signature   ----------------------------------------------------------------  Electronically signed by Rob Correa MD(Interpreting  physician) on 10/08/2021 03:52 PM  ----------------------------------------------------------------  M-Mode/2D Measurements & Calculations   LV Diastolic    LV Systolic Dimension: 3.2   AV Cusp Separation: 1.9 cmLA  Dimension: 5.3  cm                           Dimension: 3.9 cmAO Root  cm              LV Volume Diastolic: 117.1   Dimension: 3.5 cm  LV FS:39.6 %    ml  LV PW           LV Volume Systolic: 33.7 ml  Diastolic: 1.2  LV EDV/LV EDV Index: 134.5  cm              ml/82 ml/m^2LV ESV/LV ESV    RV Diastolic Dimension: 2.8  LV PW Systolic: Index: 22.1 AF/51XW/ m^2     cm  1.5 cm          EF Calculated: 70 %          RV Systolic Dimension: 2.4 cm  Septum          LV Mass Index: 147 l/min*m^2 LA/Aorta: 1.2  Diastolic: 1.1  cm                                           LA volume/Index: 79.7 ml  Septum          LVOT: 2 cm  Systolic: 1.2  cm   LV Mass: 241.96  g  Doppler Measurements & Calculations   MV Peak E-Wave: 1.42   AV Peak Velocity: 1.87 m/s  LVOT Peak Velocity:  m/s                    AV Peak Gradient: 14.05     1.55 m/s  MV Peak A-Wave: 0.82   mmHg                        LVOT Mean Velocity:  m/s                    AV Mean Velocity: 1.27 m/s  0.81 m/s  MV E/A Ratio: 1.73     AV Mean Gradient: 7.2 mmHg  LVOT Peak Gradient: 9.6  MV Peak Gradient: 9.2  AV VTI: 42.1 cm             mmHgLVOT Mean Gradient:  mmHg                   AV Area (Continuity):2.37   3.5 mmHg  MV Mean Gradient: 2.9  cm^2                        Estimated RVSP: 39 mmHg  mmHg                   AV Deceleration Time:       Estimated RAP:10 mmHg  MV Mean Velocity: 0.75 2474.3 msec  m/s                    LVOT VTI: 31.8 cm  MV Deceleration Time:                              TR Velocity:2.69 m/s  271.4 msec             Estimated PASP: 39.03 mmHg  TR Gradient:29.03 mmHg  MV P1/2t: 101.8 msec   Pulm. Vein A Reversal       PV Peak Velocity: 0.51  MVA by PHT:2.16 cm^2   Duration:175.3 msec         m/s  MV Area (continuity):  Pulm. Vein D Velocity:0.74  PV Peak Gradient: 1.02  1.9 cm^2               m/sPulm. Vein A Reversal    mmHg                         Velocity:0.38 m/s           PV Mean Velocity: 0.36                         Pulm. Vein S Velocity: 0.44 m/s  MR Velocity: 5.37 m/s  m/s                         PV Mean Gradient: 0.6  MV RICARDO PISA: 0.23 cm^2                             mmHg  MR VTI: 221.9 cm  Alias Velocity: 0.25  m/sPISA Radius: 0.9 cm   PISA area: 5.09 cm^2MR  flow rate: 125.72  ml/sMR volume:51.04 ml  [id number]    CT CHEST W CONTRAST    Addendum Date: 10/13/2021    ADDENDUM: 6 mm indeterminate hypodense lesions are identified in the body and tail of the pancreas. Consider surveillance     Result Date: 10/13/2021  EXAMINATION: CT OF THE CHEST WITH CONTRAST 10/13/2021 10:59 am TECHNIQUE: CT of the chest was performed with the administration of intravenous contrast. Multiplanar reformatted images are provided for review. Dose modulation, iterative reconstruction, and/or weight based adjustment of the mA/kV was utilized to reduce the radiation dose to as low as reasonably achievable. COMPARISON: None. HISTORY: ORDERING SYSTEM PROVIDED HISTORY: Chest wall mass FINDINGS: There is cardiomegaly. There is coronary artery calcification. The great vessels are normal.  Moderately enlarged mediastinal and hilar lymph nodes are noted with lymph nodes measuring up to 1.3 cm in the right hilum. Trachea and major bronchi are patent. Multiple bilateral pulmonary nodules are identified with nodules measuring up to 1.5 cm in the right lower lobe and smaller ones in the right middle lobe and right upper lobe. The pulmonary nodules in the left lower lobe ranges from 5 mm up to 8 mm. There is no focal consolidation or pleural effusion. Punctate enhancing nodules are identified in the posterior right hepatic lobe, largest 1 measuring up to 7 mm. Multiple heterogeneously enhancing right renal masses are identified largest 1 measuring 3.8 x 3.3 cm which is partially necrotic with additional smaller nodules concerning for multifocal malignancy like renal cell carcinoma. There is filling defects in the right renal vein concerning for tumor invasion versus tumor embolism of the renal vein. There is bilateral adrenal metastasis with the largest 1 in the left adrenal gland measuring 1.6 x 2.7 cm. There is bone metastasis with the destructive soft tissue mass involving the right 4th rib anterolaterally with adjacent soft tissue mass which is partially necrotic measuring 4.5 x 9.2 cm.   Lytic destructive lesions are also identified in the right 8th and 9th rib near the costo vertebral junction. Multiple heterogeneous  enhancing and necrotic masses in the right kidney concerning for multifocal renal cell carcinoma with  possible tumor invasion or tumor embolism of the right renal vein. There is diffuse metastasis with the involvement of the right and left adrenal glands, possible hepatic metastasis, widespread pulmonary and rib involvement as noted. Further assessment by PET-CT scan is recommended. Findings were telephoned to licensed caregiver. ASSESSMENT/PLAN :    Aleshia Moreno was seen today for cancer. Diagnoses and all orders for this visit:    Renal cell cancer, right ([facility] [address]  -     CBC with Auto Differential; Future  -     Comprehensive Metabolic Panel; Future  -     TSH; Future         73years old male with Stage IV renal cell carcinoma (diagnosed 11/2021- chest wall mass biopsy) -right chest wall mass, multiple right kidney necrotic lesions in addition to large soft tissue destructive mass of right fourth rib, multiple lung nodules up to 1.5 cm, bilateral adrenal metastasis up to 2.5 cm. ECOG 1/2. Started ipilimumab/nivolumab combination in November 2021. S/p 4 cycles. Switched to single agent Opdivo in March 2022. We will continue Opdivo 480 mg q 4 weeks till progression. Tolerating treatment well. No new complaints. Reviewed labs which are unremarkable. PET scan 3/2022 showed a good response. We will re order next month    Reviewed blood work from yesterday-unremarkable CBC CMP. Primary hypothyroidism: With low normal T4, elevated TSH. Related to immunotherapy. Asymptomatic. Synthroid dose was increased to 75 mcg last month due to inadequate response to 50 mcg. TSH has come down to 35 from 50. We will continue with same dose and reassess in a month. Patient was not sure of his medications. I asked him to give me a call with his meds once he gets home.      Continue Xgeva every 4 weeks. Patient is edentulous. Ca normal. Continue ca/vitamin d. Pain at the right side chest wall mass is well controlled with Tylenol as needed. Return to clinic in 4 weeks. Return in about 4 weeks (around 6/9/2022).      Tammy Melo MD   Electronically signed 5/12/2022 at 10:27 AM

## 2022-05-12 NOTE — TELEPHONE ENCOUNTER
Patient brought in a bill today asking if it would be covered. Unfortunately the bill was from [employer] who does not honor our financial assistance and doesn't offer any of their own. Patient was notified of this and stated he's not sure how he is going to pay it. I advised him to contact their office and see if he can be set up on a payment plan. Patient verbalized understanding and was in agreement. He has no further questions/concerns at this time.  Electronically signed by Robert Chavez on 5/12/2022 at 9:56 AM

## 2022-05-12 NOTE — TELEPHONE ENCOUNTER
Patient stopped back in after his appointment to discuss the same bills again. We filled out a silver lining fund application to see if he would be approved to give him a little bit of cash assistance to pay on some bills. Patient was appreciative of my help and states he has no further questions/concerns at this time.  Electronically signed by [employer] on 5/12/2022 at 1:31 PM

## 2022-05-12 NOTE — PROGRESS NOTES
Patient presents to clinic today for Xgeva injection. Patient's labs monitored, specifically, Calcium level, to ensure no increased risk of hypocalcemia with administration of the medication. Patient's calcium level is 8.7 mg/dL. Patient received therapy and will continue to be monitored prior to each dose.     Haylee Whittington, [address] 5/12/2022 11:00 AM

## 2022-06-08 ENCOUNTER — TELEPHONE (OUTPATIENT)
Dept: ONCOLOGY | Age: 72
End: 2022-06-08

## 2022-06-08 ENCOUNTER — HOSPITAL ENCOUNTER (OUTPATIENT)
Dept: INFUSION THERAPY | Age: 72
Discharge: HOME | End: 2022-06-08
Payer: MEDICARE

## 2022-06-08 DIAGNOSIS — C64.1 RENAL CELL CANCER, RIGHT (HCC): Primary | ICD-10-CM

## 2022-06-08 LAB
ALBUMIN SERPL-MCNC: 4.1 G/DL (ref 3.5–5.2)
ALP BLD-CCNC: 94 U/L (ref 40–129)
ALT SERPL-CCNC: 9 U/L (ref 0–40)
ANION GAP SERPL CALCULATED.3IONS-SCNC: 9 MMOL/L (ref 7–16)
AST SERPL-CCNC: 15 U/L (ref 0–39)
BASOPHILS ABSOLUTE: 0.09 E9/L (ref 0–0.2)
BASOPHILS RELATIVE PERCENT: 1.1 % (ref 0–2)
BILIRUB SERPL-MCNC: <0.2 MG/DL (ref 0–1.2)
BUN BLDV-MCNC: 5 MG/DL (ref 6–23)
CALCIUM SERPL-MCNC: 8.7 MG/DL (ref 8.6–10.2)
CHLORIDE BLD-SCNC: 102 MMOL/L (ref 98–107)
CO2: 27 MMOL/L (ref 22–29)
CREAT SERPL-MCNC: 0.9 MG/DL (ref 0.7–1.2)
EOSINOPHILS ABSOLUTE: 0.3 E9/L (ref 0.05–0.5)
EOSINOPHILS RELATIVE PERCENT: 3.5 % (ref 0–6)
GFR AFRICAN AMERICAN: >60
GFR NON-AFRICAN AMERICAN: >60 ML/MIN/1.73
GLUCOSE BLD-MCNC: 95 MG/DL (ref 74–99)
HCT VFR BLD CALC: 37.4 % (ref 37–54)
HEMOGLOBIN: 12.5 G/DL (ref 12.5–16.5)
IMMATURE GRANULOCYTES #: 0.02 E9/L
IMMATURE GRANULOCYTES %: 0.2 % (ref 0–5)
LYMPHOCYTES ABSOLUTE: 3.46 E9/L (ref 1.5–4)
LYMPHOCYTES RELATIVE PERCENT: 40.9 % (ref 20–42)
MCH RBC QN AUTO: 34 PG (ref 26–35)
MCHC RBC AUTO-ENTMCNC: 33.4 % (ref 32–34.5)
MCV RBC AUTO: 101.6 FL (ref 80–99.9)
MONOCYTES ABSOLUTE: 0.71 E9/L (ref 0.1–0.95)
MONOCYTES RELATIVE PERCENT: 8.4 % (ref 2–12)
NEUTROPHILS ABSOLUTE: 3.89 E9/L (ref 1.8–7.3)
NEUTROPHILS RELATIVE PERCENT: 45.9 % (ref 43–80)
PDW BLD-RTO: 15.3 FL (ref 11.5–15)
PLATELET # BLD: 227 E9/L (ref 130–450)
PMV BLD AUTO: 10.2 FL (ref 7–12)
POTASSIUM SERPL-SCNC: 3.7 MMOL/L (ref 3.5–5)
RBC # BLD: 3.68 E12/L (ref 3.8–5.8)
SODIUM BLD-SCNC: 138 MMOL/L (ref 132–146)
TOTAL PROTEIN: 6.8 G/DL (ref 6.4–8.3)
TSH SERPL DL<=0.05 MIU/L-ACNC: 12 UIU/ML (ref 0.27–4.2)
WBC # BLD: 8.5 E9/L (ref 4.5–11.5)

## 2022-06-08 PROCEDURE — 84443 ASSAY THYROID STIM HORMONE: CPT

## 2022-06-08 PROCEDURE — 6360000002 HC RX W HCPCS: Performed by: INTERNAL MEDICINE

## 2022-06-08 PROCEDURE — 85025 COMPLETE CBC W/AUTO DIFF WBC: CPT

## 2022-06-08 PROCEDURE — 36591 DRAW BLOOD OFF VENOUS DEVICE: CPT

## 2022-06-08 PROCEDURE — 2580000003 HC RX 258: Performed by: INTERNAL MEDICINE

## 2022-06-08 PROCEDURE — 80053 COMPREHEN METABOLIC PANEL: CPT

## 2022-06-08 RX ORDER — SODIUM CHLORIDE 0.9 % (FLUSH) 0.9 %
5-40 SYRINGE (ML) INJECTION PRN
Status: CANCELLED | OUTPATIENT
Start: 2022-06-08

## 2022-06-08 RX ORDER — SODIUM CHLORIDE 0.9 % (FLUSH) 0.9 %
5-40 SYRINGE (ML) INJECTION PRN
Status: DISCONTINUED | OUTPATIENT
Start: 2022-06-08 | End: 2022-06-09 | Stop reason: HOSPADM

## 2022-06-08 RX ORDER — HEPARIN SODIUM (PORCINE) LOCK FLUSH IV SOLN 100 UNIT/ML 100 UNIT/ML
500 SOLUTION INTRAVENOUS PRN
Status: CANCELLED | OUTPATIENT
Start: 2022-06-08

## 2022-06-08 RX ORDER — HEPARIN SODIUM (PORCINE) LOCK FLUSH IV SOLN 100 UNIT/ML 100 UNIT/ML
500 SOLUTION INTRAVENOUS PRN
Status: DISCONTINUED | OUTPATIENT
Start: 2022-06-08 | End: 2022-06-09 | Stop reason: HOSPADM

## 2022-06-08 RX ADMIN — Medication 500 UNITS: at 09:31

## 2022-06-08 RX ADMIN — SODIUM CHLORIDE, PRESERVATIVE FREE 10 ML: 5 INJECTION INTRAVENOUS at 09:31

## 2022-06-08 NOTE — PROGRESS NOTES
Patient presents to clinic for labs today. L  SQ port accessed per policy using 59K 1.37\" Zapata needle for good blood return. Aspirate for waste and specimen sent to lab. Site flushed easily with 10 mL NSS followed by 5 mL Heparin solution 100 units/ml rinse prior to de-access. Dry sterile dressing to area. Tolerated procedure well. Encouraged to schedule port flush every 4 weeks.

## 2022-06-08 NOTE — TELEPHONE ENCOUNTER
Pt stopped by office and was was asking about financial assistance for which he stated he recently completed an application.  informed pt that he could not see if any of that information went through and upon further chart review, noticed that pt had completed a silver lin[employer] fund application.  informed pt that these do not come back to the office and that he would receive the payment in the mail if they accepted.  also informed pt that they typically go through applications at the end of the month. Pt was encouraged to call  should needs arise.         Kip Franklin MSW, BRUNILDA  Oncology Social Worker

## 2022-06-09 ENCOUNTER — HOSPITAL ENCOUNTER (OUTPATIENT)
Dept: INFUSION THERAPY | Age: 72
Discharge: HOME | End: 2022-06-09
Payer: MEDICARE

## 2022-06-09 ENCOUNTER — OFFICE VISIT (OUTPATIENT)
Dept: ONCOLOGY | Age: 72
End: 2022-06-09

## 2022-06-09 VITALS
BODY MASS INDEX: 19.7 KG/M2 | HEART RATE: 70 BPM | SYSTOLIC BLOOD PRESSURE: 138 MMHG | OXYGEN SATURATION: 100 % | DIASTOLIC BLOOD PRESSURE: 79 MMHG | TEMPERATURE: 97.3 F | HEIGHT: 68 IN | WEIGHT: 130 LBS

## 2022-06-09 VITALS — DIASTOLIC BLOOD PRESSURE: 69 MMHG | RESPIRATION RATE: 16 BRPM | HEART RATE: 58 BPM | SYSTOLIC BLOOD PRESSURE: 139 MMHG

## 2022-06-09 DIAGNOSIS — C64.1 RENAL CELL CANCER, RIGHT (HCC): Primary | ICD-10-CM

## 2022-06-09 PROCEDURE — 6360000002 HC RX W HCPCS: Performed by: INTERNAL MEDICINE

## 2022-06-09 PROCEDURE — 96413 CHEMO IV INFUSION 1 HR: CPT

## 2022-06-09 PROCEDURE — 6360000002 HC RX W HCPCS

## 2022-06-09 PROCEDURE — 96372 THER/PROPH/DIAG INJ SC/IM: CPT

## 2022-06-09 PROCEDURE — 2580000003 HC RX 258: Performed by: INTERNAL MEDICINE

## 2022-06-09 RX ORDER — EPINEPHRINE 1 MG/ML
0.3 INJECTION, SOLUTION, CONCENTRATE INTRAVENOUS PRN
Status: CANCELLED | OUTPATIENT
Start: 2022-06-09

## 2022-06-09 RX ORDER — HEPARIN SODIUM (PORCINE) LOCK FLUSH IV SOLN 100 UNIT/ML 100 UNIT/ML
500 SOLUTION INTRAVENOUS PRN
Status: DISCONTINUED | OUTPATIENT
Start: 2022-06-09 | End: 2022-06-10 | Stop reason: HOSPADM

## 2022-06-09 RX ORDER — SODIUM CHLORIDE 0.9 % (FLUSH) 0.9 %
5-40 SYRINGE (ML) INJECTION PRN
Status: CANCELLED | OUTPATIENT
Start: 2022-06-09

## 2022-06-09 RX ORDER — HEPARIN SODIUM (PORCINE) LOCK FLUSH IV SOLN 100 UNIT/ML 100 UNIT/ML
SOLUTION INTRAVENOUS
Status: COMPLETED
Start: 2022-06-09 | End: 2022-06-09

## 2022-06-09 RX ORDER — SODIUM CHLORIDE 9 MG/ML
25 INJECTION, SOLUTION INTRAVENOUS PRN
Status: CANCELLED | OUTPATIENT
Start: 2022-06-09

## 2022-06-09 RX ORDER — SODIUM CHLORIDE 9 MG/ML
20 INJECTION, SOLUTION INTRAVENOUS ONCE
Status: DISCONTINUED | OUTPATIENT
Start: 2022-06-09 | End: 2022-06-10 | Stop reason: HOSPADM

## 2022-06-09 RX ORDER — METHYLPREDNISOLONE SODIUM SUCCINATE 125 MG/2ML
125 INJECTION, POWDER, LYOPHILIZED, FOR SOLUTION INTRAMUSCULAR; INTRAVENOUS ONCE
Status: CANCELLED | OUTPATIENT
Start: 2022-06-09 | End: 2022-06-09

## 2022-06-09 RX ORDER — MEPERIDINE HYDROCHLORIDE 25 MG/ML
12.5 INJECTION INTRAMUSCULAR; INTRAVENOUS; SUBCUTANEOUS ONCE
Status: CANCELLED | OUTPATIENT
Start: 2022-06-09 | End: 2022-06-09

## 2022-06-09 RX ORDER — SODIUM CHLORIDE 9 MG/ML
5-40 INJECTION INTRAVENOUS PRN
Status: CANCELLED | OUTPATIENT
Start: 2022-06-09

## 2022-06-09 RX ORDER — DIPHENHYDRAMINE HYDROCHLORIDE 50 MG/ML
50 INJECTION INTRAMUSCULAR; INTRAVENOUS ONCE
Status: CANCELLED | OUTPATIENT
Start: 2022-06-09 | End: 2022-06-09

## 2022-06-09 RX ORDER — SODIUM CHLORIDE 9 MG/ML
20 INJECTION, SOLUTION INTRAVENOUS ONCE
Status: CANCELLED | OUTPATIENT
Start: 2022-06-09 | End: 2022-06-09

## 2022-06-09 RX ORDER — SODIUM CHLORIDE 9 MG/ML
INJECTION, SOLUTION INTRAVENOUS CONTINUOUS
Status: CANCELLED | OUTPATIENT
Start: 2022-06-09

## 2022-06-09 RX ORDER — SODIUM CHLORIDE 0.9 % (FLUSH) 0.9 %
5-40 SYRINGE (ML) INJECTION PRN
Status: DISCONTINUED | OUTPATIENT
Start: 2022-06-09 | End: 2022-06-10 | Stop reason: HOSPADM

## 2022-06-09 RX ORDER — HEPARIN SODIUM (PORCINE) LOCK FLUSH IV SOLN 100 UNIT/ML 100 UNIT/ML
500 SOLUTION INTRAVENOUS PRN
Status: CANCELLED | OUTPATIENT
Start: 2022-06-09

## 2022-06-09 RX ADMIN — SODIUM CHLORIDE 20 ML/HR: 9 INJECTION, SOLUTION INTRAVENOUS at 10:22

## 2022-06-09 RX ADMIN — HEPARIN 500 UNITS: 100 SYRINGE at 11:35

## 2022-06-09 RX ADMIN — SODIUM CHLORIDE 480 MG: 9 INJECTION, SOLUTION INTRAVENOUS at 10:53

## 2022-06-09 RX ADMIN — DENOSUMAB 120 MG: 120 INJECTION SUBCUTANEOUS at 10:54

## 2022-06-09 RX ADMIN — SODIUM CHLORIDE, PRESERVATIVE FREE 10 ML: 5 INJECTION INTRAVENOUS at 10:21

## 2022-06-09 RX ADMIN — HEPARIN SODIUM (PORCINE) LOCK FLUSH IV SOLN 100 UNIT/ML 500 UNITS: 100 SOLUTION at 11:35

## 2022-06-09 NOTE — PROGRESS NOTES
[address]  [address] [place] [address]   Hematology/Oncology  Consult      Patient Name: Katarina Lennon  YOB: 1950  PCP: SHI Cole NP   Referring Provider:      Reason for Consultation:   Chief Complaint   Patient presents with    Follow-up     renal cancer      Interval history: No new complaints. Right chest wall mass has resolved. Feels well. Stable weight. History of Present Illness:  73years old male referred for possible metastatic renal cell carcinoma. Patient presented to his PCP, HETAL Pathak, complaining of right side chest wall mass which he noticed about a month ago and had been gradually increasing in size. CT chest from October 2021 showed mltiple heterogenous and necrotic masses in the right kidney with possible tumor invasion of the right renal vein. In addition to this there was a large 4 by 9 cm soft tissue necrotic mass involving the fourth rib. There were other lytic destructive lesions in the right eighth and ninth rib. There were multiple right lung nodules ranging upto 1.5 cm. Few liver nodules were noted as well the largest being 7 mm. 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 PET scan showed hypermetabolic lesions in the right kidney, left adrenal diffuse skeletal metastasis, scattered pulmonary nodules most of them subcentimeter except for 1 right lung hypermetabolic nodule. MRI brain reviewed no intracranial metastasis. S/p right chest wall mass biopsy on 11/2/2021. Consistent with clear-cell renal cell carcinoma. Performance status seems fair Karnofsky performance status 80/70. Intermediate risk based on [facility] risk factors although the disease seems to have progressed rapidly over the past month. His rapid progression as well as renal vein involvement probably precludes debulking nephrectomy. Tumor burden is not high and patient is asymptomatic.   Recommended treatment with ipilimumab/nivolumab combination. Started ipilimumab/nivolumab in November 2021. Switch to nivolumab monotherapy after 4 cycles and 3/2022. Patient reports pain in the right side chest wall mass which is well controlled with Tylenol as needed. Denies recent weight loss, loss of appetite night sweats, back pain. Review of systems: Over 10 systems were reviewed and all were negative except as mentioned above. Past medical history: Hypertension. Coronary artery disease, peripheral arterial disease, hyperlipidemia. Past surgical history: History of CABG, femoral arterial bypass, hernia repair. Social history: Smokes half a pack a day, denies alcohol or drug abuse. Family history Sister had breast cancer, father had colon cancer. Diagnostic Data:     Past Medical History:   Diagnosis Date    Anticoagulant long-term use 2007    aspirin    CAD (coronary artery disease)     Cancer (HCC)     kidney    Deep vein thrombosis (DVT) (HCC)     leg    [place] (hard of hearing)     Hx of blood clots     Hyperlipidemia     Hypertension     Mentally challenged     information per patient's niece. Patient Active Problem List    Diagnosis Date Noted    Poor venous access     Renal cell cancer, right ([address] 11/11/2021        Past Surgical History:   Procedure Laterality Date   [facility]    patient's sister said they were told a twin was removed from paitent's abdomen.    [address]  2007    bypass    CATHETER INSERTION Left 11/16/2021    MEDIPORT CATHETER INSERTION performed by Selena Cuenca MD at [facility]      bifemoral    IR PERCUT BX LUNG/MEDIASTINUM  11/2/2021    IR PERCUT BX LUNG/MEDIASTINUM 11/2/2021 [facility] CT       Family History  Family History   Problem Relation Age of Onset    High Blood Pressure Mother     Heart Disease Mother     Heart Attack Mother     Cancer Father 64        colon    Arthritis Sister     Heart Attack Brother     No Known Problems Maternal Uncle     No Known Problems Paternal Aunt     No Known Problems Paternal Uncle     No Known Problems Maternal Grandmother     Cancer Maternal Grandfather         throat    Heart Attack Paternal Grandmother     No Known Problems Paternal Grandfather     Cirrhosis Paternal Cousin     Deep Vein Thrombosis Sister     Diabetes Sister     Heart Disease Sister         triple bypass    Breast Cancer Sister 61    High Blood Pressure Sister     Diabetes Sister     High Cholesterol Sister     Arthritis Sister        Social History    TOBACCO:   reports that he has been smoking cigarettes. He has a 13.50 pack-year smoking history. He has never used smokeless tobacco.  ETOH:   reports previous alcohol use. Home Medications  Prior to Admission medications    Medication Sig Start Date End Date Taking? Authorizing Provider   levothyroxine (SYNTHROID) 75 MCG tablet Take 1 tablet by mouth daily 4/14/22 6/9/22 Yes Gemma Guillaume MD   Calcium Carbonate-Vitamin D (OYSTER SHELL CALCIUM/D) 500-200 MG-UNIT TABS Take 2 tablets by mouth daily 3/31/22 3/31/23 Yes Gemma Guillaume MD   levothyroxine (SYNTHROID) 50 MCG tablet Take 1 tablet by mouth daily 3/3/22 6/9/22 Yes Gemma Guillaume MD   lidocaine-prilocaine (EMLA) 2.5-2.5 % cream Apply topically as needed.  12/9/21  Yes SHI De Jesus - CNP   ARTHRITIS PAIN RELIEF 650 MG extended release tablet TAKE TWO TABLETS BY MOUTH AT BEDTIME 9/21/21  Yes Historical Provider, MD   metoprolol tartrate (LOPRESSOR) 25 MG tablet Take 25 mg by mouth 2 times daily   Yes Historical Provider, MD   aspirin 81 MG EC tablet Take 81 mg by mouth daily LD 10/26/21   Yes Historical Provider, MD   simvastatin (ZOCOR) 80 MG tablet Take 80 mg by mouth nightly   Yes Historical Provider, MD   lisinopril (PRINIVIL;ZESTRIL) 20 MG tablet Take 20 mg by mouth daily   Yes Historical Provider, MD       Allergies  No Known Allergies    Review of Systems:      Objective  /79 Pulse 70   Temp 97.3 °F (36.3 °C)   Ht 5' 8\" (1.727 m)   Wt 130 lb (59 kg)   SpO2 100%   BMI 19.77 kg/m²     Physical Performance Status    Physical Exam:  General: AAO to person, place, time, and purpose, appears stated age, cooperative, no acute distress, pleasant   Head and neck : PERRLA, EOMI . Sclera non icteric. Oropharynx : Clear  Neck: no JVD,  no adenopathy, no carotid bruit  LYMPHATICS : no lap  Lungs: Clear to auscultation. Right side chest wall globular mass, 4-5 cm in diamter has completely resolved. Heart: Regular rate and regular rhythm, no murmur, normal S1, S2  Abdomen: Soft, non-tender;no masses, no organomegaly  Extremities: No edema,no cyanosis, no clubbing. Skin:  No rash. Neurologic:Cranial nerves grossly intact. No focal motor or sensory deficits .     Recent Laboratory Data-   Lab Results   Component Value Date    WBC 8.5 06/08/2022    HGB 12.5 06/08/2022    HCT 37.4 06/08/2022    .6 (H) 06/08/2022     06/08/2022    LYMPHOPCT 40.9 06/08/2022    RBC 3.68 (L) 06/08/2022    MCH 34.0 06/08/2022    MCHC 33.4 06/08/2022    RDW 15.3 (H) 06/08/2022    NEUTOPHILPCT 45.9 06/08/2022    MONOPCT 8.4 06/08/2022    BASOPCT 1.1 06/08/2022    NEUTROABS 3.89 06/08/2022    LYMPHSABS 3.46 06/08/2022    MONOSABS 0.71 06/08/2022    EOSABS 0.30 06/08/2022    BASOSABS 0.09 06/08/2022       Lab Results   Component Value Date     06/08/2022    K 3.7 06/08/2022     06/08/2022    CO2 27 06/08/2022    BUN 5 (L) 06/08/2022    CREATININE 0.9 06/08/2022    GLUCOSE 95 06/08/2022    CALCIUM 8.7 06/08/2022    PROT 6.8 06/08/2022    LABALBU 4.1 06/08/2022    BILITOT <0.2 06/08/2022    ALKPHOS 94 06/08/2022    AST 15 06/08/2022    ALT 9 06/08/2022    LABGLOM >60 06/08/2022    GFRAA >60 06/08/2022       No results found for: IRON, TIBC, FERRITIN        Radiology-    Echo Complete    Result Date: 10/8/2021  Transthoracic Echocardiography Report (TTE)  Demographics   Patient Name    Conda Femi Gender            Male                  [address] [facility] Record  [id number]      Room Number  Number   Account #       [de-identified]     Procedure Date    10/08/2021   Corporate ID                  Ordering          Oscar Arguelles DO                                Physician   Accession       [id number]    Referring         Oscar Arguelles DO  Number                        Physician   Date of Birth   1950    Sonographer       Nighat Rodriguez [facility]   Age             73 year(s)    Interpreting      H[address]                                Physician         Physician Cardiology                                                  Ting Merida MD                                 Any Other  Procedure Type of Study   TTE procedure:Echo Complete W/Doppler & Color Flow. Procedure Date Date: 10/08/2021 Start: 09:57 AM Study Location: Echo Lab Technical Quality: Poor visualization due to poor acoustical window. Indications:Heart murmur. Patient Status: Routine Height: 68 inches Weight: 120 pounds BSA: 1.65 m^2 BMI: 18.25 kg/m^2  Findings   Left Ventricle  Normal left ventricular chamber size. Normal left ventricular systolic function. Visually estimated LVEF 65%. Mild left ventricular concentric hypertrophy noted. Normal diastolic function. Right Ventricle  Normal right ventricle size and function. Left Atrium  The left atrium is mildly dilated. Mildly increased left atrial volume. Interatrial septum not well visualized but appears intact. Right Atrium  Normal right atrium. Mitral Valve  Normal mitral valve structure. There is moderate mitral regurgitation - ERO 0.23cm2, PISA 0.9cm, RV 51cc. No mitral valve prolapse. Tricuspid Valve  Normal tricuspid valve structure. There is mild tricuspid regurgitation. Mild pulmonary hypertension, RVSP 39mmHg. Aortic Valve  Normal aortic valve structure. There is trace to mild aortic regurgitation, pressure 1/2 time 718ms.    Pulmonic Valve  The pulmonic valve was not well visualized. Pericardial Effusion  No evidence of pericardial effusion. Pericardium appears normal.   Pleural Effusion  No evidence of pleural effusion. Aorta  Aortic root 3.5cm. Miscellaneous  The inferior vena cava diameter is normal with normal respiratory  variation. Conclusions   Summary  Normal left ventricular chamber size. Normal left ventricular systolic function, LVEF 03%. Mild left ventricular concentric hypertrophy noted. Normal diastolic function. The left atrium is mildly dilated. Mildly increased left atrial volume. Interatrial septum not well visualized but appears intact. Normal right ventricle size and function. There is moderate mitral regurgitation - ERO 0.23cm2, PISA 0.9cm, RV 51cc. No mitral valve prolapse. There is trace to mild aortic regurgitation, pressure 1/2 time 718ms. There is mild tricuspid regurgitation. Mild pulmonary hypertension, RVSP 39mmHg. The pulmonic valve was not well visualized. Aortic root 3.5cm. No evidence of pericardial effusion. No intra cardiac mass or thrombus. No comparison study available.    Signature   ----------------------------------------------------------------  Electronically signed by Roberto Andrade MD(Interpreting  physician) on 10/08/2021 03:52 PM  ----------------------------------------------------------------  M-Mode/2D Measurements & Calculations   LV Diastolic    LV Systolic Dimension: 3.2   AV Cusp Separation: 1.9 cmLA  Dimension: 5.3  cm                           Dimension: 3.9 cmAO Root  cm              LV Volume Diastolic: 180.1   Dimension: 3.5 cm  LV FS:39.6 %    ml  LV PW           LV Volume Systolic: 41.5 ml  Diastolic: 1.2  LV EDV/LV EDV Index: 134.5  cm              ml/82 ml/m^2LV ESV/LV ESV    RV Diastolic Dimension: 2.8  LV PW Systolic: Index: 05.8 QI/05KZ/ m^2     cm  1.5 cm          EF Calculated: 70 %          RV Systolic Dimension: 2.4 cm  Septum          LV Mass Index: 147 l/min*m^2 LA/Aorta: 1.2  Diastolic: 1.1  cm                                           LA volume/Index: 79.7 ml  Septum          LVOT: 2 cm  Systolic: 1.2  cm   LV Mass: 241.96  g  Doppler Measurements & Calculations   MV Peak E-Wave: 1.42   AV Peak Velocity: 1.87 m/s  LVOT Peak Velocity:  m/s                    AV Peak Gradient: 14.05     1.55 m/s  MV Peak A-Wave: 0.82   mmHg                        LVOT Mean Velocity:  m/s                    AV Mean Velocity: 1.27 m/s  0.81 m/s  MV E/A Ratio: 1.73     AV Mean Gradient: 7.2 mmHg  LVOT Peak Gradient: 9.6  MV Peak Gradient: 9.2  AV VTI: 42.1 cm             mmHgLVOT Mean Gradient:  mmHg                   AV Area (Continuity):2.37   3.5 mmHg  MV Mean Gradient: 2.9  cm^2                        Estimated RVSP: 39 mmHg  mmHg                   AV Deceleration Time:       Estimated RAP:10 mmHg  MV Mean Velocity: 0.75 2474.3 msec  m/s                    LVOT VTI: 31.8 cm  MV Deceleration Time:                              TR Velocity:2.69 m/s  271.4 msec             Estimated PASP: 39.03 mmHg  TR Gradient:29.03 mmHg  MV P1/2t: 101.8 msec   Pulm. Vein A Reversal       PV Peak Velocity: 0.51  MVA by PHT:2.16 cm^2   Duration:175.3 msec         m/s  MV Area (continuity):  Pulm. Vein D Velocity:0.74  PV Peak Gradient: 1.02  1.9 cm^2               m/sPulm. Vein A Reversal    mmHg                         Velocity:0.38 m/s           PV Mean Velocity: 0.36                         Pulm. Vein S Velocity: 0.44 m/s  MR Velocity: 5.37 m/s  m/s                         PV Mean Gradient: 0.6  MV RICARDO PISA: 0.23 cm^2                             mmHg  MR VTI: 221.9 cm  Alias Velocity: 0.25  m/sPISA Radius: 0.9 cm   PISA area: 5.09 cm^2MR  flow rate: 125.72  ml/sMR volume:51.04 ml  [email or web address] [id number]    CT CHEST W CONTRAST    Addendum Date: 10/13/2021    ADDENDUM: 6 mm indeterminate hypodense lesions are identified in the body and tail of the pancreas. Consider surveillance     Result Date: 10/13/2021  EXAMINATION: CT OF THE CHEST WITH CONTRAST 10/13/2021 10:59 am TECHNIQUE: CT of the chest was performed with the administration of intravenous contrast. Multiplanar reformatted images are provided for review. Dose modulation, iterative reconstruction, and/or weight based adjustment of the mA/kV was utilized to reduce the radiation dose to as low as reasonably achievable. COMPARISON: None. HISTORY: ORDERING SYSTEM PROVIDED HISTORY: Chest wall mass FINDINGS: There is cardiomegaly. There is coronary artery calcification. The great vessels are normal.  Moderately enlarged mediastinal and hilar lymph nodes are noted with lymph nodes measuring up to 1.3 cm in the right hilum. Trachea and major bronchi are patent. Multiple bilateral pulmonary nodules are identified with nodules measuring up to 1.5 cm in the right lower lobe and smaller ones in the right middle lobe and right upper lobe. The pulmonary nodules in the left lower lobe ranges from 5 mm up to 8 mm. There is no focal consolidation or pleural effusion. Punctate enhancing nodules are identified in the posterior right hepatic lobe, largest 1 measuring up to 7 mm. Multiple heterogeneously enhancing right renal masses are identified largest 1 measuring 3.8 x 3.3 cm which is partially necrotic with additional smaller nodules concerning for multifocal malignancy like renal cell carcinoma. There is filling defects in the right renal vein concerning for tumor invasion versus tumor embolism of the renal vein. There is bilateral adrenal metastasis with the largest 1 in the left adrenal gland measuring 1.6 x 2.7 cm. There is bone metastasis with the destructive soft tissue mass involving the right 4th rib anterolaterally with adjacent soft tissue mass which is partially necrotic measuring 4.5 x 9.2 cm.   Lytic destructive lesions are also identified in the right 8th and 9th rib near the costo vertebral junction. Multiple heterogeneous  enhancing and necrotic masses in the right kidney concerning for multifocal renal cell carcinoma with  possible tumor invasion or tumor embolism of the right renal vein. There is diffuse metastasis with the involvement of the right and left adrenal glands, possible hepatic metastasis, widespread pulmonary and rib involvement as noted. Further assessment by PET-CT scan is recommended. Findings were telephoned to licensed caregiver. ASSESSMENT/PLAN :    Juli Payton was seen today for follow-up. Diagnoses and all orders for this visit:    Renal cell cancer, right ([address]  -     CBC with Auto Differential; Future  -     Comprehensive Metabolic Panel; Future  -     TSH; Future         73years old male with Stage IV renal cell carcinoma (diagnosed 11/2021- chest wall mass biopsy) -right chest wall mass, multiple right kidney necrotic lesions in addition to large soft tissue destructive mass of right fourth rib, multiple lung nodules up to 1.5 cm, bilateral adrenal metastasis up to 2.5 cm. ECOG 1/2. Started ipilimumab/nivolumab combination in November 2021. S/p 4 cycles. Switched to single agent Opdivo in March 2022. We will continue Opdivo 480 mg q 4 weeks till progression. Tolerating treatment well. No new complaints. Reviewed labs which are unremarkable. PET scan 3/2022 showed a good response. We will re order next month    Reviewed blood work from yesterday-unremarkable CBC CMP. Primary hypothyroidism: With low normal T4, elevated TSH. Related to immunotherapy. Asymptomatic. On 75 mcg daily Synthroid. TSH has come down to 12 from 50. We will continue with same dose and reassess in a month. Continue Xgeva every 4 weeks. Patient is edentulous. Ca normal. Continue ca/vitamin d. Pain at the right side chest wall mass is well controlled with Tylenol as needed.       Return to clinic in 4 weeks. Return in about 4 weeks (around 7/7/2022).      Francisca Romero MD   Electronically signed 6/9/2022 at 10:11 AM

## 2022-06-20 ENCOUNTER — FOLLOWUP TELEPHONE ENCOUNTER (OUTPATIENT)
Dept: INFUSION THERAPY | Age: 72
End: 2022-06-20

## 2022-06-20 NOTE — TELEPHONE ENCOUNTER
Patient stopped in with a check from [employer] [address] for $400. He wasn't sure what to do with the check as he thought he had to bring it to us to pay a bill. I advised patient that it was a great that he was approved for so no need to bring it to us and he can cash it and use it towards whatever else he needed, whether it was utility, gas, food, etc.  Patient was extremely appreciative of our assistance with signing him up for this and said he was going to get his oil changed and put the rest in his account. He brought in another application that they sent him incase he is still receiving treatment in December for him to reapply. He didn't want to hold on to this and asked me to keep it in his file, so it was placed in my folder that I keep for him. Patient states he has no further questions/concerns at this time.  Electronically signed by Luis Eduardo Castellon on 6/20/2022 at 1:35 PM

## 2022-07-06 ENCOUNTER — HOSPITAL ENCOUNTER (OUTPATIENT)
Dept: INFUSION THERAPY | Age: 72
Discharge: HOME | End: 2022-07-06
Payer: MEDICARE

## 2022-07-06 DIAGNOSIS — C64.1 RENAL CELL CANCER, RIGHT (HCC): Primary | ICD-10-CM

## 2022-07-06 LAB
ALBUMIN SERPL-MCNC: 4.2 G/DL (ref 3.5–5.2)
ALP BLD-CCNC: 94 U/L (ref 40–129)
ALT SERPL-CCNC: 11 U/L (ref 0–40)
ANION GAP SERPL CALCULATED.3IONS-SCNC: 9 MMOL/L (ref 7–16)
AST SERPL-CCNC: 17 U/L (ref 0–39)
BASOPHILS ABSOLUTE: 0.09 E9/L (ref 0–0.2)
BASOPHILS RELATIVE PERCENT: 1.1 % (ref 0–2)
BILIRUB SERPL-MCNC: 0.2 MG/DL (ref 0–1.2)
BUN BLDV-MCNC: 4 MG/DL (ref 6–23)
CALCIUM SERPL-MCNC: 8.5 MG/DL (ref 8.6–10.2)
CHLORIDE BLD-SCNC: 101 MMOL/L (ref 98–107)
CO2: 26 MMOL/L (ref 22–29)
CREAT SERPL-MCNC: 1 MG/DL (ref 0.7–1.2)
EOSINOPHILS ABSOLUTE: 0.37 E9/L (ref 0.05–0.5)
EOSINOPHILS RELATIVE PERCENT: 4.5 % (ref 0–6)
GFR AFRICAN AMERICAN: >60
GFR NON-AFRICAN AMERICAN: >60 ML/MIN/1.73
GLUCOSE BLD-MCNC: 139 MG/DL (ref 74–99)
HCT VFR BLD CALC: 36.3 % (ref 37–54)
HEMOGLOBIN: 12.2 G/DL (ref 12.5–16.5)
IMMATURE GRANULOCYTES #: 0.02 E9/L
IMMATURE GRANULOCYTES %: 0.2 % (ref 0–5)
LYMPHOCYTES ABSOLUTE: 3.29 E9/L (ref 1.5–4)
LYMPHOCYTES RELATIVE PERCENT: 40.2 % (ref 20–42)
MCH RBC QN AUTO: 33.8 PG (ref 26–35)
MCHC RBC AUTO-ENTMCNC: 33.6 % (ref 32–34.5)
MCV RBC AUTO: 100.6 FL (ref 80–99.9)
MONOCYTES ABSOLUTE: 0.58 E9/L (ref 0.1–0.95)
MONOCYTES RELATIVE PERCENT: 7.1 % (ref 2–12)
NEUTROPHILS ABSOLUTE: 3.84 E9/L (ref 1.8–7.3)
NEUTROPHILS RELATIVE PERCENT: 46.9 % (ref 43–80)
PDW BLD-RTO: 14.6 FL (ref 11.5–15)
PLATELET # BLD: 212 E9/L (ref 130–450)
PMV BLD AUTO: 9.9 FL (ref 7–12)
POTASSIUM SERPL-SCNC: 3.8 MMOL/L (ref 3.5–5)
RBC # BLD: 3.61 E12/L (ref 3.8–5.8)
SODIUM BLD-SCNC: 136 MMOL/L (ref 132–146)
TOTAL PROTEIN: 6.6 G/DL (ref 6.4–8.3)
TSH SERPL DL<=0.05 MIU/L-ACNC: 7.35 UIU/ML (ref 0.27–4.2)
WBC # BLD: 8.2 E9/L (ref 4.5–11.5)

## 2022-07-06 PROCEDURE — 6360000002 HC RX W HCPCS: Performed by: INTERNAL MEDICINE

## 2022-07-06 PROCEDURE — 36591 DRAW BLOOD OFF VENOUS DEVICE: CPT

## 2022-07-06 PROCEDURE — 85025 COMPLETE CBC W/AUTO DIFF WBC: CPT

## 2022-07-06 PROCEDURE — 84443 ASSAY THYROID STIM HORMONE: CPT

## 2022-07-06 PROCEDURE — 2580000003 HC RX 258: Performed by: INTERNAL MEDICINE

## 2022-07-06 PROCEDURE — 80053 COMPREHEN METABOLIC PANEL: CPT

## 2022-07-06 RX ORDER — HEPARIN SODIUM (PORCINE) LOCK FLUSH IV SOLN 100 UNIT/ML 100 UNIT/ML
500 SOLUTION INTRAVENOUS PRN
Status: DISCONTINUED | OUTPATIENT
Start: 2022-07-06 | End: 2022-07-07 | Stop reason: HOSPADM

## 2022-07-06 RX ORDER — HEPARIN SODIUM (PORCINE) LOCK FLUSH IV SOLN 100 UNIT/ML 100 UNIT/ML
500 SOLUTION INTRAVENOUS PRN
Status: CANCELLED | OUTPATIENT
Start: 2022-07-06

## 2022-07-06 RX ORDER — SODIUM CHLORIDE 0.9 % (FLUSH) 0.9 %
5-40 SYRINGE (ML) INJECTION PRN
Status: CANCELLED | OUTPATIENT
Start: 2022-07-06

## 2022-07-06 RX ORDER — SODIUM CHLORIDE 0.9 % (FLUSH) 0.9 %
5-40 SYRINGE (ML) INJECTION PRN
Status: DISCONTINUED | OUTPATIENT
Start: 2022-07-06 | End: 2022-07-07 | Stop reason: HOSPADM

## 2022-07-06 RX ADMIN — Medication 500 UNITS: at 10:35

## 2022-07-06 RX ADMIN — SODIUM CHLORIDE, PRESERVATIVE FREE 10 ML: 5 INJECTION INTRAVENOUS at 10:35

## 2022-07-06 NOTE — PROGRESS NOTES
Patient presents to clinic for labs today. L chest  SQ port accessed per policy using 91F 4.83FR Zapata needle for good blood return. Aspirate for waste and specimen sent to lab. Site flushed easily with 10 mL NSS followed by 5 mL Heparin solution 100 units/ml rinse prior to de-access. Dry sterile dressing to area. Tolerated procedure well. Encouraged to schedule port flush every 4 weeks.

## 2022-07-07 ENCOUNTER — HOSPITAL ENCOUNTER (OUTPATIENT)
Dept: INFUSION THERAPY | Age: 72
Discharge: HOME | End: 2022-07-07
Payer: MEDICARE

## 2022-07-07 ENCOUNTER — OFFICE VISIT (OUTPATIENT)
Dept: ONCOLOGY | Age: 72
End: 2022-07-07

## 2022-07-07 VITALS — HEART RATE: 50 BPM | SYSTOLIC BLOOD PRESSURE: 129 MMHG | DIASTOLIC BLOOD PRESSURE: 64 MMHG

## 2022-07-07 VITALS
BODY MASS INDEX: 18.85 KG/M2 | SYSTOLIC BLOOD PRESSURE: 116 MMHG | WEIGHT: 124.4 LBS | OXYGEN SATURATION: 95 % | HEART RATE: 63 BPM | HEIGHT: 68 IN | TEMPERATURE: 97.4 F | DIASTOLIC BLOOD PRESSURE: 65 MMHG

## 2022-07-07 DIAGNOSIS — C64.1 RENAL CELL CANCER, RIGHT (HCC): Primary | ICD-10-CM

## 2022-07-07 PROCEDURE — 2580000003 HC RX 258: Performed by: INTERNAL MEDICINE

## 2022-07-07 PROCEDURE — 96413 CHEMO IV INFUSION 1 HR: CPT

## 2022-07-07 PROCEDURE — 6360000002 HC RX W HCPCS: Performed by: INTERNAL MEDICINE

## 2022-07-07 RX ORDER — HEPARIN SODIUM (PORCINE) LOCK FLUSH IV SOLN 100 UNIT/ML 100 UNIT/ML
500 SOLUTION INTRAVENOUS PRN
Status: DISCONTINUED | OUTPATIENT
Start: 2022-07-07 | End: 2022-07-08 | Stop reason: HOSPADM

## 2022-07-07 RX ORDER — SODIUM CHLORIDE 0.9 % (FLUSH) 0.9 %
5-40 SYRINGE (ML) INJECTION PRN
Status: DISCONTINUED | OUTPATIENT
Start: 2022-07-07 | End: 2022-07-08 | Stop reason: HOSPADM

## 2022-07-07 RX ORDER — SODIUM CHLORIDE 9 MG/ML
20 INJECTION, SOLUTION INTRAVENOUS ONCE
Status: CANCELLED | OUTPATIENT
Start: 2022-07-07 | End: 2022-07-07

## 2022-07-07 RX ORDER — DIPHENHYDRAMINE HYDROCHLORIDE 50 MG/ML
50 INJECTION INTRAMUSCULAR; INTRAVENOUS ONCE
Status: CANCELLED | OUTPATIENT
Start: 2022-07-07 | End: 2022-07-07

## 2022-07-07 RX ORDER — SODIUM CHLORIDE 9 MG/ML
5-40 INJECTION INTRAVENOUS PRN
Status: CANCELLED | OUTPATIENT
Start: 2022-07-07

## 2022-07-07 RX ORDER — SODIUM CHLORIDE 0.9 % (FLUSH) 0.9 %
5-40 SYRINGE (ML) INJECTION PRN
Status: CANCELLED | OUTPATIENT
Start: 2022-07-07

## 2022-07-07 RX ORDER — METHYLPREDNISOLONE SODIUM SUCCINATE 125 MG/2ML
125 INJECTION, POWDER, LYOPHILIZED, FOR SOLUTION INTRAMUSCULAR; INTRAVENOUS ONCE
Status: CANCELLED | OUTPATIENT
Start: 2022-07-07 | End: 2022-07-07

## 2022-07-07 RX ORDER — HEPARIN SODIUM (PORCINE) LOCK FLUSH IV SOLN 100 UNIT/ML 100 UNIT/ML
500 SOLUTION INTRAVENOUS PRN
Status: CANCELLED | OUTPATIENT
Start: 2022-07-07

## 2022-07-07 RX ORDER — SODIUM CHLORIDE 9 MG/ML
20 INJECTION, SOLUTION INTRAVENOUS ONCE
Status: COMPLETED | OUTPATIENT
Start: 2022-07-07 | End: 2022-07-07

## 2022-07-07 RX ORDER — SODIUM CHLORIDE 9 MG/ML
25 INJECTION, SOLUTION INTRAVENOUS PRN
Status: CANCELLED | OUTPATIENT
Start: 2022-07-07

## 2022-07-07 RX ORDER — EPINEPHRINE 1 MG/ML
0.3 INJECTION, SOLUTION, CONCENTRATE INTRAVENOUS PRN
Status: CANCELLED | OUTPATIENT
Start: 2022-07-07

## 2022-07-07 RX ORDER — SODIUM CHLORIDE 9 MG/ML
INJECTION, SOLUTION INTRAVENOUS CONTINUOUS
Status: CANCELLED | OUTPATIENT
Start: 2022-07-07

## 2022-07-07 RX ADMIN — SODIUM CHLORIDE 480 MG: 9 INJECTION, SOLUTION INTRAVENOUS at 11:08

## 2022-07-07 RX ADMIN — Medication 500 UNITS: at 11:45

## 2022-07-07 RX ADMIN — SODIUM CHLORIDE, PRESERVATIVE FREE 10 ML: 5 INJECTION INTRAVENOUS at 11:45

## 2022-07-07 RX ADMIN — SODIUM CHLORIDE 20 ML/HR: 9 INJECTION, SOLUTION INTRAVENOUS at 11:08

## 2022-07-07 NOTE — PROGRESS NOTES
[address]  [address] [place] [address]   Hematology/Oncology  Consult      Patient Name: Corina Humphreys  YOB: 1950  PCP: SHI Kapoor NP   Referring Provider:      Reason for Consultation:   Chief Complaint   Patient presents with    Cancer     renal    Follow-up      Interval history: No new complaints. Right chest wall mass has resolved. Feels well. Stable weight. History of Present Illness:  73years old male referred for possible metastatic renal cell carcinoma. Patient presented to his PCP, HETAL Pathak, complaining of right side chest wall mass which he noticed about a month ago and had been gradually increasing in size. CT chest from October 2021 showed mltiple heterogenous and necrotic masses in the right kidney with possible tumor invasion of the right renal vein. In addition to this there was a large 4 by 9 cm soft tissue necrotic mass involving the fourth rib. There were other lytic destructive lesions in the right eighth and ninth rib. There were multiple right lung nodules ranging upto 1.5 cm. Few liver nodules were noted as well the largest being 7 mm. 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 PET scan showed hypermetabolic lesions in the right kidney, left adrenal diffuse skeletal metastasis, scattered pulmonary nodules most of them subcentimeter except for 1 right lung hypermetabolic nodule. MRI brain reviewed no intracranial metastasis. S/p right chest wall mass biopsy on 11/2/2021. Consistent with clear-cell renal cell carcinoma. Performance status seems fair Karnofsky performance status 80/70. Intermediate risk based on [facility] risk factors although the disease seems to have progressed rapidly over the past month. His rapid progression as well as renal vein involvement probably precludes debulking nephrectomy. Tumor burden is not high and patient is asymptomatic.   Recommended treatment with ipilimumab/nivolumab combination. Started ipilimumab/nivolumab in November 2021. Switch to nivolumab monotherapy after 4 cycles and 3/2022. Patient reports pain in the right side chest wall mass which is well controlled with Tylenol as needed. Denies recent weight loss, loss of appetite night sweats, back pain. Review of systems: Over 10 systems were reviewed and all were negative except as mentioned above. Past medical history: Hypertension. Coronary artery disease, peripheral arterial disease, hyperlipidemia. Past surgical history: History of CABG, femoral arterial bypass, hernia repair. Social history: Smokes half a pack a day, denies alcohol or drug abuse. Family history Sister had breast cancer, father had colon cancer. Diagnostic Data:     Past Medical History:   Diagnosis Date    Anticoagulant long-term use 2007    aspirin    CAD (coronary artery disease)     Cancer (HCC)     kidney    Deep vein thrombosis (DVT) (HCC)     leg    [place] (hard of hearing)     Hx of blood clots     Hyperlipidemia     Hypertension     Mentally challenged     information per patient's niece. Patient Active Problem List    Diagnosis Date Noted    Poor venous access     Renal cell cancer, right ([address] 11/11/2021        Past Surgical History:   Procedure Laterality Date   [facility]    patient's sister said they were told a twin was removed from paitent's abdomen.    [address]  2007    bypass    CATHETER INSERTION Left 11/16/2021    MEDIPORT CATHETER INSERTION performed by Bernice Jensen MD at [facility] [address]      bifemoral    IR PERCUT BX LUNG/MEDIASTINUM  11/2/2021    IR PERCUT BX LUNG/MEDIASTINUM 11/2/2021 [facility] CT       Family History  Family History   Problem Relation Age of Onset    High Blood Pressure Mother     Heart Disease Mother     Heart Attack Mother     Cancer Father 64        colon    Arthritis Sister     Heart Attack Brother     No Known Problems Maternal Uncle     No Known Problems Paternal Aunt     No Known Problems Paternal Uncle     No Known Problems Maternal Grandmother     Cancer Maternal Grandfather         throat    Heart Attack Paternal Grandmother     No Known Problems Paternal Grandfather     Cirrhosis Paternal Cousin     Deep Vein Thrombosis Sister     Diabetes Sister     Heart Disease Sister         triple bypass    Breast Cancer Sister 61    High Blood Pressure Sister     Diabetes Sister     High Cholesterol Sister     Arthritis Sister        Social History    TOBACCO:   reports that he has been smoking cigarettes. He has a 13.50 pack-year smoking history. He has never used smokeless tobacco.  ETOH:   reports previous alcohol use. Home Medications  Prior to Admission medications    Medication Sig Start Date End Date Taking? Authorizing Provider   levothyroxine (SYNTHROID) 75 MCG tablet Take 1 tablet by mouth daily 4/14/22 7/7/22 Yes Fred Napoles MD   Calcium Carbonate-Vitamin D (OYSTER SHELL CALCIUM/D) 500-200 MG-UNIT TABS Take 2 tablets by mouth daily 3/31/22 3/31/23 Yes Fred Napoles MD   levothyroxine (SYNTHROID) 50 MCG tablet Take 1 tablet by mouth daily 3/3/22 7/7/22 Yes Fred Napoles MD   lidocaine-prilocaine (EMLA) 2.5-2.5 % cream Apply topically as needed.  12/9/21  Yes Carol Lung, APRN - CNP   ARTHRITIS PAIN RELIEF 650 MG extended release tablet TAKE TWO TABLETS BY MOUTH AT BEDTIME 9/21/21  Yes Historical Provider, MD   metoprolol tartrate (LOPRESSOR) 25 MG tablet Take 25 mg by mouth 2 times daily   Yes Historical Provider, MD   aspirin 81 MG EC tablet Take 81 mg by mouth daily LD 10/26/21   Yes Historical Provider, MD   simvastatin (ZOCOR) 80 MG tablet Take 80 mg by mouth nightly   Yes Historical Provider, MD   lisinopril (PRINIVIL;ZESTRIL) 20 MG tablet Take 20 mg by mouth daily   Yes Historical Provider, MD       Allergies  No Known Allergies    Review of Systems:      Objective  /65 Pulse 63   Temp 97.4 °F (36.3 °C)   Ht 5' 8\" (1.727 m)   Wt 124 lb 6.4 oz (56.4 kg)   SpO2 95%   BMI 18.91 kg/m²     Physical Performance Status    Physical Exam:  General: AAO to person, place, time, and purpose, appears stated age, cooperative, no acute distress, pleasant   Head and neck : PERRLA, EOMI . Sclera non icteric. Oropharynx : Clear  Neck: no JVD,  no adenopathy, no carotid bruit  LYMPHATICS : no lap  Lungs: Clear to auscultation. Right side chest wall globular mass, 4-5 cm in diamter has completely resolved. Heart: Regular rate and regular rhythm, no murmur, normal S1, S2  Abdomen: Soft, non-tender;no masses, no organomegaly  Extremities: No edema,no cyanosis, no clubbing. Skin:  No rash. Neurologic:Cranial nerves grossly intact. No focal motor or sensory deficits .     Recent Laboratory Data-   Lab Results   Component Value Date    WBC 8.2 07/06/2022    HGB 12.2 (L) 07/06/2022    HCT 36.3 (L) 07/06/2022    .6 (H) 07/06/2022     07/06/2022    LYMPHOPCT 40.2 07/06/2022    RBC 3.61 (L) 07/06/2022    MCH 33.8 07/06/2022    MCHC 33.6 07/06/2022    RDW 14.6 07/06/2022    NEUTOPHILPCT 46.9 07/06/2022    MONOPCT 7.1 07/06/2022    BASOPCT 1.1 07/06/2022    NEUTROABS 3.84 07/06/2022    LYMPHSABS 3.29 07/06/2022    MONOSABS 0.58 07/06/2022    EOSABS 0.37 07/06/2022    BASOSABS 0.09 07/06/2022       Lab Results   Component Value Date     07/06/2022    K 3.8 07/06/2022     07/06/2022    CO2 26 07/06/2022    BUN 4 (L) 07/06/2022    CREATININE 1.0 07/06/2022    GLUCOSE 139 (H) 07/06/2022    CALCIUM 8.5 (L) 07/06/2022    PROT 6.6 07/06/2022    LABALBU 4.2 07/06/2022    BILITOT 0.2 07/06/2022    ALKPHOS 94 07/06/2022    AST 17 07/06/2022    ALT 11 07/06/2022    LABGLOM >60 07/06/2022    GFRAA >60 07/06/2022       No results found for: IRON, TIBC, FERRITIN        Radiology-    Echo Complete    Result Date: 10/8/2021  Transthoracic Echocardiography Report (TTE)  Demographics   Patient Name    [facility]        Gender            Male                  Cyrus Hernández   Medical Record  [id number]      Room Number  Number   Account #       [de-identified]     Procedure Date    10/08/2021   Corporate ID                  Ordering          Mandi Weber                                 Physician   Accession       [id number]    Referring         Mandiashley Weber   Number                        Physician   Date of Birth   1950    Sonographer       Lj Rondon CS   Age             73 year(s)    Interpreting      [address]                                Physician         Physician Cardiology                                                  Jordana Hall MD                                 Any Other  Procedure Type of Study   TTE procedure:Echo Complete W/Doppler & Color Flow. Procedure Date Date: 10/08/2021 Start: 09:57 AM Study Location: Echo Lab Technical Quality: Poor visualization due to poor acoustical window. Indications:Heart murmur. Patient Status: Routine Height: 68 inches Weight: 120 pounds BSA: 1.65 m^2 BMI: 18.25 kg/m^2  Findings   Left Ventricle  Normal left ventricular chamber size. Normal left ventricular systolic function. Visually estimated LVEF 65%. Mild left ventricular concentric hypertrophy noted. Normal diastolic function. Right Ventricle  Normal right ventricle size and function. Left Atrium  The left atrium is mildly dilated. Mildly increased left atrial volume. Interatrial septum not well visualized but appears intact. Right Atrium  Normal right atrium. Mitral Valve  Normal mitral valve structure. There is moderate mitral regurgitation - ERO 0.23cm2, PISA 0.9cm, RV 51cc. No mitral valve prolapse. Tricuspid Valve  Normal tricuspid valve structure. There is mild tricuspid regurgitation. Mild pulmonary hypertension, RVSP 39mmHg. Aortic Valve  Normal aortic valve structure. There is trace to mild aortic regurgitation, pressure 1/2 time 718ms.    Pulmonic Valve  The pulmonic valve was not well visualized. Pericardial Effusion  No evidence of pericardial effusion. Pericardium appears normal.   Pleural Effusion  No evidence of pleural effusion. Aorta  Aortic root 3.5cm. Miscellaneous  The inferior vena cava diameter is normal with normal respiratory  variation. Conclusions   Summary  Normal left ventricular chamber size. Normal left ventricular systolic function, LVEF 48%. Mild left ventricular concentric hypertrophy noted. Normal diastolic function. The left atrium is mildly dilated. Mildly increased left atrial volume. Interatrial septum not well visualized but appears intact. Normal right ventricle size and function. There is moderate mitral regurgitation - ERO 0.23cm2, PISA 0.9cm, RV 51cc. No mitral valve prolapse. There is trace to mild aortic regurgitation, pressure 1/2 time 718ms. There is mild tricuspid regurgitation. Mild pulmonary hypertension, RVSP 39mmHg. The pulmonic valve was not well visualized. Aortic root 3.5cm. No evidence of pericardial effusion. No intra cardiac mass or thrombus. No comparison study available.    Signature   ----------------------------------------------------------------  Electronically signed by Peg Medrano MD(Interpreting  physician) on 10/08/2021 03:52 PM  ----------------------------------------------------------------  M-Mode/2D Measurements & Calculations   LV Diastolic    LV Systolic Dimension: 3.2   AV Cusp Separation: 1.9 cmLA  Dimension: 5.3  cm                           Dimension: 3.9 cmAO Root  cm              LV Volume Diastolic: 778.7   Dimension: 3.5 cm  LV FS:39.6 %    ml  LV PW           LV Volume Systolic: 33.1 ml  Diastolic: 1.2  LV EDV/LV EDV Index: 134.5  cm              ml/82 ml/m^2LV ESV/LV ESV    RV Diastolic Dimension: 2.8  LV PW Systolic: Index: 48.0 OB/72BI/ m^2     cm  1.5 cm          EF Calculated: 70 %          RV Systolic Dimension: 2.4 cm  Septum          LV Mass Index: 147 l/min*m^2 LA/Aorta: 1.2  Diastolic: 1.1  cm                                           LA volume/Index: 79.7 ml  Septum          LVOT: 2 cm  Systolic: 1.2  cm   LV Mass: 241.96  g  Doppler Measurements & Calculations   MV Peak E-Wave: 1.42   AV Peak Velocity: 1.87 m/s  LVOT Peak Velocity:  m/s                    AV Peak Gradient: 14.05     1.55 m/s  MV Peak A-Wave: 0.82   mmHg                        LVOT Mean Velocity:  m/s                    AV Mean Velocity: 1.27 m/s  0.81 m/s  MV E/A Ratio: 1.73     AV Mean Gradient: 7.2 mmHg  LVOT Peak Gradient: 9.6  MV Peak Gradient: 9.2  AV VTI: 42.1 cm             mmHgLVOT Mean Gradient:  mmHg                   AV Area (Continuity):2.37   3.5 mmHg  MV Mean Gradient: 2.9  cm^2                        Estimated RVSP: 39 mmHg  mmHg                   AV Deceleration Time:       Estimated RAP:10 mmHg  MV Mean Velocity: 0.75 2474.3 msec  m/s                    LVOT VTI: 31.8 cm  MV Deceleration Time:                              TR Velocity:2.69 m/s  271.4 msec             Estimated PASP: 39.03 mmHg  TR Gradient:29.03 mmHg  MV P1/2t: 101.8 msec   Pulm. Vein A Reversal       PV Peak Velocity: 0.51  MVA by PHT:2.16 cm^2   Duration:175.3 msec         m/s  MV Area (continuity):  Pulm. Vein D Velocity:0.74  PV Peak Gradient: 1.02  1.9 cm^2               m/sPulm. Vein A Reversal    mmHg                         Velocity:0.38 m/s           PV Mean Velocity: 0.36                         Pulm. Vein S Velocity: 0.44 m/s  MR Velocity: 5.37 m/s  m/s                         PV Mean Gradient: 0.6  MV RICARDO PISA: 0.23 cm^2                             mmHg  MR VTI: 221.9 cm  Alias Velocity: 0.25  m/sPISA Radius: 0.9 cm   PISA area: 5.09 cm^2MR  flow rate: 125.72  ml/sMR volume:51.04 ml  [id number]    CT CHEST W CONTRAST    Addendum Date: 10/13/2021    ADDENDUM: 6 mm indeterminate hypodense lesions are identified in the body and tail of the pancreas. Consider surveillance     Result Date: 10/13/2021  EXAMINATION: CT OF THE CHEST WITH CONTRAST 10/13/2021 10:59 am TECHNIQUE: CT of the chest was performed with the administration of intravenous contrast. Multiplanar reformatted images are provided for review. Dose modulation, iterative reconstruction, and/or weight based adjustment of the mA/kV was utilized to reduce the radiation dose to as low as reasonably achievable. COMPARISON: None. HISTORY: ORDERING SYSTEM PROVIDED HISTORY: Chest wall mass FINDINGS: There is cardiomegaly. There is coronary artery calcification. The great vessels are normal.  Moderately enlarged mediastinal and hilar lymph nodes are noted with lymph nodes measuring up to 1.3 cm in the right hilum. Trachea and major bronchi are patent. Multiple bilateral pulmonary nodules are identified with nodules measuring up to 1.5 cm in the right lower lobe and smaller ones in the right middle lobe and right upper lobe. The pulmonary nodules in the left lower lobe ranges from 5 mm up to 8 mm. There is no focal consolidation or pleural effusion. Punctate enhancing nodules are identified in the posterior right hepatic lobe, largest 1 measuring up to 7 mm. Multiple heterogeneously enhancing right renal masses are identified largest 1 measuring 3.8 x 3.3 cm which is partially necrotic with additional smaller nodules concerning for multifocal malignancy like renal cell carcinoma. There is filling defects in the right renal vein concerning for tumor invasion versus tumor embolism of the renal vein. There is bilateral adrenal metastasis with the largest 1 in the left adrenal gland measuring 1.6 x 2.7 cm. There is bone metastasis with the destructive soft tissue mass involving the right 4th rib anterolaterally with adjacent soft tissue mass which is partially necrotic measuring 4.5 x 9.2 cm.   Lytic destructive lesions are also identified in the right 8th and 9th rib near the costo vertebral junction. Multiple heterogeneous  enhancing and necrotic masses in the right kidney concerning for multifocal renal cell carcinoma with  possible tumor invasion or tumor embolism of the right renal vein. There is diffuse metastasis with the involvement of the right and left adrenal glands, possible hepatic metastasis, widespread pulmonary and rib involvement as noted. Further assessment by PET-CT scan is recommended. Findings were telephoned to licensed caregiver. ASSESSMENT/PLAN :    Donis Sharma was seen today for cancer and follow-up. Diagnoses and all orders for this visit:    Renal cell cancer, right ([address]  -     CBC with Auto Differential; Future  -     Comprehensive Metabolic Panel; Future  -     TSH; Future         73years old male with Stage IV renal cell carcinoma (diagnosed 11/2021- chest wall mass biopsy) -right chest wall mass, multiple right kidney necrotic lesions in addition to large soft tissue destructive mass of right fourth rib, multiple lung nodules up to 1.5 cm, bilateral adrenal metastasis up to 2.5 cm. ECOG 1/2. Started ipilimumab/nivolumab combination in November 2021. S/p 4 cycles. Switched to single agent Opdivo in March 2022. We will continue Opdivo 480 mg q 4 weeks till progression. Tolerating treatment well. No new complaints. Reviewed labs which are unremarkable. PET scan 3/2022 showed a good response. We will re order next month    Reviewed blood work from yesterday-unremarkable CBC CMP. Primary hypothyroidism: With low normal T4, elevated TSH. Related to immunotherapy. Asymptomatic. On 75 mcg daily Synthroid. TSH has come down to 7 from 50. We will continue with same dose and reassess in a month. Xgeva every 4 weeks. Patient is edentulous. Ca low. Hold Xgeva today. Continue ca/vitamin d. Return to clinic in 4 weeks.     Return in about 4 weeks (around 8/4/2022).      Jennifer Paris MD   Electronically signed 7/7/2022 at 10:40 AM

## 2022-08-03 ENCOUNTER — HOSPITAL ENCOUNTER (OUTPATIENT)
Dept: INFUSION THERAPY | Age: 72
Discharge: HOME | End: 2022-08-03
Payer: MEDICARE

## 2022-08-03 DIAGNOSIS — C64.1 RENAL CELL CANCER, RIGHT (HCC): Primary | ICD-10-CM

## 2022-08-03 LAB
ALBUMIN SERPL-MCNC: 4 G/DL (ref 3.5–5.2)
ALP BLD-CCNC: 94 U/L (ref 40–129)
ALT SERPL-CCNC: 9 U/L (ref 0–40)
ANION GAP SERPL CALCULATED.3IONS-SCNC: 10 MMOL/L (ref 7–16)
AST SERPL-CCNC: 13 U/L (ref 0–39)
BASOPHILS ABSOLUTE: 0.1 E9/L (ref 0–0.2)
BASOPHILS RELATIVE PERCENT: 1.1 % (ref 0–2)
BILIRUB SERPL-MCNC: <0.2 MG/DL (ref 0–1.2)
BUN BLDV-MCNC: 7 MG/DL (ref 6–23)
CALCIUM SERPL-MCNC: 8.6 MG/DL (ref 8.6–10.2)
CHLORIDE BLD-SCNC: 99 MMOL/L (ref 98–107)
CO2: 25 MMOL/L (ref 22–29)
CREAT SERPL-MCNC: 0.9 MG/DL (ref 0.7–1.2)
EOSINOPHILS ABSOLUTE: 0.35 E9/L (ref 0.05–0.5)
EOSINOPHILS RELATIVE PERCENT: 3.9 % (ref 0–6)
GFR AFRICAN AMERICAN: >60
GFR NON-AFRICAN AMERICAN: >60 ML/MIN/1.73
GLUCOSE BLD-MCNC: 123 MG/DL (ref 74–99)
HCT VFR BLD CALC: 35.9 % (ref 37–54)
HEMOGLOBIN: 12 G/DL (ref 12.5–16.5)
IMMATURE GRANULOCYTES #: 0.02 E9/L
IMMATURE GRANULOCYTES %: 0.2 % (ref 0–5)
LYMPHOCYTES ABSOLUTE: 3.5 E9/L (ref 1.5–4)
LYMPHOCYTES RELATIVE PERCENT: 39.1 % (ref 20–42)
MCH RBC QN AUTO: 33.3 PG (ref 26–35)
MCHC RBC AUTO-ENTMCNC: 33.4 % (ref 32–34.5)
MCV RBC AUTO: 99.7 FL (ref 80–99.9)
MONOCYTES ABSOLUTE: 0.62 E9/L (ref 0.1–0.95)
MONOCYTES RELATIVE PERCENT: 6.9 % (ref 2–12)
NEUTROPHILS ABSOLUTE: 4.35 E9/L (ref 1.8–7.3)
NEUTROPHILS RELATIVE PERCENT: 48.8 % (ref 43–80)
PDW BLD-RTO: 14.4 FL (ref 11.5–15)
PLATELET # BLD: 215 E9/L (ref 130–450)
PMV BLD AUTO: 9.9 FL (ref 7–12)
POTASSIUM SERPL-SCNC: 4 MMOL/L (ref 3.5–5)
RBC # BLD: 3.6 E12/L (ref 3.8–5.8)
SODIUM BLD-SCNC: 134 MMOL/L (ref 132–146)
TOTAL PROTEIN: 6.5 G/DL (ref 6.4–8.3)
TSH SERPL DL<=0.05 MIU/L-ACNC: 6.88 UIU/ML (ref 0.27–4.2)
WBC # BLD: 8.9 E9/L (ref 4.5–11.5)

## 2022-08-03 PROCEDURE — 2580000003 HC RX 258: Performed by: INTERNAL MEDICINE

## 2022-08-03 PROCEDURE — 36591 DRAW BLOOD OFF VENOUS DEVICE: CPT

## 2022-08-03 PROCEDURE — 80053 COMPREHEN METABOLIC PANEL: CPT

## 2022-08-03 PROCEDURE — 85025 COMPLETE CBC W/AUTO DIFF WBC: CPT

## 2022-08-03 PROCEDURE — 84443 ASSAY THYROID STIM HORMONE: CPT

## 2022-08-03 PROCEDURE — 6360000002 HC RX W HCPCS: Performed by: INTERNAL MEDICINE

## 2022-08-03 RX ORDER — HEPARIN SODIUM (PORCINE) LOCK FLUSH IV SOLN 100 UNIT/ML 100 UNIT/ML
500 SOLUTION INTRAVENOUS PRN
Status: DISCONTINUED | OUTPATIENT
Start: 2022-08-03 | End: 2022-08-04 | Stop reason: HOSPADM

## 2022-08-03 RX ORDER — SODIUM CHLORIDE 0.9 % (FLUSH) 0.9 %
5-40 SYRINGE (ML) INJECTION PRN
Status: DISCONTINUED | OUTPATIENT
Start: 2022-08-03 | End: 2022-08-04 | Stop reason: HOSPADM

## 2022-08-03 RX ORDER — HEPARIN SODIUM (PORCINE) LOCK FLUSH IV SOLN 100 UNIT/ML 100 UNIT/ML
500 SOLUTION INTRAVENOUS PRN
Status: CANCELLED | OUTPATIENT
Start: 2022-08-03

## 2022-08-03 RX ORDER — SODIUM CHLORIDE 0.9 % (FLUSH) 0.9 %
5-40 SYRINGE (ML) INJECTION PRN
Status: CANCELLED | OUTPATIENT
Start: 2022-08-03

## 2022-08-03 RX ADMIN — SODIUM CHLORIDE, PRESERVATIVE FREE 10 ML: 5 INJECTION INTRAVENOUS at 10:42

## 2022-08-03 RX ADMIN — Medication 500 UNITS: at 10:43

## 2022-08-03 RX ADMIN — SODIUM CHLORIDE, PRESERVATIVE FREE 10 ML: 5 INJECTION INTRAVENOUS at 10:43

## 2022-08-04 ENCOUNTER — HOSPITAL ENCOUNTER (OUTPATIENT)
Dept: INFUSION THERAPY | Age: 72
Discharge: HOME | End: 2022-08-04
Payer: MEDICARE

## 2022-08-04 ENCOUNTER — OFFICE VISIT (OUTPATIENT)
Dept: ONCOLOGY | Age: 72
End: 2022-08-04

## 2022-08-04 VITALS
BODY MASS INDEX: 19.04 KG/M2 | HEART RATE: 53 BPM | DIASTOLIC BLOOD PRESSURE: 72 MMHG | WEIGHT: 125.6 LBS | TEMPERATURE: 97.8 F | OXYGEN SATURATION: 100 % | SYSTOLIC BLOOD PRESSURE: 112 MMHG | HEIGHT: 68 IN

## 2022-08-04 VITALS — DIASTOLIC BLOOD PRESSURE: 64 MMHG | SYSTOLIC BLOOD PRESSURE: 108 MMHG | HEART RATE: 56 BPM

## 2022-08-04 DIAGNOSIS — C64.1 RENAL CELL CANCER, RIGHT (HCC): Primary | ICD-10-CM

## 2022-08-04 PROCEDURE — 96372 THER/PROPH/DIAG INJ SC/IM: CPT

## 2022-08-04 PROCEDURE — 6360000002 HC RX W HCPCS: Performed by: INTERNAL MEDICINE

## 2022-08-04 PROCEDURE — 6360000002 HC RX W HCPCS

## 2022-08-04 PROCEDURE — 96413 CHEMO IV INFUSION 1 HR: CPT

## 2022-08-04 PROCEDURE — 2580000003 HC RX 258: Performed by: INTERNAL MEDICINE

## 2022-08-04 RX ORDER — SODIUM CHLORIDE 9 MG/ML
20 INJECTION, SOLUTION INTRAVENOUS ONCE
Status: COMPLETED | OUTPATIENT
Start: 2022-08-04 | End: 2022-08-04

## 2022-08-04 RX ORDER — MEPERIDINE HYDROCHLORIDE 25 MG/ML
12.5 INJECTION INTRAMUSCULAR; INTRAVENOUS; SUBCUTANEOUS ONCE
Status: CANCELLED | OUTPATIENT
Start: 2022-08-04 | End: 2022-08-04

## 2022-08-04 RX ORDER — EPINEPHRINE 1 MG/ML
0.3 INJECTION, SOLUTION, CONCENTRATE INTRAVENOUS PRN
Status: CANCELLED | OUTPATIENT
Start: 2022-08-04

## 2022-08-04 RX ORDER — SODIUM CHLORIDE 9 MG/ML
5-40 INJECTION INTRAVENOUS PRN
Status: CANCELLED | OUTPATIENT
Start: 2022-08-04

## 2022-08-04 RX ORDER — SODIUM CHLORIDE 0.9 % (FLUSH) 0.9 %
5-40 SYRINGE (ML) INJECTION PRN
Status: CANCELLED | OUTPATIENT
Start: 2022-08-04

## 2022-08-04 RX ORDER — SODIUM CHLORIDE 9 MG/ML
INJECTION, SOLUTION INTRAVENOUS CONTINUOUS
Status: CANCELLED | OUTPATIENT
Start: 2022-08-04

## 2022-08-04 RX ORDER — HEPARIN SODIUM (PORCINE) LOCK FLUSH IV SOLN 100 UNIT/ML 100 UNIT/ML
SOLUTION INTRAVENOUS
Status: COMPLETED
Start: 2022-08-04 | End: 2022-08-04

## 2022-08-04 RX ORDER — SODIUM CHLORIDE 9 MG/ML
25 INJECTION, SOLUTION INTRAVENOUS PRN
Status: CANCELLED | OUTPATIENT
Start: 2022-08-04

## 2022-08-04 RX ORDER — DIPHENHYDRAMINE HYDROCHLORIDE 50 MG/ML
50 INJECTION INTRAMUSCULAR; INTRAVENOUS ONCE
Status: CANCELLED | OUTPATIENT
Start: 2022-08-04 | End: 2022-08-04

## 2022-08-04 RX ORDER — SODIUM CHLORIDE 9 MG/ML
5-40 INJECTION INTRAVENOUS PRN
Status: DISCONTINUED | OUTPATIENT
Start: 2022-08-04 | End: 2022-08-05 | Stop reason: HOSPADM

## 2022-08-04 RX ORDER — HEPARIN SODIUM (PORCINE) LOCK FLUSH IV SOLN 100 UNIT/ML 100 UNIT/ML
500 SOLUTION INTRAVENOUS PRN
Status: DISCONTINUED | OUTPATIENT
Start: 2022-08-04 | End: 2022-08-05 | Stop reason: HOSPADM

## 2022-08-04 RX ORDER — SODIUM CHLORIDE 9 MG/ML
20 INJECTION, SOLUTION INTRAVENOUS ONCE
Status: CANCELLED | OUTPATIENT
Start: 2022-08-04 | End: 2022-08-04

## 2022-08-04 RX ORDER — METHYLPREDNISOLONE SODIUM SUCCINATE 125 MG/2ML
125 INJECTION, POWDER, LYOPHILIZED, FOR SOLUTION INTRAMUSCULAR; INTRAVENOUS ONCE
Status: CANCELLED | OUTPATIENT
Start: 2022-08-04 | End: 2022-08-04

## 2022-08-04 RX ORDER — HEPARIN SODIUM (PORCINE) LOCK FLUSH IV SOLN 100 UNIT/ML 100 UNIT/ML
500 SOLUTION INTRAVENOUS PRN
Status: CANCELLED | OUTPATIENT
Start: 2022-08-04

## 2022-08-04 RX ADMIN — SODIUM CHLORIDE 20 ML/HR: 9 INJECTION, SOLUTION INTRAVENOUS at 10:49

## 2022-08-04 RX ADMIN — SODIUM CHLORIDE, PRESERVATIVE FREE 10 ML: 5 INJECTION INTRAVENOUS at 12:01

## 2022-08-04 RX ADMIN — HEPARIN SODIUM (PORCINE) LOCK FLUSH IV SOLN 100 UNIT/ML 500 UNITS: 100 SOLUTION at 12:01

## 2022-08-04 RX ADMIN — SODIUM CHLORIDE 480 MG: 9 INJECTION, SOLUTION INTRAVENOUS at 11:14

## 2022-08-04 RX ADMIN — DENOSUMAB 120 MG: 120 INJECTION SUBCUTANEOUS at 11:16

## 2022-08-04 RX ADMIN — Medication 500 UNITS: at 12:01

## 2022-08-04 NOTE — PROGRESS NOTES
[address]  [address] [place] [address]   Hematology/Oncology  Consult      Patient Name: Ellie Oquendo  YOB: 1950  PCP: SHI Powell NP   Referring Provider:      Reason for Consultation:   Chief Complaint   Patient presents with    Cancer    Chemotherapy    Follow-up     Renal cell cancer, right [facility])      Interval history: No new complaints. Right chest wall mass has resolved. Feels well. Stable weight. History of Present Illness:  73years old male referred for possible metastatic renal cell carcinoma. Patient presented to his PCP, HETAL Pathak, complaining of right side chest wall mass which he noticed about a month ago and had been gradually increasing in size. CT chest from October 2021 showed mltiple heterogenous and necrotic masses in the right kidney with possible tumor invasion of the right renal vein. In addition to this there was a large 4 by 9 cm soft tissue necrotic mass involving the fourth rib. There were other lytic destructive lesions in the right eighth and ninth rib. There were multiple right lung nodules ranging upto 1.5 cm. Few liver nodules were noted as well the largest being 7 mm. 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 PET scan showed hypermetabolic lesions in the right kidney, left adrenal diffuse skeletal metastasis, scattered pulmonary nodules most of them subcentimeter except for 1 right lung hypermetabolic nodule. MRI brain reviewed no intracranial metastasis. S/p right chest wall mass biopsy on 11/2/2021. Consistent with clear-cell renal cell carcinoma. Performance status seems fair Karnofsky performance status 80/70. Intermediate risk based on [facility] risk factors although the disease seems to have progressed rapidly over the past month. His rapid progression as well as renal vein involvement probably precludes debulking nephrectomy.   Tumor burden is not high and patient is asymptomatic. Recommended treatment with ipilimumab/nivolumab combination. Started ipilimumab/nivolumab in November 2021. Switch to nivolumab monotherapy after 4 cycles and 3/2022. Patient reports pain in the right side chest wall mass which is well controlled with Tylenol as needed. Denies recent weight loss, loss of appetite night sweats, back pain. Review of systems: Over 10 systems were reviewed and all were negative except as mentioned above. Past medical history: Hypertension. Coronary artery disease, peripheral arterial disease, hyperlipidemia. Past surgical history: History of CABG, femoral arterial bypass, hernia repair. Social history: Smokes half a pack a day, denies alcohol or drug abuse. Family history Sister had breast cancer, father had colon cancer. Diagnostic Data:     Past Medical History:   Diagnosis Date    Anticoagulant long-term use 2007    aspirin    CAD (coronary artery disease)     Cancer (HCC)     kidney    Deep vein thrombosis (DVT) (HCC)     leg    [place] (hard of hearing)     Hx of blood clots     Hyperlipidemia     Hypertension     Mentally challenged     information per patient's niece. Patient Active Problem List    Diagnosis Date Noted    Poor venous access     Renal cell cancer, right ([address] 11/11/2021        Past Surgical History:   Procedure Laterality Date    [facility]    patient's sister said they were told a twin was removed from paitent's abdomen.     CARDIAC SURGERY  2007    bypass    CATHETER INSERTION Left 11/16/2021    MEDIPORT CATHETER INSERTION performed by Rudy Prado MD at [address] [place] [address] [place]      bifemoral    IR PERCUT BX LUNG/MEDIASTINUM  11/2/2021    IR PERCUT BX LUNG/MEDIASTINUM 11/2/2021 [facility] CT       Family History  Family History   Problem Relation Age of Onset    High Blood Pressure Mother     Heart Disease Mother     Heart Attack Mother     Cancer Father 64        colon    Arthritis Sister     Heart Attack Brother     No Known Problems Maternal Uncle     No Known Problems Paternal Aunt     No Known Problems Paternal Uncle     No Known Problems Maternal Grandmother     Cancer Maternal Grandfather         throat    Heart Attack Paternal Grandmother     No Known Problems Paternal Grandfather     Cirrhosis Paternal Cousin     Deep Vein Thrombosis Sister     Diabetes Sister     Heart Disease Sister         triple bypass    Breast Cancer Sister 61    High Blood Pressure Sister     Diabetes Sister     High Cholesterol Sister     Arthritis Sister        Social History    TOBACCO:   reports that he has been smoking cigarettes. He has a 13.50 pack-year smoking history. He has never used smokeless tobacco.  ETOH:   reports that he does not currently use alcohol. Home Medications  Prior to Admission medications    Medication Sig Start Date End Date Taking? Authorizing Provider   Calcium Carbonate-Vitamin D (OYSTER SHELL CALCIUM/D) 500-200 MG-UNIT TABS Take 2 tablets by mouth daily 3/31/22 3/31/23 Yes Ines Villasenor MD   lidocaine-prilocaine (EMLA) 2.5-2.5 % cream Apply topically as needed.  12/9/21  Yes SHI Moreno - CNP   ARTHRITIS PAIN RELIEF 650 MG extended release tablet TAKE TWO TABLETS BY MOUTH AT BEDTIME 9/21/21  Yes Historical Provider, MD   metoprolol tartrate (LOPRESSOR) 25 MG tablet Take 25 mg by mouth 2 times daily   Yes Historical Provider, MD   aspirin 81 MG EC tablet Take 81 mg by mouth daily LD 10/26/21   Yes Historical Provider, MD   simvastatin (ZOCOR) 80 MG tablet Take 80 mg by mouth nightly   Yes Historical Provider, MD   lisinopril (PRINIVIL;ZESTRIL) 20 MG tablet Take 20 mg by mouth daily   Yes Historical Provider, MD   levothyroxine (SYNTHROID) 75 MCG tablet Take 1 tablet by mouth daily 4/14/22 7/7/22  Ines Villasenor MD   levothyroxine (SYNTHROID) 50 MCG tablet Take 1 tablet by mouth daily 3/3/22 7/7/22  Ines Villasenor MD       Allergies  No Known Allergies    Review of Systems:      Objective  /72   Pulse 53   Temp 97.8 °F (36.6 °C)   Ht 5' 8\" (1.727 m)   Wt 125 lb 9.6 oz (57 kg)   SpO2 100%   BMI 19.10 kg/m²     Physical Performance Status    Physical Exam:  General: AAO to person, place, time, and purpose, appears stated age, cooperative, no acute distress, pleasant   Head and neck : PERRLA, EOMI . Sclera non icteric. Oropharynx : Clear  Neck: no JVD,  no adenopathy, no carotid bruit  LYMPHATICS : no lap  Lungs: Clear to auscultation. Right side chest wall globular mass, 4-5 cm in diamter has completely resolved. Heart: Regular rate and regular rhythm, no murmur, normal S1, S2  Abdomen: Soft, non-tender;no masses, no organomegaly  Extremities: No edema,no cyanosis, no clubbing. Skin:  No rash. Neurologic:Cranial nerves grossly intact. No focal motor or sensory deficits .     Recent Laboratory Data-   Lab Results   Component Value Date    WBC 8.9 08/03/2022    HGB 12.0 (L) 08/03/2022    HCT 35.9 (L) 08/03/2022    MCV 99.7 08/03/2022     08/03/2022    LYMPHOPCT 39.1 08/03/2022    RBC 3.60 (L) 08/03/2022    MCH 33.3 08/03/2022    MCHC 33.4 08/03/2022    RDW 14.4 08/03/2022    NEUTOPHILPCT 48.8 08/03/2022    MONOPCT 6.9 08/03/2022    BASOPCT 1.1 08/03/2022    NEUTROABS 4.35 08/03/2022    LYMPHSABS 3.50 08/03/2022    MONOSABS 0.62 08/03/2022    EOSABS 0.35 08/03/2022    BASOSABS 0.10 08/03/2022       Lab Results   Component Value Date     08/03/2022    K 4.0 08/03/2022    CL 99 08/03/2022    CO2 25 08/03/2022    BUN 7 08/03/2022    CREATININE 0.9 08/03/2022    GLUCOSE 123 (H) 08/03/2022    CALCIUM 8.6 08/03/2022    PROT 6.5 08/03/2022    LABALBU 4.0 08/03/2022    BILITOT <0.2 08/03/2022    ALKPHOS 94 08/03/2022    AST 13 08/03/2022    ALT 9 08/03/2022    LABGLOM >60 08/03/2022    GFRAA >60 08/03/2022       No results found for: IRON, TIBC, FERRITIN        Radiology-    Echo Complete    Result Date: 10/8/2021  Transthoracic Echocardiography Report (TTE) Demographics   Patient Name    [facility]        Gender            Male                  Marcio Prater  [id number]      Room Number  Number   Account #       [de-identified]     Procedure Date    10/08/2021   Corporate ID                  Ordering          Derral Like DO                                Physician   Accession       [id number]    Referring         Derral Like DO  Number                        Physician   Date of Birth   1950    Sonographer       Trever Melton HATTIE   Age             73 year(s)    Interpreting      [address] 66                                Physician         Physician Cardiology                                                  Ingrid Mancini MD                                 Any Other  Procedure Type of Study   TTE procedure:Echo Complete W/Doppler & Color Flow. Procedure Date Date: 10/08/2021 Start: 09:57 AM Study Location: Echo Lab Technical Quality: Poor visualization due to poor acoustical window. Indications:Heart murmur. Patient Status: Routine Height: 68 inches Weight: 120 pounds BSA: 1.65 m^2 BMI: 18.25 kg/m^2  Findings   Left Ventricle  Normal left ventricular chamber size. Normal left ventricular systolic function. Visually estimated LVEF 65%. Mild left ventricular concentric hypertrophy noted. Normal diastolic function. Right Ventricle  Normal right ventricle size and function. Left Atrium  The left atrium is mildly dilated. Mildly increased left atrial volume. Interatrial septum not well visualized but appears intact. Right Atrium  Normal right atrium. Mitral Valve  Normal mitral valve structure. There is moderate mitral regurgitation - ERO 0.23cm2, PISA 0.9cm, RV 51cc. No mitral valve prolapse. Tricuspid Valve  Normal tricuspid valve structure. There is mild tricuspid regurgitation. Mild pulmonary hypertension, RVSP 39mmHg. Aortic Valve  Normal aortic valve structure.   There is trace to mild aortic regurgitation, pressure 1/2 time 718ms. Pulmonic Valve  The pulmonic valve was not well visualized. Pericardial Effusion  No evidence of pericardial effusion. Pericardium appears normal.   Pleural Effusion  No evidence of pleural effusion. Aorta  Aortic root 3.5cm. Miscellaneous  The inferior vena cava diameter is normal with normal respiratory  variation. Conclusions   Summary  Normal left ventricular chamber size. Normal left ventricular systolic function, LVEF 77%. Mild left ventricular concentric hypertrophy noted. Normal diastolic function. The left atrium is mildly dilated. Mildly increased left atrial volume. Interatrial septum not well visualized but appears intact. Normal right ventricle size and function. There is moderate mitral regurgitation - ERO 0.23cm2, PISA 0.9cm, RV 51cc. No mitral valve prolapse. There is trace to mild aortic regurgitation, pressure 1/2 time 718ms. There is mild tricuspid regurgitation. Mild pulmonary hypertension, RVSP 39mmHg. The pulmonic valve was not well visualized. Aortic root 3.5cm. No evidence of pericardial effusion. No intra cardiac mass or thrombus. No comparison study available.    Signature   ----------------------------------------------------------------  Electronically signed by Leighton Flores MD(Interpreting  physician) on 10/08/2021 03:52 PM  ----------------------------------------------------------------  M-Mode/2D Measurements & Calculations   LV Diastolic    LV Systolic Dimension: 3.2   AV Cusp Separation: 1.9 cmLA  Dimension: 5.3  cm                           Dimension: 3.9 cmAO Root  cm              LV Volume Diastolic: 108.8   Dimension: 3.5 cm  LV FS:39.6 %    ml  LV PW           LV Volume Systolic: 80.6 ml  Diastolic: 1.2  LV EDV/LV EDV Index: 134.5  cm              ml/82 ml/m^2LV ESV/LV ESV    RV Diastolic Dimension: 2.8  LV PW Systolic: Index: 43.7 BV/57ZF/ m^2     cm  1.5 cm          EF Calculated: 70 %          RV Systolic Dimension: 2.4 cm  Septum LV Mass Index: 147 l/min*m^2 LA/Aorta: 1.2  Diastolic: 1.1  cm                                           LA volume/Index: 79.7 ml  Septum          LVOT: 2 cm  Systolic: 1.2  cm   LV Mass: 241.96  g  Doppler Measurements & Calculations   MV Peak E-Wave: 1.42   AV Peak Velocity: 1.87 m/s  LVOT Peak Velocity:  m/s                    AV Peak Gradient: 14.05     1.55 m/s  MV Peak A-Wave: 0.82   mmHg                        LVOT Mean Velocity:  m/s                    AV Mean Velocity: 1.27 m/s  0.81 m/s  MV E/A Ratio: 1.73     AV Mean Gradient: 7.2 mmHg  LVOT Peak Gradient: 9.6  MV Peak Gradient: 9.2  AV VTI: 42.1 cm             mmHgLVOT Mean Gradient:  mmHg                   AV Area (Continuity):2.37   3.5 mmHg  MV Mean Gradient: 2.9  cm^2                        Estimated RVSP: 39 mmHg  mmHg                   AV Deceleration Time:       Estimated RAP:10 mmHg  MV Mean Velocity: 0.75 2474.3 msec  m/s                    LVOT VTI: 31.8 cm  MV Deceleration Time:                              TR Velocity:2.69 m/s  271.4 msec             Estimated PASP: 39.03 mmHg  TR Gradient:29.03 mmHg  MV P1/2t: 101.8 msec   Pulm. Vein A Reversal       PV Peak Velocity: 0.51  MVA by PHT:2.16 cm^2   Duration:175.3 msec         m/s  MV Area (continuity):  Pulm. Vein D Velocity:0.74  PV Peak Gradient: 1.02  1.9 cm^2               m/sPulm. Vein A Reversal    mmHg                         Velocity:0.38 m/s           PV Mean Velocity: 0.36                         Pulm. Vein S Velocity: 0.44 m/s  MR Velocity: 5.37 m/s  m/s                         PV Mean Gradient: 0.6  MV RICARDO PISA: 0.23 cm^2                             mmHg  MR VTI: 221.9 cm  Alias Velocity: 0.25  m/sPISA Radius: 0.9 cm   PISA area: 5.09 cm^2MR  flow rate: 125.72  ml/sMR volume:51.04 ml  [email or web address] [id number]%3d    CT CHEST W CONTRAST    Addendum Date: 10/13/2021 ADDENDUM: 6 mm indeterminate hypodense lesions are identified in the body and tail of the pancreas. Consider surveillance     Result Date: 10/13/2021  EXAMINATION: CT OF THE CHEST WITH CONTRAST 10/13/2021 10:59 am TECHNIQUE: CT of the chest was performed with the administration of intravenous contrast. Multiplanar reformatted images are provided for review. Dose modulation, iterative reconstruction, and/or weight based adjustment of the mA/kV was utilized to reduce the radiation dose to as low as reasonably achievable. COMPARISON: None. HISTORY: ORDERING SYSTEM PROVIDED HISTORY: Chest wall mass FINDINGS: There is cardiomegaly. There is coronary artery calcification. The great vessels are normal.  Moderately enlarged mediastinal and hilar lymph nodes are noted with lymph nodes measuring up to 1.3 cm in the right hilum. Trachea and major bronchi are patent. Multiple bilateral pulmonary nodules are identified with nodules measuring up to 1.5 cm in the right lower lobe and smaller ones in the right middle lobe and right upper lobe. The pulmonary nodules in the left lower lobe ranges from 5 mm up to 8 mm. There is no focal consolidation or pleural effusion. Punctate enhancing nodules are identified in the posterior right hepatic lobe, largest 1 measuring up to 7 mm. Multiple heterogeneously enhancing right renal masses are identified largest 1 measuring 3.8 x 3.3 cm which is partially necrotic with additional smaller nodules concerning for multifocal malignancy like renal cell carcinoma. There is filling defects in the right renal vein concerning for tumor invasion versus tumor embolism of the renal vein. There is bilateral adrenal metastasis with the largest 1 in the left adrenal gland measuring 1.6 x 2.7 cm. There is bone metastasis with the destructive soft tissue mass involving the right 4th rib anterolaterally with adjacent soft tissue mass which is partially necrotic measuring 4.5 x 9.2 cm.   Lytic addition to large soft tissue destructive mass of right fourth rib, multiple lung nodules up to 1.5 cm, bilateral adrenal metastasis up to 2.5 cm. ECOG 1/2. Started ipilimumab/nivolumab combination in November 2021. S/p 4 cycles. Switched to single agent Opdivo in March 2022. We will continue Opdivo 480 mg q 4 weeks till progression. Tolerating treatment well. No new complaints. Reviewed labs which are unremarkable. PET scan 3/2022 showed a good response. Reordered. Reviewed blood work from yesterday-unremarkable CBC CMP. Primary hypothyroidism: With low normal T4, elevated TSH. Related to immunotherapy. Asymptomatic. On 75 mcg daily Synthroid. TSH has come down to 6 from 50. We will continue with same dose and reassess in a month. Xgeva every 4 weeks. Patient is edentulous. Continue ca/vitamin d. Return to clinic in 4 weeks. Return in about 4 weeks (around 9/1/2022).      Meet Rodriguez MD   Electronically signed 8/4/2022 at 10:45 AM

## 2022-08-04 NOTE — DISCHARGE INSTR - COC
Continuity of Care Form    Patient Name: Marni Frankel   :  1950  MRN:  [id number]    Admit date:  2022  Discharge date:  ***    Code Status Order: Prior   Advance Directives:     Admitting Physician:  No admitting provider for patient encounter. PCP: SHI Alfaro NP    Discharging Nurse: [facility] Unit/Room#: No information available for this encounter. Discharging Unit Phone Number: ***    Emergency Contact:   Extended Emergency Contact Information  Primary Emergency Contact: [facility]  Address: [address] [place] [address] Phone: [phone]  Mobile Phone: [phone]  Relation: Spouse   needed? No    Past Surgical History:  Past Surgical History:   Procedure Laterality Date    [address]    patient's sister said they were told a twin was removed from paitent's abdomen. CARDIAC SURGERY      bypass    CATHETER INSERTION Left 2021    MEDIPORT CATHETER INSERTION performed by Soham Ibarra MD at [address] [place] [address]      bifemoral    IR PERCUT BX LUNG/MEDIASTINUM  2021    IR PERCUT BX LUNG/MEDIASTINUM 2021 [facility] CT       Immunization History: There is no immunization history on file for this patient.     Active Problems:  Patient Active Problem List   Diagnosis Code    Renal cell cancer, right ([facility] [address] C64.1    Poor venous access I87.8       Isolation/Infection:   Isolation            No Isolation          Patient Infection Status       None to display            Nurse Assessment:  Last Vital Signs: /64   Pulse 56     Last documented pain score (0-10 scale):    Last Weight:   Wt Readings from Last 1 Encounters:   22 125 lb 9.6 oz (57 kg)     Mental Status:  {IP PT MENTAL STATUS:}    IV Access:  [facility] GARY IV ACCESS:389846380}    Nursing Mobility/ADLs:  Walking   {CHP DME AZJJ:683744881}  Transfer  {CHP DME PIGZ:785223133}  Bathing  {P DME TBXQ:858386441}  Dressing  {CHP DME MCKU:136616694}  Toileting  {CHP DME TSEW:805465237}  Feeding  {CHP DME QQRD:103977348}  Med Admin  {CHP DME BYUK:831659446}  Med Delivery   { GARY MED Delivery:449006708}    Wound Care Documentation and Therapy:  Incision 21 Chest Left; Upper (Active)   Number of days: 260        Elimination:  Continence: Bowel: {YES / YL:54837}  Bladder: {YES / TH:88460}  Urinary Catheter: {Urinary Catheter:378382826}   Colostomy/Ileostomy/Ileal Conduit: {YES / RE:87284}       Date of Last BM: ***  No intake or output data in the 24 hours ending 22 1205  No intake/output data recorded.     Safety Concerns:     [facility] GARY Safety Concerns:554621879}    Impairments/Disabilities:      [facility] GARY Impairments/Disabilities:242697266}    Nutrition Therapy:  Current Nutrition Therapy:   [facility] GARY Diet List:100821155}    Routes of Feeding: {P DME Other Feedings:793215570}  Liquids: {Slp liquid thickness:36736}  Daily Fluid Restriction: {CHP DME Yes amt example:363062500}  Last Modified Barium Swallow with Video (Video Swallowing Test): {Done Not Done MZAS:732071983}    Treatments at the Time of Hospital Discharge:   Respiratory Treatments: ***  Oxygen Therapy:  {Therapy; copd oxygen:38386}  Ventilator:    { CC Vent UFFH:455849719}    Rehab Therapies: {THERAPEUTIC INTERVENTION:4725122119}  Weight Bearing Status/Restrictions: [facility] CC Weight Bearin}  Other Medical Equipment (for information only, NOT a DME order):  {EQUIPMENT:148738177}  Other Treatments: ***    Patient's personal belongings (please select all that are sent with patient):  {CHP DME Belongings:778013425}    RN SIGNATURE:  {Esignature:099657300}    CASE MANAGEMENT/SOCIAL WORK SECTION    Inpatient Status Date: ***    Readmission Risk Assessment Score:  Readmission Risk              Risk of Unplanned Readmission:  0           Discharging to Facility/ Agency   Name:   Address:  Phone:  Fax:    Dialysis Facility (if applicable) Name:  Address:  Dialysis Schedule:  Phone:  Fax:    / signature: {Esignature:709191935}    PHYSICIAN SECTION    Prognosis: {Prognosis:6409344837}    Condition at Discharge: Bartolo Mai Patient Condition:456169488}    Rehab Potential (if transferring to Rehab): {Prognosis:6979637784}    Recommended Labs or Other Treatments After Discharge: ***    Physician Certification: I certify the above information and transfer of Ellie Oquendo  is necessary for the continuing treatment of the diagnosis listed and that he requires {Admit to Appropriate Level of Care:70001} for {GREATER/LESS:123778215} 30 days.      Update Admission H&P: {CHP DME Changes in ZQIUU:145557244}    PHYSICIAN SIGNATURE:  {Esignature:438332819}

## 2022-08-22 ENCOUNTER — HOSPITAL ENCOUNTER (OUTPATIENT)
Dept: NUCLEAR MEDICINE | Age: 72
Discharge: HOME | End: 2022-08-22
Payer: MEDICARE

## 2022-08-22 DIAGNOSIS — C64.1 RENAL CELL CANCER, RIGHT (HCC): ICD-10-CM

## 2022-08-22 PROCEDURE — A9552 F18 FDG: HCPCS | Performed by: RADIOLOGY

## 2022-08-22 PROCEDURE — 78815 PET IMAGE W/CT SKULL-THIGH: CPT

## 2022-08-22 PROCEDURE — 3430000000 HC RX DIAGNOSTIC RADIOPHARMACEUTICAL: Performed by: RADIOLOGY

## 2022-08-22 RX ORDER — FLUDEOXYGLUCOSE F 18 200 MCI/ML
10 INJECTION, SOLUTION INTRAVENOUS
Status: COMPLETED | OUTPATIENT
Start: 2022-08-22 | End: 2022-08-22

## 2022-08-22 RX ADMIN — FLUDEOXYGLUCOSE F 18 10 MILLICURIE: 200 INJECTION, SOLUTION INTRAVENOUS at 10:31

## 2022-09-16 ENCOUNTER — TELEPHONE (OUTPATIENT)
Dept: ENT CLINIC | Age: 72
End: 2022-09-16

## 2022-09-16 ENCOUNTER — HOSPITAL ENCOUNTER (EMERGENCY)
Age: 72
Discharge: HOME | End: 2022-09-16
Payer: MEDICARE

## 2022-09-16 VITALS
TEMPERATURE: 98 F | OXYGEN SATURATION: 97 % | HEART RATE: 85 BPM | DIASTOLIC BLOOD PRESSURE: 65 MMHG | SYSTOLIC BLOOD PRESSURE: 117 MMHG | RESPIRATION RATE: 14 BRPM

## 2022-09-16 DIAGNOSIS — H92.02 ACUTE OTALGIA, LEFT: ICD-10-CM

## 2022-09-16 DIAGNOSIS — K08.89 PAIN, DENTAL: ICD-10-CM

## 2022-09-16 DIAGNOSIS — K04.7 DENTAL INFECTION: Primary | ICD-10-CM

## 2022-09-16 PROCEDURE — 6370000000 HC RX 637 (ALT 250 FOR IP): Performed by: PHYSICIAN ASSISTANT

## 2022-09-16 PROCEDURE — 99283 EMERGENCY DEPT VISIT LOW MDM: CPT

## 2022-09-16 RX ORDER — CHLORHEXIDINE GLUCONATE 0.12 MG/ML
15 RINSE ORAL 2 TIMES DAILY
Qty: 420 ML | Refills: 0 | Status: SHIPPED | OUTPATIENT
Start: 2022-09-16 | End: 2022-09-30

## 2022-09-16 RX ORDER — AMOXICILLIN 250 MG/1
500 CAPSULE ORAL ONCE
Status: COMPLETED | OUTPATIENT
Start: 2022-09-16 | End: 2022-09-16

## 2022-09-16 RX ORDER — HYDROCODONE BITARTRATE AND ACETAMINOPHEN 5; 325 MG/1; MG/1
1 TABLET ORAL ONCE
Status: COMPLETED | OUTPATIENT
Start: 2022-09-16 | End: 2022-09-16

## 2022-09-16 RX ORDER — AMOXICILLIN 500 MG/1
500 CAPSULE ORAL 2 TIMES DAILY
Qty: 20 CAPSULE | Refills: 0 | Status: SHIPPED | OUTPATIENT
Start: 2022-09-16 | End: 2022-09-26

## 2022-09-16 RX ORDER — HYDROCODONE BITARTRATE AND ACETAMINOPHEN 5; 325 MG/1; MG/1
1 TABLET ORAL EVERY 4 HOURS PRN
Qty: 18 TABLET | Refills: 0 | Status: SHIPPED | OUTPATIENT
Start: 2022-09-16 | End: 2022-09-19

## 2022-09-16 RX ADMIN — AMOXICILLIN 500 MG: 250 CAPSULE ORAL at 18:33

## 2022-09-16 RX ADMIN — HYDROCODONE BITARTRATE AND ACETAMINOPHEN 1 TABLET: 5; 325 TABLET ORAL at 18:33

## 2022-09-16 ASSESSMENT — PAIN SCALES - GENERAL
PAINLEVEL_OUTOF10: 10
PAINLEVEL_OUTOF10: 10

## 2022-09-16 ASSESSMENT — PAIN - FUNCTIONAL ASSESSMENT: PAIN_FUNCTIONAL_ASSESSMENT: 0-10

## 2022-09-16 NOTE — ED PROVIDER NOTES
[facility]  Department of Emergency Medicine   ED  Encounter Note  Admit Date/RoomTime: 2022  5:43 PM  ED Room: [place] [address]    NAME: Evonne Zavaleta  : 1950  MRN: [id number]     Chief Complaint:  Otalgia and Abscess (Patient C/O pain to left ear and jaw. Patinet has noted swelling to jaw line and has pain from ear to chin)    History of Present Illness        Evonne Zavaleta is a 81 y.o. old male who presents to the emergency department by private vehicle, for non-traumatic left upper jaw/dental pain, which occured 1 week(s) prior to arrival.  Since onset the symptoms have been gradually worsening and moderate in severity. Worsened by  hot liquids, cold liquids, and chewing and improved by nothing. Associated Signs & Symptoms:  left ear pain. Patient denies chest pain, shortness of breath, fever, chills, nausea, vomiting, diarrhea lightheadedness or syncopal episodes. Patient reports that he has not had any dental procedures recently and that he has had full upper and lower extraction several years prior. He does not see a dentist.           Onset:       Spontaneous:   yes. Following Trauma:   No.     Previous Caries:   yes. Recent Dental Procedure:   No.     ROS   Pertinent positives and negatives are stated within HPI, all other systems reviewed and are negative. Past Medical History:  has a past medical history of Anticoagulant long-term use, CAD (coronary artery disease), Cancer ([address] Deep vein thrombosis (DVT) ([facility] [address] [place] (hard of hearing), Hx of blood clots, Hyperlipidemia, Hypertension, and Mentally challenged. Surgical History:  has a past surgical history that includes Cardiac surgery (); femoral bypass; IR NEEDLE BIOPSY LUNG/MEDIASTINUM PERC (2021); Abdomen surgery (); and Catheter Insertion (Left, 2021). Social History:  reports that he has been smoking cigarettes. He has a 13.50 pack-year smoking history.  He has never used smokeless tobacco. He reports that he does not currently use alcohol. He reports that he does not currently use drugs. Family History: family history includes Arthritis in his sister and sister; Breast Cancer (age of onset: 61) in his sister; Cancer in his maternal grandfather; Cancer (age of onset: 64) in his father; Cirrhosis in his paternal cousin; Deep Vein Thrombosis in his sister; Diabetes in his sister and sister; Heart Attack in his brother, mother, and paternal grandmother; Heart Disease in his mother and sister; High Blood Pressure in his mother and sister; High Cholesterol in his sister; No Known Problems in his maternal grandmother, maternal uncle, paternal aunt, paternal grandfather, and paternal uncle. Allergies: Patient has no known allergies. Physical Exam   Oxygen Saturation Interpretation: Normal.        ED Triage Vitals   BP Temp Temp src Heart Rate Resp SpO2 Height Weight   09/16/22 1741 09/16/22 1716 -- 09/16/22 1716 09/16/22 1741 09/16/22 1716 -- --   117/65 98 °F (36.7 °C)  85 14 97 %           Constitutional:  Alert, development consistent with age. HEENT:  NC/NT. Airway patent. Bilateral TMs within normal limits. Bilateral external ear canals within normal limits. Neck:  Supple. Normal ROM. Lips:  upper and lower normal.  Mouth:  normal tongue and buccal mucosa. Dental: Oral cavity noted for status post full extraction upper and lower without signs of active bleeding, active drainage or obvious foreign body. There is tenderness to palpation overlying left lower jawline as well as erythema without obvious palpable abscess. Physical Exam                  Trismus: No.         Drooling: No.           Airway stridor: No.  Facial skin: left no wounds or erythema. Left posterior jaw noted for slight edema without palpable abscess.   Respiratory:  Clear to auscultation and breath sounds equal.    CV: Regular rate and rhythm, normal heart sounds, without pathological murmurs, ectopy, gallops, or rubs. Skin:  No rashes, erythema or lesions present, unless noted elsewhere. .  Lymphatics: No lymphangitis or adenopathy noted. Neurological:  Oriented. Motor functions intact. Lab / Imaging Results   (All laboratory and radiology results have been personally reviewed by myself)  Labs:  No results found for this visit on 09/16/22. Imaging: All Radiology results interpreted by Radiologist unless otherwise noted. No orders to display     ED Course / Medical Decision Making     Medications   amoxicillin (AMOXIL) capsule 500 mg (500 mg Oral Given 9/16/22 1833)   HYDROcodone-acetaminophen (NORCO) 5-325 MG per tablet 1 tablet (1 tablet Oral Given 9/16/22 1833)        Consult(s):   Dental Resident was not consulted to see patient regarding complaint. Procedure(s):   None    MDM:  Patient presented for evaluation of left-sided lower jaw pain due to no known injury or dental procedure. On exam he has no signs of a drainable abscess and therefore I&D was considered but was not performed as it is not applicable. I do feel that the patient most likely has dental infection underneath the gumline that is appropriate for antibiotic initiation at this time as well as follow-up to dentist.  Patient was given first dose of antibiotics and pain medication with an ED setting. Patient was sent continuation of prescriptions to pharmacy of choice to be taken as directed. Patient has no red flag symptoms that would warrant further evaluation or admit to the hospital at this time. He has no trismus, difficulty handling secretions or airway compromise. Patient is appropriate for discharged home with follow-up to dental clinic as he is alert and oriented, no acute distress, afebrile, nontachycardic nonhypoxic and demonstrates no signs of respiratory distress. Patient recommended to return to ER with new or worsening symptoms. Patient states he is understandable and agreeable to plan.     Plan of Care/Counseling:  NIA Hills reviewed today's visit with the patient in addition to providing specific details for the plan of care and counseling regarding the diagnosis and prognosis. Questions are answered at this time and are agreeable with the plan. Assessment      1. Dental infection    2. Acute otalgia, left    3. Pain, dental      Plan   Discharged home. Patient condition is good    New Medications     Discharge Medication List as of 9/16/2022  6:38 PM        START taking these medications    Details   amoxicillin (AMOXIL) 500 MG capsule Take 1 capsule by mouth 2 times daily for 10 days, Disp-20 capsule, R-0Normal      HYDROcodone-acetaminophen (NORCO) 5-325 MG per tablet Take 1 tablet by mouth every 4 hours as needed for Pain for up to 3 days. Intended supply: 3 days. Take lowest dose possible to manage pain, Disp-18 tablet, R-0Normal      chlorhexidine (PERIDEX) 0.12 % solution Take 15 mLs by mouth 2 times daily for 14 days SWISH AND SPIT. DO NOT SWALLOW., Disp-420 mL, R-0Normal           Electronically signed by NIA Hills   DD: 9/16/22  **This report was transcribed using voice recognition software. Every effort was made to ensure accuracy; however, inadvertent computerized transcription errors may be present.   END OF ED PROVIDER NOTE      Jenni Hills  09/17/22 0040

## 2022-09-16 NOTE — TELEPHONE ENCOUNTER
Pt called office stating he was having jaw pain and swelling, pt states he would like something for the pain. I advised pt that he has not been seen by our provider before and he is not going to seed pt a prescription to the pharmacy without seeing him. Also telling pt Dr. Pierce Kathleen does not prescribe pain medication. I told pt to go the the nearest UC or ED if he does not want to wait for a appointment. Pt did not want to make a appointment and thank me for my time.

## 2022-10-05 ENCOUNTER — HOSPITAL ENCOUNTER (OUTPATIENT)
Dept: INFUSION THERAPY | Age: 72
End: 2022-10-05

## 2022-10-26 ENCOUNTER — HOSPITAL ENCOUNTER (OUTPATIENT)
Dept: INFUSION THERAPY | Age: 72
Discharge: HOME | End: 2022-10-26
Payer: MEDICARE

## 2022-10-26 DIAGNOSIS — C64.1 RENAL CELL CANCER, RIGHT (HCC): Primary | ICD-10-CM

## 2022-10-26 LAB
ALBUMIN SERPL-MCNC: 3.9 G/DL (ref 3.5–5.2)
ALP BLD-CCNC: 80 U/L (ref 40–129)
ALT SERPL-CCNC: 14 U/L (ref 0–40)
ANION GAP SERPL CALCULATED.3IONS-SCNC: 8 MMOL/L (ref 7–16)
AST SERPL-CCNC: 15 U/L (ref 0–39)
BASOPHILS ABSOLUTE: 0.09 E9/L (ref 0–0.2)
BASOPHILS RELATIVE PERCENT: 1.1 % (ref 0–2)
BILIRUB SERPL-MCNC: <0.2 MG/DL (ref 0–1.2)
BUN BLDV-MCNC: 11 MG/DL (ref 6–23)
CALCIUM SERPL-MCNC: 9.2 MG/DL (ref 8.6–10.2)
CHLORIDE BLD-SCNC: 97 MMOL/L (ref 98–107)
CO2: 26 MMOL/L (ref 22–29)
CREAT SERPL-MCNC: 0.8 MG/DL (ref 0.7–1.2)
EOSINOPHILS ABSOLUTE: 0.32 E9/L (ref 0.05–0.5)
EOSINOPHILS RELATIVE PERCENT: 3.8 % (ref 0–6)
GFR SERPL CREATININE-BSD FRML MDRD: >60 ML/MIN/1.73
GLUCOSE BLD-MCNC: 96 MG/DL (ref 74–99)
HCT VFR BLD CALC: 35.6 % (ref 37–54)
HEMOGLOBIN: 11.7 G/DL (ref 12.5–16.5)
IMMATURE GRANULOCYTES #: 0.03 E9/L
IMMATURE GRANULOCYTES %: 0.4 % (ref 0–5)
LYMPHOCYTES ABSOLUTE: 2.83 E9/L (ref 1.5–4)
LYMPHOCYTES RELATIVE PERCENT: 33.2 % (ref 20–42)
MCH RBC QN AUTO: 31.6 PG (ref 26–35)
MCHC RBC AUTO-ENTMCNC: 32.9 % (ref 32–34.5)
MCV RBC AUTO: 96.2 FL (ref 80–99.9)
MONOCYTES ABSOLUTE: 0.73 E9/L (ref 0.1–0.95)
MONOCYTES RELATIVE PERCENT: 8.6 % (ref 2–12)
NEUTROPHILS ABSOLUTE: 4.53 E9/L (ref 1.8–7.3)
NEUTROPHILS RELATIVE PERCENT: 52.9 % (ref 43–80)
PDW BLD-RTO: 15.5 FL (ref 11.5–15)
PLATELET # BLD: 295 E9/L (ref 130–450)
PMV BLD AUTO: 9.3 FL (ref 7–12)
POTASSIUM SERPL-SCNC: 4.4 MMOL/L (ref 3.5–5)
RBC # BLD: 3.7 E12/L (ref 3.8–5.8)
SODIUM BLD-SCNC: 131 MMOL/L (ref 132–146)
TOTAL PROTEIN: 6.7 G/DL (ref 6.4–8.3)
TSH SERPL DL<=0.05 MIU/L-ACNC: 2.27 UIU/ML (ref 0.27–4.2)
WBC # BLD: 8.5 E9/L (ref 4.5–11.5)

## 2022-10-26 PROCEDURE — 85025 COMPLETE CBC W/AUTO DIFF WBC: CPT

## 2022-10-26 PROCEDURE — 36591 DRAW BLOOD OFF VENOUS DEVICE: CPT

## 2022-10-26 PROCEDURE — 2580000003 HC RX 258: Performed by: INTERNAL MEDICINE

## 2022-10-26 PROCEDURE — 6360000002 HC RX W HCPCS: Performed by: INTERNAL MEDICINE

## 2022-10-26 PROCEDURE — 84443 ASSAY THYROID STIM HORMONE: CPT

## 2022-10-26 PROCEDURE — 80053 COMPREHEN METABOLIC PANEL: CPT

## 2022-10-26 RX ORDER — WATER 1000 ML/1000ML
2.2 INJECTION, SOLUTION INTRAVENOUS ONCE
Status: CANCELLED | OUTPATIENT
Start: 2022-10-26 | End: 2022-10-26

## 2022-10-26 RX ORDER — HEPARIN SODIUM (PORCINE) LOCK FLUSH IV SOLN 100 UNIT/ML 100 UNIT/ML
500 SOLUTION INTRAVENOUS PRN
Status: DISCONTINUED | OUTPATIENT
Start: 2022-10-26 | End: 2022-10-27 | Stop reason: HOSPADM

## 2022-10-26 RX ORDER — HEPARIN SODIUM (PORCINE) LOCK FLUSH IV SOLN 100 UNIT/ML 100 UNIT/ML
500 SOLUTION INTRAVENOUS PRN
Status: CANCELLED | OUTPATIENT
Start: 2022-10-26

## 2022-10-26 RX ORDER — SODIUM CHLORIDE 0.9 % (FLUSH) 0.9 %
5-40 SYRINGE (ML) INJECTION PRN
Status: CANCELLED | OUTPATIENT
Start: 2022-10-26

## 2022-10-26 RX ORDER — SODIUM CHLORIDE 0.9 % (FLUSH) 0.9 %
5-40 SYRINGE (ML) INJECTION PRN
Status: DISCONTINUED | OUTPATIENT
Start: 2022-10-26 | End: 2022-10-27 | Stop reason: HOSPADM

## 2022-10-26 RX ADMIN — Medication 500 UNITS: at 09:42

## 2022-10-26 RX ADMIN — SODIUM CHLORIDE, PRESERVATIVE FREE 10 ML: 5 INJECTION INTRAVENOUS at 09:42

## 2022-10-26 NOTE — PROGRESS NOTES
Patient presents to clinic for labs today. L chest  SQ port accessed per policy using 20 G, 6.82 inches [place] needle for good blood return. Aspirate for waste and specimen sent to lab. Site flushed easily with 10 mL NSS followed by 5 mL Heparin solution 100 units/ml rinse prior to de-access. Dry sterile dressing to area. Tolerated procedure well. Encouraged to schedule port flush every 4 weeks.

## 2022-10-27 ENCOUNTER — HOSPITAL ENCOUNTER (OUTPATIENT)
Dept: INFUSION THERAPY | Age: 72
Discharge: HOME | End: 2022-10-27
Payer: MEDICARE

## 2022-10-27 ENCOUNTER — OFFICE VISIT (OUTPATIENT)
Dept: ONCOLOGY | Age: 72
End: 2022-10-27

## 2022-10-27 ENCOUNTER — HOSPITAL ENCOUNTER (OUTPATIENT)
Dept: CT IMAGING | Age: 72
Discharge: HOME | End: 2022-10-27
Payer: MEDICARE

## 2022-10-27 VITALS
TEMPERATURE: 97.6 F | OXYGEN SATURATION: 100 % | DIASTOLIC BLOOD PRESSURE: 67 MMHG | BODY MASS INDEX: 18.25 KG/M2 | WEIGHT: 120.4 LBS | SYSTOLIC BLOOD PRESSURE: 122 MMHG | HEART RATE: 54 BPM | HEIGHT: 68 IN

## 2022-10-27 VITALS — DIASTOLIC BLOOD PRESSURE: 60 MMHG | SYSTOLIC BLOOD PRESSURE: 120 MMHG | HEART RATE: 55 BPM

## 2022-10-27 DIAGNOSIS — C64.1 RENAL CELL CANCER, RIGHT (HCC): Primary | ICD-10-CM

## 2022-10-27 DIAGNOSIS — C64.1 RENAL CELL CANCER, RIGHT (HCC): ICD-10-CM

## 2022-10-27 PROCEDURE — 96413 CHEMO IV INFUSION 1 HR: CPT

## 2022-10-27 PROCEDURE — 6360000002 HC RX W HCPCS: Performed by: INTERNAL MEDICINE

## 2022-10-27 PROCEDURE — 2580000003 HC RX 258: Performed by: INTERNAL MEDICINE

## 2022-10-27 PROCEDURE — 70486 CT MAXILLOFACIAL W/O DYE: CPT

## 2022-10-27 RX ORDER — ALBUTEROL SULFATE 90 UG/1
4 AEROSOL, METERED RESPIRATORY (INHALATION) PRN
Status: CANCELLED | OUTPATIENT
Start: 2022-10-27

## 2022-10-27 RX ORDER — SODIUM CHLORIDE 0.9 % (FLUSH) 0.9 %
5-40 SYRINGE (ML) INJECTION PRN
Status: DISCONTINUED | OUTPATIENT
Start: 2022-10-27 | End: 2022-10-28 | Stop reason: HOSPADM

## 2022-10-27 RX ORDER — SODIUM CHLORIDE 9 MG/ML
5-250 INJECTION, SOLUTION INTRAVENOUS PRN
Status: CANCELLED | OUTPATIENT
Start: 2022-10-27

## 2022-10-27 RX ORDER — DIPHENHYDRAMINE HYDROCHLORIDE 50 MG/ML
50 INJECTION INTRAMUSCULAR; INTRAVENOUS ONCE
Status: CANCELLED | OUTPATIENT
Start: 2022-10-27 | End: 2022-10-27

## 2022-10-27 RX ORDER — SODIUM CHLORIDE 9 MG/ML
INJECTION, SOLUTION INTRAVENOUS CONTINUOUS
Status: CANCELLED | OUTPATIENT
Start: 2022-10-27

## 2022-10-27 RX ORDER — ACETAMINOPHEN 325 MG/1
650 TABLET ORAL
Status: CANCELLED | OUTPATIENT
Start: 2022-10-27

## 2022-10-27 RX ORDER — MEPERIDINE HYDROCHLORIDE 25 MG/ML
12.5 INJECTION INTRAMUSCULAR; INTRAVENOUS; SUBCUTANEOUS ONCE
Status: CANCELLED | OUTPATIENT
Start: 2022-10-27 | End: 2022-10-27

## 2022-10-27 RX ORDER — LEVOTHYROXINE SODIUM 0.07 MG/1
75 TABLET ORAL DAILY
Qty: 90 TABLET | Refills: 1 | Status: SHIPPED | OUTPATIENT
Start: 2022-10-27

## 2022-10-27 RX ORDER — WATER 1000 ML/1000ML
2.2 INJECTION, SOLUTION INTRAVENOUS ONCE
Status: CANCELLED | OUTPATIENT
Start: 2022-10-27 | End: 2022-10-27

## 2022-10-27 RX ORDER — HEPARIN SODIUM (PORCINE) LOCK FLUSH IV SOLN 100 UNIT/ML 100 UNIT/ML
500 SOLUTION INTRAVENOUS PRN
Status: CANCELLED | OUTPATIENT
Start: 2022-10-27

## 2022-10-27 RX ORDER — SODIUM CHLORIDE 9 MG/ML
5-250 INJECTION, SOLUTION INTRAVENOUS PRN
Status: DISCONTINUED | OUTPATIENT
Start: 2022-10-27 | End: 2022-10-28 | Stop reason: HOSPADM

## 2022-10-27 RX ORDER — MEPERIDINE HYDROCHLORIDE 25 MG/ML
12.5 INJECTION INTRAMUSCULAR; INTRAVENOUS; SUBCUTANEOUS PRN
Status: CANCELLED | OUTPATIENT
Start: 2022-10-27

## 2022-10-27 RX ORDER — METHYLPREDNISOLONE SODIUM SUCCINATE 125 MG/2ML
125 INJECTION, POWDER, LYOPHILIZED, FOR SOLUTION INTRAMUSCULAR; INTRAVENOUS ONCE
Status: CANCELLED | OUTPATIENT
Start: 2022-10-27 | End: 2022-10-27

## 2022-10-27 RX ORDER — SODIUM CHLORIDE 9 MG/ML
25 INJECTION, SOLUTION INTRAVENOUS PRN
Status: CANCELLED | OUTPATIENT
Start: 2022-10-27

## 2022-10-27 RX ORDER — SODIUM CHLORIDE 9 MG/ML
20 INJECTION, SOLUTION INTRAVENOUS ONCE
Status: CANCELLED | OUTPATIENT
Start: 2022-10-27 | End: 2022-10-27

## 2022-10-27 RX ORDER — DIPHENHYDRAMINE HYDROCHLORIDE 50 MG/ML
50 INJECTION INTRAMUSCULAR; INTRAVENOUS
Status: CANCELLED | OUTPATIENT
Start: 2022-10-27

## 2022-10-27 RX ORDER — SODIUM CHLORIDE 9 MG/ML
5-40 INJECTION INTRAVENOUS PRN
Status: CANCELLED | OUTPATIENT
Start: 2022-10-27

## 2022-10-27 RX ORDER — EPINEPHRINE 1 MG/ML
0.3 INJECTION, SOLUTION, CONCENTRATE INTRAVENOUS PRN
Status: CANCELLED | OUTPATIENT
Start: 2022-10-27

## 2022-10-27 RX ORDER — HEPARIN SODIUM (PORCINE) LOCK FLUSH IV SOLN 100 UNIT/ML 100 UNIT/ML
500 SOLUTION INTRAVENOUS PRN
Status: DISCONTINUED | OUTPATIENT
Start: 2022-10-27 | End: 2022-10-28 | Stop reason: HOSPADM

## 2022-10-27 RX ORDER — SODIUM CHLORIDE 0.9 % (FLUSH) 0.9 %
5-40 SYRINGE (ML) INJECTION PRN
Status: CANCELLED | OUTPATIENT
Start: 2022-10-27

## 2022-10-27 RX ORDER — ONDANSETRON 2 MG/ML
8 INJECTION INTRAMUSCULAR; INTRAVENOUS
Status: CANCELLED | OUTPATIENT
Start: 2022-10-27

## 2022-10-27 RX ADMIN — SODIUM CHLORIDE, PRESERVATIVE FREE 10 ML: 5 INJECTION INTRAVENOUS at 12:15

## 2022-10-27 RX ADMIN — SODIUM CHLORIDE 480 MG: 9 INJECTION, SOLUTION INTRAVENOUS at 12:29

## 2022-10-27 RX ADMIN — SODIUM CHLORIDE, PRESERVATIVE FREE 10 ML: 5 INJECTION INTRAVENOUS at 13:11

## 2022-10-27 RX ADMIN — SODIUM CHLORIDE 20 ML/HR: 9 INJECTION, SOLUTION INTRAVENOUS at 12:15

## 2022-10-27 RX ADMIN — HEPARIN 500 UNITS: 100 SYRINGE at 13:11

## 2022-10-27 NOTE — PROGRESS NOTES
tHE PATIENT REPORTS JAW PAIN AND SWELLING FOR THE PAST TWO WEEKS. tHE LEFT SIDE OF HIS JAW IS RED AND EDEMATOUS AND THE LEFT CHIN IS SWOLLEN. THE PATIENT HAS BEEN RECEIVING xGEVA. tHE PATIENT STATES HE HAS LOST WEIGHT OVER THE PAST TWO WEEKS DUE TO INABILITY TO EAT BECAUSE OF JAW PAIN.

## 2022-10-27 NOTE — PROGRESS NOTES
[address]  [address] [place] [address]   Hematology/Oncology  Consult      Patient Name: Amarjit Prater  YOB: 1950  PCP: SHI Lopez NP   Referring Provider:      Reason for Consultation:   Chief Complaint   Patient presents with    Cancer    Follow-up    Chemotherapy     Renal cell cancer      Interval history: Reports swelling of left mandible on the left side. History of Present Illness:  73years old male referred for possible metastatic renal cell carcinoma. Patient presented to his PCP, HETAL Pathak, complaining of right side chest wall mass which he noticed about a month ago and had been gradually increasing in size. CT chest from October 2021 showed mltiple heterogenous and necrotic masses in the right kidney with possible tumor invasion of the right renal vein. In addition to this there was a large 4 by 9 cm soft tissue necrotic mass involving the fourth rib. There were other lytic destructive lesions in the right eighth and ninth rib. There were multiple right lung nodules ranging upto 1.5 cm. Few liver nodules were noted as well the largest being 7 mm. 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 PET scan showed hypermetabolic lesions in the right kidney, left adrenal diffuse skeletal metastasis, scattered pulmonary nodules most of them subcentimeter except for 1 right lung hypermetabolic nodule. MRI brain reviewed no intracranial metastasis. S/p right chest wall mass biopsy on 11/2/2021. Consistent with clear-cell renal cell carcinoma. Performance status seems fair Karnofsky performance status 80/70. Intermediate risk based on [facility] risk factors although the disease seems to have progressed rapidly over the past month. His rapid progression as well as renal vein involvement probably precludes debulking nephrectomy. Tumor burden is not high and patient is asymptomatic.   Recommended treatment with ipilimumab/nivolumab combination. Started ipilimumab/nivolumab in November 2021. Switch to nivolumab monotherapy after 4 cycles and 3/2022. Patient reports pain in the right side chest wall mass which is well controlled with Tylenol as needed. Denies recent weight loss, loss of appetite night sweats, back pain. Review of systems: Over 10 systems were reviewed and all were negative except as mentioned above. Past medical history: Hypertension. Coronary artery disease, peripheral arterial disease, hyperlipidemia. Past surgical history: History of CABG, femoral arterial bypass, hernia repair. Social history: Smokes half a pack a day, denies alcohol or drug abuse. Family history Sister had breast cancer, father had colon cancer. Diagnostic Data:     Past Medical History:   Diagnosis Date    Anticoagulant long-term use 2007    aspirin    CAD (coronary artery disease)     Cancer (HCC)     kidney    Deep vein thrombosis (DVT) (HCC)     leg    [employer] (hard of hearing)     Hx of blood clots     Hyperlipidemia     Hypertension     Mentally challenged     information per patient's niece. Patient Active Problem List    Diagnosis Date Noted    Poor venous access     Renal cell cancer, right ([address] 11/11/2021        Past Surgical History:   Procedure Laterality Date    [facility]    patient's sister said they were told a twin was removed from paitent's abdomen.     CARDIAC SURGERY  2007    bypass    CATHETER INSERTION Left 11/16/2021    MEDIPORT CATHETER INSERTION performed by Jarret Wetzel MD at [address] [place] [address]      bifemoral    IR PERCUT BX LUNG/MEDIASTINUM  11/2/2021    IR PERCUT BX LUNG/MEDIASTINUM 11/2/2021 [facility] CT       Family History  Family History   Problem Relation Age of Onset    High Blood Pressure Mother     Heart Disease Mother     Heart Attack Mother     Cancer Father 64        colon    Arthritis Sister     Heart Attack Brother     No Known Problems Maternal Uncle No Known Problems Paternal Aunt     No Known Problems Paternal Uncle     No Known Problems Maternal Grandmother     Cancer Maternal Grandfather         throat    Heart Attack Paternal Grandmother     No Known Problems Paternal Grandfather     Cirrhosis Paternal Cousin     Deep Vein Thrombosis Sister     Diabetes Sister     Heart Disease Sister         triple bypass    Breast Cancer Sister 61    High Blood Pressure Sister     Diabetes Sister     High Cholesterol Sister     Arthritis Sister        Social History    TOBACCO:   reports that he has been smoking cigarettes. He has a 13.50 pack-year smoking history. He has never used smokeless tobacco.  ETOH:   reports that he does not currently use alcohol. Home Medications  Prior to Admission medications    Medication Sig Start Date End Date Taking? Authorizing Provider   levothyroxine (SYNTHROID) 75 MCG tablet Take 1 tablet by mouth daily 10/27/22  Yes Danisha Parker MD   Calcium Carbonate-Vitamin D (OYSTER SHELL CALCIUM/D) 500-200 MG-UNIT TABS Take 2 tablets by mouth daily 3/31/22 3/31/23 Yes Danisha Parker MD   lidocaine-prilocaine (EMLA) 2.5-2.5 % cream Apply topically as needed.  12/9/21  Yes SHI Bowles - CNP   ARTHRITIS PAIN RELIEF 650 MG extended release tablet TAKE TWO TABLETS BY MOUTH AT BEDTIME 9/21/21  Yes Historical Provider, MD   metoprolol tartrate (LOPRESSOR) 25 MG tablet Take 25 mg by mouth 2 times daily   Yes Historical Provider, MD   aspirin 81 MG EC tablet Take 81 mg by mouth daily LD 10/26/21   Yes Historical Provider, MD   simvastatin (ZOCOR) 80 MG tablet Take 80 mg by mouth nightly   Yes Historical Provider, MD   lisinopril (PRINIVIL;ZESTRIL) 20 MG tablet Take 20 mg by mouth daily   Yes Historical Provider, MD   levothyroxine (SYNTHROID) 75 MCG tablet Take 1 tablet by mouth daily 4/14/22 7/7/22  Danisha Parker MD   levothyroxine (SYNTHROID) 50 MCG tablet Take 1 tablet by mouth daily 3/3/22 7/7/22  Danisha Parker MD Allergies  No Known Allergies    Review of Systems:      Objective  /67   Pulse 54   Temp 97.6 °F (36.4 °C)   Ht 5' 8\" (1.727 m)   Wt 120 lb 6.4 oz (54.6 kg)   SpO2 100%   BMI 18.31 kg/m²     Physical Performance Status    Physical Exam:  General: AAO to person, place, time, and purpose, appears stated age, cooperative, no acute distress, pleasant   Head and neck : PERRLA, EOMI . Sclera non icteric. Oropharynx : Clear. No exposed bones or discharge. Neck: no JVD,  no adenopathy, no carotid bruit  LYMPHATICS : no lap  Lungs: Clear to auscultation. Right side chest wall globular mass, 4-5 cm in diamter has completely resolved. Heart: Regular rate and regular rhythm, no murmur, normal S1, S2  Abdomen: Soft, non-tender;no masses, no organomegaly  Extremities: No edema,no cyanosis, no clubbing. Skin:  No rash. Neurologic:Cranial nerves grossly intact. No focal motor or sensory deficits .     Recent Laboratory Data-   Lab Results   Component Value Date    WBC 8.5 10/26/2022    HGB 11.7 (L) 10/26/2022    HCT 35.6 (L) 10/26/2022    MCV 96.2 10/26/2022     10/26/2022    LYMPHOPCT 33.2 10/26/2022    RBC 3.70 (L) 10/26/2022    MCH 31.6 10/26/2022    MCHC 32.9 10/26/2022    RDW 15.5 (H) 10/26/2022    NEUTOPHILPCT 52.9 10/26/2022    MONOPCT 8.6 10/26/2022    BASOPCT 1.1 10/26/2022    NEUTROABS 4.53 10/26/2022    LYMPHSABS 2.83 10/26/2022    MONOSABS 0.73 10/26/2022    EOSABS 0.32 10/26/2022    BASOSABS 0.09 10/26/2022       Lab Results   Component Value Date     (L) 10/26/2022    K 4.4 10/26/2022    CL 97 (L) 10/26/2022    CO2 26 10/26/2022    BUN 11 10/26/2022    CREATININE 0.8 10/26/2022    GLUCOSE 96 10/26/2022    CALCIUM 9.2 10/26/2022    PROT 6.7 10/26/2022    LABALBU 3.9 10/26/2022    BILITOT <0.2 10/26/2022    ALKPHOS 80 10/26/2022    AST 15 10/26/2022    ALT 14 10/26/2022    LABGLOM >60 10/26/2022    GFRAA >60 08/03/2022       No results found for: IRON, TIBC, FERRITIN        Radiology-    Echo Complete    Result Date: 10/8/2021  Transthoracic Echocardiography Report (TTE)  Demographics   Patient Name    [facility]        Gender            Male                  Marcio Prater  [id number]      Room Number  Number   Account #       [de-identified]     Procedure Date    10/08/2021   Corporate ID                  Ordering          Irvine Peabody DO                                Physician   Accession       [id number]    Referring         Irvine Peabody DO  Number                        Physician   Date of Birth   1950    Sonographer       Ruthann Hoang RDCS   Age             73 year(s)    Interpreting      [address] 66                                Physician         Physician Cardiology                                                  Blanca Traylor MD                                 Any Other  Procedure Type of Study   TTE procedure:Echo Complete W/Doppler & Color Flow. Procedure Date Date: 10/08/2021 Start: 09:57 AM Study Location: Echo Lab Technical Quality: Poor visualization due to poor acoustical window. Indications:Heart murmur. Patient Status: Routine Height: 68 inches Weight: 120 pounds BSA: 1.65 m^2 BMI: 18.25 kg/m^2  Findings   Left Ventricle  Normal left ventricular chamber size. Normal left ventricular systolic function. Visually estimated LVEF 65%. Mild left ventricular concentric hypertrophy noted. Normal diastolic function. Right Ventricle  Normal right ventricle size and function. Left Atrium  The left atrium is mildly dilated. Mildly increased left atrial volume. Interatrial septum not well visualized but appears intact. Right Atrium  Normal right atrium. Mitral Valve  Normal mitral valve structure. There is moderate mitral regurgitation - ERO 0.23cm2, PISA 0.9cm, RV 51cc. No mitral valve prolapse. Tricuspid Valve  Normal tricuspid valve structure. There is mild tricuspid regurgitation.   Mild pulmonary hypertension, RVSP 39mmHg. Aortic Valve  Normal aortic valve structure. There is trace to mild aortic regurgitation, pressure 1/2 time 718ms. Pulmonic Valve  The pulmonic valve was not well visualized. Pericardial Effusion  No evidence of pericardial effusion. Pericardium appears normal.   Pleural Effusion  No evidence of pleural effusion. Aorta  Aortic root 3.5cm. Miscellaneous  The inferior vena cava diameter is normal with normal respiratory  variation. Conclusions   Summary  Normal left ventricular chamber size. Normal left ventricular systolic function, LVEF 52%. Mild left ventricular concentric hypertrophy noted. Normal diastolic function. The left atrium is mildly dilated. Mildly increased left atrial volume. Interatrial septum not well visualized but appears intact. Normal right ventricle size and function. There is moderate mitral regurgitation - ERO 0.23cm2, PISA 0.9cm, RV 51cc. No mitral valve prolapse. There is trace to mild aortic regurgitation, pressure 1/2 time 718ms. There is mild tricuspid regurgitation. Mild pulmonary hypertension, RVSP 39mmHg. The pulmonic valve was not well visualized. Aortic root 3.5cm. No evidence of pericardial effusion. No intra cardiac mass or thrombus. No comparison study available.    Signature   ----------------------------------------------------------------  Electronically signed by Carli Rodirguez MD(Interpreting  physician) on 10/08/2021 03:52 PM  ----------------------------------------------------------------  M-Mode/2D Measurements & Calculations   LV Diastolic    LV Systolic Dimension: 3.2   AV Cusp Separation: 1.9 cmLA  Dimension: 5.3  cm                           Dimension: 3.9 cmAO Root  cm              LV Volume Diastolic: 802.1   Dimension: 3.5 cm  LV FS:39.6 %    ml  LV PW           LV Volume Systolic: 53.7 ml  Diastolic: 1.2  LV EDV/LV EDV Index: 134.5  cm              ml/82 ml/m^2LV ESV/LV ESV    RV Diastolic Dimension: 2.8  LV PW Systolic: Index: 17.8 EH/25YH/ m^2     cm  1.5 cm          EF Calculated: 70 %          RV Systolic Dimension: 2.4 cm  Septum          LV Mass Index: 147 l/min*m^2 LA/Aorta: 1.2  Diastolic: 1.1  cm                                           LA volume/Index: 79.7 ml  Septum          LVOT: 2 cm  Systolic: 1.2  cm   LV Mass: 241.96  g  Doppler Measurements & Calculations   MV Peak E-Wave: 1.42   AV Peak Velocity: 1.87 m/s  LVOT Peak Velocity:  m/s                    AV Peak Gradient: 14.05     1.55 m/s  MV Peak A-Wave: 0.82   mmHg                        LVOT Mean Velocity:  m/s                    AV Mean Velocity: 1.27 m/s  0.81 m/s  MV E/A Ratio: 1.73     AV Mean Gradient: 7.2 mmHg  LVOT Peak Gradient: 9.6  MV Peak Gradient: 9.2  AV VTI: 42.1 cm             mmHgLVOT Mean Gradient:  mmHg                   AV Area (Continuity):2.37   3.5 mmHg  MV Mean Gradient: 2.9  cm^2                        Estimated RVSP: 39 mmHg  mmHg                   AV Deceleration Time:       Estimated RAP:10 mmHg  MV Mean Velocity: 0.75 2474.3 msec  m/s                    LVOT VTI: 31.8 cm  MV Deceleration Time:                              TR Velocity:2.69 m/s  271.4 msec             Estimated PASP: 39.03 mmHg  TR Gradient:29.03 mmHg  MV P1/2t: 101.8 msec   Pulm. Vein A Reversal       PV Peak Velocity: 0.51  MVA by PHT:2.16 cm^2   Duration:175.3 msec         m/s  MV Area (continuity):  Pulm. Vein D Velocity:0.74  PV Peak Gradient: 1.02  1.9 cm^2               m/sPulm. Vein A Reversal    mmHg                         Velocity:0.38 m/s           PV Mean Velocity: 0.36                         Pulm.  Vein S Velocity: 0.44 m/s  MR Velocity: 5.37 m/s  m/s                         PV Mean Gradient: 0.6  MV RICARDO PISA: 0.23 cm^2                             mmHg  MR VTI: 221.9 cm  Alias Velocity: 0.25  m/sPISA Radius: 0.9 cm   PISA area: 5.09 cm^2MR  flow rate: 125.72  ml/sMR volume:51.04 ml [email or web address] [id number]    CT CHEST W CONTRAST    Addendum Date: 10/13/2021    ADDENDUM: 6 mm indeterminate hypodense lesions are identified in the body and tail of the pancreas. Consider surveillance     Result Date: 10/13/2021  EXAMINATION: CT OF THE CHEST WITH CONTRAST 10/13/2021 10:59 am TECHNIQUE: CT of the chest was performed with the administration of intravenous contrast. Multiplanar reformatted images are provided for review. Dose modulation, iterative reconstruction, and/or weight based adjustment of the mA/kV was utilized to reduce the radiation dose to as low as reasonably achievable. COMPARISON: None. HISTORY: ORDERING SYSTEM PROVIDED HISTORY: Chest wall mass FINDINGS: There is cardiomegaly. There is coronary artery calcification. The great vessels are normal.  Moderately enlarged mediastinal and hilar lymph nodes are noted with lymph nodes measuring up to 1.3 cm in the right hilum. Trachea and major bronchi are patent. Multiple bilateral pulmonary nodules are identified with nodules measuring up to 1.5 cm in the right lower lobe and smaller ones in the right middle lobe and right upper lobe. The pulmonary nodules in the left lower lobe ranges from 5 mm up to 8 mm. There is no focal consolidation or pleural effusion. Punctate enhancing nodules are identified in the posterior right hepatic lobe, largest 1 measuring up to 7 mm. Multiple heterogeneously enhancing right renal masses are identified largest 1 measuring 3.8 x 3.3 cm which is partially necrotic with additional smaller nodules concerning for multifocal malignancy like renal cell carcinoma. There is filling defects in the right renal vein concerning for tumor invasion versus tumor embolism of the renal vein. There is bilateral adrenal metastasis with the largest 1 in the left adrenal gland measuring 1.6 x 2.7 cm.   There is bone metastasis with the destructive soft tissue mass involving the right 4th rib anterolaterally with adjacent soft tissue mass which is partially necrotic measuring 4.5 x 9.2 cm. Lytic destructive lesions are also identified in the right 8th and 9th rib near the costo vertebral junction. Multiple heterogeneous  enhancing and necrotic masses in the right kidney concerning for multifocal renal cell carcinoma with  possible tumor invasion or tumor embolism of the right renal vein. There is diffuse metastasis with the involvement of the right and left adrenal glands, possible hepatic metastasis, widespread pulmonary and rib involvement as noted. Further assessment by PET-CT scan is recommended. Findings were telephoned to licensed caregiver. ASSESSMENT/PLAN :    Hernandez Mendez was seen today for cancer, follow-up and chemotherapy. Diagnoses and all orders for this visit:    Renal cell cancer, right ([facility] [address]  -     Cancel: CT FACIAL BONES WO CONTRAST; Future  -     CBC With Auto Differential; Future  -     Comprehensive metabolic panel; Future  -     TSH without Reflex;  Future  -     Cancel: 0.9 % sodium chloride infusion  -     Cancel: nivolumab (OPDIVO) 480 mg in sodium chloride 0.9 % 100 mL chemo IVPB  -     Cancel: sodium chloride flush 0.9 % injection 5-40 mL  -     Cancel: heparin flush 100 UNIT/ML injection 500 Units    Other orders  -     Cancel: 0.9 % sodium chloride infusion  -     Cancel: nivolumab (OPDIVO) 480 mg in sodium chloride 0.9 % 100 mL chemo IVPB  -     Cancel: 0.9 % sodium chloride infusion  -     Cancel: diphenhydrAMINE (BENADRYL) injection 50 mg  -     Cancel: hydrocortisone sodium succinate PF (SOLU-CORTEF) injection 100 mg  -     Cancel: meperidine (DEMEROL) injection 12.5 mg  -     Cancel: methylPREDNISolone sodium (SOLU-MEDROL) injection 125 mg  -     Cancel: famotidine (PEPCID) 20 mg in sodium chloride (PF) 0.9 % 10 mL injection  -     Cancel: EPINEPHrine PF 1 MG/ML injection (Anaphylaxis) 0.3 mg  -     Cancel: meperidine (DEMEROL) injection 12.5 mg  -     Cancel: sodium chloride flush 0.9 % injection 5-40 mL  -     Cancel: sodium chloride (PF) 0.9 % injection 5-40 mL  -     Cancel: 0.9 % sodium chloride infusion  -     Cancel: heparin flush 100 UNIT/ML injection 500 Units  -     Cancel: alteplase (CATHFLO) injection 2 mg  -     Cancel: alteplase (CATHFLO) injection 2 mg  -     Cancel: sterile water injection 2.2 mL  -     levothyroxine (SYNTHROID) 75 MCG tablet; Take 1 tablet by mouth daily  -     Cancel: sodium chloride (PF) 0.9 % injection 5-40 mL  -     Cancel: 0.9 % sodium chloride infusion  -     Cancel: alteplase (CATHFLO) 2 mg in sterile water 2 mL injection  -     Cancel: 0.9 % sodium chloride infusion  -     Cancel: diphenhydrAMINE (BENADRYL) injection 50 mg  -     Cancel: famotidine (PEPCID) 20 mg in sodium chloride (PF) 0.9 % 10 mL injection  -     Cancel: hydrocortisone sodium succinate PF (SOLU-CORTEF) injection 100 mg  -     Cancel: acetaminophen (TYLENOL) tablet 650 mg  -     Cancel: meperidine (DEMEROL) injection 12.5 mg  -     Cancel: ondansetron (ZOFRAN) injection 8 mg  -     Cancel: EPINEPHrine PF 1 MG/ML injection (Anaphylaxis) 0.3 mg  -     Cancel: albuterol sulfate HFA (PROVENTIL;VENTOLIN;PROAIR) 108 (90 Base) MCG/ACT inhaler 4 puff       73years old male with Stage IV renal cell carcinoma (diagnosed 11/2021- chest wall mass biopsy) -right chest wall mass, multiple right kidney necrotic lesions in addition to large soft tissue destructive mass of right fourth rib, multiple lung nodules up to 1.5 cm, bilateral adrenal metastasis up to 2.5 cm. ECOG 1/2. Started ipilimumab/nivolumab combination in November 2021. S/p 4 cycles. Switched to single agent Opdivo in March 2022. We will continue Opdivo 480 mg q 4 weeks till progression. Tolerating treatment well. No new complaints. Reviewed labs which are unremarkable.     Reviewed PET scan from August 2022 and discussed with the patient. No hypermetabolic lesions noted and no hypermetabolic activity in the residual renal mass. Consistent with complete response. Left jaw swelling. No exposed bone or discharge in mouth on examination. We will hold Xgeva and get a CT face. Primary hypothyroidism: With low normal T4, elevated TSH. Related to immunotherapy. Asymptomatic. On 75 mcg daily Synthroid. TSH has come down to normal. We will continue with same dose and reassess in a month. Return to clinic in 4 weeks. Return in about 1 week (around 11/3/2022).      Angeles Barron MD   Electronically signed 10/27/2022 at 3:07 PM

## 2022-11-03 ENCOUNTER — TELEPHONE (OUTPATIENT)
Dept: INFUSION THERAPY | Age: 72
End: 2022-11-03

## 2022-11-03 ENCOUNTER — HOSPITAL ENCOUNTER (OUTPATIENT)
Dept: INFUSION THERAPY | Age: 72
Discharge: HOME | End: 2022-11-03
Payer: MEDICARE

## 2022-11-03 ENCOUNTER — OFFICE VISIT (OUTPATIENT)
Dept: ONCOLOGY | Age: 72
End: 2022-11-03
Payer: MEDICARE

## 2022-11-03 VITALS
WEIGHT: 121.9 LBS | BODY MASS INDEX: 18.47 KG/M2 | DIASTOLIC BLOOD PRESSURE: 57 MMHG | SYSTOLIC BLOOD PRESSURE: 122 MMHG | HEIGHT: 68 IN | OXYGEN SATURATION: 100 % | HEART RATE: 50 BPM | TEMPERATURE: 97.6 F

## 2022-11-03 DIAGNOSIS — C64.1 RENAL CELL CANCER, RIGHT (HCC): Primary | ICD-10-CM

## 2022-11-03 LAB
ALBUMIN SERPL-MCNC: 3.9 G/DL (ref 3.5–5.2)
ALP BLD-CCNC: 77 U/L (ref 40–129)
ALT SERPL-CCNC: 10 U/L (ref 0–40)
ANION GAP SERPL CALCULATED.3IONS-SCNC: 8 MMOL/L (ref 7–16)
AST SERPL-CCNC: 12 U/L (ref 0–39)
BASOPHILS ABSOLUTE: 0.09 E9/L (ref 0–0.2)
BASOPHILS RELATIVE PERCENT: 0.9 % (ref 0–2)
BILIRUB SERPL-MCNC: <0.2 MG/DL (ref 0–1.2)
BUN BLDV-MCNC: 11 MG/DL (ref 6–23)
CALCIUM SERPL-MCNC: 8.9 MG/DL (ref 8.6–10.2)
CHLORIDE BLD-SCNC: 99 MMOL/L (ref 98–107)
CO2: 26 MMOL/L (ref 22–29)
CREAT SERPL-MCNC: 0.8 MG/DL (ref 0.7–1.2)
EOSINOPHILS ABSOLUTE: 0.33 E9/L (ref 0.05–0.5)
EOSINOPHILS RELATIVE PERCENT: 3.2 % (ref 0–6)
GFR SERPL CREATININE-BSD FRML MDRD: >60 ML/MIN/1.73
GLUCOSE BLD-MCNC: 95 MG/DL (ref 74–99)
HCT VFR BLD CALC: 33.1 % (ref 37–54)
HEMOGLOBIN: 11 G/DL (ref 12.5–16.5)
IMMATURE GRANULOCYTES #: 0.03 E9/L
IMMATURE GRANULOCYTES %: 0.3 % (ref 0–5)
LYMPHOCYTES ABSOLUTE: 2.47 E9/L (ref 1.5–4)
LYMPHOCYTES RELATIVE PERCENT: 23.6 % (ref 20–42)
MCH RBC QN AUTO: 32 PG (ref 26–35)
MCHC RBC AUTO-ENTMCNC: 33.2 % (ref 32–34.5)
MCV RBC AUTO: 96.2 FL (ref 80–99.9)
MONOCYTES ABSOLUTE: 0.91 E9/L (ref 0.1–0.95)
MONOCYTES RELATIVE PERCENT: 8.7 % (ref 2–12)
NEUTROPHILS ABSOLUTE: 6.64 E9/L (ref 1.8–7.3)
NEUTROPHILS RELATIVE PERCENT: 63.3 % (ref 43–80)
PDW BLD-RTO: 15.9 FL (ref 11.5–15)
PLATELET # BLD: 268 E9/L (ref 130–450)
PMV BLD AUTO: 9.8 FL (ref 7–12)
POTASSIUM SERPL-SCNC: 4.4 MMOL/L (ref 3.5–5)
RBC # BLD: 3.44 E12/L (ref 3.8–5.8)
SODIUM BLD-SCNC: 133 MMOL/L (ref 132–146)
TOTAL PROTEIN: 6.7 G/DL (ref 6.4–8.3)
TSH SERPL DL<=0.05 MIU/L-ACNC: 3.4 UIU/ML (ref 0.27–4.2)
WBC # BLD: 10.5 E9/L (ref 4.5–11.5)

## 2022-11-03 PROCEDURE — 6360000002 HC RX W HCPCS: Performed by: INTERNAL MEDICINE

## 2022-11-03 PROCEDURE — 85025 COMPLETE CBC W/AUTO DIFF WBC: CPT

## 2022-11-03 PROCEDURE — 36591 DRAW BLOOD OFF VENOUS DEVICE: CPT

## 2022-11-03 PROCEDURE — 80053 COMPREHEN METABOLIC PANEL: CPT

## 2022-11-03 PROCEDURE — 2580000003 HC RX 258: Performed by: INTERNAL MEDICINE

## 2022-11-03 PROCEDURE — 99214 OFFICE O/P EST MOD 30 MIN: CPT

## 2022-11-03 PROCEDURE — 84443 ASSAY THYROID STIM HORMONE: CPT

## 2022-11-03 RX ORDER — HEPARIN SODIUM (PORCINE) LOCK FLUSH IV SOLN 100 UNIT/ML 100 UNIT/ML
500 SOLUTION INTRAVENOUS PRN
Status: DISCONTINUED | OUTPATIENT
Start: 2022-11-03 | End: 2022-11-04 | Stop reason: HOSPADM

## 2022-11-03 RX ORDER — SODIUM CHLORIDE 0.9 % (FLUSH) 0.9 %
5-40 SYRINGE (ML) INJECTION PRN
Status: CANCELLED | OUTPATIENT
Start: 2022-11-03

## 2022-11-03 RX ORDER — HEPARIN SODIUM (PORCINE) LOCK FLUSH IV SOLN 100 UNIT/ML 100 UNIT/ML
500 SOLUTION INTRAVENOUS PRN
Status: CANCELLED | OUTPATIENT
Start: 2022-11-03

## 2022-11-03 RX ORDER — WATER 1000 ML/1000ML
2.2 INJECTION, SOLUTION INTRAVENOUS ONCE
OUTPATIENT
Start: 2022-11-03 | End: 2022-11-03

## 2022-11-03 RX ORDER — SODIUM CHLORIDE 0.9 % (FLUSH) 0.9 %
5-40 SYRINGE (ML) INJECTION PRN
Status: DISCONTINUED | OUTPATIENT
Start: 2022-11-03 | End: 2022-11-04 | Stop reason: HOSPADM

## 2022-11-03 RX ADMIN — SODIUM CHLORIDE, PRESERVATIVE FREE 20 ML: 5 INJECTION INTRAVENOUS at 09:31

## 2022-11-03 RX ADMIN — Medication 500 UNITS: at 09:33

## 2022-11-03 NOTE — TELEPHONE ENCOUNTER
Chrismonica Orozco  11/3/2022  Wt Readings from Last 10 Encounters:   11/03/22 121 lb 14.4 oz (55.3 kg)   10/27/22 120 lb 6.4 oz (54.6 kg)   08/04/22 125 lb 9.6 oz (57 kg)   07/07/22 124 lb 6.4 oz (56.4 kg)   06/09/22 130 lb (59 kg)   05/12/22 132 lb 11.2 oz (60.2 kg)   04/14/22 129 lb 9.6 oz (58.8 kg)   03/31/22 128 lb (58.1 kg)   03/17/22 128 lb 3.2 oz (58.2 kg)   03/03/22 121 lb 4.8 oz (55 kg)     Ht Readings from Last 1 Encounters:   11/03/22 5' 8\" (1.727 m)     BMI=18.53    Assessment: Visited with Ava Capellan while in for f/u with Dr. Su Khan d/t left jaw swelling. Crow Ontiveros reports that it isn't hurting him or affecting his intake. He denies increased difficulty chewing or pain when swallowing. He reports food tastes as it usually does and having no problems with xerostomia. Denies N/V/D/C. Encouraged adding snacks 3 times per day between meals d/t he said he eats good at meals. Contact information provided and encouraged him to call if he has questions. Weight change: 1.25% weight gain x 1 week, 2.95% weight loss x 3 months, 8.14% weight loss x 6 months  Appetite: Good appetite, says he eats everything that is put in front of him  Nutritional Side Effects: anorexia  Calculated Needs: 35-40kcal/kg/LFZ=0068-3710bjciq, 1.3-1.5gm/kg/CBW=70-85gm protein, 30-35kcal/kg/ZDP=1402-9550ro fluids  Malnutrition Status: Moderate  Nutrition Diagnosis: Moderate malnutrition r/t renal cancer AEB severe subcutaneous fat loss noted to cheek region and orbital fat pads, muscle wasting noted to temple region. Recommendations: Encourage adding 3 snacks to his 3 meals per day.      Ivett King RD

## 2022-11-03 NOTE — PROGRESS NOTES
[address] [place] [address]   Hematology/Oncology  Consult      Patient Name: Tiney Dakin  YOB: 1950  PCP: SHI Veronica NP   Referring Provider:      Reason for Consultation:   Chief Complaint   Patient presents with    Cancer    Follow-up     Renal cell cancer      Interval history: Reports swelling of left mandible on the left side has improved. History of Present Illness:  73years old male referred for possible metastatic renal cell carcinoma. Patient presented to his PCP, HETAL Pathak, complaining of right side chest wall mass which he noticed about a month ago and had been gradually increasing in size. CT chest from October 2021 showed mltiple heterogenous and necrotic masses in the right kidney with possible tumor invasion of the right renal vein. In addition to this there was a large 4 by 9 cm soft tissue necrotic mass involving the fourth rib. There were other lytic destructive lesions in the right eighth and ninth rib. There were multiple right lung nodules ranging upto 1.5 cm. Few liver nodules were noted as well the largest being 7 mm. 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 PET scan showed hypermetabolic lesions in the right kidney, left adrenal diffuse skeletal metastasis, scattered pulmonary nodules most of them subcentimeter except for 1 right lung hypermetabolic nodule. MRI brain reviewed no intracranial metastasis. S/p right chest wall mass biopsy on 11/2/2021. Consistent with clear-cell renal cell carcinoma. Performance status seems fair Karnofsky performance status 80/70. Intermediate risk based on [facility] risk factors although the disease seems to have progressed rapidly over the past month. His rapid progression as well as renal vein involvement probably precludes debulking nephrectomy. Tumor burden is not high and patient is asymptomatic.   Recommended treatment with ipilimumab/nivolumab combination. Started ipilimumab/nivolumab in November 2021. Switch to nivolumab monotherapy after 4 cycles and 3/2022. Patient reports pain in the right side chest wall mass which is well controlled with Tylenol as needed. Denies recent weight loss, loss of appetite night sweats, back pain. Review of systems: Over 10 systems were reviewed and all were negative except as mentioned above. Past medical history: Hypertension. Coronary artery disease, peripheral arterial disease, hyperlipidemia. Past surgical history: History of CABG, femoral arterial bypass, hernia repair. Social history: Smokes half a pack a day, denies alcohol or drug abuse. Family history Sister had breast cancer, father had colon cancer. Diagnostic Data:     Past Medical History:   Diagnosis Date    Anticoagulant long-term use 2007    aspirin    CAD (coronary artery disease)     Cancer (HCC)     kidney    Deep vein thrombosis (DVT) (HCC)     leg    [place] (hard of hearing)     Hx of blood clots     Hyperlipidemia     Hypertension     Mentally challenged     information per patient's niece. Patient Active Problem List    Diagnosis Date Noted    Poor venous access     Renal cell cancer, right ([address] 11/11/2021        Past Surgical History:   Procedure Laterality Date    [facility]    patient's sister said they were told a twin was removed from paitent's abdomen.     CARDIAC SURGERY  2007    bypass    CATHETER INSERTION Left 11/16/2021    MEDIPORT CATHETER INSERTION performed by Gopal Quintana MD at [address] [place] [address]      bifemoral    IR PERCUT BX LUNG/MEDIASTINUM  11/2/2021    IR PERCUT BX LUNG/MEDIASTINUM 11/2/2021 [facility] CT       Family History  Family History   Problem Relation Age of Onset    High Blood Pressure Mother     Heart Disease Mother     Heart Attack Mother     Cancer Father 64        colon    Arthritis Sister     Heart Attack Brother     No Known Problems Maternal Uncle No Known Problems Paternal Aunt     No Known Problems Paternal Uncle     No Known Problems Maternal Grandmother     Cancer Maternal Grandfather         throat    Heart Attack Paternal Grandmother     No Known Problems Paternal Grandfather     Cirrhosis Paternal Cousin     Deep Vein Thrombosis Sister     Diabetes Sister     Heart Disease Sister         triple bypass    Breast Cancer Sister 61    High Blood Pressure Sister     Diabetes Sister     High Cholesterol Sister     Arthritis Sister        Social History    TOBACCO:   reports that he has been smoking cigarettes. He has a 13.50 pack-year smoking history. He has never used smokeless tobacco.  ETOH:   reports that he does not currently use alcohol. Home Medications  Prior to Admission medications    Medication Sig Start Date End Date Taking? Authorizing Provider   levothyroxine (SYNTHROID) 75 MCG tablet Take 1 tablet by mouth daily 10/27/22  Yes Fiona Badillo MD   Calcium Carbonate-Vitamin D (OYSTER SHELL CALCIUM/D) 500-200 MG-UNIT TABS Take 2 tablets by mouth daily 3/31/22 3/31/23 Yes Fiona Badillo MD   lidocaine-prilocaine (EMLA) 2.5-2.5 % cream Apply topically as needed.  12/9/21  Yes SHI Farias - CNP   ARTHRITIS PAIN RELIEF 650 MG extended release tablet TAKE TWO TABLETS BY MOUTH AT BEDTIME 9/21/21  Yes Historical Provider, MD   metoprolol tartrate (LOPRESSOR) 25 MG tablet Take 25 mg by mouth 2 times daily   Yes Historical Provider, MD   aspirin 81 MG EC tablet Take 81 mg by mouth daily LD 10/26/21   Yes Historical Provider, MD   simvastatin (ZOCOR) 80 MG tablet Take 80 mg by mouth nightly   Yes Historical Provider, MD   lisinopril (PRINIVIL;ZESTRIL) 20 MG tablet Take 20 mg by mouth daily   Yes Historical Provider, MD   levothyroxine (SYNTHROID) 75 MCG tablet Take 1 tablet by mouth daily 4/14/22 7/7/22  Fiona Badillo MD   levothyroxine (SYNTHROID) 50 MCG tablet Take 1 tablet by mouth daily 3/3/22 7/7/22  Fiona Badillo MD Allergies  No Known Allergies    Review of Systems:      Objective  BP (!) 122/57   Pulse 50   Temp 97.6 °F (36.4 °C)   Ht 5' 8\" (1.727 m)   Wt 121 lb 14.4 oz (55.3 kg)   SpO2 100%   BMI 18.53 kg/m²     Physical Performance Status    Physical Exam:  General: AAO to person, place, time, and purpose, appears stated age, cooperative, no acute distress, pleasant   Head and neck : PERRLA, EOMI . Sclera non icteric. Oropharynx : Clear. No exposed bones or discharge. Neck: no JVD,  no adenopathy, no carotid bruit  LYMPHATICS : no lap  Lungs: Clear to auscultation. Right side chest wall globular mass, 4-5 cm in diamter has completely resolved. Heart: Regular rate and regular rhythm, no murmur, normal S1, S2  Abdomen: Soft, non-tender;no masses, no organomegaly  Extremities: No edema,no cyanosis, no clubbing. Skin:  No rash. Neurologic:Cranial nerves grossly intact. No focal motor or sensory deficits .     Recent Laboratory Data-   Lab Results   Component Value Date    WBC 10.5 11/03/2022    HGB 11.0 (L) 11/03/2022    HCT 33.1 (L) 11/03/2022    MCV 96.2 11/03/2022     11/03/2022    LYMPHOPCT 23.6 11/03/2022    RBC 3.44 (L) 11/03/2022    MCH 32.0 11/03/2022    MCHC 33.2 11/03/2022    RDW 15.9 (H) 11/03/2022    NEUTOPHILPCT 63.3 11/03/2022    MONOPCT 8.7 11/03/2022    BASOPCT 0.9 11/03/2022    NEUTROABS 6.64 11/03/2022    LYMPHSABS 2.47 11/03/2022    MONOSABS 0.91 11/03/2022    EOSABS 0.33 11/03/2022    BASOSABS 0.09 11/03/2022       Lab Results   Component Value Date     11/03/2022    K 4.4 11/03/2022    CL 99 11/03/2022    CO2 26 11/03/2022    BUN 11 11/03/2022    CREATININE 0.8 11/03/2022    GLUCOSE 95 11/03/2022    CALCIUM 8.9 11/03/2022    PROT 6.7 11/03/2022    LABALBU 3.9 11/03/2022    BILITOT <0.2 11/03/2022    ALKPHOS 77 11/03/2022    AST 12 11/03/2022    ALT 10 11/03/2022    LABGLOM >60 11/03/2022    GFRAA >60 08/03/2022       No results found for: IRON, TIBC, FERRITIN        Radiology-    Echo Complete    Result Date: 10/8/2021  Transthoracic Echocardiography Report (TTE)  Demographics   Patient Name    [facility]        Gender            Male                  Marcio Prater  [id number]      Room Number  Number   Account #       [de-identified]     Procedure Date    10/08/2021   Corporate ID                  Ordering          Tejas Berger DO                                Physician   Accession       [id number]    Referring         Tejas Berger DO  Number                        Physician   Date of Birth   1950    Sonographer       Pili Quispe RDCS   Age             73 year(s)    Interpreting      Himanshu 66                                Physician         Physician Cardiology                                                  Yazmin Alvares MD                                 Any Other  Procedure Type of Study   TTE procedure:Echo Complete W/Doppler & Color Flow. Procedure Date Date: 10/08/2021 Start: 09:57 AM Study Location: Echo Lab Technical Quality: Poor visualization due to poor acoustical window. Indications:Heart murmur. Patient Status: Routine Height: 68 inches Weight: 120 pounds BSA: 1.65 m^2 BMI: 18.25 kg/m^2  Findings   Left Ventricle  Normal left ventricular chamber size. Normal left ventricular systolic function. Visually estimated LVEF 65%. Mild left ventricular concentric hypertrophy noted. Normal diastolic function. Right Ventricle  Normal right ventricle size and function. Left Atrium  The left atrium is mildly dilated. Mildly increased left atrial volume. Interatrial septum not well visualized but appears intact. Right Atrium  Normal right atrium. Mitral Valve  Normal mitral valve structure. There is moderate mitral regurgitation - ERO 0.23cm2, PISA 0.9cm, RV 51cc. No mitral valve prolapse. Tricuspid Valve  Normal tricuspid valve structure. There is mild tricuspid regurgitation.   Mild pulmonary hypertension, RVSP 39mmHg. Aortic Valve  Normal aortic valve structure. There is trace to mild aortic regurgitation, pressure 1/2 time 718ms. Pulmonic Valve  The pulmonic valve was not well visualized. Pericardial Effusion  No evidence of pericardial effusion. Pericardium appears normal.   Pleural Effusion  No evidence of pleural effusion. Aorta  Aortic root 3.5cm. Miscellaneous  The inferior vena cava diameter is normal with normal respiratory  variation. Conclusions   Summary  Normal left ventricular chamber size. Normal left ventricular systolic function, LVEF 08%. Mild left ventricular concentric hypertrophy noted. Normal diastolic function. The left atrium is mildly dilated. Mildly increased left atrial volume. Interatrial septum not well visualized but appears intact. Normal right ventricle size and function. There is moderate mitral regurgitation - ERO 0.23cm2, PISA 0.9cm, RV 51cc. No mitral valve prolapse. There is trace to mild aortic regurgitation, pressure 1/2 time 718ms. There is mild tricuspid regurgitation. Mild pulmonary hypertension, RVSP 39mmHg. The pulmonic valve was not well visualized. Aortic root 3.5cm. No evidence of pericardial effusion. No intra cardiac mass or thrombus. No comparison study available.    Signature   ----------------------------------------------------------------  Electronically signed by Lizette Díaz MD(Interpreting  physician) on 10/08/2021 03:52 PM  ----------------------------------------------------------------  M-Mode/2D Measurements & Calculations   LV Diastolic    LV Systolic Dimension: 3.2   AV Cusp Separation: 1.9 cmLA  Dimension: 5.3  cm                           Dimension: 3.9 cmAO Root  cm              LV Volume Diastolic: 230.7   Dimension: 3.5 cm  LV FS:39.6 %    ml  LV PW           LV Volume Systolic: 42.6 ml  Diastolic: 1.2  LV EDV/LV EDV Index: 134.5  cm              ml/82 ml/m^2LV ESV/LV ESV    RV Diastolic Dimension: 2.8  LV PW Systolic: Index: 99.8 BW/14BQ/ m^2     cm  1.5 cm          EF Calculated: 70 %          RV Systolic Dimension: 2.4 cm  Septum          LV Mass Index: 147 l/min*m^2 LA/Aorta: 1.2  Diastolic: 1.1  cm                                           LA volume/Index: 79.7 ml  Septum          LVOT: 2 cm  Systolic: 1.2  cm   LV Mass: 241.96  g  Doppler Measurements & Calculations   MV Peak E-Wave: 1.42   AV Peak Velocity: 1.87 m/s  LVOT Peak Velocity:  m/s                    AV Peak Gradient: 14.05     1.55 m/s  MV Peak A-Wave: 0.82   mmHg                        LVOT Mean Velocity:  m/s                    AV Mean Velocity: 1.27 m/s  0.81 m/s  MV E/A Ratio: 1.73     AV Mean Gradient: 7.2 mmHg  LVOT Peak Gradient: 9.6  MV Peak Gradient: 9.2  AV VTI: 42.1 cm             mmHgLVOT Mean Gradient:  mmHg                   AV Area (Continuity):2.37   3.5 mmHg  MV Mean Gradient: 2.9  cm^2                        Estimated RVSP: 39 mmHg  mmHg                   AV Deceleration Time:       Estimated RAP:10 mmHg  MV Mean Velocity: 0.75 2474.3 msec  m/s                    LVOT VTI: 31.8 cm  MV Deceleration Time:                              TR Velocity:2.69 m/s  271.4 msec             Estimated PASP: 39.03 mmHg  TR Gradient:29.03 mmHg  MV P1/2t: 101.8 msec   Pulm. Vein A Reversal       PV Peak Velocity: 0.51  MVA by PHT:2.16 cm^2   Duration:175.3 msec         m/s  MV Area (continuity):  Pulm. Vein D Velocity:0.74  PV Peak Gradient: 1.02  1.9 cm^2               m/sPulm. Vein A Reversal    mmHg                         Velocity:0.38 m/s           PV Mean Velocity: 0.36                         Pulm.  Vein S Velocity: 0.44 m/s  MR Velocity: 5.37 m/s  m/s                         PV Mean Gradient: 0.6  MV RICARDO PISA: 0.23 cm^2                             mmHg  MR VTI: 221.9 cm  Alias Velocity: 0.25  m/sPISA Radius: 0.9 cm   PISA area: 5.09 cm^2MR  flow rate: 125.72  ml/sMR volume:51.04 ml [email or web address] [id number]%[id number]    CT CHEST W CONTRAST    Addendum Date: 10/13/2021    ADDENDUM: 6 mm indeterminate hypodense lesions are identified in the body and tail of the pancreas. Consider surveillance     Result Date: 10/13/2021  EXAMINATION: CT OF THE CHEST WITH CONTRAST 10/13/2021 10:59 am TECHNIQUE: CT of the chest was performed with the administration of intravenous contrast. Multiplanar reformatted images are provided for review. Dose modulation, iterative reconstruction, and/or weight based adjustment of the mA/kV was utilized to reduce the radiation dose to as low as reasonably achievable. COMPARISON: None. HISTORY: ORDERING SYSTEM PROVIDED HISTORY: Chest wall mass FINDINGS: There is cardiomegaly. There is coronary artery calcification. The great vessels are normal.  Moderately enlarged mediastinal and hilar lymph nodes are noted with lymph nodes measuring up to 1.3 cm in the right hilum. Trachea and major bronchi are patent. Multiple bilateral pulmonary nodules are identified with nodules measuring up to 1.5 cm in the right lower lobe and smaller ones in the right middle lobe and right upper lobe. The pulmonary nodules in the left lower lobe ranges from 5 mm up to 8 mm. There is no focal consolidation or pleural effusion. Punctate enhancing nodules are identified in the posterior right hepatic lobe, largest 1 measuring up to 7 mm. Multiple heterogeneously enhancing right renal masses are identified largest 1 measuring 3.8 x 3.3 cm which is partially necrotic with additional smaller nodules concerning for multifocal malignancy like renal cell carcinoma. There is filling defects in the right renal vein concerning for tumor invasion versus tumor embolism of the renal vein. There is bilateral adrenal metastasis with the largest 1 in the left adrenal gland measuring 1.6 x 2.7 cm.   There is bone metastasis with the destructive soft tissue mass involving the right 4th rib anterolaterally with adjacent soft tissue mass which is partially necrotic measuring 4.5 x 9.2 cm. Lytic destructive lesions are also identified in the right 8th and 9th rib near the costo vertebral junction. Multiple heterogeneous  enhancing and necrotic masses in the right kidney concerning for multifocal renal cell carcinoma with  possible tumor invasion or tumor embolism of the right renal vein. There is diffuse metastasis with the involvement of the right and left adrenal glands, possible hepatic metastasis, widespread pulmonary and rib involvement as noted. Further assessment by PET-CT scan is recommended. Findings were telephoned to licensed caregiver. ASSESSMENT/PLAN :    Di Whitt was seen today for cancer and follow-up. Diagnoses and all orders for this visit:    Renal cell cancer, right ([facility] [address]  -     CBC with Auto Differential; Future  -     Comprehensive Metabolic Panel; Future  -     TSH; Future       73years old male with Stage IV renal cell carcinoma (diagnosed 11/2021- chest wall mass biopsy) -right chest wall mass, multiple right kidney necrotic lesions in addition to large soft tissue destructive mass of right fourth rib, multiple lung nodules up to 1.5 cm, bilateral adrenal metastasis up to 2.5 cm. ECOG 1/2. Started ipilimumab/nivolumab combination in November 2021. S/p 4 cycles. Switched to single agent Opdivo in March 2022. We will continue Opdivo 480 mg q 4 weeks till progression. Tolerating treatment well. No new complaints. Reviewed labs which are unremarkable. Reviewed PET scan from August 2022 and discussed with the patient. No hypermetabolic lesions noted and no hypermetabolic activity in the residual renal mass. Consistent with complete response. Left jaw swelling. No exposed bone or discharge in mouth on examination.   CT face from last week did not show any evidence of osteonecrosis of jaw. MRI of facial bones was recommended for further evaluation. His swelling has improved remarkably. Will defer further evaluation at this time. Hilaria Rail has been held. Primary hypothyroidism: With low normal T4, elevated TSH. Related to immunotherapy. Asymptomatic. On 75 mcg daily Synthroid. TSH has come down to normal. We will continue with same dose and reassess in a month. Return to clinic in 3 weeks. Return in about 3 weeks (around 11/24/2022).      Priyanka Alex MD   Electronically signed 11/3/2022 at 11:20 AM

## 2022-12-01 ENCOUNTER — OFFICE VISIT (OUTPATIENT)
Dept: ONCOLOGY | Age: 72
End: 2022-12-01
Payer: MEDICARE

## 2022-12-01 ENCOUNTER — HOSPITAL ENCOUNTER (EMERGENCY)
Age: 72
Discharge: HOME | End: 2022-12-01
Payer: MEDICARE

## 2022-12-01 ENCOUNTER — TELEPHONE (OUTPATIENT)
Dept: INFUSION THERAPY | Age: 72
End: 2022-12-01

## 2022-12-01 ENCOUNTER — HOSPITAL ENCOUNTER (OUTPATIENT)
Dept: INFUSION THERAPY | Age: 72
Discharge: HOME | End: 2022-12-01
Payer: MEDICARE

## 2022-12-01 VITALS
RESPIRATION RATE: 18 BRPM | TEMPERATURE: 97.2 F | OXYGEN SATURATION: 99 % | SYSTOLIC BLOOD PRESSURE: 135 MMHG | DIASTOLIC BLOOD PRESSURE: 69 MMHG | HEART RATE: 62 BPM

## 2022-12-01 VITALS
WEIGHT: 113.7 LBS | BODY MASS INDEX: 17.23 KG/M2 | HEART RATE: 63 BPM | HEIGHT: 68 IN | SYSTOLIC BLOOD PRESSURE: 148 MMHG | TEMPERATURE: 98.1 F | DIASTOLIC BLOOD PRESSURE: 65 MMHG | OXYGEN SATURATION: 99 %

## 2022-12-01 DIAGNOSIS — C64.1 RENAL CELL CANCER, RIGHT (HCC): Primary | ICD-10-CM

## 2022-12-01 DIAGNOSIS — L02.91 ABSCESS: Primary | ICD-10-CM

## 2022-12-01 LAB
ALBUMIN SERPL-MCNC: 3.3 G/DL (ref 3.5–5.2)
ALP BLD-CCNC: 137 U/L (ref 40–129)
ALT SERPL-CCNC: 5 U/L (ref 0–40)
ANION GAP SERPL CALCULATED.3IONS-SCNC: 9 MMOL/L (ref 7–16)
AST SERPL-CCNC: 9 U/L (ref 0–39)
BASOPHILS ABSOLUTE: 0.05 E9/L (ref 0–0.2)
BASOPHILS RELATIVE PERCENT: 0.4 % (ref 0–2)
BILIRUB SERPL-MCNC: 0.2 MG/DL (ref 0–1.2)
BUN BLDV-MCNC: 6 MG/DL (ref 6–23)
CALCIUM SERPL-MCNC: 8.6 MG/DL (ref 8.6–10.2)
CHLORIDE BLD-SCNC: 90 MMOL/L (ref 98–107)
CO2: 29 MMOL/L (ref 22–29)
CREAT SERPL-MCNC: 0.7 MG/DL (ref 0.7–1.2)
EOSINOPHILS ABSOLUTE: 0.02 E9/L (ref 0.05–0.5)
EOSINOPHILS RELATIVE PERCENT: 0.2 % (ref 0–6)
GFR SERPL CREATININE-BSD FRML MDRD: >60 ML/MIN/1.73
GLUCOSE BLD-MCNC: 133 MG/DL (ref 74–99)
HCT VFR BLD CALC: 34.6 % (ref 37–54)
HEMOGLOBIN: 11.5 G/DL (ref 12.5–16.5)
IMMATURE GRANULOCYTES #: 0.05 E9/L
IMMATURE GRANULOCYTES %: 0.4 % (ref 0–5)
LYMPHOCYTES ABSOLUTE: 2.13 E9/L (ref 1.5–4)
LYMPHOCYTES RELATIVE PERCENT: 17 % (ref 20–42)
MCH RBC QN AUTO: 31.4 PG (ref 26–35)
MCHC RBC AUTO-ENTMCNC: 33.2 % (ref 32–34.5)
MCV RBC AUTO: 94.5 FL (ref 80–99.9)
MONOCYTES ABSOLUTE: 0.96 E9/L (ref 0.1–0.95)
MONOCYTES RELATIVE PERCENT: 7.6 % (ref 2–12)
NEUTROPHILS ABSOLUTE: 9.34 E9/L (ref 1.8–7.3)
NEUTROPHILS RELATIVE PERCENT: 74.4 % (ref 43–80)
PDW BLD-RTO: 16.4 FL (ref 11.5–15)
PLATELET # BLD: 559 E9/L (ref 130–450)
PMV BLD AUTO: 9 FL (ref 7–12)
POTASSIUM SERPL-SCNC: 3.7 MMOL/L (ref 3.5–5)
RBC # BLD: 3.66 E12/L (ref 3.8–5.8)
SODIUM BLD-SCNC: 128 MMOL/L (ref 132–146)
TOTAL PROTEIN: 6.5 G/DL (ref 6.4–8.3)
TSH SERPL DL<=0.05 MIU/L-ACNC: 1.28 UIU/ML (ref 0.27–4.2)
WBC # BLD: 12.6 E9/L (ref 4.5–11.5)

## 2022-12-01 PROCEDURE — 10060 I&D ABSCESS SIMPLE/SINGLE: CPT

## 2022-12-01 PROCEDURE — 85025 COMPLETE CBC W/AUTO DIFF WBC: CPT

## 2022-12-01 PROCEDURE — 84443 ASSAY THYROID STIM HORMONE: CPT

## 2022-12-01 PROCEDURE — 99283 EMERGENCY DEPT VISIT LOW MDM: CPT

## 2022-12-01 PROCEDURE — 80053 COMPREHEN METABOLIC PANEL: CPT

## 2022-12-01 PROCEDURE — 36415 COLL VENOUS BLD VENIPUNCTURE: CPT

## 2022-12-01 PROCEDURE — 99212 OFFICE O/P EST SF 10 MIN: CPT

## 2022-12-01 PROCEDURE — 6370000000 HC RX 637 (ALT 250 FOR IP): Performed by: PHYSICIAN ASSISTANT

## 2022-12-01 RX ORDER — DOXYCYCLINE HYCLATE 100 MG/1
100 CAPSULE ORAL ONCE
Status: COMPLETED | OUTPATIENT
Start: 2022-12-01 | End: 2022-12-01

## 2022-12-01 RX ORDER — HEPARIN SODIUM (PORCINE) LOCK FLUSH IV SOLN 100 UNIT/ML 100 UNIT/ML
500 SOLUTION INTRAVENOUS PRN
Status: DISCONTINUED | OUTPATIENT
Start: 2022-12-01 | End: 2022-12-02 | Stop reason: HOSPADM

## 2022-12-01 RX ORDER — DOXYCYCLINE HYCLATE 100 MG
100 TABLET ORAL 2 TIMES DAILY
Qty: 20 TABLET | Refills: 0 | Status: SHIPPED | OUTPATIENT
Start: 2022-12-01 | End: 2022-12-11

## 2022-12-01 RX ORDER — SODIUM CHLORIDE 0.9 % (FLUSH) 0.9 %
5-40 SYRINGE (ML) INJECTION PRN
Status: CANCELLED | OUTPATIENT
Start: 2022-12-01

## 2022-12-01 RX ORDER — HYDROCODONE BITARTRATE AND ACETAMINOPHEN 5; 325 MG/1; MG/1
1 TABLET ORAL EVERY 6 HOURS PRN
Qty: 12 TABLET | Refills: 0 | Status: SHIPPED | OUTPATIENT
Start: 2022-12-01 | End: 2022-12-04

## 2022-12-01 RX ORDER — WATER 1000 ML/1000ML
2.2 INJECTION, SOLUTION INTRAVENOUS ONCE
Status: CANCELLED | OUTPATIENT
Start: 2022-12-01 | End: 2022-12-01

## 2022-12-01 RX ORDER — SODIUM CHLORIDE 0.9 % (FLUSH) 0.9 %
5-40 SYRINGE (ML) INJECTION PRN
Status: DISCONTINUED | OUTPATIENT
Start: 2022-12-01 | End: 2022-12-02 | Stop reason: HOSPADM

## 2022-12-01 RX ORDER — HEPARIN SODIUM (PORCINE) LOCK FLUSH IV SOLN 100 UNIT/ML 100 UNIT/ML
500 SOLUTION INTRAVENOUS PRN
Status: CANCELLED | OUTPATIENT
Start: 2022-12-01

## 2022-12-01 RX ORDER — HYDROCODONE BITARTRATE AND ACETAMINOPHEN 5; 325 MG/1; MG/1
1 TABLET ORAL ONCE
Status: COMPLETED | OUTPATIENT
Start: 2022-12-01 | End: 2022-12-01

## 2022-12-01 RX ADMIN — HYDROCODONE BITARTRATE AND ACETAMINOPHEN 1 TABLET: 5; 325 TABLET ORAL at 14:56

## 2022-12-01 RX ADMIN — DOXYCYCLINE HYCLATE 100 MG: 100 CAPSULE ORAL at 14:57

## 2022-12-01 NOTE — PROGRESS NOTES
Patient has abscess left side of face refusing to have labs and treatment thru port, IV started ant labs drawn and  - Dr. Suri Stoddard aware.

## 2022-12-01 NOTE — ED PROVIDER NOTES
[facility]      HPI:  12/1/22, Time: 2:01 PM ALYSSA Prater is a 81 y.o. male presenting to the ED for facial abscess , beginning 1 month  ago. The complaint has been persistent, moderate in severity, and worsened by nothing. Patient comes in with complaint of abscess to the left lower jaw area that started the end of October. Patient did take antibiotics with no improvement. He was sent over today from the cancer center. Review of Systems:   A complete review of systems was performed and pertinent positives and negatives are stated within HPI, all other systems reviewed and are negative.          --------------------------------------------- PAST HISTORY ---------------------------------------------  Past Medical History:  has a past medical history of Anticoagulant long-term use, CAD (coronary artery disease), Cancer ([address] Deep vein thrombosis (DVT) ([facility] [address] [place] (hard of hearing), Hx of blood clots, Hyperlipidemia, Hypertension, and Mentally challenged. Past Surgical History:  has a past surgical history that includes Cardiac surgery (2007); femoral bypass; IR NEEDLE BIOPSY LUNG/MEDIASTINUM PERC (11/2/2021); Abdomen surgery (1954); and Catheter Insertion (Left, 11/16/2021). Social History:  reports that he has been smoking cigarettes. He has a 13.50 pack-year smoking history. He has never used smokeless tobacco. He reports that he does not currently use alcohol. He reports that he does not currently use drugs. Family History: family history includes Arthritis in his sister and sister; Breast Cancer (age of onset: 61) in his sister; Cancer in his maternal grandfather; Cancer (age of onset: 64) in his father; Cirrhosis in his paternal cousin; Deep Vein Thrombosis in his sister; Diabetes in his sister and sister;  Heart Attack in his brother, mother, and paternal grandmother; Heart Disease in his mother and sister; High Blood Pressure in his mother and sister; High Cholesterol in his sister; No Known Problems in his maternal grandmother, maternal uncle, paternal aunt, paternal grandfather, and paternal uncle. The patients home medications have been reviewed. Allergies: Patient has no known allergies. -------------------------------------------------- RESULTS -------------------------------------------------  All laboratory and radiology results have been personally reviewed by myself   LABS:  No results found for this visit on 12/01/22. RADIOLOGY:  Interpreted by Radiologist.  No orders to display       ------------------------- NURSING NOTES AND VITALS REVIEWED ---------------------------   The nursing notes within the ED encounter and vital signs as below have been reviewed. /69   Pulse 62   Temp 97.2 °F (36.2 °C) (Infrared)   Resp 18   SpO2 99%   Oxygen Saturation Interpretation: Normal      ---------------------------------------------------PHYSICAL EXAM--------------------------------------      Constitutional/General: Alert and oriented x3, well appearing, non toxic in NAD  Head: Normocephalic and atraumatic  Eyes: PERRL, EOMI  Mouth: Oropharynx clear, handling secretions, no trismus  Neck: Supple, full ROM,   Pulmonary: Lungs clear to auscultation bilaterally, no wheezes, rales, or rhonchi. Not in respiratory distress  Cardiovascular:  Regular rate and rhythm, no murmurs, gallops, or rubs. 2+ distal pulses  Abdomen: Soft, non tender, non distended,   Extremities: Moves all extremities x 4.  Warm and well perfused  Skin: warm and dry without rash  Neurologic: GCS 15,  Psych: Normal Affect      ------------------------------ ED COURSE/MEDICAL DECISION MAKING----------------------  Medications   HYDROcodone-acetaminophen (NORCO) 5-325 MG per tablet 1 tablet (1 tablet Oral Given 12/1/22 4180)   doxycycline hyclate (VIBRAMYCIN) capsule 100 mg (100 mg Oral Given 12/1/22 5387)         ED COURSE:     PROCEDURE  12/1/22       Time: 1405    INCISION AND DRAINAGE  Risks, benefits and alternatives (for applicable procedures below) described. Performed By: NIA Abdul. Indication: Abscess located on let lower face   Informed consent obtained: The patient was counseled regarding the procedure, it's indications, risks, potential complications and alternatives and any questions were answered. Consent was obtained. .  Prep: The skin was cleansed with povidone iodine and draped in a sterile fashion. Local Anesthesia:  obtained with Lidocaine 1% without epinephrine. Incision: The Abscess located on left lower face  was Incised by scalpal and moderate fluid was drained. A wound culture was not obtained. The wound  was not irrigated and was not packed with iodoform gauze. The wound was then covered with a sterile dressing. Patient tolerated the procedure well. Medical Decision Making:     Patient Came in with complaint of abscess of the left lower face that started 1 month ago. Abscess was I&D need. Patient was placed on Doxy. Follow-up primary care 1 to 2 days. Counseling: The emergency provider has spoken with the patient and discussed todays results, in addition to providing specific details for the plan of care and counseling regarding the diagnosis and prognosis. Questions are answered at this time and they are agreeable with the plan.      --------------------------------- IMPRESSION AND DISPOSITION ---------------------------------    IMPRESSION  1. Abscess        DISPOSITION  Disposition: Discharge to home  Patient condition is good      NOTE: This report was transcribed using voice recognition software.  Every effort was made to ensure accuracy; however, inadvertent computerized transcription errors may be present      Ace Fernando, [id number]  12/01/22 1938

## 2022-12-01 NOTE — PROGRESS NOTES
[address]  [address] [place] [address]   Hematology/Oncology  Consult      Patient Name: Mark Patient  YOB: 1950  PCP: SHI Valero NP   Referring Provider:      Reason for Consultation:   Chief Complaint   Patient presents with    Cancer    Follow-up     Renal cell cancer      Interval history: Reports worsening swelling pain tenderness in the neck on the left side. History of Present Illness:  73years old male referred for possible metastatic renal cell carcinoma. Patient presented to his PCP, HETAL Pathak, complaining of right side chest wall mass which he noticed about a month ago and had been gradually increasing in size. CT chest from October 2021 showed mltiple heterogenous and necrotic masses in the right kidney with possible tumor invasion of the right renal vein. In addition to this there was a large 4 by 9 cm soft tissue necrotic mass involving the fourth rib. There were other lytic destructive lesions in the right eighth and ninth rib. There were multiple right lung nodules ranging upto 1.5 cm. Few liver nodules were noted as well the largest being 7 mm. 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 PET scan showed hypermetabolic lesions in the right kidney, left adrenal diffuse skeletal metastasis, scattered pulmonary nodules most of them subcentimeter except for 1 right lung hypermetabolic nodule. MRI brain reviewed no intracranial metastasis. S/p right chest wall mass biopsy on 11/2/2021. Consistent with clear-cell renal cell carcinoma. Performance status seems fair Karnofsky performance status 80/70. Intermediate risk based on [facility] risk factors although the disease seems to have progressed rapidly over the past month. His rapid progression as well as renal vein involvement probably precludes debulking nephrectomy. Tumor burden is not high and patient is asymptomatic.   Recommended treatment with ipilimumab/nivolumab combination. Started ipilimumab/nivolumab in November 2021. Switch to nivolumab monotherapy after 4 cycles and 3/2022. Patient reports pain in the right side chest wall mass which is well controlled with Tylenol as needed. Denies recent weight loss, loss of appetite night sweats, back pain. Review of systems: Over 10 systems were reviewed and all were negative except as mentioned above. Past medical history: Hypertension. Coronary artery disease, peripheral arterial disease, hyperlipidemia. Past surgical history: History of CABG, femoral arterial bypass, hernia repair. Social history: Smokes half a pack a day, denies alcohol or drug abuse. Family history Sister had breast cancer, father had colon cancer. Diagnostic Data:     Past Medical History:   Diagnosis Date    Anticoagulant long-term use 2007    aspirin    CAD (coronary artery disease)     Cancer (HCC)     kidney    Deep vein thrombosis (DVT) (HCC)     leg    Robinson (hard of hearing)     Hx of blood clots     Hyperlipidemia     Hypertension     Mentally challenged     information per patient's niece. Patient Active Problem List    Diagnosis Date Noted    Poor venous access     Renal cell cancer, right ([address] 11/11/2021        Past Surgical History:   Procedure Laterality Date    [facility]    patient's sister said they were told a twin was removed from paitent's abdomen.     CARDIAC SURGERY  2007    bypass    CATHETER INSERTION Left 11/16/2021    MEDIPORT CATHETER INSERTION performed by Pedro Dyer MD at [address] [place] [address]      bifemoral    IR PERCUT BX LUNG/MEDIASTINUM  11/2/2021    IR PERCUT BX LUNG/MEDIASTINUM 11/2/2021 [facility] CT       Family History  Family History   Problem Relation Age of Onset    High Blood Pressure Mother     Heart Disease Mother     Heart Attack Mother     Cancer Father 64        colon    Arthritis Sister     Heart Attack Brother     No Known Problems Maternal Uncle     No Known Problems Paternal Aunt     No Known Problems Paternal Uncle     No Known Problems Maternal Grandmother     Cancer Maternal Grandfather         throat    Heart Attack Paternal Grandmother     No Known Problems Paternal Grandfather     Cirrhosis Paternal Cousin     Deep Vein Thrombosis Sister     Diabetes Sister     Heart Disease Sister         triple bypass    Breast Cancer Sister 61    High Blood Pressure Sister     Diabetes Sister     High Cholesterol Sister     Arthritis Sister        Social History    TOBACCO:   reports that he has been smoking cigarettes. He has a 13.50 pack-year smoking history. He has never used smokeless tobacco.  ETOH:   reports that he does not currently use alcohol. Home Medications  Prior to Admission medications    Medication Sig Start Date End Date Taking? Authorizing Provider   levothyroxine (SYNTHROID) 75 MCG tablet Take 1 tablet by mouth daily 10/27/22   Eneida Freeman MD   levothyroxine (SYNTHROID) 75 MCG tablet Take 1 tablet by mouth daily 4/14/22 7/7/22  Eneida Freeman MD   Calcium Carbonate-Vitamin D (OYSTER SHELL CALCIUM/D) 500-200 MG-UNIT TABS Take 2 tablets by mouth daily 3/31/22 3/31/23  Eneida Freeman MD   levothyroxine (SYNTHROID) 50 MCG tablet Take 1 tablet by mouth daily 3/3/22 7/7/22  Eneida Freeman MD   lidocaine-prilocaine (EMLA) 2.5-2.5 % cream Apply topically as needed.  12/9/21   SHI Cain - CNP   ARTHRITIS PAIN RELIEF 650 MG extended release tablet TAKE TWO TABLETS BY MOUTH AT BEDTIME 9/21/21   Historical Provider, MD   metoprolol tartrate (LOPRESSOR) 25 MG tablet Take 25 mg by mouth 2 times daily    Historical Provider, MD   aspirin 81 MG EC tablet Take 81 mg by mouth daily LD 10/26/21    Historical Provider, MD   simvastatin (ZOCOR) 80 MG tablet Take 80 mg by mouth nightly    Historical Provider, MD   lisinopril (PRINIVIL;ZESTRIL) 20 MG tablet Take 20 mg by mouth daily    Historical Provider, MD       Allergies  No Known Allergies    Review of Systems:      Objective  BP (!) 148/65   Pulse 63   Temp 98.1 °F (36.7 °C)   Ht 5' 8\" (1.727 m)   Wt 113 lb 11.2 oz (51.6 kg)   SpO2 99%   BMI 17.29 kg/m²     Physical Performance Status    Physical Exam:  General: AAO to person, place, time, and purpose, appears stated age, cooperative, no acute distress, pleasant   Head and neck : PERRLA, EOMI . Sclera non icteric. Oropharynx : Clear. No exposed bones or discharge. Neck: Abscess noted in submandibular region. Purulent discharge noted in the oral cavity in the area of left lower molar. LYMPHATICS : no lap  Lungs: Clear to auscultation. Right side chest wall globular mass, 4-5 cm in diamter has completely resolved. Heart: Regular rate and regular rhythm, no murmur, normal S1, S2  Abdomen: Soft, non-tender;no masses, no organomegaly  Extremities: No edema,no cyanosis, no clubbing. Skin:  No rash. Neurologic:Cranial nerves grossly intact. No focal motor or sensory deficits .     Recent Laboratory Data-   Lab Results   Component Value Date    WBC 12.6 (H) 12/01/2022    HGB 11.5 (L) 12/01/2022    HCT 34.6 (L) 12/01/2022    MCV 94.5 12/01/2022     (H) 12/01/2022    LYMPHOPCT 17.0 (L) 12/01/2022    RBC 3.66 (L) 12/01/2022    MCH 31.4 12/01/2022    MCHC 33.2 12/01/2022    RDW 16.4 (H) 12/01/2022    NEUTOPHILPCT 74.4 12/01/2022    MONOPCT 7.6 12/01/2022    BASOPCT 0.4 12/01/2022    NEUTROABS 9.34 (H) 12/01/2022    LYMPHSABS 2.13 12/01/2022    MONOSABS 0.96 (H) 12/01/2022    EOSABS 0.02 (L) 12/01/2022    BASOSABS 0.05 12/01/2022       Lab Results   Component Value Date     (L) 12/01/2022    K 3.7 12/01/2022    CL 90 (L) 12/01/2022    CO2 29 12/01/2022    BUN 6 12/01/2022    CREATININE 0.7 12/01/2022    GLUCOSE 133 (H) 12/01/2022    CALCIUM 8.6 12/01/2022    PROT 6.5 12/01/2022    LABALBU 3.3 (L) 12/01/2022    BILITOT 0.2 12/01/2022    ALKPHOS 137 (H) 12/01/2022    AST 9 12/01/2022    ALT 5 12/01/2022    LABGLOM >60 12/01/2022 GFRAA >60 08/03/2022       No results found for: IRON, TIBC, FERRITIN        Radiology-    Echo Complete    Result Date: 10/8/2021  Transthoracic Echocardiography Report (TTE)  Demographics   Patient Name    [facility]        Gender            Male                  Marcio Prater  [id number]      Room Number  Number   Account #       [de-identified]     Procedure Date    10/08/2021   Corporate ID                  Ordering          Yrn Adams DO                                Physician   Accession       [id number]    Referring         Yrn Adams DO  Number                        Physician   Date of Birth   1950    Sonographer       Kwasi Cummings RDCS   Age             73 year(s)    Interpreting      [facility]                                Physician         Physician Cardiology                                                  Starr Felix MD                                 Any Other  Procedure Type of Study   TTE procedure:Echo Complete W/Doppler & Color Flow. Procedure Date Date: 10/08/2021 Start: 09:57 AM Study Location: Echo Lab Technical Quality: Poor visualization due to poor acoustical window. Indications:Heart murmur. Patient Status: Routine Height: 68 inches Weight: 120 pounds BSA: 1.65 m^2 BMI: 18.25 kg/m^2  Findings   Left Ventricle  Normal left ventricular chamber size. Normal left ventricular systolic function. Visually estimated LVEF 65%. Mild left ventricular concentric hypertrophy noted. Normal diastolic function. Right Ventricle  Normal right ventricle size and function. Left Atrium  The left atrium is mildly dilated. Mildly increased left atrial volume. Interatrial septum not well visualized but appears intact. Right Atrium  Normal right atrium. Mitral Valve  Normal mitral valve structure. There is moderate mitral regurgitation - ERO 0.23cm2, PISA 0.9cm, RV 51cc. No mitral valve prolapse. Tricuspid Valve  Normal tricuspid valve structure.   There is mild tricuspid regurgitation. Mild pulmonary hypertension, RVSP 39mmHg. Aortic Valve  Normal aortic valve structure. There is trace to mild aortic regurgitation, pressure 1/2 time 718ms. Pulmonic Valve  The pulmonic valve was not well visualized. Pericardial Effusion  No evidence of pericardial effusion. Pericardium appears normal.   Pleural Effusion  No evidence of pleural effusion. Aorta  Aortic root 3.5cm. Miscellaneous  The inferior vena cava diameter is normal with normal respiratory  variation. Conclusions   Summary  Normal left ventricular chamber size. Normal left ventricular systolic function, LVEF 02%. Mild left ventricular concentric hypertrophy noted. Normal diastolic function. The left atrium is mildly dilated. Mildly increased left atrial volume. Interatrial septum not well visualized but appears intact. Normal right ventricle size and function. There is moderate mitral regurgitation - ERO 0.23cm2, PISA 0.9cm, RV 51cc. No mitral valve prolapse. There is trace to mild aortic regurgitation, pressure 1/2 time 718ms. There is mild tricuspid regurgitation. Mild pulmonary hypertension, RVSP 39mmHg. The pulmonic valve was not well visualized. Aortic root 3.5cm. No evidence of pericardial effusion. No intra cardiac mass or thrombus. No comparison study available.    Signature   ----------------------------------------------------------------  Electronically signed by Tania Alberts MD(Interpreting  physician) on 10/08/2021 03:52 PM  ----------------------------------------------------------------  M-Mode/2D Measurements & Calculations   LV Diastolic    LV Systolic Dimension: 3.2   AV Cusp Separation: 1.9 cmLA  Dimension: 5.3  cm                           Dimension: 3.9 cmAO Root  cm              LV Volume Diastolic: 279.0   Dimension: 3.5 cm  LV FS:39.6 %    ml  LV PW           LV Volume Systolic: 40.1 ml  Diastolic: 1.2  LV EDV/LV EDV Index: 134.5  cm              ml/82 ml/m^2LV ESV/LV ESV    RV Diastolic Dimension: 2.8  LV PW Systolic: Index: 36.4 HC/86MM/ m^2     cm  1.5 cm          EF Calculated: 70 %          RV Systolic Dimension: 2.4 cm  Septum          LV Mass Index: 147 l/min*m^2 LA/Aorta: 1.2  Diastolic: 1.1  cm                                           LA volume/Index: 79.7 ml  Septum          LVOT: 2 cm  Systolic: 1.2  cm   LV Mass: 241.96  g  Doppler Measurements & Calculations   MV Peak E-Wave: 1.42   AV Peak Velocity: 1.87 m/s  LVOT Peak Velocity:  m/s                    AV Peak Gradient: 14.05     1.55 m/s  MV Peak A-Wave: 0.82   mmHg                        LVOT Mean Velocity:  m/s                    AV Mean Velocity: 1.27 m/s  0.81 m/s  MV E/A Ratio: 1.73     AV Mean Gradient: 7.2 mmHg  LVOT Peak Gradient: 9.6  MV Peak Gradient: 9.2  AV VTI: 42.1 cm             mmHgLVOT Mean Gradient:  mmHg                   AV Area (Continuity):2.37   3.5 mmHg  MV Mean Gradient: 2.9  cm^2                        Estimated RVSP: 39 mmHg  mmHg                   AV Deceleration Time:       Estimated RAP:10 mmHg  MV Mean Velocity: 0.75 2474.3 msec  m/s                    LVOT VTI: 31.8 cm  MV Deceleration Time:                              TR Velocity:2.69 m/s  271.4 msec             Estimated PASP: 39.03 mmHg  TR Gradient:29.03 mmHg  MV P1/2t: 101.8 msec   Pulm. Vein A Reversal       PV Peak Velocity: 0.51  MVA by PHT:2.16 cm^2   Duration:175.3 msec         m/s  MV Area (continuity):  Pulm. Vein D Velocity:0.74  PV Peak Gradient: 1.02  1.9 cm^2               m/sPulm. Vein A Reversal    mmHg                         Velocity:0.38 m/s           PV Mean Velocity: 0.36                         Pulm.  Vein S Velocity: 0.44 m/s  MR Velocity: 5.37 m/s  m/s                         PV Mean Gradient: 0.6  MV RICARDO PISA: 0.23 cm^2                             mmHg  MR VTI: 221.9 cm  Alias Velocity: 0.25  m/sPISA Radius: 0.9 cm   PISA area: 5.09 cm^2MR  flow rate: 125.72  ml/sMR volume:51.04 ml [email or web address] [id number]    CT CHEST W CONTRAST    Addendum Date: 10/13/2021    ADDENDUM: 6 mm indeterminate hypodense lesions are identified in the body and tail of the pancreas. Consider surveillance     Result Date: 10/13/2021  EXAMINATION: CT OF THE CHEST WITH CONTRAST 10/13/2021 10:59 am TECHNIQUE: CT of the chest was performed with the administration of intravenous contrast. Multiplanar reformatted images are provided for review. Dose modulation, iterative reconstruction, and/or weight based adjustment of the mA/kV was utilized to reduce the radiation dose to as low as reasonably achievable. COMPARISON: None. HISTORY: ORDERING SYSTEM PROVIDED HISTORY: Chest wall mass FINDINGS: There is cardiomegaly. There is coronary artery calcification. The great vessels are normal.  Moderately enlarged mediastinal and hilar lymph nodes are noted with lymph nodes measuring up to 1.3 cm in the right hilum. Trachea and major bronchi are patent. Multiple bilateral pulmonary nodules are identified with nodules measuring up to 1.5 cm in the right lower lobe and smaller ones in the right middle lobe and right upper lobe. The pulmonary nodules in the left lower lobe ranges from 5 mm up to 8 mm. There is no focal consolidation or pleural effusion. Punctate enhancing nodules are identified in the posterior right hepatic lobe, largest 1 measuring up to 7 mm. Multiple heterogeneously enhancing right renal masses are identified largest 1 measuring 3.8 x 3.3 cm which is partially necrotic with additional smaller nodules concerning for multifocal malignancy like renal cell carcinoma. There is filling defects in the right renal vein concerning for tumor invasion versus tumor embolism of the renal vein. There is bilateral adrenal metastasis with the largest 1 in the left adrenal gland measuring 1.6 x 2.7 cm.   There is bone metastasis with the destructive soft tissue mass involving the right 4th rib anterolaterally with adjacent soft tissue mass which is partially necrotic measuring 4.5 x 9.2 cm. Lytic destructive lesions are also identified in the right 8th and 9th rib near the costo vertebral junction. Multiple heterogeneous  enhancing and necrotic masses in the right kidney concerning for multifocal renal cell carcinoma with  possible tumor invasion or tumor embolism of the right renal vein. There is diffuse metastasis with the involvement of the right and left adrenal glands, possible hepatic metastasis, widespread pulmonary and rib involvement as noted. Further assessment by PET-CT scan is recommended. Findings were telephoned to licensed caregiver. ASSESSMENT/PLAN :    Genaro Contreras was seen today for cancer and follow-up. Diagnoses and all orders for this visit:    Renal cell cancer, right ([facility] [address]  -     CBC with Auto Differential; Future  -     Comprehensive Metabolic Panel; Future       73years old male with Stage IV renal cell carcinoma (diagnosed 11/2021- chest wall mass biopsy) -right chest wall mass, multiple right kidney necrotic lesions in addition to large soft tissue destructive mass of right fourth rib, multiple lung nodules up to 1.5 cm, bilateral adrenal metastasis up to 2.5 cm. ECOG 1/2. Left submandibular abscess communicating with oral cavity. Symptomatic from pain tenderness. Unable to eat. Lost 10 pounds in last month. CT face from last month did not show any abnormal findings. Christensen Corie has been held. Will hold treatment. Sent to the ER. Started ipilimumab/nivolumab combination in November 2021. S/p 4 cycles. Switched to single agent Opdivo in March 2022. We will continue Opdivo 480 mg q 4 weeks till progression. Tolerating treatment well. No new complaints. Reviewed labs which are unremarkable. PET scan from August 2022 and discussed with the patient.   No hypermetabolic lesions noted and no hypermetabolic activity in the residual renal mass. Consistent with complete response. We will repeat in a month. Primary hypothyroidism: With low normal T4, elevated TSH. Related to immunotherapy. Asymptomatic. On 75 mcg daily Synthroid. TSH has come down to normal. We will continue with same dose and reassess in a month. Return to clinic in 3 weeks. Return in about 1 week (around 12/8/2022).      Nereyda Perez MD   Electronically signed 12/1/2022 at 11:00 AM

## 2022-12-01 NOTE — PROGRESS NOTES
Called report to ER spoke with Nurse Lidia-renal cell cancer no treatment today per Dr. Allen Boswell. Left side of face swollen and he want him to be evaluated at ER- IV site left in and ER is aware of this will route this to his PCP. Patient has abscess left side of face refusing to have labs and treatment thru port.

## 2022-12-01 NOTE — TELEPHONE ENCOUNTER
Briefly met with Justa Mota while in for OV with Dr. Papito Traore. Justa Mota has experienced a significant weight loss of 6.73% x 1 month, and 12.54% x 6 months. He complained of difficulty eating and sleeping due to pain in mouth. Heather Gonsalez noted left submandibular abscess and has sent him to the ER to be evaluated. Suggested a few soft foods to try, such as scrambled eggs with heavy whipping cream to make fluffy and add calories. Provided contact information for Justa Mota to call with questions. Follow up appointment is scheduled for 12/8/22 with Dr. Papito Traore.

## 2022-12-08 ENCOUNTER — OFFICE VISIT (OUTPATIENT)
Dept: ONCOLOGY | Age: 72
End: 2022-12-08

## 2022-12-08 ENCOUNTER — TELEPHONE (OUTPATIENT)
Dept: INFUSION THERAPY | Age: 72
End: 2022-12-08

## 2022-12-08 ENCOUNTER — HOSPITAL ENCOUNTER (OUTPATIENT)
Dept: INFUSION THERAPY | Age: 72
Discharge: HOME | End: 2022-12-08
Payer: MEDICARE

## 2022-12-08 ENCOUNTER — TELEPHONE (OUTPATIENT)
Dept: ONCOLOGY | Age: 72
End: 2022-12-08

## 2022-12-08 VITALS
WEIGHT: 114.6 LBS | HEART RATE: 54 BPM | DIASTOLIC BLOOD PRESSURE: 74 MMHG | TEMPERATURE: 97.9 F | HEIGHT: 68 IN | SYSTOLIC BLOOD PRESSURE: 123 MMHG | OXYGEN SATURATION: 98 % | BODY MASS INDEX: 17.37 KG/M2

## 2022-12-08 VITALS
DIASTOLIC BLOOD PRESSURE: 62 MMHG | SYSTOLIC BLOOD PRESSURE: 115 MMHG | TEMPERATURE: 97.9 F | OXYGEN SATURATION: 98 % | RESPIRATION RATE: 16 BRPM | HEART RATE: 60 BPM

## 2022-12-08 DIAGNOSIS — C64.1 RENAL CELL CANCER, RIGHT (HCC): Primary | ICD-10-CM

## 2022-12-08 LAB
ALBUMIN SERPL-MCNC: 3.3 G/DL (ref 3.5–5.2)
ALP BLD-CCNC: 108 U/L (ref 40–129)
ALT SERPL-CCNC: 12 U/L (ref 0–40)
ANION GAP SERPL CALCULATED.3IONS-SCNC: 11 MMOL/L (ref 7–16)
AST SERPL-CCNC: 10 U/L (ref 0–39)
BASOPHILS ABSOLUTE: 0.07 E9/L (ref 0–0.2)
BASOPHILS RELATIVE PERCENT: 0.8 % (ref 0–2)
BILIRUB SERPL-MCNC: <0.2 MG/DL (ref 0–1.2)
BUN BLDV-MCNC: 6 MG/DL (ref 6–23)
CALCIUM SERPL-MCNC: 8.6 MG/DL (ref 8.6–10.2)
CHLORIDE BLD-SCNC: 98 MMOL/L (ref 98–107)
CO2: 29 MMOL/L (ref 22–29)
CREAT SERPL-MCNC: 0.7 MG/DL (ref 0.7–1.2)
EOSINOPHILS ABSOLUTE: 0.11 E9/L (ref 0.05–0.5)
EOSINOPHILS RELATIVE PERCENT: 1.3 % (ref 0–6)
GFR SERPL CREATININE-BSD FRML MDRD: >60 ML/MIN/1.73
GLUCOSE BLD-MCNC: 94 MG/DL (ref 74–99)
HCT VFR BLD CALC: 32.4 % (ref 37–54)
HEMOGLOBIN: 10.7 G/DL (ref 12.5–16.5)
IMMATURE GRANULOCYTES #: 0.04 E9/L
IMMATURE GRANULOCYTES %: 0.5 % (ref 0–5)
LYMPHOCYTES ABSOLUTE: 2.69 E9/L (ref 1.5–4)
LYMPHOCYTES RELATIVE PERCENT: 32.5 % (ref 20–42)
MCH RBC QN AUTO: 31.2 PG (ref 26–35)
MCHC RBC AUTO-ENTMCNC: 33 % (ref 32–34.5)
MCV RBC AUTO: 94.5 FL (ref 80–99.9)
MONOCYTES ABSOLUTE: 0.87 E9/L (ref 0.1–0.95)
MONOCYTES RELATIVE PERCENT: 10.5 % (ref 2–12)
NEUTROPHILS ABSOLUTE: 4.49 E9/L (ref 1.8–7.3)
NEUTROPHILS RELATIVE PERCENT: 54.4 % (ref 43–80)
PDW BLD-RTO: 16.3 FL (ref 11.5–15)
PLATELET # BLD: 462 E9/L (ref 130–450)
PMV BLD AUTO: 8.9 FL (ref 7–12)
POTASSIUM SERPL-SCNC: 3.3 MMOL/L (ref 3.5–5)
RBC # BLD: 3.43 E12/L (ref 3.8–5.8)
SODIUM BLD-SCNC: 138 MMOL/L (ref 132–146)
TOTAL PROTEIN: 6.3 G/DL (ref 6.4–8.3)
WBC # BLD: 8.3 E9/L (ref 4.5–11.5)

## 2022-12-08 PROCEDURE — 2580000003 HC RX 258: Performed by: INTERNAL MEDICINE

## 2022-12-08 PROCEDURE — 6360000002 HC RX W HCPCS: Performed by: INTERNAL MEDICINE

## 2022-12-08 PROCEDURE — 85025 COMPLETE CBC W/AUTO DIFF WBC: CPT

## 2022-12-08 PROCEDURE — 80053 COMPREHEN METABOLIC PANEL: CPT

## 2022-12-08 PROCEDURE — 96413 CHEMO IV INFUSION 1 HR: CPT

## 2022-12-08 RX ORDER — HEPARIN SODIUM (PORCINE) LOCK FLUSH IV SOLN 100 UNIT/ML 100 UNIT/ML
500 SOLUTION INTRAVENOUS PRN
Status: CANCELLED | OUTPATIENT
Start: 2022-12-08

## 2022-12-08 RX ORDER — SODIUM CHLORIDE 0.9 % (FLUSH) 0.9 %
5-40 SYRINGE (ML) INJECTION PRN
OUTPATIENT
Start: 2022-12-08

## 2022-12-08 RX ORDER — SODIUM CHLORIDE 9 MG/ML
5-250 INJECTION, SOLUTION INTRAVENOUS PRN
Status: CANCELLED | OUTPATIENT
Start: 2022-12-08

## 2022-12-08 RX ORDER — EPINEPHRINE 1 MG/ML
0.3 INJECTION, SOLUTION, CONCENTRATE INTRAVENOUS PRN
Status: CANCELLED | OUTPATIENT
Start: 2022-12-08

## 2022-12-08 RX ORDER — ONDANSETRON 2 MG/ML
8 INJECTION INTRAMUSCULAR; INTRAVENOUS
Status: CANCELLED | OUTPATIENT
Start: 2022-12-08

## 2022-12-08 RX ORDER — SODIUM CHLORIDE 9 MG/ML
5-250 INJECTION, SOLUTION INTRAVENOUS PRN
Status: DISCONTINUED | OUTPATIENT
Start: 2022-12-08 | End: 2022-12-09 | Stop reason: HOSPADM

## 2022-12-08 RX ORDER — MEPERIDINE HYDROCHLORIDE 25 MG/ML
12.5 INJECTION INTRAMUSCULAR; INTRAVENOUS; SUBCUTANEOUS PRN
Status: CANCELLED | OUTPATIENT
Start: 2022-12-08

## 2022-12-08 RX ORDER — SODIUM CHLORIDE 9 MG/ML
INJECTION, SOLUTION INTRAVENOUS CONTINUOUS
Status: CANCELLED | OUTPATIENT
Start: 2022-12-08

## 2022-12-08 RX ORDER — ACETAMINOPHEN 325 MG/1
650 TABLET ORAL
Status: CANCELLED | OUTPATIENT
Start: 2022-12-08

## 2022-12-08 RX ORDER — HEPARIN SODIUM (PORCINE) LOCK FLUSH IV SOLN 100 UNIT/ML 100 UNIT/ML
500 SOLUTION INTRAVENOUS PRN
Status: DISCONTINUED | OUTPATIENT
Start: 2022-12-08 | End: 2022-12-09 | Stop reason: HOSPADM

## 2022-12-08 RX ORDER — DIPHENHYDRAMINE HYDROCHLORIDE 50 MG/ML
50 INJECTION INTRAMUSCULAR; INTRAVENOUS
Status: CANCELLED | OUTPATIENT
Start: 2022-12-08

## 2022-12-08 RX ORDER — SODIUM CHLORIDE 0.9 % (FLUSH) 0.9 %
5-40 SYRINGE (ML) INJECTION PRN
Status: DISCONTINUED | OUTPATIENT
Start: 2022-12-08 | End: 2022-12-09 | Stop reason: HOSPADM

## 2022-12-08 RX ORDER — ALBUTEROL SULFATE 90 UG/1
4 AEROSOL, METERED RESPIRATORY (INHALATION) PRN
Status: CANCELLED | OUTPATIENT
Start: 2022-12-08

## 2022-12-08 RX ORDER — WATER 1000 ML/1000ML
2.2 INJECTION, SOLUTION INTRAVENOUS ONCE
OUTPATIENT
Start: 2022-12-08 | End: 2022-12-08

## 2022-12-08 RX ORDER — SODIUM CHLORIDE 9 MG/ML
5-40 INJECTION INTRAVENOUS PRN
Status: CANCELLED | OUTPATIENT
Start: 2022-12-08

## 2022-12-08 RX ORDER — HEPARIN SODIUM (PORCINE) LOCK FLUSH IV SOLN 100 UNIT/ML 100 UNIT/ML
500 SOLUTION INTRAVENOUS PRN
OUTPATIENT
Start: 2022-12-08

## 2022-12-08 RX ORDER — SODIUM CHLORIDE 0.9 % (FLUSH) 0.9 %
5-40 SYRINGE (ML) INJECTION PRN
Status: CANCELLED | OUTPATIENT
Start: 2022-12-08

## 2022-12-08 RX ADMIN — SODIUM CHLORIDE 250 ML/HR: 9 INJECTION, SOLUTION INTRAVENOUS at 11:09

## 2022-12-08 RX ADMIN — Medication 500 UNITS: at 12:01

## 2022-12-08 RX ADMIN — SODIUM CHLORIDE, PRESERVATIVE FREE 10 ML: 5 INJECTION INTRAVENOUS at 10:02

## 2022-12-08 RX ADMIN — SODIUM CHLORIDE 480 MG: 9 INJECTION, SOLUTION INTRAVENOUS at 11:24

## 2022-12-08 RX ADMIN — SODIUM CHLORIDE, PRESERVATIVE FREE 10 ML: 5 INJECTION INTRAVENOUS at 11:09

## 2022-12-08 NOTE — TELEPHONE ENCOUNTER
RAMESH met with pt re:[facility] cancer fund. Pt reported that he had filled it out in the past.  SW has a copy of last time pt sent in cancer fund and a response letter pt brought in stating that he could apply again in December 2022. SW assisted pt with application and application was put in the mail.       BRUNILDA Ferrer  Oncology Social Worker

## 2022-12-08 NOTE — PROGRESS NOTES
[address]  [address] [place] [address]   Hematology/Oncology  Consult      Patient Name: Jaki Walls  YOB: 1950  PCP: SHI Messina NP   Referring Provider:      Reason for Consultation:   Chief Complaint   Patient presents with    Cancer    Follow-up     Renal cell cancer      Interval history: S/p incision and drainage for left facial abscess around left mandible. On doxycycline. Has been afebrile feels much better. History of Present Illness:  73years old male referred for possible metastatic renal cell carcinoma. Patient presented to his PCP, HETAL Pathak, complaining of right side chest wall mass which he noticed about a month ago and had been gradually increasing in size. CT chest from October 2021 showed mltiple heterogenous and necrotic masses in the right kidney with possible tumor invasion of the right renal vein. In addition to this there was a large 4 by 9 cm soft tissue necrotic mass involving the fourth rib. There were other lytic destructive lesions in the right eighth and ninth rib. There were multiple right lung nodules ranging upto 1.5 cm. Few liver nodules were noted as well the largest being 7 mm. 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 PET scan showed hypermetabolic lesions in the right kidney, left adrenal diffuse skeletal metastasis, scattered pulmonary nodules most of them subcentimeter except for 1 right lung hypermetabolic nodule. MRI brain reviewed no intracranial metastasis. S/p right chest wall mass biopsy on 11/2/2021. Consistent with clear-cell renal cell carcinoma. Performance status seems fair Karnofsky performance status 80/70. Intermediate risk based on [facility] risk factors although the disease seems to have progressed rapidly over the past month. His rapid progression as well as renal vein involvement probably precludes debulking nephrectomy.   Tumor burden is not high and patient is asymptomatic. Recommended treatment with ipilimumab/nivolumab combination. Started ipilimumab/nivolumab in November 2021. Switch to nivolumab monotherapy after 4 cycles and 3/2022. Patient reports pain in the right side chest wall mass which is well controlled with Tylenol as needed. Denies recent weight loss, loss of appetite night sweats, back pain. Review of systems: Over 10 systems were reviewed and all were negative except as mentioned above. Past medical history: Hypertension. Coronary artery disease, peripheral arterial disease, hyperlipidemia. Past surgical history: History of CABG, femoral arterial bypass, hernia repair. Social history: Smokes half a pack a day, denies alcohol or drug abuse. Family history Sister had breast cancer, father had colon cancer. Diagnostic Data:     Past Medical History:   Diagnosis Date    Anticoagulant long-term use 2007    aspirin    CAD (coronary artery disease)     Cancer (HCC)     kidney    Deep vein thrombosis (DVT) (HCC)     leg    [place] (hard of hearing)     Hx of blood clots     Hyperlipidemia     Hypertension     Mentally challenged     information per patient's niece. Patient Active Problem List    Diagnosis Date Noted    Poor venous access     Renal cell cancer, right ([address] 11/11/2021        Past Surgical History:   Procedure Laterality Date    [facility]    patient's sister said they were told a twin was removed from paitent's abdomen.     CARDIAC SURGERY  2007    bypass    CATHETER INSERTION Left 11/16/2021    MEDIPORT CATHETER INSERTION performed by Jamie Sheets MD at [address] [place] [address]      bifemoral    IR PERCUT BX LUNG/MEDIASTINUM  11/2/2021    IR PERCUT BX LUNG/MEDIASTINUM 11/2/2021 [facility] CT       Family History  Family History   Problem Relation Age of Onset    High Blood Pressure Mother     Heart Disease Mother     Heart Attack Mother     Cancer Father 64        colon    Arthritis Sister Heart Attack Brother     No Known Problems Maternal Uncle     No Known Problems Paternal Aunt     No Known Problems Paternal Uncle     No Known Problems Maternal Grandmother     Cancer Maternal Grandfather         throat    Heart Attack Paternal Grandmother     No Known Problems Paternal Grandfather     Cirrhosis Paternal Cousin     Deep Vein Thrombosis Sister     Diabetes Sister     Heart Disease Sister         triple bypass    Breast Cancer Sister 61    High Blood Pressure Sister     Diabetes Sister     High Cholesterol Sister     Arthritis Sister        Social History    TOBACCO:   reports that he has been smoking cigarettes. He has a 13.50 pack-year smoking history. He has never used smokeless tobacco.  ETOH:   reports that he does not currently use alcohol. Home Medications  Prior to Admission medications    Medication Sig Start Date End Date Taking? Authorizing Provider   doxycycline hyclate (VIBRA-TABS) 100 MG tablet Take 1 tablet by mouth 2 times daily for 10 days 12/1/22 12/11/22  NIA Vega   levothyroxine (SYNTHROID) 75 MCG tablet Take 1 tablet by mouth daily 10/27/22   [facility] MD Hayde   levothyroxine (SYNTHROID) 75 MCG tablet Take 1 tablet by mouth daily 4/14/22 7/7/22  [facility] MD Hayde   Calcium Carbonate-Vitamin D (OYSTER SHELL CALCIUM/D) 500-200 MG-UNIT TABS Take 2 tablets by mouth daily 3/31/22 3/31/23  [facility] July, MD   levothyroxine (SYNTHROID) 50 MCG tablet Take 1 tablet by mouth daily 3/3/22 7/7/22  [facility] MD Hayde   lidocaine-prilocaine (EMLA) 2.5-2.5 % cream Apply topically as needed.  12/9/21   SHI Gaines - CNP   ARTHRITIS PAIN RELIEF 650 MG extended release tablet TAKE TWO TABLETS BY MOUTH AT BEDTIME 9/21/21   Historical Provider, MD   metoprolol tartrate (LOPRESSOR) 25 MG tablet Take 25 mg by mouth 2 times daily    Historical Provider, MD   aspirin 81 MG EC tablet Take 81 mg by mouth daily LD 10/26/21    Historical Provider, MD   simvastatin (ZOCOR) 80 MG tablet Take 80 mg by mouth nightly    Historical Provider, MD   lisinopril (PRINIVIL;ZESTRIL) 20 MG tablet Take 20 mg by mouth daily    Historical Provider, MD       Allergies  No Known Allergies    Review of Systems:      Objective  /74   Pulse 54   Temp 97.9 °F (36.6 °C)   Ht 5' 8\" (1.727 m)   Wt 114 lb 9.6 oz (52 kg)   SpO2 98%   BMI 17.42 kg/m²     Physical Performance Status    Physical Exam:  General: AAO to person, place, time, and purpose, appears stated age, cooperative, no acute distress, pleasant   Head and neck : PERRLA, EOMI . Sclera non icteric. Oropharynx : Clear. No exposed bones or discharge. Neck: Abscess noted in submandibular region. Purulent discharge noted in the oral cavity in the area of left lower molar. LYMPHATICS : no lap  Lungs: Clear to auscultation. Right side chest wall globular mass, 4-5 cm in diamter has completely resolved. Heart: Regular rate and regular rhythm, no murmur, normal S1, S2  Abdomen: Soft, non-tender;no masses, no organomegaly  Extremities: No edema,no cyanosis, no clubbing. Skin:  No rash. Neurologic:Cranial nerves grossly intact. No focal motor or sensory deficits .     Recent Laboratory Data-   Lab Results   Component Value Date    WBC 8.3 12/08/2022    HGB 10.7 (L) 12/08/2022    HCT 32.4 (L) 12/08/2022    MCV 94.5 12/08/2022     (H) 12/08/2022    LYMPHOPCT 32.5 12/08/2022    RBC 3.43 (L) 12/08/2022    MCH 31.2 12/08/2022    MCHC 33.0 12/08/2022    RDW 16.3 (H) 12/08/2022    NEUTOPHILPCT 54.4 12/08/2022    MONOPCT 10.5 12/08/2022    BASOPCT 0.8 12/08/2022    NEUTROABS 4.49 12/08/2022    LYMPHSABS 2.69 12/08/2022    MONOSABS 0.87 12/08/2022    EOSABS 0.11 12/08/2022    BASOSABS 0.07 12/08/2022       Lab Results   Component Value Date     12/08/2022    K 3.3 (L) 12/08/2022    CL 98 12/08/2022    CO2 29 12/08/2022    BUN 6 12/08/2022    CREATININE 0.7 12/08/2022    GLUCOSE 94 12/08/2022    CALCIUM 8.6 12/08/2022    PROT 6.3 (L) 12/08/2022    LABALBU 3.3 (L) 12/08/2022    BILITOT <0.2 12/08/2022    ALKPHOS 108 12/08/2022    AST 10 12/08/2022    ALT 12 12/08/2022    LABGLOM >60 12/08/2022    GFRAA >60 08/03/2022       No results found for: IRON, TIBC, FERRITIN        Radiology-    Echo Complete    Result Date: 10/8/2021  Transthoracic Echocardiography Report (TTE)  Demographics   Patient Name    [facility]        Gender            Male                  Marcio Prater  [id number]      Room Number  Number   Account #       [de-identified]     Procedure Date    10/08/2021   Corporate ID                  Ordering          Vernard Pancoast DO                                Physician   Accession       [id number]    Referring         Vernard Pancoast DO  Number                        Physician   Date of Birth   1950    Sonographer       Fallon Vann RDCS   Age             73 year(s)    Interpreting      Him[address]hu 66                                Physician         Physician Cardiology                                                  Wilman Gastelum MD                                 Any Other  Procedure Type of Study   TTE procedure:Echo Complete W/Doppler & Color Flow. Procedure Date Date: 10/08/2021 Start: 09:57 AM Study Location: Echo Lab Technical Quality: Poor visualization due to poor acoustical window. Indications:Heart murmur. Patient Status: Routine Height: 68 inches Weight: 120 pounds BSA: 1.65 m^2 BMI: 18.25 kg/m^2  Findings   Left Ventricle  Normal left ventricular chamber size. Normal left ventricular systolic function. Visually estimated LVEF 65%. Mild left ventricular concentric hypertrophy noted. Normal diastolic function. Right Ventricle  Normal right ventricle size and function. Left Atrium  The left atrium is mildly dilated. Mildly increased left atrial volume. Interatrial septum not well visualized but appears intact. Right Atrium  Normal right atrium. Mitral Valve  Normal mitral valve structure. There is moderate mitral regurgitation - ERO 0.23cm2, PISA 0.9cm, RV 51cc. No mitral valve prolapse. Tricuspid Valve  Normal tricuspid valve structure. There is mild tricuspid regurgitation. Mild pulmonary hypertension, RVSP 39mmHg. Aortic Valve  Normal aortic valve structure. There is trace to mild aortic regurgitation, pressure 1/2 time 718ms. Pulmonic Valve  The pulmonic valve was not well visualized. Pericardial Effusion  No evidence of pericardial effusion. Pericardium appears normal.   Pleural Effusion  No evidence of pleural effusion. Aorta  Aortic root 3.5cm. Miscellaneous  The inferior vena cava diameter is normal with normal respiratory  variation. Conclusions   Summary  Normal left ventricular chamber size. Normal left ventricular systolic function, LVEF 43%. Mild left ventricular concentric hypertrophy noted. Normal diastolic function. The left atrium is mildly dilated. Mildly increased left atrial volume. Interatrial septum not well visualized but appears intact. Normal right ventricle size and function. There is moderate mitral regurgitation - ERO 0.23cm2, PISA 0.9cm, RV 51cc. No mitral valve prolapse. There is trace to mild aortic regurgitation, pressure 1/2 time 718ms. There is mild tricuspid regurgitation. Mild pulmonary hypertension, RVSP 39mmHg. The pulmonic valve was not well visualized. Aortic root 3.5cm. No evidence of pericardial effusion. No intra cardiac mass or thrombus. No comparison study available.    Signature   ----------------------------------------------------------------  Electronically signed by Aakash Yung MD(Interpreting  physician) on 10/08/2021 03:52 PM  ----------------------------------------------------------------  M-Mode/2D Measurements & Calculations   LV Diastolic    LV Systolic Dimension: 3.2   AV Cusp Separation: 1.9 cmLA  Dimension: 5.3  cm                           Dimension: 3.9 cmAO Root  cm              LV Volume Diastolic: 134.5   Dimension: 3.5 cm  LV FS:39.6 %    ml  LV PW           LV Volume Systolic: 55.8 ml  Diastolic: 1.2  LV EDV/LV EDV Index: 134.5  cm              ml/82 ml/m^2LV ESV/LV ESV    RV Diastolic Dimension: 2.8  LV PW Systolic: Index: 03.8 UL/26BA/ m^2     cm  1.5 cm          EF Calculated: 70 %          RV Systolic Dimension: 2.4 cm  Septum          LV Mass Index: 147 l/min*m^2 LA/Aorta: 1.2  Diastolic: 1.1  cm                                           LA volume/Index: 79.7 ml  Septum          LVOT: 2 cm  Systolic: 1.2  cm   LV Mass: 241.96  g  Doppler Measurements & Calculations   MV Peak E-Wave: 1.42   AV Peak Velocity: 1.87 m/s  LVOT Peak Velocity:  m/s                    AV Peak Gradient: 14.05     1.55 m/s  MV Peak A-Wave: 0.82   mmHg                        LVOT Mean Velocity:  m/s                    AV Mean Velocity: 1.27 m/s  0.81 m/s  MV E/A Ratio: 1.73     AV Mean Gradient: 7.2 mmHg  LVOT Peak Gradient: 9.6  MV Peak Gradient: 9.2  AV VTI: 42.1 cm             mmHgLVOT Mean Gradient:  mmHg                   AV Area (Continuity):2.37   3.5 mmHg  MV Mean Gradient: 2.9  cm^2                        Estimated RVSP: 39 mmHg  mmHg                   AV Deceleration Time:       Estimated RAP:10 mmHg  MV Mean Velocity: 0.75 2474.3 msec  m/s                    LVOT VTI: 31.8 cm  MV Deceleration Time:                              TR Velocity:2.69 m/s  271.4 msec             Estimated PASP: 39.03 mmHg  TR Gradient:29.03 mmHg  MV P1/2t: 101.8 msec   Pulm. Vein A Reversal       PV Peak Velocity: 0.51  MVA by PHT:2.16 cm^2   Duration:175.3 msec         m/s  MV Area (continuity):  Pulm. Vein D Velocity:0.74  PV Peak Gradient: 1.02  1.9 cm^2               m/sPulm. Vein A Reversal    mmHg                         Velocity:0.38 m/s           PV Mean Velocity: 0.36                         Pulm.  Vein S Velocity: 0.44 m/s  MR Velocity: 5.37 m/s  m/s                         PV Mean Gradient: 0.6  MV RICARDO PISA: 0.23 cm^2 mmHg  MR VTI: 221.9 cm  Alias Velocity: 0.25  m/sPISA Radius: 0.9 cm   PISA area: 5.09 cm^2MR  flow rate: 125.72  ml/sMR volume:51.04 ml  [email or web address] [id number]    CT CHEST W CONTRAST    Addendum Date: 10/13/2021    ADDENDUM: 6 mm indeterminate hypodense lesions are identified in the body and tail of the pancreas. Consider surveillance     Result Date: 10/13/2021  EXAMINATION: CT OF THE CHEST WITH CONTRAST 10/13/2021 10:59 am TECHNIQUE: CT of the chest was performed with the administration of intravenous contrast. Multiplanar reformatted images are provided for review. Dose modulation, iterative reconstruction, and/or weight based adjustment of the mA/kV was utilized to reduce the radiation dose to as low as reasonably achievable. COMPARISON: None. HISTORY: ORDERING SYSTEM PROVIDED HISTORY: Chest wall mass FINDINGS: There is cardiomegaly. There is coronary artery calcification. The great vessels are normal.  Moderately enlarged mediastinal and hilar lymph nodes are noted with lymph nodes measuring up to 1.3 cm in the right hilum. Trachea and major bronchi are patent. Multiple bilateral pulmonary nodules are identified with nodules measuring up to 1.5 cm in the right lower lobe and smaller ones in the right middle lobe and right upper lobe. The pulmonary nodules in the left lower lobe ranges from 5 mm up to 8 mm. There is no focal consolidation or pleural effusion. Punctate enhancing nodules are identified in the posterior right hepatic lobe, largest 1 measuring up to 7 mm. Multiple heterogeneously enhancing right renal masses are identified largest 1 measuring 3.8 x 3.3 cm which is partially necrotic with additional smaller nodules concerning for multifocal malignancy like renal cell carcinoma.   There is filling defects in the right renal vein concerning for tumor invasion versus tumor embolism of the renal vein. There is bilateral adrenal metastasis with the largest 1 in the left adrenal gland measuring 1.6 x 2.7 cm. There is bone metastasis with the destructive soft tissue mass involving the right 4th rib anterolaterally with adjacent soft tissue mass which is partially necrotic measuring 4.5 x 9.2 cm. Lytic destructive lesions are also identified in the right 8th and 9th rib near the costo vertebral junction. Multiple heterogeneous  enhancing and necrotic masses in the right kidney concerning for multifocal renal cell carcinoma with  possible tumor invasion or tumor embolism of the right renal vein. There is diffuse metastasis with the involvement of the right and left adrenal glands, possible hepatic metastasis, widespread pulmonary and rib involvement as noted. Further assessment by PET-CT scan is recommended. Findings were telephoned to licensed caregiver. ASSESSMENT/PLAN :    Salvador Menednez was seen today for cancer and follow-up. Diagnoses and all orders for this visit:    Renal cell cancer, right ([facility] [address]  -     CBC With Auto Differential; Future  -     Comprehensive metabolic panel; Future  -     CBC with Auto Differential; Future  -     Comprehensive Metabolic Panel; Future  -     TSH;  Future    Other orders  -     0.9 % sodium chloride infusion  -     nivolumab (OPDIVO) 480 mg in sodium chloride 0.9 % 100 mL chemo IVPB  -     sodium chloride flush 0.9 % injection 5-40 mL  -     sodium chloride (PF) 0.9 % injection 5-40 mL  -     0.9 % sodium chloride infusion  -     heparin flush 100 UNIT/ML injection 500 Units  -     alteplase (CATHFLO) 2 mg in sterile water 2 mL injection  -     0.9 % sodium chloride infusion  -     diphenhydrAMINE (BENADRYL) injection 50 mg  -     famotidine (PEPCID) 20 mg in sodium chloride (PF) 0.9 % 10 mL injection  -     hydrocortisone sodium succinate PF (SOLU-CORTEF) injection 100 mg  -     acetaminophen (TYLENOL) tablet 650 mg  - meperidine (DEMEROL) injection 12.5 mg  -     ondansetron (ZOFRAN) injection 8 mg  -     EPINEPHrine PF 1 MG/ML injection (Anaphylaxis) 0.3 mg  -     albuterol sulfate HFA (PROVENTIL;VENTOLIN;PROAIR) 108 (90 Base) MCG/ACT inhaler 4 puff       73years old male with Stage IV renal cell carcinoma (diagnosed 11/2021- chest wall mass biopsy) -right chest wall mass, multiple right kidney necrotic lesions in addition to large soft tissue destructive mass of right fourth rib, multiple lung nodules up to 1.5 cm, bilateral adrenal metastasis up to 2.5 cm. ECOG 1/2. S/p incision and drainage for left facial abscess around left mandible. On doxycycline. Has been afebrile feels much better. CT face from last month did not show any abnormal findings. Lovenia Karma has been held. We will continue holding. Started ipilimumab/nivolumab combination in November 2021. S/p 4 cycles. Switched to single agent Opdivo in March 2022. We will continue Opdivo 480 mg q 4 weeks till progression. Tolerating treatment well. No new complaints. Reviewed labs which are unremarkable. Continue treatment. PET scan from August 2022 and discussed with the patient. No hypermetabolic lesions noted and no hypermetabolic activity in the residual renal mass. Consistent with complete response. We will repeat in a month after inflammation from left facial abscess resolves. Primary hypothyroidism: With low normal T4, elevated TSH. Related to immunotherapy. Asymptomatic. On 75 mcg daily Synthroid. TSH has come down to normal. We will continue with same dose and reassess in a month. Return to clinic in 4 weeks. Return in about 4 weeks (around 1/5/2023).      Ion Vu MD   Electronically signed 12/8/2022 at 10:58 AM

## 2022-12-08 NOTE — TELEPHONE ENCOUNTER
Shaina Orozco  12/8/2022  Wt Readings from Last 10 Encounters:   12/08/22 114 lb 9.6 oz (52 kg)   12/01/22 113 lb 11.2 oz (51.6 kg)   11/03/22 121 lb 14.4 oz (55.3 kg)   10/27/22 120 lb 6.4 oz (54.6 kg)   08/04/22 125 lb 9.6 oz (57 kg)   07/07/22 124 lb 6.4 oz (56.4 kg)   06/09/22 130 lb (59 kg)   05/12/22 132 lb 11.2 oz (60.2 kg)   04/14/22 129 lb 9.6 oz (58.8 kg)   03/31/22 128 lb (58.1 kg)     Ht Readings from Last 1 Encounters:   12/08/22 5' 8\" (1.727 m)     BMI=17.42    Assessment: Visited with Efe Griggs while in for OTV with Dr. Tan Vitale for [facility] IV renal cell cancer. Continues on Opdivo. Last week Efe Griggs was in and was sent to the ER due to abscess of left face. He had I&D of abscess and is currently taking oral ATB. He reports nausea with ATB use. Encouraged him to take it with food and to eat every 2 hours while taking ATB to help with nausea. He reports his mouth feels a lot better and the pain is not interfering with his ability to eat. Samples of supplements offered but he reports the Ensure or Boost cause him to have diarrhea. He does drink milk without problems and he did accept samples of [employer], he was instructed to mix with whole milk and add to his daily eating routine twice per day. Encouraged him to call with questions or concerns. Weight change: 1# weight gain x 1 week, 5.99% significant weight loss x 1 month, 11.85% significant weight loss x 6 months  Appetite: Fair appetite, reports eating 2 times per day. Nutritional Side Effects: Complains of nausea, he reports started after ATB started. He said he has 5 or 6 more doses to take.    Calculated Needs: 35-50kcal/kg/OPP=8050-4362uqjlv, 1.3-1.5gm/kg/CBW=70-80gm protein, 30-35ml/WUG=3776-9151hw fluids  Malnutrition Status: Severe  Nutrition Diagnosis: Severe malnutrition r/t renal cancer AEB significant weight loss x 1 and 6 months, severe subcutaneous fat loss noted to cheek region and orbital fat pads and muscle wasting noted to temple region. Recommendations: Eat 3 meals per day, snack between meals. Drink [place] [employer]ential mixed with whole milk, 2 packets per day.      Joel Matias, RD

## 2022-12-08 NOTE — PROGRESS NOTES
Patient presents to clinic for labs today. L chest  SQ port accessed per policy using 20 G, 6.13 inches Zapata needle for good blood return. Aspirate for waste and specimen sent to lab. Site flushed easily with 10 mL NSS  Dry sterile dressing to area. Tolerated procedure well. no

## 2023-01-05 ENCOUNTER — TELEPHONE (OUTPATIENT)
Dept: ONCOLOGY | Age: 73
End: 2023-01-05

## 2023-01-05 ENCOUNTER — HOSPITAL ENCOUNTER (OUTPATIENT)
Dept: INFUSION THERAPY | Age: 73
Discharge: HOME | End: 2023-01-05
Payer: MEDICARE

## 2023-01-05 ENCOUNTER — TELEPHONE (OUTPATIENT)
Dept: INFUSION THERAPY | Age: 73
End: 2023-01-05

## 2023-01-05 ENCOUNTER — OFFICE VISIT (OUTPATIENT)
Dept: ONCOLOGY | Age: 73
End: 2023-01-05

## 2023-01-05 VITALS
OXYGEN SATURATION: 93 % | BODY MASS INDEX: 16.99 KG/M2 | TEMPERATURE: 98.2 F | SYSTOLIC BLOOD PRESSURE: 102 MMHG | DIASTOLIC BLOOD PRESSURE: 63 MMHG | HEIGHT: 68 IN | WEIGHT: 112.1 LBS | HEART RATE: 79 BPM

## 2023-01-05 VITALS
RESPIRATION RATE: 20 BRPM | TEMPERATURE: 97 F | DIASTOLIC BLOOD PRESSURE: 73 MMHG | OXYGEN SATURATION: 100 % | SYSTOLIC BLOOD PRESSURE: 133 MMHG | HEART RATE: 58 BPM

## 2023-01-05 DIAGNOSIS — C64.1 RENAL CELL CANCER, RIGHT (HCC): Primary | ICD-10-CM

## 2023-01-05 LAB
ALBUMIN SERPL-MCNC: 3.5 G/DL (ref 3.5–5.2)
ALP BLD-CCNC: 141 U/L (ref 40–129)
ALT SERPL-CCNC: 6 U/L (ref 0–40)
ANION GAP SERPL CALCULATED.3IONS-SCNC: 13 MMOL/L (ref 7–16)
AST SERPL-CCNC: 7 U/L (ref 0–39)
BASOPHILS ABSOLUTE: 0.07 E9/L (ref 0–0.2)
BASOPHILS RELATIVE PERCENT: 0.5 % (ref 0–2)
BILIRUB SERPL-MCNC: <0.2 MG/DL (ref 0–1.2)
BUN BLDV-MCNC: 6 MG/DL (ref 6–23)
CALCIUM SERPL-MCNC: 9.1 MG/DL (ref 8.6–10.2)
CHLORIDE BLD-SCNC: 95 MMOL/L (ref 98–107)
CO2: 26 MMOL/L (ref 22–29)
CREAT SERPL-MCNC: 0.8 MG/DL (ref 0.7–1.2)
EOSINOPHILS ABSOLUTE: 0.13 E9/L (ref 0.05–0.5)
EOSINOPHILS RELATIVE PERCENT: 0.9 % (ref 0–6)
GFR SERPL CREATININE-BSD FRML MDRD: >60 ML/MIN/1.73
GLUCOSE BLD-MCNC: 139 MG/DL (ref 74–99)
HCT VFR BLD CALC: 34.2 % (ref 37–54)
HEMOGLOBIN: 11.1 G/DL (ref 12.5–16.5)
IMMATURE GRANULOCYTES #: 0.09 E9/L
IMMATURE GRANULOCYTES %: 0.6 % (ref 0–5)
LYMPHOCYTES ABSOLUTE: 1.76 E9/L (ref 1.5–4)
LYMPHOCYTES RELATIVE PERCENT: 12.1 % (ref 20–42)
MCH RBC QN AUTO: 31.3 PG (ref 26–35)
MCHC RBC AUTO-ENTMCNC: 32.5 % (ref 32–34.5)
MCV RBC AUTO: 96.3 FL (ref 80–99.9)
MONOCYTES ABSOLUTE: 0.91 E9/L (ref 0.1–0.95)
MONOCYTES RELATIVE PERCENT: 6.3 % (ref 2–12)
NEUTROPHILS ABSOLUTE: 11.57 E9/L (ref 1.8–7.3)
NEUTROPHILS RELATIVE PERCENT: 79.6 % (ref 43–80)
PDW BLD-RTO: 16.5 FL (ref 11.5–15)
PLATELET # BLD: 465 E9/L (ref 130–450)
PMV BLD AUTO: 9.4 FL (ref 7–12)
POTASSIUM SERPL-SCNC: 3.5 MMOL/L (ref 3.5–5)
RBC # BLD: 3.55 E12/L (ref 3.8–5.8)
SODIUM BLD-SCNC: 134 MMOL/L (ref 132–146)
TOTAL PROTEIN: 6.7 G/DL (ref 6.4–8.3)
TSH SERPL DL<=0.05 MIU/L-ACNC: 1.03 UIU/ML (ref 0.27–4.2)
WBC # BLD: 14.5 E9/L (ref 4.5–11.5)

## 2023-01-05 PROCEDURE — 2580000003 HC RX 258: Performed by: INTERNAL MEDICINE

## 2023-01-05 PROCEDURE — 84443 ASSAY THYROID STIM HORMONE: CPT

## 2023-01-05 PROCEDURE — 96413 CHEMO IV INFUSION 1 HR: CPT

## 2023-01-05 PROCEDURE — 6360000002 HC RX W HCPCS: Performed by: INTERNAL MEDICINE

## 2023-01-05 PROCEDURE — 80053 COMPREHEN METABOLIC PANEL: CPT

## 2023-01-05 PROCEDURE — 2580000003 HC RX 258

## 2023-01-05 PROCEDURE — 85025 COMPLETE CBC W/AUTO DIFF WBC: CPT

## 2023-01-05 PROCEDURE — 96361 HYDRATE IV INFUSION ADD-ON: CPT

## 2023-01-05 RX ORDER — SODIUM CHLORIDE 0.9 % (FLUSH) 0.9 %
5-40 SYRINGE (ML) INJECTION PRN
Status: DISCONTINUED | OUTPATIENT
Start: 2023-01-05 | End: 2023-01-06 | Stop reason: HOSPADM

## 2023-01-05 RX ORDER — HEPARIN SODIUM (PORCINE) LOCK FLUSH IV SOLN 100 UNIT/ML 100 UNIT/ML
500 SOLUTION INTRAVENOUS PRN
Status: DISCONTINUED | OUTPATIENT
Start: 2023-01-05 | End: 2023-01-06 | Stop reason: HOSPADM

## 2023-01-05 RX ORDER — SODIUM CHLORIDE 9 MG/ML
5-250 INJECTION, SOLUTION INTRAVENOUS PRN
Status: CANCELLED | OUTPATIENT
Start: 2023-01-05

## 2023-01-05 RX ORDER — SODIUM CHLORIDE 9 MG/ML
5-250 INJECTION, SOLUTION INTRAVENOUS PRN
OUTPATIENT
Start: 2023-01-05

## 2023-01-05 RX ORDER — SODIUM CHLORIDE 9 MG/ML
5-250 INJECTION, SOLUTION INTRAVENOUS PRN
Status: DISCONTINUED | OUTPATIENT
Start: 2023-01-05 | End: 2023-01-06 | Stop reason: HOSPADM

## 2023-01-05 RX ORDER — SODIUM CHLORIDE 9 MG/ML
INJECTION, SOLUTION INTRAVENOUS CONTINUOUS
Status: CANCELLED | OUTPATIENT
Start: 2023-01-05

## 2023-01-05 RX ORDER — SODIUM CHLORIDE 0.9 % (FLUSH) 0.9 %
5-40 SYRINGE (ML) INJECTION PRN
Status: CANCELLED | OUTPATIENT
Start: 2023-01-05

## 2023-01-05 RX ORDER — WATER 1000 ML/1000ML
2.2 INJECTION, SOLUTION INTRAVENOUS ONCE
OUTPATIENT
Start: 2023-01-05 | End: 2023-01-05

## 2023-01-05 RX ORDER — HEPARIN SODIUM (PORCINE) LOCK FLUSH IV SOLN 100 UNIT/ML 100 UNIT/ML
500 SOLUTION INTRAVENOUS PRN
Status: CANCELLED | OUTPATIENT
Start: 2023-01-05

## 2023-01-05 RX ORDER — ACETAMINOPHEN 325 MG/1
650 TABLET ORAL
Status: CANCELLED | OUTPATIENT
Start: 2023-01-05

## 2023-01-05 RX ORDER — 0.9 % SODIUM CHLORIDE 0.9 %
1000 INTRAVENOUS SOLUTION INTRAVENOUS ONCE
Status: CANCELLED | OUTPATIENT
Start: 2023-01-05 | End: 2023-01-05

## 2023-01-05 RX ORDER — SODIUM CHLORIDE 9 MG/ML
INJECTION, SOLUTION INTRAVENOUS
Status: COMPLETED
Start: 2023-01-05 | End: 2023-01-05

## 2023-01-05 RX ORDER — ALBUTEROL SULFATE 90 UG/1
4 AEROSOL, METERED RESPIRATORY (INHALATION) PRN
Status: CANCELLED | OUTPATIENT
Start: 2023-01-05

## 2023-01-05 RX ORDER — SODIUM CHLORIDE 0.9 % (FLUSH) 0.9 %
5-40 SYRINGE (ML) INJECTION PRN
OUTPATIENT
Start: 2023-01-05

## 2023-01-05 RX ORDER — HEPARIN SODIUM (PORCINE) LOCK FLUSH IV SOLN 100 UNIT/ML 100 UNIT/ML
500 SOLUTION INTRAVENOUS PRN
OUTPATIENT
Start: 2023-01-05

## 2023-01-05 RX ORDER — AMOXICILLIN AND CLAVULANATE POTASSIUM 875; 125 MG/1; MG/1
1 TABLET, FILM COATED ORAL 2 TIMES DAILY
Qty: 14 TABLET | Refills: 0 | Status: SHIPPED | OUTPATIENT
Start: 2023-01-05 | End: 2023-01-12

## 2023-01-05 RX ORDER — ONDANSETRON 2 MG/ML
8 INJECTION INTRAMUSCULAR; INTRAVENOUS
Status: CANCELLED | OUTPATIENT
Start: 2023-01-05

## 2023-01-05 RX ORDER — DIPHENHYDRAMINE HYDROCHLORIDE 50 MG/ML
50 INJECTION INTRAMUSCULAR; INTRAVENOUS
Status: CANCELLED | OUTPATIENT
Start: 2023-01-05

## 2023-01-05 RX ORDER — MEPERIDINE HYDROCHLORIDE 25 MG/ML
12.5 INJECTION INTRAMUSCULAR; INTRAVENOUS; SUBCUTANEOUS PRN
Status: CANCELLED | OUTPATIENT
Start: 2023-01-05

## 2023-01-05 RX ORDER — SODIUM CHLORIDE 9 MG/ML
5-40 INJECTION INTRAVENOUS PRN
Status: CANCELLED | OUTPATIENT
Start: 2023-01-05

## 2023-01-05 RX ORDER — EPINEPHRINE 1 MG/ML
0.3 INJECTION, SOLUTION, CONCENTRATE INTRAVENOUS PRN
Status: CANCELLED | OUTPATIENT
Start: 2023-01-05

## 2023-01-05 RX ORDER — 0.9 % SODIUM CHLORIDE 0.9 %
1000 INTRAVENOUS SOLUTION INTRAVENOUS ONCE
Status: COMPLETED | OUTPATIENT
Start: 2023-01-05 | End: 2023-01-05

## 2023-01-05 RX ADMIN — SODIUM CHLORIDE 1000 ML: 9 INJECTION, SOLUTION INTRAVENOUS at 11:55

## 2023-01-05 RX ADMIN — Medication 500 UNITS: at 09:38

## 2023-01-05 RX ADMIN — Medication 10 ML: at 10:55

## 2023-01-05 RX ADMIN — Medication 10 ML: at 12:57

## 2023-01-05 RX ADMIN — SODIUM CHLORIDE 20 ML/HR: 9 INJECTION, SOLUTION INTRAVENOUS at 10:55

## 2023-01-05 RX ADMIN — SODIUM CHLORIDE, PRESERVATIVE FREE 20 ML: 5 INJECTION INTRAVENOUS at 09:38

## 2023-01-05 RX ADMIN — SODIUM CHLORIDE 480 MG: 9 INJECTION, SOLUTION INTRAVENOUS at 11:00

## 2023-01-05 RX ADMIN — Medication 500 UNITS: at 12:56

## 2023-01-05 NOTE — PROGRESS NOTES
Tolerated infusion well. Hypotensive  when post treatment VS taken. SBP 80s. Asymptomatic. Manuela Babcock spoke with patient as Dr Erika Aguilar wanted him to go the the ER because worried about his swollen lower jaw and potential for infection. Patient refused. Received IV bolus 1000cc NS and hypotension resolved. Feeling better. Encouraged to drink fluids as much as possible at home.

## 2023-01-05 NOTE — TELEPHONE ENCOUNTER
Reji Orozco  1/5/2023  Wt Readings from Last 10 Encounters:   01/05/23 112 lb 1.6 oz (50.8 kg)   12/08/22 114 lb 9.6 oz (52 kg)   12/01/22 113 lb 11.2 oz (51.6 kg)   11/03/22 121 lb 14.4 oz (55.3 kg)   10/27/22 120 lb 6.4 oz (54.6 kg)   08/04/22 125 lb 9.6 oz (57 kg)   07/07/22 124 lb 6.4 oz (56.4 kg)   06/09/22 130 lb (59 kg)   05/12/22 132 lb 11.2 oz (60.2 kg)   04/14/22 129 lb 9.6 oz (58.8 kg)     Ht Readings from Last 1 Encounters:   01/05/23 5' 8\" (1.727 m)     BMI=17.04    Assessment: Visited with Lui Bateman, his wife and sister while in for OTV with Dr. Re Buchanan for renal cell cancer. Lui Bateman complains of a sore in mouth and mouth pain. He is eating soft foods such as tomato soup, mashed potatoes and mac/cheese. Lui Bateman complains that mouth sore is affecting his intake. He reports he is going to see an ENT within the next week. Dr. Re Buchanan has prescribed Augmentin for purulent discharge. Discussed ways to increase calories in the foods that he is eating. Discussed making tomato soup with whole milk and adding 2 tablespoons heavy whipping cream to his serving. Using powdered milk, heavy cream and butter in mac/cheese and mashed potatoes. Wife was in agreement and said she could do that. Also discussed previous samples of [employer] Essentials which he admits he hasn't tried yet because he didn't have a . Explained he doesn't need a  for those. But wife reported they do have a  at home and agreed to [employer] Drink Recipes that can be used for extra calories due to his weight loss and mouth pain. Resources including [employer] Calorie Drink Recipes\", \"High Calorie/Protein Foods\" additives and additional [employer] Breakfast Essentials and Ensure Clear provided. Wife was appreciative of advice and resources. Encouraged them to call with questions or concerns.      Weight change: 2.18% weight loss x 1 month, 8.04% significant weight loss x 2 months, 10.75% significant weight loss x 5 months  Appetite: Decreased intake that he relates to his mouth pain. Nutritional Side Effects: anorexia  Calculated Needs: 30-35kcal/kg/XML=[id number], 1.3-1.5gm/kg/IBW=90-105gm protein, 1ml/kcal=1500-1800ml fluids  Malnutrition Status: Severe  Nutrition Diagnosis: Severe malnutrition r/t renal cancer AEB significant weight loss, severe subcutaneous fat loss and muscle wasting. Recommendations: Eat 3 meals per day with high calorie/protein additives, snacks between meals. [place] Breakfast Essential mixed with whole milk, 2 packs per day.      Ramesh Vieira RD

## 2023-01-05 NOTE — PROGRESS NOTES
[address]  [address] [place] [address]   Hematology/Oncology  Consult      Patient Name: Cherrie Wheeler  YOB: 1950  PCP: SHI Garcia NP   Referring Provider:      Reason for Consultation:   Chief Complaint   Patient presents with    Cancer    Follow-up     Renal cell cancer      Interval history: Patient reports pain and swelling beneath his mandible anteriorly. Afebrile vital stable    History of Present Illness:  73years old male referred for possible metastatic renal cell carcinoma. Patient presented to his PCP, HETAL Pathak, complaining of right side chest wall mass which he noticed about a month ago and had been gradually increasing in size. CT chest from October 2021 showed mltiple heterogenous and necrotic masses in the right kidney with possible tumor invasion of the right renal vein. In addition to this there was a large 4 by 9 cm soft tissue necrotic mass involving the fourth rib. There were other lytic destructive lesions in the right eighth and ninth rib. There were multiple right lung nodules ranging upto 1.5 cm. Few liver nodules were noted as well the largest being 7 mm. 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 PET scan showed hypermetabolic lesions in the right kidney, left adrenal diffuse skeletal metastasis, scattered pulmonary nodules most of them subcentimeter except for 1 right lung hypermetabolic nodule. MRI brain reviewed no intracranial metastasis. S/p right chest wall mass biopsy on 11/2/2021. Consistent with clear-cell renal cell carcinoma. Performance status seems fair Karnofsky performance status 80/70. Intermediate risk based on [facility] risk factors although the disease seems to have progressed rapidly over the past month. His rapid progression as well as renal vein involvement probably precludes debulking nephrectomy. Tumor burden is not high and patient is asymptomatic. Recommended treatment with ipilimumab/nivolumab combination. Started ipilimumab/nivolumab in November 2021. Switch to nivolumab monotherapy after 4 cycles and 3/2022. Patient reports pain in the right side chest wall mass which is well controlled with Tylenol as needed. Denies recent weight loss, loss of appetite night sweats, back pain. Review of systems: Over 10 systems were reviewed and all were negative except as mentioned above. Past medical history: Hypertension. Coronary artery disease, peripheral arterial disease, hyperlipidemia. Past surgical history: History of CABG, femoral arterial bypass, hernia repair. Social history: Smokes half a pack a day, denies alcohol or drug abuse. Family history Sister had breast cancer, father had colon cancer. Diagnostic Data:     Past Medical History:   Diagnosis Date    Anticoagulant long-term use 2007    aspirin    CAD (coronary artery disease)     Cancer (HCC)     kidney    Deep vein thrombosis (DVT) (HCC)     leg    [place] (hard of hearing)     Hx of blood clots     Hyperlipidemia     Hypertension     Mentally challenged     information per patient's niece. Patient Active Problem List    Diagnosis Date Noted    Poor venous access     Renal cell cancer, right ([address] 11/11/2021        Past Surgical History:   Procedure Laterality Date    [facility]    patient's sister said they were told a twin was removed from paitent's abdomen.     CARDIAC SURGERY  2007    bypass    CATHETER INSERTION Left 11/16/2021    MEDIPORT CATHETER INSERTION performed by Olga Lidia Andrews MD at [address] [place] [address]      bifemoral    IR PERCUT BX LUNG/MEDIASTINUM  11/2/2021    IR PERCUT BX LUNG/MEDIASTINUM 11/2/2021 [facility] CT       Family History  Family History   Problem Relation Age of Onset    High Blood Pressure Mother     Heart Disease Mother     Heart Attack Mother     Cancer Father 64        colon    Arthritis Sister     Heart Attack Brother     No Known Problems Maternal Uncle     No Known Problems Paternal Aunt     No Known Problems Paternal Uncle     No Known Problems Maternal Grandmother     Cancer Maternal Grandfather         throat    Heart Attack Paternal Grandmother     No Known Problems Paternal Grandfather     Cirrhosis Paternal Cousin     Deep Vein Thrombosis Sister     Diabetes Sister     Heart Disease Sister         triple bypass    Breast Cancer Sister 61    High Blood Pressure Sister     Diabetes Sister     High Cholesterol Sister     Arthritis Sister        Social History    TOBACCO:   reports that he has been smoking cigarettes. He has a 13.50 pack-year smoking history. He has never used smokeless tobacco.  ETOH:   reports that he does not currently use alcohol. Home Medications  Prior to Admission medications    Medication Sig Start Date End Date Taking? Authorizing Provider   amoxicillin-clavulanate (AUGMENTIN) 875-125 MG per tablet Take 1 tablet by mouth 2 times daily for 7 days 1/5/23 1/12/23 Yes Emily Palumbo MD   levothyroxine (SYNTHROID) 75 MCG tablet Take 1 tablet by mouth daily 10/27/22  Yes Emily Palumbo MD   Calcium Carbonate-Vitamin D (OYSTER SHELL CALCIUM/D) 500-200 MG-UNIT TABS Take 2 tablets by mouth daily 3/31/22 3/31/23 Yes Emily Palumbo MD   lidocaine-prilocaine (EMLA) 2.5-2.5 % cream Apply topically as needed.  12/9/21  Yes SHI Chairez - CNP   ARTHRITIS PAIN RELIEF 650 MG extended release tablet TAKE TWO TABLETS BY MOUTH AT BEDTIME 9/21/21  Yes Historical Provider, MD   metoprolol tartrate (LOPRESSOR) 25 MG tablet Take 25 mg by mouth 2 times daily   Yes Historical Provider, MD   aspirin 81 MG EC tablet Take 81 mg by mouth daily LD 10/26/21   Yes Historical Provider, MD   simvastatin (ZOCOR) 80 MG tablet Take 80 mg by mouth nightly   Yes Historical Provider, MD   lisinopril (PRINIVIL;ZESTRIL) 20 MG tablet Take 20 mg by mouth daily   Yes Historical Provider, MD   levothyroxine (SYNTHROID) 75 MCG tablet Take 1 tablet by mouth daily 4/14/22 7/7/22  Dorene Pretty MD   levothyroxine (SYNTHROID) 50 MCG tablet Take 1 tablet by mouth daily 3/3/22 7/7/22  Dorene Pretty MD       Allergies  No Known Allergies    Review of Systems:      Objective  /63   Pulse 79   Temp 98.2 °F (36.8 °C)   Ht 5' 8\" (1.727 m)   Wt 112 lb 1.6 oz (50.8 kg)   SpO2 93%   BMI 17.04 kg/m²     Physical Performance Status    Physical Exam:  General: AAO to person, place, time, and purpose, appears stated age, cooperative, no acute distress, pleasant   Head and neck : PERRLA, EOMI . Sclera non icteric. Oropharynx : Clear. No exposed bones or discharge. Neck: Abscess noted in submandibular region. Purulent discharge noted in the oral cavity in the area of left lower molar. LYMPHATICS : no lap  Lungs: Clear to auscultation. Right side chest wall globular mass, 4-5 cm in diamter has completely resolved. Heart: Regular rate and regular rhythm, no murmur, normal S1, S2  Abdomen: Soft, non-tender;no masses, no organomegaly  Extremities: No edema,no cyanosis, no clubbing. Skin:  No rash. Neurologic:Cranial nerves grossly intact. No focal motor or sensory deficits .     Recent Laboratory Data-   Lab Results   Component Value Date    WBC 14.5 (H) 01/05/2023    HGB 11.1 (L) 01/05/2023    HCT 34.2 (L) 01/05/2023    MCV 96.3 01/05/2023     (H) 01/05/2023    LYMPHOPCT 12.1 (L) 01/05/2023    RBC 3.55 (L) 01/05/2023    MCH 31.3 01/05/2023    MCHC 32.5 01/05/2023    RDW 16.5 (H) 01/05/2023    NEUTOPHILPCT 79.6 01/05/2023    MONOPCT 6.3 01/05/2023    BASOPCT 0.5 01/05/2023    NEUTROABS 11.57 (H) 01/05/2023    LYMPHSABS 1.76 01/05/2023    MONOSABS 0.91 01/05/2023    EOSABS 0.13 01/05/2023    BASOSABS 0.07 01/05/2023       Lab Results   Component Value Date     12/08/2022    K 3.3 (L) 12/08/2022    CL 98 12/08/2022    CO2 29 12/08/2022    BUN 6 12/08/2022    CREATININE 0.7 12/08/2022    GLUCOSE 94 12/08/2022    CALCIUM 8.6 12/08/2022    PROT 6.3 (L) 12/08/2022    LABALBU 3.3 (L) 12/08/2022    BILITOT <0.2 12/08/2022    ALKPHOS 108 12/08/2022    AST 10 12/08/2022    ALT 12 12/08/2022    LABGLOM >60 12/08/2022    GFRAA >60 08/03/2022       No results found for: IRON, TIBC, FERRITIN        Radiology-    Echo Complete    Result Date: 10/8/2021  Transthoracic Echocardiography Report (TTE)  Demographics   Patient Name    [facility]        Gender            Male                  Marcio Prater  [id number]      Room Number  Number   Account #       [de-identified]     Procedure Date    10/08/2021   Corporate ID                  Ordering          Yoana Garcia DO                                Physician   Accession       [id number]    Referring         Yoana Garcia DO  Number                        Physician   Date of Birth   1950    Sonographer       Darlyn Kumari RDCS   Age             73 year(s)    Interpreting      Himanshu 66                                Physician         Physician Cardiology                                                  Biju Childs MD                                 Any Other  Procedure Type of Study   TTE procedure:Echo Complete W/Doppler & Color Flow. Procedure Date Date: 10/08/2021 Start: 09:57 AM Study Location: Echo Lab Technical Quality: Poor visualization due to poor acoustical window. Indications:Heart murmur. Patient Status: Routine Height: 68 inches Weight: 120 pounds BSA: 1.65 m^2 BMI: 18.25 kg/m^2  Findings   Left Ventricle  Normal left ventricular chamber size. Normal left ventricular systolic function. Visually estimated LVEF 65%. Mild left ventricular concentric hypertrophy noted. Normal diastolic function. Right Ventricle  Normal right ventricle size and function. Left Atrium  The left atrium is mildly dilated. Mildly increased left atrial volume. Interatrial septum not well visualized but appears intact. Right Atrium  Normal right atrium.    Mitral Valve  Normal mitral valve structure. There is moderate mitral regurgitation - ERO 0.23cm2, PISA 0.9cm, RV 51cc. No mitral valve prolapse. Tricuspid Valve  Normal tricuspid valve structure. There is mild tricuspid regurgitation. Mild pulmonary hypertension, RVSP 39mmHg. Aortic Valve  Normal aortic valve structure. There is trace to mild aortic regurgitation, pressure 1/2 time 718ms. Pulmonic Valve  The pulmonic valve was not well visualized. Pericardial Effusion  No evidence of pericardial effusion. Pericardium appears normal.   Pleural Effusion  No evidence of pleural effusion. Aorta  Aortic root 3.5cm. Miscellaneous  The inferior vena cava diameter is normal with normal respiratory  variation. Conclusions   Summary  Normal left ventricular chamber size. Normal left ventricular systolic function, LVEF 91%. Mild left ventricular concentric hypertrophy noted. Normal diastolic function. The left atrium is mildly dilated. Mildly increased left atrial volume. Interatrial septum not well visualized but appears intact. Normal right ventricle size and function. There is moderate mitral regurgitation - ERO 0.23cm2, PISA 0.9cm, RV 51cc. No mitral valve prolapse. There is trace to mild aortic regurgitation, pressure 1/2 time 718ms. There is mild tricuspid regurgitation. Mild pulmonary hypertension, RVSP 39mmHg. The pulmonic valve was not well visualized. Aortic root 3.5cm. No evidence of pericardial effusion. No intra cardiac mass or thrombus. No comparison study available.    Signature   ----------------------------------------------------------------  Electronically signed by Leighton Flores MD(Interpreting  physician) on 10/08/2021 03:52 PM  ----------------------------------------------------------------  M-Mode/2D Measurements & Calculations   LV Diastolic    LV Systolic Dimension: 3.2   AV Cusp Separation: 1.9 cmLA  Dimension: 5.3  cm                           Dimension: 3.9 cmAO Root  cm              LV Volume Diastolic: 636.7   Dimension: 3.5 cm  LV FS:39.6 %    ml  LV PW           LV Volume Systolic: 62.5 ml  Diastolic: 1.2  LV EDV/LV EDV Index: 134.5  cm              ml/82 ml/m^2LV ESV/LV ESV    RV Diastolic Dimension: 2.8  LV PW Systolic: Index: 31.5 YM/54PV/ m^2     cm  1.5 cm          EF Calculated: 70 %          RV Systolic Dimension: 2.4 cm  Septum          LV Mass Index: 147 l/min*m^2 LA/Aorta: 1.2  Diastolic: 1.1  cm                                           LA volume/Index: 79.7 ml  Septum          LVOT: 2 cm  Systolic: 1.2  cm   LV Mass: 241.96  g  Doppler Measurements & Calculations   MV Peak E-Wave: 1.42   AV Peak Velocity: 1.87 m/s  LVOT Peak Velocity:  m/s                    AV Peak Gradient: 14.05     1.55 m/s  MV Peak A-Wave: 0.82   mmHg                        LVOT Mean Velocity:  m/s                    AV Mean Velocity: 1.27 m/s  0.81 m/s  MV E/A Ratio: 1.73     AV Mean Gradient: 7.2 mmHg  LVOT Peak Gradient: 9.6  MV Peak Gradient: 9.2  AV VTI: 42.1 cm             mmHgLVOT Mean Gradient:  mmHg                   AV Area (Continuity):2.37   3.5 mmHg  MV Mean Gradient: 2.9  cm^2                        Estimated RVSP: 39 mmHg  mmHg                   AV Deceleration Time:       Estimated RAP:10 mmHg  MV Mean Velocity: 0.75 2474.3 msec  m/s                    LVOT VTI: 31.8 cm  MV Deceleration Time:                              TR Velocity:2.69 m/s  271.4 msec             Estimated PASP: 39.03 mmHg  TR Gradient:29.03 mmHg  MV P1/2t: 101.8 msec   Pulm. Vein A Reversal       PV Peak Velocity: 0.51  MVA by PHT:2.16 cm^2   Duration:175.3 msec         m/s  MV Area (continuity):  Pulm. Vein D Velocity:0.74  PV Peak Gradient: 1.02  1.9 cm^2               m/sPulm. Vein A Reversal    mmHg                         Velocity:0.38 m/s           PV Mean Velocity: 0.36                         Pulm.  Vein S Velocity: 0.44 m/s  MR Velocity: 5.37 m/s  m/s                         PV Mean Gradient: 0.6  MV RICARDO PISA: 0.23 cm^2                             mmHg  MR VTI: 221.9 cm  Alias Velocity: 0.25  m/sPISA Radius: 0.9 cm   PISA area: 5.09 cm^2MR  flow rate: 125.72  ml/sMR volume:51.04 ml  [id number]    CT CHEST W CONTRAST    Addendum Date: 10/13/2021    ADDENDUM: 6 mm indeterminate hypodense lesions are identified in the body and tail of the pancreas. Consider surveillance     Result Date: 10/13/2021  EXAMINATION: CT OF THE CHEST WITH CONTRAST 10/13/2021 10:59 am TECHNIQUE: CT of the chest was performed with the administration of intravenous contrast. Multiplanar reformatted images are provided for review. Dose modulation, iterative reconstruction, and/or weight based adjustment of the mA/kV was utilized to reduce the radiation dose to as low as reasonably achievable. COMPARISON: None. HISTORY: ORDERING SYSTEM PROVIDED HISTORY: Chest wall mass FINDINGS: There is cardiomegaly. There is coronary artery calcification. The great vessels are normal.  Moderately enlarged mediastinal and hilar lymph nodes are noted with lymph nodes measuring up to 1.3 cm in the right hilum. Trachea and major bronchi are patent. Multiple bilateral pulmonary nodules are identified with nodules measuring up to 1.5 cm in the right lower lobe and smaller ones in the right middle lobe and right upper lobe. The pulmonary nodules in the left lower lobe ranges from 5 mm up to 8 mm. There is no focal consolidation or pleural effusion. Punctate enhancing nodules are identified in the posterior right hepatic lobe, largest 1 measuring up to 7 mm. Multiple heterogeneously enhancing right renal masses are identified largest 1 measuring 3.8 x 3.3 cm which is partially necrotic with additional smaller nodules concerning for multifocal malignancy like renal cell carcinoma.   There is filling defects in the right renal vein concerning for tumor invasion versus tumor embolism of the renal vein. There is bilateral adrenal metastasis with the largest 1 in the left adrenal gland measuring 1.6 x 2.7 cm. There is bone metastasis with the destructive soft tissue mass involving the right 4th rib anterolaterally with adjacent soft tissue mass which is partially necrotic measuring 4.5 x 9.2 cm. Lytic destructive lesions are also identified in the right 8th and 9th rib near the costo vertebral junction. Multiple heterogeneous  enhancing and necrotic masses in the right kidney concerning for multifocal renal cell carcinoma with  possible tumor invasion or tumor embolism of the right renal vein. There is diffuse metastasis with the involvement of the right and left adrenal glands, possible hepatic metastasis, widespread pulmonary and rib involvement as noted. Further assessment by PET-CT scan is recommended. Findings were telephoned to licensed caregiver. ASSESSMENT/PLAN :    Shelly Leigh was seen today for cancer and follow-up. Diagnoses and all orders for this visit:    Renal cell cancer, right ([facility] [address]  -     CBC With Auto Differential; Future  -     Comprehensive metabolic panel; Future  -     TSH without Reflex; Future  -     CBC with Auto Differential; Future  -     Comprehensive Metabolic Panel; Future  -     TSH;  Future    Other orders  -     0.9 % sodium chloride infusion  -     nivolumab (OPDIVO) 480 mg in sodium chloride 0.9 % 100 mL chemo IVPB  -     sodium chloride flush 0.9 % injection 5-40 mL  -     sodium chloride (PF) 0.9 % injection 5-40 mL  -     0.9 % sodium chloride infusion  -     heparin flush 100 UNIT/ML injection 500 Units  -     alteplase (CATHFLO) 2 mg in sterile water 2 mL injection  -     0.9 % sodium chloride infusion  -     diphenhydrAMINE (BENADRYL) injection 50 mg  -     famotidine (PEPCID) 20 mg in sodium chloride (PF) 0.9 % 10 mL injection  -     hydrocortisone sodium succinate PF (SOLU-CORTEF) injection 100 mg  - acetaminophen (TYLENOL) tablet 650 mg  -     meperidine (DEMEROL) injection 12.5 mg  -     ondansetron (ZOFRAN) injection 8 mg  -     EPINEPHrine PF 1 MG/ML injection (Anaphylaxis) 0.3 mg  -     albuterol sulfate HFA (PROVENTIL;VENTOLIN;PROAIR) 108 (90 Base) MCG/ACT inhaler 4 puff  -     amoxicillin-clavulanate (AUGMENTIN) 875-125 MG per tablet; Take 1 tablet by mouth 2 times daily for 7 days       73years old male with Stage IV renal cell carcinoma (diagnosed 11/2021- chest wall mass biopsy) -right chest wall mass, multiple right kidney necrotic lesions in addition to large soft tissue destructive mass of right fourth rib, multiple lung nodules up to 1.5 cm, bilateral adrenal metastasis up to 2.5 cm. ECOG 1/2. S/p incision and drainage for left facial abscess around left mandible last month. There is recurrence of inflammation at a different site -anteriorly submental.  Purulent discharge noted inside the mouth. Patient is scheduled to see ENT in 4 days. Prescribe Augmentin 875/125 twice daily for 7 days. Asked him to go to the ER if he has fever chills worsening pain trouble speaking or swallowing. [facility] [employer] has been discontinued. Started ipilimumab/nivolumab combination in November 2021. S/p 4 cycles. Switched to single agent Opdivo in March 2022. We will continue Opdivo 480 mg q 4 weeks till progression. Tolerating treatment well. No new complaints. Reviewed labs which are unremarkable. Continue treatment. PET scan from August 2022 and discussed with the patient. No hypermetabolic lesions noted and no hypermetabolic activity in the residual renal mass. Consistent with complete response. We will repeat in a month after inflammation from left facial abscess resolves. Primary hypothyroidism: With low normal T4, elevated TSH. Related to immunotherapy. Asymptomatic. On 75 mcg daily Synthroid. TSH has come down to normal. We will continue with same dose and reassess in a month. Return to clinic in 4 weeks. Return in about 4 weeks (around 2/2/2023).      Phyllis Villarreal MD   Electronically signed 1/5/2023 at 10:28 AM

## 2023-01-05 NOTE — TELEPHONE ENCOUNTER
SW followed up with pt at pt request.  Pt had a question about the last [employer] application that he had completed. Pt was informed that they meet at the end of the month and they do not collaborate with SW after the application is sent. Pt reported understanding. Pt was encouraged to reach out should any needs arise.       Elizabeth Azevedo MSW, LISW-S  Oncology Social Worker

## 2023-01-18 ENCOUNTER — TELEPHONE (OUTPATIENT)
Dept: INFUSION THERAPY | Age: 73
End: 2023-01-18

## 2023-01-18 NOTE — TELEPHONE ENCOUNTER
Spoke to Patient's wife Luis Miguel Marx and today and verified his insurance. Patient will follow through with hard copy insurance verification when they receive the card. Patient  was in 1-5-23 and stated he has new insurance but didn't have the card. Patient was instructed to call insurance company and get verification of insurance coverage.

## 2023-01-24 ENCOUNTER — TELEPHONE (OUTPATIENT)
Dept: INFUSION THERAPY | Age: 73
End: 2023-01-24

## 2023-01-24 ENCOUNTER — APPOINTMENT (OUTPATIENT)
Dept: CT IMAGING | Age: 73
DRG: 987 | End: 2023-01-24
Payer: MEDICARE

## 2023-01-24 ENCOUNTER — HOSPITAL ENCOUNTER (INPATIENT)
Age: 73
LOS: 5 days | Discharge: HOME HEALTH | DRG: 987 | End: 2023-01-29
Attending: EMERGENCY MEDICINE | Admitting: INTERNAL MEDICINE
Payer: MEDICARE

## 2023-01-24 DIAGNOSIS — L02.01 FACIAL ABSCESS: ICD-10-CM

## 2023-01-24 DIAGNOSIS — C79.51 METASTATIC RENAL CELL CARCINOMA TO BONE (HCC): ICD-10-CM

## 2023-01-24 DIAGNOSIS — C64.9 METASTATIC RENAL CELL CARCINOMA TO BONE (HCC): ICD-10-CM

## 2023-01-24 DIAGNOSIS — E53.8 FOLATE DEFICIENCY: ICD-10-CM

## 2023-01-24 DIAGNOSIS — R22.0 MANDIBULAR MASS: Primary | ICD-10-CM

## 2023-01-24 PROBLEM — E87.1 HYPONATREMIA: Status: ACTIVE | Noted: 2023-01-24

## 2023-01-24 LAB
ANION GAP SERPL CALCULATED.3IONS-SCNC: 11 MMOL/L (ref 7–16)
BASOPHILS ABSOLUTE: 0.06 E9/L (ref 0–0.2)
BASOPHILS RELATIVE PERCENT: 0.4 % (ref 0–2)
BUN BLDV-MCNC: 6 MG/DL (ref 6–23)
C-REACTIVE PROTEIN: 18.3 MG/DL (ref 0–0.4)
CALCIUM SERPL-MCNC: 9.1 MG/DL (ref 8.6–10.2)
CHLORIDE BLD-SCNC: 90 MMOL/L (ref 98–107)
CO2: 27 MMOL/L (ref 22–29)
CREAT SERPL-MCNC: 0.6 MG/DL (ref 0.7–1.2)
EOSINOPHILS ABSOLUTE: 0.03 E9/L (ref 0.05–0.5)
EOSINOPHILS RELATIVE PERCENT: 0.2 % (ref 0–6)
GFR SERPL CREATININE-BSD FRML MDRD: >60 ML/MIN/1.73
GLUCOSE BLD-MCNC: 102 MG/DL (ref 74–99)
HCT VFR BLD CALC: 35.4 % (ref 37–54)
HEMOGLOBIN: 11.8 G/DL (ref 12.5–16.5)
IMMATURE GRANULOCYTES #: 0.06 E9/L
IMMATURE GRANULOCYTES %: 0.4 % (ref 0–5)
LACTIC ACID: 1.1 MMOL/L (ref 0.5–2.2)
LYMPHOCYTES ABSOLUTE: 2.21 E9/L (ref 1.5–4)
LYMPHOCYTES RELATIVE PERCENT: 15.2 % (ref 20–42)
MCH RBC QN AUTO: 31.6 PG (ref 26–35)
MCHC RBC AUTO-ENTMCNC: 33.3 % (ref 32–34.5)
MCV RBC AUTO: 94.9 FL (ref 80–99.9)
MONOCYTES ABSOLUTE: 1.24 E9/L (ref 0.1–0.95)
MONOCYTES RELATIVE PERCENT: 8.5 % (ref 2–12)
NEUTROPHILS ABSOLUTE: 10.97 E9/L (ref 1.8–7.3)
NEUTROPHILS RELATIVE PERCENT: 75.3 % (ref 43–80)
PDW BLD-RTO: 16.7 FL (ref 11.5–15)
PLATELET # BLD: 334 E9/L (ref 130–450)
PMV BLD AUTO: 9.4 FL (ref 7–12)
POTASSIUM REFLEX MAGNESIUM: 4.5 MMOL/L (ref 3.5–5)
RBC # BLD: 3.73 E12/L (ref 3.8–5.8)
SEDIMENTATION RATE, ERYTHROCYTE: 100 MM/HR (ref 0–15)
SODIUM BLD-SCNC: 128 MMOL/L (ref 132–146)
WBC # BLD: 14.6 E9/L (ref 4.5–11.5)

## 2023-01-24 PROCEDURE — 96375 TX/PRO/DX INJ NEW DRUG ADDON: CPT

## 2023-01-24 PROCEDURE — 70491 CT SOFT TISSUE NECK W/DYE: CPT

## 2023-01-24 PROCEDURE — 83605 ASSAY OF LACTIC ACID: CPT

## 2023-01-24 PROCEDURE — 6360000004 HC RX CONTRAST MEDICATION: Performed by: RADIOLOGY

## 2023-01-24 PROCEDURE — 85651 RBC SED RATE NONAUTOMATED: CPT

## 2023-01-24 PROCEDURE — 2580000003 HC RX 258: Performed by: PHYSICIAN ASSISTANT

## 2023-01-24 PROCEDURE — 2580000003 HC RX 258

## 2023-01-24 PROCEDURE — 6360000002 HC RX W HCPCS: Performed by: PHYSICIAN ASSISTANT

## 2023-01-24 PROCEDURE — 99223 1ST HOSP IP/OBS HIGH 75: CPT | Performed by: INTERNAL MEDICINE

## 2023-01-24 PROCEDURE — 6370000000 HC RX 637 (ALT 250 FOR IP)

## 2023-01-24 PROCEDURE — 36415 COLL VENOUS BLD VENIPUNCTURE: CPT

## 2023-01-24 PROCEDURE — 6360000002 HC RX W HCPCS

## 2023-01-24 PROCEDURE — 87077 CULTURE AEROBIC IDENTIFY: CPT

## 2023-01-24 PROCEDURE — 87070 CULTURE OTHR SPECIMN AEROBIC: CPT

## 2023-01-24 PROCEDURE — 96365 THER/PROPH/DIAG IV INF INIT: CPT

## 2023-01-24 PROCEDURE — 80048 BASIC METABOLIC PNL TOTAL CA: CPT

## 2023-01-24 PROCEDURE — 86140 C-REACTIVE PROTEIN: CPT

## 2023-01-24 PROCEDURE — APPSS45 APP SPLIT SHARED TIME 31-45 MINUTES

## 2023-01-24 PROCEDURE — 2580000003 HC RX 258: Performed by: INTERNAL MEDICINE

## 2023-01-24 PROCEDURE — 88305 TISSUE EXAM BY PATHOLOGIST: CPT

## 2023-01-24 PROCEDURE — 85025 COMPLETE CBC W/AUTO DIFF WBC: CPT

## 2023-01-24 PROCEDURE — 87186 SC STD MICRODIL/AGAR DIL: CPT

## 2023-01-24 PROCEDURE — 0N9T3ZX DRAINAGE OF RIGHT MANDIBLE, PERCUTANEOUS APPROACH, DIAGNOSTIC: ICD-10-PCS | Performed by: OTOLARYNGOLOGY

## 2023-01-24 PROCEDURE — 87040 BLOOD CULTURE FOR BACTERIA: CPT

## 2023-01-24 PROCEDURE — 1200000000 HC SEMI PRIVATE

## 2023-01-24 PROCEDURE — 99285 EMERGENCY DEPT VISIT HI MDM: CPT

## 2023-01-24 PROCEDURE — 88312 SPECIAL STAINS GROUP 1: CPT

## 2023-01-24 PROCEDURE — 6360000002 HC RX W HCPCS: Performed by: INTERNAL MEDICINE

## 2023-01-24 RX ORDER — ONDANSETRON 2 MG/ML
4 INJECTION INTRAMUSCULAR; INTRAVENOUS ONCE
Status: COMPLETED | OUTPATIENT
Start: 2023-01-24 | End: 2023-01-24

## 2023-01-24 RX ORDER — 0.9 % SODIUM CHLORIDE 0.9 %
1000 INTRAVENOUS SOLUTION INTRAVENOUS ONCE
Status: COMPLETED | OUTPATIENT
Start: 2023-01-24 | End: 2023-01-24

## 2023-01-24 RX ORDER — ONDANSETRON 4 MG/1
4 TABLET, ORALLY DISINTEGRATING ORAL EVERY 8 HOURS PRN
Status: DISCONTINUED | OUTPATIENT
Start: 2023-01-24 | End: 2023-01-29 | Stop reason: HOSPADM

## 2023-01-24 RX ORDER — ACETAMINOPHEN 650 MG/1
650 SUPPOSITORY RECTAL EVERY 6 HOURS PRN
Status: DISCONTINUED | OUTPATIENT
Start: 2023-01-24 | End: 2023-01-29 | Stop reason: HOSPADM

## 2023-01-24 RX ORDER — ACETAMINOPHEN 325 MG/1
650 TABLET ORAL EVERY 6 HOURS PRN
Status: DISCONTINUED | OUTPATIENT
Start: 2023-01-24 | End: 2023-01-29 | Stop reason: HOSPADM

## 2023-01-24 RX ORDER — LEVOTHYROXINE SODIUM 0.07 MG/1
75 TABLET ORAL DAILY
Status: DISCONTINUED | OUTPATIENT
Start: 2023-01-25 | End: 2023-01-29 | Stop reason: HOSPADM

## 2023-01-24 RX ORDER — DEXAMETHASONE SODIUM PHOSPHATE 4 MG/ML
4 INJECTION, SOLUTION INTRA-ARTICULAR; INTRALESIONAL; INTRAMUSCULAR; INTRAVENOUS; SOFT TISSUE EVERY 12 HOURS
Status: COMPLETED | OUTPATIENT
Start: 2023-01-25 | End: 2023-01-26

## 2023-01-24 RX ORDER — MORPHINE SULFATE 4 MG/ML
4 INJECTION, SOLUTION INTRAMUSCULAR; INTRAVENOUS ONCE
Status: COMPLETED | OUTPATIENT
Start: 2023-01-24 | End: 2023-01-24

## 2023-01-24 RX ORDER — SODIUM CHLORIDE 0.9 % (FLUSH) 0.9 %
5-40 SYRINGE (ML) INJECTION EVERY 12 HOURS SCHEDULED
Status: DISCONTINUED | OUTPATIENT
Start: 2023-01-24 | End: 2023-01-29 | Stop reason: HOSPADM

## 2023-01-24 RX ORDER — POLYETHYLENE GLYCOL 3350 17 G/17G
17 POWDER, FOR SOLUTION ORAL DAILY PRN
Status: DISCONTINUED | OUTPATIENT
Start: 2023-01-24 | End: 2023-01-29 | Stop reason: HOSPADM

## 2023-01-24 RX ORDER — LISINOPRIL 20 MG/1
20 TABLET ORAL DAILY
Status: DISCONTINUED | OUTPATIENT
Start: 2023-01-25 | End: 2023-01-25

## 2023-01-24 RX ORDER — ONDANSETRON 2 MG/ML
4 INJECTION INTRAMUSCULAR; INTRAVENOUS EVERY 6 HOURS PRN
Status: DISCONTINUED | OUTPATIENT
Start: 2023-01-24 | End: 2023-01-29 | Stop reason: HOSPADM

## 2023-01-24 RX ORDER — SODIUM CHLORIDE 0.9 % (FLUSH) 0.9 %
5-40 SYRINGE (ML) INJECTION PRN
Status: DISCONTINUED | OUTPATIENT
Start: 2023-01-24 | End: 2023-01-29 | Stop reason: HOSPADM

## 2023-01-24 RX ORDER — SODIUM CHLORIDE 9 MG/ML
INJECTION, SOLUTION INTRAVENOUS PRN
Status: DISCONTINUED | OUTPATIENT
Start: 2023-01-24 | End: 2023-01-29 | Stop reason: HOSPADM

## 2023-01-24 RX ORDER — ASPIRIN 81 MG/1
81 TABLET ORAL DAILY
Status: DISCONTINUED | OUTPATIENT
Start: 2023-01-25 | End: 2023-01-29 | Stop reason: HOSPADM

## 2023-01-24 RX ORDER — DEXAMETHASONE SODIUM PHOSPHATE 10 MG/ML
10 INJECTION INTRAMUSCULAR; INTRAVENOUS ONCE
Status: COMPLETED | OUTPATIENT
Start: 2023-01-24 | End: 2023-01-24

## 2023-01-24 RX ORDER — IPRATROPIUM BROMIDE AND ALBUTEROL SULFATE 2.5; .5 MG/3ML; MG/3ML
1 SOLUTION RESPIRATORY (INHALATION) EVERY 4 HOURS PRN
Status: DISCONTINUED | OUTPATIENT
Start: 2023-01-24 | End: 2023-01-29 | Stop reason: HOSPADM

## 2023-01-24 RX ORDER — ATORVASTATIN CALCIUM 40 MG/1
40 TABLET, FILM COATED ORAL DAILY
Status: DISCONTINUED | OUTPATIENT
Start: 2023-01-25 | End: 2023-01-29 | Stop reason: HOSPADM

## 2023-01-24 RX ORDER — SODIUM CHLORIDE 9 MG/ML
INJECTION, SOLUTION INTRAVENOUS CONTINUOUS
Status: DISCONTINUED | OUTPATIENT
Start: 2023-01-24 | End: 2023-01-29 | Stop reason: HOSPADM

## 2023-01-24 RX ADMIN — DEXAMETHASONE SODIUM PHOSPHATE 10 MG: 10 INJECTION INTRAMUSCULAR; INTRAVENOUS at 13:07

## 2023-01-24 RX ADMIN — Medication 10 ML: at 21:20

## 2023-01-24 RX ADMIN — SODIUM CHLORIDE 1000 ML: 9 INJECTION, SOLUTION INTRAVENOUS at 13:06

## 2023-01-24 RX ADMIN — AMPICILLIN SODIUM AND SULBACTAM SODIUM 3000 MG: 2; 1 INJECTION, POWDER, FOR SOLUTION INTRAMUSCULAR; INTRAVENOUS at 18:45

## 2023-01-24 RX ADMIN — ONDANSETRON 4 MG: 2 INJECTION INTRAMUSCULAR; INTRAVENOUS at 17:00

## 2023-01-24 RX ADMIN — IOPAMIDOL 75 ML: 755 INJECTION, SOLUTION INTRAVENOUS at 14:11

## 2023-01-24 RX ADMIN — METOPROLOL TARTRATE 25 MG: 25 TABLET, FILM COATED ORAL at 23:36

## 2023-01-24 RX ADMIN — SODIUM CHLORIDE 3000 MG: 9 INJECTION, SOLUTION INTRAVENOUS at 13:06

## 2023-01-24 RX ADMIN — MORPHINE SULFATE 4 MG: 4 INJECTION, SOLUTION INTRAMUSCULAR; INTRAVENOUS at 17:00

## 2023-01-24 RX ADMIN — AMPICILLIN SODIUM AND SULBACTAM SODIUM 3000 MG: 2; 1 INJECTION, POWDER, FOR SOLUTION INTRAMUSCULAR; INTRAVENOUS at 23:33

## 2023-01-24 RX ADMIN — SODIUM CHLORIDE: 9 INJECTION, SOLUTION INTRAVENOUS at 21:17

## 2023-01-24 RX ADMIN — DEXAMETHASONE SODIUM PHOSPHATE 4 MG: 4 INJECTION, SOLUTION INTRAMUSCULAR; INTRAVENOUS at 23:37

## 2023-01-24 ASSESSMENT — ENCOUNTER SYMPTOMS
COLOR CHANGE: 0
RHINORRHEA: 0
CHOKING: 0
COUGH: 0
VOICE CHANGE: 0
SINUS PAIN: 0
SINUS PRESSURE: 0
SORE THROAT: 0
TROUBLE SWALLOWING: 0
STRIDOR: 0
WHEEZING: 0
GASTROINTESTINAL NEGATIVE: 1

## 2023-01-24 ASSESSMENT — PAIN SCALES - GENERAL
PAINLEVEL_OUTOF10: 10
PAINLEVEL_OUTOF10: 10

## 2023-01-24 ASSESSMENT — PAIN DESCRIPTION - PAIN TYPE: TYPE: ACUTE PAIN

## 2023-01-24 ASSESSMENT — PAIN - FUNCTIONAL ASSESSMENT: PAIN_FUNCTIONAL_ASSESSMENT: 0-10

## 2023-01-24 ASSESSMENT — PAIN DESCRIPTION - ORIENTATION: ORIENTATION: RIGHT

## 2023-01-24 ASSESSMENT — PAIN DESCRIPTION - LOCATION: LOCATION: MOUTH

## 2023-01-24 NOTE — CONSULTS
OTOLARYNGOLOGY  CONSULT NOTE  1/24/2023    Physician Consulted: Dr. Sriram Zuniga  Reason for Consult: mandibular mass  Referring Physician: Dr. Alexey MILLER  Lavinia Toney is a 81 y.o. male with history of metastatic renal cell carcinoma currently undergoing chemotherapy who ENT was consulted for evaluation of mandibular pain and swelling. Patient reports onset of symptoms several months ago. Patient was seen in the ED on 12/1/22 for similar symptoms and a left lower facial abscess was drained. The pain and swelling is now worse on the right side and has been draining pus at home. CT scan in the ED today with extensive osseous destruction of the mandible with fluid collection along the right mandibular body and right submandibular lymph node. Patient has a long history of tobacco use. He reports 20 lb weight loss in the past year. Oral intake is limited due to jaw pain but patient has been tolerating soft diet. Review of Systems   Constitutional:  Positive for unexpected weight change. Negative for activity change, fatigue and fever. HENT:  Negative for congestion, ear discharge, ear pain, hearing loss, nosebleeds, postnasal drip, rhinorrhea, sinus pressure, sinus pain, sore throat, tinnitus, trouble swallowing and voice change. Respiratory:  Negative for cough, choking, wheezing and stridor. Cardiovascular:  Negative for chest pain. Gastrointestinal: Negative. Genitourinary: Negative. Skin:  Negative for color change and rash. Neurological:  Negative for speech difficulty, light-headedness, numbness and headaches. Hematological:  Negative for adenopathy. Psychiatric/Behavioral:  Negative for behavioral problems.       Past Medical History:   Diagnosis Date    Anticoagulant long-term use 2007    aspirin    CAD (coronary artery disease)     Cancer (HCC)     kidney    Deep vein thrombosis (DVT) (HCC)     leg    [place] (hard of hearing)     Hx of blood clots     Hyperlipidemia     Hypertension Mentally challenged     information per patient's niece. Past Surgical History:   Procedure Laterality Date    [address]    patient's sister said they were told a twin was removed from paitent's abdomen. CARDIAC SURGERY  2007    bypass    CATHETER INSERTION Left 11/16/2021    MEDIPORT CATHETER INSERTION performed by Starr Lopez MD at [address] [place] [address] [place]      bifemoral    IR PERCUT BX LUNG/MEDIASTINUM  11/2/2021    IR PERCUT BX LUNG/MEDIASTINUM 11/2/2021 [facility] CT       Medications Prior to Admission:    Prior to Admission medications    Medication Sig Start Date End Date Taking? Authorizing Provider   levothyroxine (SYNTHROID) 75 MCG tablet Take 1 tablet by mouth daily 10/27/22   Darryn Souza MD   levothyroxine (SYNTHROID) 75 MCG tablet Take 1 tablet by mouth daily 4/14/22 7/7/22  Darryn Souza MD   Calcium Carbonate-Vitamin D (OYSTER SHELL CALCIUM/D) 500-200 MG-UNIT TABS Take 2 tablets by mouth daily 3/31/22 3/31/23  Darryn Souza MD   levothyroxine (SYNTHROID) 50 MCG tablet Take 1 tablet by mouth daily 3/3/22 7/7/22  Darryn Souza MD   lidocaine-prilocaine (EMLA) 2.5-2.5 % cream Apply topically as needed.  12/9/21   SHI Kendall - CNP   ARTHRITIS PAIN RELIEF 650 MG extended release tablet TAKE TWO TABLETS BY MOUTH AT BEDTIME 9/21/21   Historical Provider, MD   metoprolol tartrate (LOPRESSOR) 25 MG tablet Take 25 mg by mouth 2 times daily    Historical Provider, MD   aspirin 81 MG EC tablet Take 81 mg by mouth daily LD 10/26/21    Historical Provider, MD   simvastatin (ZOCOR) 80 MG tablet Take 80 mg by mouth nightly    Historical Provider, MD   lisinopril (PRINIVIL;ZESTRIL) 20 MG tablet Take 20 mg by mouth daily    Historical Provider, MD       No Known Allergies    Family History   Problem Relation Age of Onset    High Blood Pressure Mother     Heart Disease Mother     Heart Attack Mother     Cancer Father 64        colon    Arthritis Sister     Heart Attack Brother No Known Problems Maternal Uncle     No Known Problems Paternal Aunt     No Known Problems Paternal Uncle     No Known Problems Maternal Grandmother     Cancer Maternal Grandfather         throat    Heart Attack Paternal Grandmother     No Known Problems Paternal Grandfather     Cirrhosis Paternal Cousin     Deep Vein Thrombosis Sister     Diabetes Sister     Heart Disease Sister         triple bypass    Breast Cancer Sister 61    High Blood Pressure Sister     Diabetes Sister     High Cholesterol Sister     Arthritis Sister        Social History     Tobacco Use    Smoking status: Every Day     Packs/day: 0.25     Years: 54.00     Pack years: 13.50     Types: Cigarettes    Smokeless tobacco: Never    Tobacco comments:     Informed patient of [employer] Tobacco Cessation program, gave pamphlet. Vaping Use    Vaping Use: Never used   Substance Use Topics    Alcohol use: Not Currently    Drug use: Not Currently           PHYSICAL EXAM:    Vitals:    01/24/23 1213   BP: 131/61   Pulse: 83   Resp: 18   Temp:    SpO2: 99%       General Appearance:  Laying in bed, awake, alert, no apparent distress, cachectic  Head/face:  NC/AT, Diffuse woody edema to the bilateral mandible which is greater on the right. Round ulcerative lesion to the skin of the right anterior mandibular body. No fluctuance or active drainage. 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 Uvula midline Tolerating oral secretions. Floor of mouth and tongue soft. Neck:Supple, no adenopathy  Lungs:  Non-labored, good respiratory effort, no stridor  Heart:  RR  Neuro: Facial nerve symmetric and intact. House Brackmann 1/6, bilaterally.        LABS:  CBC  Recent Labs     01/24/23  1243   WBC 14.6*   HGB 11.8*   HCT 35.4*          RADIOLOGY  CT SOFT TISSUE NECK W CONTRAST    Result Date: 1/24/2023  EXAMINATION: CT OF THE NECK SOFT TISSUE WITH CONTRAST  1/24/2023 TECHNIQUE: CT of the neck was performed with the administration of intravenous contrast. Multiplanar reformatted images are provided for review. Automated exposure control, iterative reconstruction, and/or weight based adjustment of the mA/kV was utilized to reduce the radiation dose to as low as reasonably achievable. COMPARISON: CT facial bones dated 10/27/2022 HISTORY: ORDERING SYSTEM PROVIDED HISTORY: Include jaw please. Abscess/mass. Hx renal cell CA TECHNOLOGIST PROVIDED HISTORY: Please go up thru right mandible, mass/abscess Reason for exam:->Include jaw please. Abscess/mass. Hx renal cell CA Decision Support Exception - unselect if not a suspected or confirmed emergency medical condition->Emergency Medical Condition (MA) Right-sided facial swelling FINDINGS: PHARYNX/LARYNX:  The palatine tonsils are normal in appearance. The tongue is normal in appearance. The valleculae, epiglottis, aryepiglottic folds and pyriform sinuses appear unremarkable. The true and false vocal cords are normal in appearance. No mass or abscess is seen. SALIVARY GLANDS/THYROID:  The parotid and submandibular glands appear unremarkable. The thyroid gland appears unremarkable. LYMPH NODES:  Small lymph nodes are seen in the submandibular region. There is a 1.5 cm cutaneous nodule over the right submandibular region seen on series 2, image 48, likely metastatic disease. SOFT TISSUES:  There is soft tissue mass with fluid density extending medial and lateral to the right mandibular body measuring at least 5.3 x 3.6 cm. Stranding in the adjacent subcutaneous fat with soft tissue fullness extending posteriorly into the lateral parapharyngeal space. There is associated destructive process involving the mandible that is actually worse on the left side. BRAIN/ORBITS/SINUSES:  The visualized portion of the intracranial contents appear unremarkable.   The visualized portion of the orbits, paranasal sinuses and mastoid air cells demonstrate no acute abnormality. LUNG APICES/SUPERIOR MEDIASTINUM:  No focal consolidation is seen within the visualized lung apices. No superior mediastinal lymphadenopathy or mass. The visualized portion of the trachea appears unremarkable. BONES:  The mandible shows extensive mottled appearance with cortical destruction and periosteal reaction, worse on the left side. No other osseous destructive process or acute fracture is seen. 1. Extensive osseous destructive process with periosteal reaction involving the mandible, worse on the left side. This is associated with soft tissue and fluid density mass surrounding the right mandibular body. The findings are suspicious for metastatic disease with associated partially necrotic soft tissue mass. The possibility of osteomyelitis or osteonecrosis cannot be completely excluded. 2. Mild right submandibular lymphadenopathy. 1.5 cm cutaneous nodule in the right submandibular region. These findings are suspicious for metastatic disease. Procedure Note: Biopsy of oral lesion and needle aspiration   Verbal consent was obtained. Areas were prepped with alcohol swabs. Oral lesion along right mandibular alveolar ridge and external lesion along the mandibular body were anesthetized with Lidocaine with epinephrine 1:100,000 and allowed to work. Needle aspiration of external lesion was performed and minimal purulent fluid was expressed and sent for culture. Oral lesion was excised using #15 blade and sent for permanent pathology. Hemostasis was achieved using Kerlix. Dressing was placed over the external needle aspiration site. Patient tolerated the procedure well. ASSESSMENT:  81 y.o. male with mandibular mass (suspect metastasis vs. primary malignancy) with superimposed infection in the setting of metastatic renal cell carcinoma.     PLAN:  Biopsy of right mandibular alveolar ridge obtained  Cultures taken from cutaneous fistula/lesion  Admission to medicine for IV antibiotics  Recommend consults to infectious disease and medical oncology  No acute ENT surgical intervention at this time  Harman Khanna for soft diet from ENT standpoint, patient may require PEG in the future due to cachexia    Patient seen and examined by resident with attending physician Dr. Joseph Mckinney. Charmaine Wang DO,  Resident Physician, PGY-1  Department of Otolaryngology, [facility]      Electronically signed by Charmaine Wang DO on 1/24/23 at 4:15 PM EST     [email or web address]   I saw and evaluated the patient, i reviewed the note and agree with findings and plan  Pt with metastatic malignancy and jaw issues with need of a biopsy. Done depending upon resutls will determine txment options- metastatic renal cell or scca as he is a smoker.

## 2023-01-24 NOTE — TELEPHONE ENCOUNTER
Leela, patient's niece, called to report patient's face is swollen again. Requesting antibiotic. Per Dr. Alex Gay, patient needs to go to the ER to be evaluated. Leela verbalized understanding and states she \"will try\" to convince patient to go. She will call office back if he refuses. Dr. Alex Gay made aware.   Governor IMELDA Link 1/24/23 1634

## 2023-01-24 NOTE — H&P
[address] [place], [place] [address] Group   History and Physical      CHIEF COMPLAINT:  Jaw pain    History of Present Illness:  81 y.o. male with a history of CAD, HTN, metastatic right renal Cell carcinoma, history of DVT, and hypothyroidism presents to ED today states he was told by his oncologist to come in for evaluation of right facial abscess. Has known left mandibular abscess being seen by oncology. The pain has been worsening on the right side with drainage. CT scan reviewed and shows extensive osseous destructive process with periosteal reaction involving the mandible, worse on the left side. This is associated with soft tissue and fluid density mass surrounding the right mandibular body. Suspicious for metastatic disease and possible necrotic soft tissue. The possibility of osteomyelitis or osteonecrosis cannot be completely excluded. Also with right submandibular lymphadenopathy measuring 1.5 cm. ENT evaluated patient at bedside and performed a bedside biopsy and drainage. Patient with mild leukocytosis as well as elevated ESR and CRP. He was started on Unasyn. He also reports 20 pound weight loss over the past year. Oral intake is limited due to jaw pain. Is tolerating soft diet    Informant(s) for H&P: Patient and EMR    REVIEW OF SYSTEMS:  no fevers, chills, cp, sob, n/v, ha, vision/hearing changes, wt changes, hot/cold flashes, other open skin lesions, diarrhea, constipation, dysuria/hematuria unless noted in HPI. Complete ROS performed with the patient and is otherwise negative. PMH:  Past Medical History:   Diagnosis Date    Anticoagulant long-term use 2007    aspirin    CAD (coronary artery disease)     Cancer (HCC)     kidney    Deep vein thrombosis (DVT) (HCC)     leg    [place] (hard of hearing)     Hx of blood clots     Hyperlipidemia     Hypertension     Mentally challenged     information per patient's niece.        Surgical History:  Past Surgical History:   Procedure Laterality Date    [address]    patient's sister said they were told a twin was removed from jhon's abdomen. CARDIAC SURGERY  2007    bypass    CATHETER INSERTION Left 11/16/2021    MEDIPORT CATHETER INSERTION performed by Sheila Jacobo MD at [address] [place] [address] [place]      bifemoral    IR PERCUT BX LUNG/MEDIASTINUM  11/2/2021    IR PERCUT BX LUNG/MEDIASTINUM 11/2/2021 [facility] CT       Medications Prior to Admission:    Prior to Admission medications    Medication Sig Start Date End Date Taking? Authorizing Provider   levothyroxine (SYNTHROID) 75 MCG tablet Take 1 tablet by mouth daily 10/27/22   Halle Hyde MD   levothyroxine (SYNTHROID) 75 MCG tablet Take 1 tablet by mouth daily 4/14/22 7/7/22  Halle Hyde MD   Calcium Carbonate-Vitamin D (OYSTER SHELL CALCIUM/D) 500-200 MG-UNIT TABS Take 2 tablets by mouth daily 3/31/22 3/31/23  Halle Hyde MD   levothyroxine (SYNTHROID) 50 MCG tablet Take 1 tablet by mouth daily 3/3/22 7/7/22  Halle Hyde MD   lidocaine-prilocaine (EMLA) 2.5-2.5 % cream Apply topically as needed. 12/9/21   SHI Sarmiento - HETAL   metoprolol tartrate (LOPRESSOR) 25 MG tablet Take 25 mg by mouth 2 times daily    Historical Provider, MD   aspirin 81 MG EC tablet Take 81 mg by mouth daily LD 10/26/21    Historical Provider, MD   simvastatin (ZOCOR) 80 MG tablet Take 80 mg by mouth daily    Historical Provider, MD   lisinopril (PRINIVIL;ZESTRIL) 20 MG tablet Take 20 mg by mouth daily    Historical Provider, MD       Allergies:    Patient has no known allergies. Social History:    reports that he has been smoking cigarettes. He has a 13.50 pack-year smoking history. He has never used smokeless tobacco. He reports that he does not currently use alcohol. He reports that he does not currently use drugs.       Family History:   family history includes Arthritis in his sister and sister; Breast Cancer (age of onset: 61) in his sister; Cancer in his maternal grandfather; Cancer (age of onset: 64) in his father; Cirrhosis in his paternal cousin; Deep Vein Thrombosis in his sister; Diabetes in his sister and sister; Heart Attack in his brother, mother, and paternal grandmother; Heart Disease in his mother and sister; High Blood Pressure in his mother and sister; High Cholesterol in his sister; No Known Problems in his maternal grandmother, maternal uncle, paternal aunt, paternal grandfather, and paternal uncle. PHYSICAL EXAM:  Vitals:  BP (!) 122/50   Pulse 72   Temp 97.8 °F (36.6 °C) (Oral)   Resp 18   Ht 5' 8\" (1.727 m)   Wt 108 lb 8 oz (49.2 kg)   SpO2 96%   BMI 16.50 kg/m²     General Appearance: Cachectic in no acute distress  Skin: warm and dry, flaky  Head: normocephalic and atraumatic left cheek scar, right mandibular nodule with incision site glued no drainage noted  Eyes: pupils equal, round, and reactive to light, extraocular eye movements intact, conjunctivae normal  Neck: Supple, symmetrical, and supraclavicular wasting  Throat: Patent airway no edema noted  Pulmonary/Chest: Diminished to auscultation bilaterally on RA- no wheezes, rales or rhonchi, normal air movement, no respiratory distress  Cardiovascular: normal rate, normal S1 and S2 and no carotid bruits, murmur noted  Abdomen: soft, non-tender, non-distended, normal bowel sounds  Extremities: no cyanosis, no clubbing and no edema  Neurologic: no cranial nerve deficit and speech normal    LABS:  Recent Labs     01/24/23  1243   *   K 4.5   CL 90*   CO2 27   BUN 6   CREATININE 0.6*   GLUCOSE 102*   CALCIUM 9.1       Recent Labs     01/24/23  1243   WBC 14.6*   RBC 3.73*   HGB 11.8*   HCT 35.4*   MCV 94.9   MCH 31.6   MCHC 33.3   RDW 16.7*      MPV 9.4       No results for input(s): POCGLU in the last 72 hours.       Radiology: CT SOFT TISSUE NECK W CONTRAST    Result Date: 1/24/2023  EXAMINATION: CT OF THE NECK SOFT TISSUE WITH CONTRAST  1/24/2023 TECHNIQUE: CT of the neck was performed with the administration of intravenous contrast. Multiplanar reformatted images are provided for review. Automated exposure control, iterative reconstruction, and/or weight based adjustment of the mA/kV was utilized to reduce the radiation dose to as low as reasonably achievable. COMPARISON: CT facial bones dated 10/27/2022 HISTORY: ORDERING SYSTEM PROVIDED HISTORY: Include jaw please. Abscess/mass. Hx renal cell CA TECHNOLOGIST PROVIDED HISTORY: Please go up thru right mandible, mass/abscess Reason for exam:->Include jaw please. Abscess/mass. Hx renal cell CA Decision Support Exception - unselect if not a suspected or confirmed emergency medical condition->Emergency Medical Condition (MA) Right-sided facial swelling FINDINGS: PHARYNX/LARYNX:  The palatine tonsils are normal in appearance. The tongue is normal in appearance. The valleculae, epiglottis, aryepiglottic folds and pyriform sinuses appear unremarkable. The true and false vocal cords are normal in appearance. No mass or abscess is seen. SALIVARY GLANDS/THYROID:  The parotid and submandibular glands appear unremarkable. The thyroid gland appears unremarkable. LYMPH NODES:  Small lymph nodes are seen in the submandibular region. There is a 1.5 cm cutaneous nodule over the right submandibular region seen on series 2, image 48, likely metastatic disease. SOFT TISSUES:  There is soft tissue mass with fluid density extending medial and lateral to the right mandibular body measuring at least 5.3 x 3.6 cm. Stranding in the adjacent subcutaneous fat with soft tissue fullness extending posteriorly into the lateral parapharyngeal space. There is associated destructive process involving the mandible that is actually worse on the left side. BRAIN/ORBITS/SINUSES:  The visualized portion of the intracranial contents appear unremarkable.   The visualized portion of the orbits, paranasal sinuses and mastoid air cells demonstrate no acute abnormality. LUNG APICES/SUPERIOR MEDIASTINUM:  No focal consolidation is seen within the visualized lung apices. No superior mediastinal lymphadenopathy or mass. The visualized portion of the trachea appears unremarkable. BONES:  The mandible shows extensive mottled appearance with cortical destruction and periosteal reaction, worse on the left side. No other osseous destructive process or acute fracture is seen. 1. Extensive osseous destructive process with periosteal reaction involving the mandible, worse on the left side. This is associated with soft tissue and fluid density mass surrounding the right mandibular body. The findings are suspicious for metastatic disease with associated partially necrotic soft tissue mass. The possibility of osteomyelitis or osteonecrosis cannot be completely excluded. 2. Mild right submandibular lymphadenopathy. 1.5 cm cutaneous nodule in the right submandibular region. These findings are suspicious for metastatic disease. ASSESSMENT:      Principal Problem:    Facial abscess  Resolved Problems:    * No resolved hospital problems. *      PLAN:    Right mandibular mass  - Afebrile, LA normal, mild leukocytosis (14.6), CRP 18.3, , check PCT, and Blood cultures   - S/p incision and drainage of left mandible back in December with purulent drainage was started on Augmentin 875/125 twice daily for 7 days  - ENT performed a bedside biopsy of lesion as well as needle aspiration of likely metastasis with superimposed infection. Cultures and pathology pending   - Decadron 4 mg q12h x 3 doses. Continue Unasyn 3 g every 6 hours consult infectious disease appreciate recommendation  - ENT and Hem/onc consulted appreciate input    2.     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 S/p 4 cycles. Switched to single agent Opdivo in March 2022. Opdivo 480 mg q 4 weeks till progression. Xgeva discontinued   - Hematology following    3. Hypothyroidism  - Continue Synthroid 75 mcg daily. TSH 1.03 (1/5/23)    4. Probable COPD  - Current life long smoker . 5 dayna/day  - Declined nicotine patch, DuoNebs as needed    5. HTN/CAD  -   Continue metoprolol, lisinopril and statin     6. H/x of DVT  -   Continue aspirin and Lovenox for DVT prophylaxis    7. Malnutrition  - Reports 20 to 30 pound weight loss over the past couple months 2/2 mandibular masses  - Currently tolerating soft diet, consult dietitian    Code Status: Full code  DVT prophylaxis: Lovenox    Disposition: Patient will be initiated on IV antibiotics awaiting infectious disease and heme-onc input likely will be inpatient for the next few days    NOTE: This report was transcribed using voice recognition software. Every effort was made to ensure accuracy; however, inadvertent computerized transcription errors may be present.      Electronically signed by SHI Dumont CNP on 1/24/2023 at 7:19 PM

## 2023-01-24 NOTE — ED PROVIDER NOTES
Shared DARREN-ED Attending Visit. CC: No                                                                                                                                          Department of Emergency Medicine   ED  Provider Note  Admit Date/RoomTime: 1/24/2023 12:15 PM  ED Room: 13/13        HPI:  1/24/23,   Time: 12:25 PM ALYSSA Patel is a 81 y.o. male presenting to the ED for painful mass right mandible, beginning few days ago. The complaint has been persistent, moderate in severity, and worsened by talking and touching. The patient has a known history of clear cell renal cell carcinoma. He has metastatic lesion seen on his last PET scan showing diffuse skeletal metastasis and scattered subcentimeter pulmonary nodules. The patient is currently still receiving chemotherapy. He does have a port in his left chest wall. He was over the cancer center today when they sent him here for further evaluation. The patient states for the past 3 days he has had worsening swelling pain and mass over the right mandible. He states that his wife did get some discharge through the skin just under his chin. He denies any fever or chills. He is reporting pain at site.           ROS:     Constitutional: Negative for fever and chills  HENT:  See HPI  Eyes: Negative for pain, discharge and redness  Respiratory:  Negative for shortness of breath, cough and wheezing  Cardiovascular: Negative for CP, edema or palpitations  Gastrointestinal: Negative for nausea, vomiting, diarrhea and abdominal distention  Genitourinary: Negative for dysuria and frequency  Musculoskeletal: Negative for back pain and arthralgia  Skin: See HPI  Neurological: Negative for weakness and headaches  Hematological: Negative for adenopathy    All other systems reviewed and are negative      -------------------------------- PAST HISTORY ----------------------------------  Past Medical History:  has a past medical history of Anticoagulant long-term use, CAD (coronary artery disease), Cancer (HCC), Deep vein thrombosis (DVT) ([facility] [address] [place] (hard of hearing), Hx of blood clots, Hyperlipidemia, Hypertension, and Mentally challenged. Past Surgical History:  has a past surgical history that includes Cardiac surgery (2007); femoral bypass; IR NEEDLE BIOPSY LUNG/MEDIASTINUM PERC (11/2/2021); Abdomen surgery (1954); and Catheter Insertion (Left, 11/16/2021). Social History:  reports that he has been smoking cigarettes. He has a 13.50 pack-year smoking history. He has never used smokeless tobacco. He reports that he does not currently use alcohol. He reports that he does not currently use drugs. Family History: family history includes Arthritis in his sister and sister; Breast Cancer (age of onset: 61) in his sister; Cancer in his maternal grandfather; Cancer (age of onset: 64) in his father; Cirrhosis in his paternal cousin; Deep Vein Thrombosis in his sister; Diabetes in his sister and sister; Heart Attack in his brother, mother, and paternal grandmother; Heart Disease in his mother and sister; High Blood Pressure in his mother and sister; High Cholesterol in his sister; No Known Problems in his maternal grandmother, maternal uncle, paternal aunt, paternal grandfather, and paternal uncle. The patients home medications have been reviewed. Allergies: Patient has no known allergies.     --------------------------------- RESULTS ------------------------------------------  All laboratory and radiology results have been personally reviewed by myself   LABS:  Results for orders placed or performed during the hospital encounter of 01/24/23   CBC with Auto Differential   Result Value Ref Range    WBC 14.6 (H) 4.5 - 11.5 E9/L    RBC 3.73 (L) 3.80 - 5.80 E12/L    Hemoglobin 11.8 (L) 12.5 - 16.5 g/dL    Hematocrit 35.4 (L) 37.0 - 54.0 %    MCV 94.9 80.0 - 99.9 fL    MCH 31.6 26.0 - 35.0 pg    MCHC 33.3 32.0 - 34.5 %    RDW 16.7 (H) 11.5 - 15.0 fL Platelets 255 188 - 200 E9/L    MPV 9.4 7.0 - 12.0 fL    Neutrophils % 75.3 43.0 - 80.0 %    Immature Granulocytes % 0.4 0.0 - 5.0 %    Lymphocytes % 15.2 (L) 20.0 - 42.0 %    Monocytes % 8.5 2.0 - 12.0 %    Eosinophils % 0.2 0.0 - 6.0 %    Basophils % 0.4 0.0 - 2.0 %    Neutrophils Absolute 10.97 (H) 1.80 - 7.30 E9/L    Immature Granulocytes # 0.06 E9/L    Lymphocytes Absolute 2.21 1.50 - 4.00 E9/L    Monocytes Absolute 1.24 (H) 0.10 - 0.95 E9/L    Eosinophils Absolute 0.03 (L) 0.05 - 0.50 E9/L    Basophils Absolute 0.06 0.00 - 0.20 T9/P   Basic Metabolic Panel w/ Reflex to MG   Result Value Ref Range    Sodium 128 (L) 132 - 146 mmol/L    Potassium reflex Magnesium 4.5 3.5 - 5.0 mmol/L    Chloride 90 (L) 98 - 107 mmol/L    CO2 27 22 - 29 mmol/L    Anion Gap 11 7 - 16 mmol/L    Glucose 102 (H) 74 - 99 mg/dL    BUN 6 6 - 23 mg/dL    Creatinine 0.6 (L) 0.7 - 1.2 mg/dL    Est, Glom Filt Rate >60 >=60 mL/min/1.73    Calcium 9.1 8.6 - 10.2 mg/dL   Lactic Acid   Result Value Ref Range    Lactic Acid 1.1 0.5 - 2.2 mmol/L   Sedimentation Rate   Result Value Ref Range    Sed Rate 100 (H) 0 - 15 mm/Hr   C-Reactive Protein   Result Value Ref Range    CRP 18.3 (H) 0.0 - 0.4 mg/dL       RADIOLOGY:  Interpreted by Radiologist.  CT SOFT TISSUE NECK W CONTRAST   Final Result   1. Extensive osseous destructive process with periosteal reaction involving   the mandible, worse on the left side. This is associated with soft tissue   and fluid density mass surrounding the right mandibular body. The findings   are suspicious for metastatic disease with associated partially necrotic soft   tissue mass. The possibility of osteomyelitis or osteonecrosis cannot be   completely excluded. 2. Mild right submandibular lymphadenopathy. 1.5 cm cutaneous nodule in the   right submandibular region.   These findings are suspicious for metastatic   disease.             ----------------- NURSING NOTES AND VITALS REVIEWED ---------------   The nursing notes within the ED encounter and vital signs as below have been reviewed. /70   Pulse 70   Temp 98.4 °F (36.9 °C)   Resp 20   Ht 5' 8\" (1.727 m)   SpO2 99%   BMI 17.04 kg/m²   Oxygen Saturation Interpretation: Normal      --------------------------------PHYSICAL EXAM------------------------------------      Constitutional/General: Alert and oriented x3, chronically ill appearing. Mild distress  Head: NC/AT  Eyes: PERRL, EOMI  Mouth:  Pt is missing most of his teeth. One single molar seen right mandible with lateral edema and tenderness. Palpable mass along right mandible. Tender and firm. No obvious redness, rash or increased wamth  Neck: Supple, full ROM, no meningeal signs  Pulmonary: Lungs clear to auscultation bilaterally, no wheezes, rales, or rhonchi. Not in respiratory distress  Cardiovascular:  Regular rate and rhythm, no murmurs, gallops, or rubs. 2+ distal pulses  Abdomen: Soft, + BS. No distension. Nontender. No palpable rigidity, rebound or guarding  Extremities: Moves all extremities x 4. Warm and well perfused  Skin: warm and dry without rash  Neurologic: GCS 15,  Intact. No focal deficits  Psych: Normal Affect      ------------------------ ED COURSE/MEDICAL DECISION MAKING----------------------  Medications   0.9 % sodium chloride bolus (0 mLs IntraVENous Stopped 1/24/23 1435)   dexamethasone (DECADRON) injection 10 mg (10 mg IntraVENous Given 1/24/23 1307)   ampicillin-sulbactam (UNASYN) 3,000 mg in sodium chloride 0.9 % 100 mL IVPB ([id number]) (0 mg IntraVENous Stopped 1/24/23 1347)   iopamidol (ISOVUE-370) 76 % injection 75 mL (75 mLs IntraVENous Given 1/24/23 1411)   morphine injection 4 mg (4 mg IntraVENous Given 1/24/23 1700)   ondansetron (ZOFRAN) injection 4 mg (4 mg IntraVENous Given 1/24/23 1700)         Medical Decision Making:    Seen and evaluated with Dr. Gregory Bolanos. Pt given Decadron and Unasyn. Labs notable for elevated WBC and ESR. Sodium is down. CT scan of neck/face notable for necrotic abscess with changes concerning for osteomyelitis/osteonecrosis. Discussed as well with ENT. Resident is on the way at this time. Dr. Darby Shepherd is agreeable to admit. ENT at bedside, Dr. Jayden Rousseau and residents. They plan to do a biopsy with cultures. Uncertain at this point whether this is metastatic disease or a new primary. Please refer to their consult notes from today           Counseling: The emergency provider has spoken with the patient and discussed todays results, in addition to providing specific details for the plan of care and counseling regarding the diagnosis and prognosis. Questions are answered at this time and they are agreeable with the plan.      ------------------------ IMPRESSION AND DISPOSITION -------------------------------    IMPRESSION  1. Mandibular mass    2. Facial abscess    3.  Metastatic renal cell carcinoma to bone [facility])        DISPOSITION  Disposition: Admit to med/surg floor  Patient condition is stable                   Marylen Pugh, PA-C  01/24/23 4581

## 2023-01-25 LAB
ALBUMIN SERPL-MCNC: 3 G/DL (ref 3.5–5.2)
ALP BLD-CCNC: 110 U/L (ref 40–129)
ALT SERPL-CCNC: 8 U/L (ref 0–40)
ANION GAP SERPL CALCULATED.3IONS-SCNC: 12 MMOL/L (ref 7–16)
AST SERPL-CCNC: 9 U/L (ref 0–39)
BASOPHILS ABSOLUTE: 0.02 E9/L (ref 0–0.2)
BASOPHILS RELATIVE PERCENT: 0.2 % (ref 0–2)
BILIRUB SERPL-MCNC: <0.2 MG/DL (ref 0–1.2)
BUN BLDV-MCNC: 11 MG/DL (ref 6–23)
CALCIUM SERPL-MCNC: 8.4 MG/DL (ref 8.6–10.2)
CHLORIDE BLD-SCNC: 97 MMOL/L (ref 98–107)
CO2: 22 MMOL/L (ref 22–29)
CORTISOL TOTAL: 3.66 MCG/DL (ref 2.68–18.4)
CREAT SERPL-MCNC: 0.6 MG/DL (ref 0.7–1.2)
EOSINOPHILS ABSOLUTE: 0 E9/L (ref 0.05–0.5)
EOSINOPHILS RELATIVE PERCENT: 0 % (ref 0–6)
GFR SERPL CREATININE-BSD FRML MDRD: >60 ML/MIN/1.73
GLUCOSE BLD-MCNC: 121 MG/DL (ref 74–99)
HCT VFR BLD CALC: 29.1 % (ref 37–54)
HEMOGLOBIN: 9.4 G/DL (ref 12.5–16.5)
IMMATURE GRANULOCYTES #: 0.08 E9/L
IMMATURE GRANULOCYTES %: 0.6 % (ref 0–5)
LYMPHOCYTES ABSOLUTE: 1.6 E9/L (ref 1.5–4)
LYMPHOCYTES RELATIVE PERCENT: 12.7 % (ref 20–42)
MAGNESIUM: 1.8 MG/DL (ref 1.6–2.6)
MCH RBC QN AUTO: 30.8 PG (ref 26–35)
MCHC RBC AUTO-ENTMCNC: 32.3 % (ref 32–34.5)
MCV RBC AUTO: 95.4 FL (ref 80–99.9)
MONOCYTES ABSOLUTE: 0.48 E9/L (ref 0.1–0.95)
MONOCYTES RELATIVE PERCENT: 3.8 % (ref 2–12)
NEUTROPHILS ABSOLUTE: 10.43 E9/L (ref 1.8–7.3)
NEUTROPHILS RELATIVE PERCENT: 82.7 % (ref 43–80)
PDW BLD-RTO: 16.5 FL (ref 11.5–15)
PHOSPHORUS: 3.4 MG/DL (ref 2.5–4.5)
PLATELET # BLD: 305 E9/L (ref 130–450)
PMV BLD AUTO: 9.9 FL (ref 7–12)
POTASSIUM SERPL-SCNC: 4.7 MMOL/L (ref 3.5–5)
PROCALCITONIN: 0.06 NG/ML (ref 0–0.08)
RBC # BLD: 3.05 E12/L (ref 3.8–5.8)
SODIUM BLD-SCNC: 131 MMOL/L (ref 132–146)
TOTAL PROTEIN: 5.8 G/DL (ref 6.4–8.3)
TSH SERPL DL<=0.05 MIU/L-ACNC: 0.29 UIU/ML (ref 0.27–4.2)
WBC # BLD: 12.6 E9/L (ref 4.5–11.5)

## 2023-01-25 PROCEDURE — 6360000002 HC RX W HCPCS: Performed by: INTERNAL MEDICINE

## 2023-01-25 PROCEDURE — 1200000000 HC SEMI PRIVATE

## 2023-01-25 PROCEDURE — 6360000002 HC RX W HCPCS

## 2023-01-25 PROCEDURE — 87081 CULTURE SCREEN ONLY: CPT

## 2023-01-25 PROCEDURE — 84145 PROCALCITONIN (PCT): CPT

## 2023-01-25 PROCEDURE — 99232 SBSQ HOSP IP/OBS MODERATE 35: CPT | Performed by: INTERNAL MEDICINE

## 2023-01-25 PROCEDURE — 83735 ASSAY OF MAGNESIUM: CPT

## 2023-01-25 PROCEDURE — 2580000003 HC RX 258: Performed by: INTERNAL MEDICINE

## 2023-01-25 PROCEDURE — 85025 COMPLETE CBC W/AUTO DIFF WBC: CPT

## 2023-01-25 PROCEDURE — APPSS30 APP SPLIT SHARED TIME 16-30 MINUTES

## 2023-01-25 PROCEDURE — 36415 COLL VENOUS BLD VENIPUNCTURE: CPT

## 2023-01-25 PROCEDURE — 84443 ASSAY THYROID STIM HORMONE: CPT

## 2023-01-25 PROCEDURE — 84100 ASSAY OF PHOSPHORUS: CPT

## 2023-01-25 PROCEDURE — 2580000003 HC RX 258

## 2023-01-25 PROCEDURE — 80053 COMPREHEN METABOLIC PANEL: CPT

## 2023-01-25 PROCEDURE — 82533 TOTAL CORTISOL: CPT

## 2023-01-25 PROCEDURE — 6370000000 HC RX 637 (ALT 250 FOR IP)

## 2023-01-25 RX ORDER — FENTANYL CITRATE 0.05 MG/ML
25 INJECTION, SOLUTION INTRAMUSCULAR; INTRAVENOUS
Status: DISCONTINUED | OUTPATIENT
Start: 2023-01-25 | End: 2023-01-29 | Stop reason: HOSPADM

## 2023-01-25 RX ORDER — 0.9 % SODIUM CHLORIDE 0.9 %
500 INTRAVENOUS SOLUTION INTRAVENOUS ONCE
Status: COMPLETED | OUTPATIENT
Start: 2023-01-25 | End: 2023-01-25

## 2023-01-25 RX ORDER — LISINOPRIL 10 MG/1
10 TABLET ORAL DAILY
Status: DISCONTINUED | OUTPATIENT
Start: 2023-01-26 | End: 2023-01-29 | Stop reason: HOSPADM

## 2023-01-25 RX ADMIN — ATORVASTATIN CALCIUM 40 MG: 40 TABLET, FILM COATED ORAL at 09:05

## 2023-01-25 RX ADMIN — Medication 10 ML: at 12:51

## 2023-01-25 RX ADMIN — SODIUM CHLORIDE: 9 INJECTION, SOLUTION INTRAVENOUS at 12:56

## 2023-01-25 RX ADMIN — AMPICILLIN SODIUM AND SULBACTAM SODIUM 3000 MG: 2; 1 INJECTION, POWDER, FOR SOLUTION INTRAMUSCULAR; INTRAVENOUS at 13:02

## 2023-01-25 RX ADMIN — SODIUM CHLORIDE: 9 INJECTION, SOLUTION INTRAVENOUS at 05:17

## 2023-01-25 RX ADMIN — ASPIRIN 81 MG: 81 TABLET, COATED ORAL at 09:05

## 2023-01-25 RX ADMIN — METOPROLOL TARTRATE 25 MG: 25 TABLET, FILM COATED ORAL at 19:54

## 2023-01-25 RX ADMIN — AMPICILLIN SODIUM AND SULBACTAM SODIUM 3000 MG: 2; 1 INJECTION, POWDER, FOR SOLUTION INTRAMUSCULAR; INTRAVENOUS at 05:12

## 2023-01-25 RX ADMIN — SODIUM CHLORIDE 500 ML: 9 INJECTION, SOLUTION INTRAVENOUS at 10:44

## 2023-01-25 RX ADMIN — METOPROLOL TARTRATE 25 MG: 25 TABLET, FILM COATED ORAL at 09:06

## 2023-01-25 RX ADMIN — AMPICILLIN SODIUM AND SULBACTAM SODIUM 3000 MG: 2; 1 INJECTION, POWDER, FOR SOLUTION INTRAMUSCULAR; INTRAVENOUS at 18:18

## 2023-01-25 RX ADMIN — LISINOPRIL 20 MG: 20 TABLET ORAL at 09:05

## 2023-01-25 RX ADMIN — LEVOTHYROXINE SODIUM 75 MCG: 0.07 TABLET ORAL at 09:05

## 2023-01-25 RX ADMIN — DEXAMETHASONE SODIUM PHOSPHATE 4 MG: 4 INJECTION, SOLUTION INTRAMUSCULAR; INTRAVENOUS at 12:51

## 2023-01-25 RX ADMIN — Medication 2 TABLET: at 09:06

## 2023-01-25 RX ADMIN — ACETAMINOPHEN 650 MG: 325 TABLET ORAL at 09:11

## 2023-01-25 ASSESSMENT — PAIN DESCRIPTION - PAIN TYPE: TYPE: ACUTE PAIN

## 2023-01-25 ASSESSMENT — PAIN SCALES - GENERAL
PAINLEVEL_OUTOF10: 5
PAINLEVEL_OUTOF10: 0
PAINLEVEL_OUTOF10: 0

## 2023-01-25 ASSESSMENT — PAIN - FUNCTIONAL ASSESSMENT: PAIN_FUNCTIONAL_ASSESSMENT: ACTIVITIES ARE NOT PREVENTED

## 2023-01-25 ASSESSMENT — PAIN DESCRIPTION - ORIENTATION: ORIENTATION: INNER

## 2023-01-25 ASSESSMENT — PAIN DESCRIPTION - LOCATION: LOCATION: FACE

## 2023-01-25 ASSESSMENT — PAIN DESCRIPTION - FREQUENCY: FREQUENCY: CONTINUOUS

## 2023-01-25 ASSESSMENT — PAIN DESCRIPTION - ONSET: ONSET: ON-GOING

## 2023-01-25 ASSESSMENT — PAIN DESCRIPTION - DESCRIPTORS: DESCRIPTORS: ACHING;DISCOMFORT;SORE

## 2023-01-25 NOTE — PLAN OF CARE
Problem: Discharge Planning  Goal: Discharge to home or other facility with appropriate resources  Outcome: Progressing  Flowsheets (Taken 1/24/2023 1813 by Booker Colon RN)  Discharge to home or other facility with appropriate resources: Identify barriers to discharge with patient and caregiver     Problem: Pain  Goal: Verbalizes/displays adequate comfort level or baseline comfort level  Outcome: Progressing     Problem: ABCDS Injury Assessment  Goal: Absence of physical injury  Outcome: Progressing

## 2023-01-25 NOTE — PROGRESS NOTES
[address] [place], [place] [address]ist   Progress Note    Admitting Date and Time: 1/24/2023 12:15 PM  Admit Dx: Facial abscess [L02.01]  Metastatic renal cell carcinoma to bone ([address] [C79.51, C64.9]  Mandibular mass [R22.0]    Subjective:    1/25: Patient seen this afternoon laying in bed finishing up lunch, states hot foods bother the inside of his mouth. He also has an episode of hypotension this morning following his morning medications. Was given fluids and hold parameters were placed.      ROS: denies fever, chills, cp, sob, n/v, HA unless stated above.     sodium chloride  500 mL IntraVENous Once    ampicillin-sulbactam  3,000 mg IntraVENous Q6H    aspirin  81 mg Oral Daily    oyster shell calcium w/D  2 tablet Oral Daily    levothyroxine  75 mcg Oral Daily    lisinopril  20 mg Oral Daily    metoprolol tartrate  25 mg Oral BID    atorvastatin  40 mg Oral Daily    dexamethasone  4 mg IntraVENous Q12H    sodium chloride flush  5-40 mL IntraVENous 2 times per day     fentanNYL, 25 mcg, Q2H PRN  ipratropium-albuterol, 1 ampule, Q4H PRN  sodium chloride flush, 5-40 mL, PRN  sodium chloride, , PRN  ondansetron, 4 mg, Q8H PRN   Or  ondansetron, 4 mg, Q6H PRN  polyethylene glycol, 17 g, Daily PRN  acetaminophen, 650 mg, Q6H PRN   Or  acetaminophen, 650 mg, Q6H PRN       Objective:    BP 87/75   Pulse 61   Temp 98.1 °F (36.7 °C) (Oral)   Resp 16   Ht 5' 8\" (1.727 m)   Wt 108 lb 8 oz (49.2 kg)   SpO2 98%   BMI 16.50 kg/m²   General Appearance: alert and oriented to person, place and time and in no acute distress  Skin: warm and dry  Head: normocephalic and atraumatic  Eyes: pupils equal, round, and reactive to light, extraocular eye movements intact, conjunctivae normal  Neck: neck supple and non tender without mass   Pulmonary/Chest: clear to auscultation bilaterally- no wheezes, rales or rhonchi, normal air movement, no respiratory distress  Cardiovascular: normal rate, normal S1 and S2 and no carotid bruits  Abdomen: soft, non-tender, non-distended, normal bowel sounds, no masses or organomegaly  Extremities: no cyanosis, no clubbing and no edema  Neurologic: no cranial nerve deficit and speech normal      Recent Labs     01/24/23  1243 01/25/23  0406   * 131*   K 4.5 4.7   CL 90* 97*   CO2 27 22   BUN 6 11   CREATININE 0.6* 0.6*   GLUCOSE 102* 121*   CALCIUM 9.1 8.4*       Recent Labs     01/25/23  0406   ALKPHOS 110   PROT 5.8*   LABALBU 3.0*   BILITOT <0.2   AST 9   ALT 8       Recent Labs     01/24/23  1243 01/25/23  0406   WBC 14.6* 12.6*   RBC 3.73* 3.05*   HGB 11.8* 9.4*   HCT 35.4* 29.1*   MCV 94.9 95.4   MCH 31.6 30.8   MCHC 33.3 32.3   RDW 16.7* 16.5*    305   MPV 9.4 9.9           Radiology:   CT SOFT TISSUE NECK W CONTRAST   Final Result   1. Extensive osseous destructive process with periosteal reaction involving   the mandible, worse on the left side. This is associated with soft tissue   and fluid density mass surrounding the right mandibular body. The findings   are suspicious for metastatic disease with associated partially necrotic soft   tissue mass. The possibility of osteomyelitis or osteonecrosis cannot be   completely excluded. 2. Mild right submandibular lymphadenopathy. 1.5 cm cutaneous nodule in the   right submandibular region. These findings are suspicious for metastatic   disease. Assessment:  Principal Problem:    Facial abscess  Active Problems:    Mandibular mass    Metastatic renal cell carcinoma to bone (HCC)    Hyponatremia  Resolved Problems:    * No resolved hospital problems.  *      Plan:    Right mandibular mass  - Afebrile, LA normal, mild leukocytosis (14.6), CRP 18.3, , check PCT, and Blood cultures   - S/p incision and drainage of left mandible back in December with purulent drainage was started on Augmentin 875/125 twice daily for 7 days  - ENT performed a bedside biopsy of lesion as well as needle aspiration of likely metastasis with superimposed infection. Cultures and pathology pending   - Decadron 4 mg q12h x 3 doses. Continue Unasyn 3 g every 6 hours consult infectious disease appreciate recommendation  - ENT and Hem/onc consulted appreciate input. Follow cultures      2. 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 S/p 4 cycles. Switched to single agent Opdivo in March 2022. Opdivo 480 mg q 4 weeks till progression. Xgeva discontinued   - Hematology following     3. Hypothyroidism  - Continue Synthroid 75 mcg daily. TSH 1.03 (1/5/23)     4. Probable COPD  - Current life long smoker . 5 dayna/day  - Declined nicotine patch, DuoNebs as needed     5. HTN/CAD  - Continue metoprolol, lisinopril and statin   - Hypotension. Hold parameters placed and lisinopril reduced to 10 mg daily for now     6. H/x of DVT  - Continue aspirin and Lovenox for DVT prophylaxis    7. Normocytic anemia  - Hgb 9.4 today  - Check iron levels as well as folate and B12  - Likely related to primary diagnosis trend daily and transfuse for Hgb < 7     8. Malnutrition  - Reports 20 to 30 pound weight loss over the past couple months 2/2 mandibular masses  - Currently tolerating soft diet, consult dietitian     Code Status: Full code  DVT prophylaxis: Lovenox     Disposition: Continue IV antibiotics follow cultures for sensitivity likely discharge home on oral antibiotics once appropriate       NOTE: This report was transcribed using voice recognition software. Every effort was made to ensure accuracy; however, inadvertent computerized transcription errors may be present.      Electronically signed by SHI Brown CNP on 1/25/2023 at 11:08 AM

## 2023-01-25 NOTE — CONSULTS
Comprehensive Nutrition Assessment    Type and Reason for Visit:  Initial, Positive Nutrition Screen, Consult (ONS/wt loss)    Nutrition Recommendations/Plan:   Continue current diet & ONS  to optimize nutrition status  Continue to monitor     Malnutrition Assessment:  Malnutrition Status:  Severe malnutrition (01/25/23 1238)    Context:  Chronic Illness     Findings of the 6 clinical characteristics of malnutrition:  Energy Intake:  75% or less estimated energy requirements for 1 month or longer  Weight Loss:  10% over 6 months (12% wt loss x 6 mos per wt hx in EMR)     Body Fat Loss:  Severe body fat loss Triceps, Orbital, Buccal region   Muscle Mass Loss:  Severe muscle mass loss Clavicles (pectoralis & deltoids), Temples (temporalis), Hand (interosseous)  Fluid Accumulation:  No significant fluid accumulation     Strength:  Not Performed    Nutrition Assessment:    Pt sent to ED [facility] w/ Inflammation on rt side of mouth. Dx: Rt mandibular mass-DDx infection/ osteomyelitis, Metastatic rt renal cell carcinoma. Hx: CAD, Cancer, [place], HTN. Note 01/24 biopsy & drain mandibular alveolar ridge. Pt meets criteria for Severe Malnutition. Pt is on easy to chew diet @ home also d/t mouth sores & pain, which is contributing to poor PO intake. Continue current diet & ONS TID to optimize nutrition status & monitor.     Nutrition Related Findings:    A/O x4, Abd WD, I/O -WDL, no edema noted, Na131(L), Cr 0.6(L), [place] Wound Type: Surgical Incision (jaw aspiration/biopsy)       Current Nutrition Intake & Therapies:    Average Meal Intake: Unable to assess  Average Supplements Intake: Unable to assess  ADULT ORAL NUTRITION SUPPLEMENT; Breakfast, Lunch, Dinner; Standard High Calorie/High Protein Oral Supplement  ADULT DIET; Easy to Chew    Anthropometric Measures:  Height: 5' 8\" (172.7 cm)  Ideal Body Weight (IBW): 154 lbs (70 kg)    Admission Body Weight: 108 lb (49 kg) (01/24 bed)  Current Body Weight: 108 lb (49 kg) (01/24 bed), 70.1 % IBW. Current BMI (kg/m2): 16.4  Usual Body Weight:  (132-108 # wt hx x 1 yrr, 12% wt loss x 6 mos, which is significant.)     Weight Adjustment For: No Adjustment     BMI Categories: Underweight (BMI less than 22) age over 72    Estimated Daily Nutrient Needs:  Energy Requirements Based On: Kcal/kg  Weight Used for Energy Requirements: Current  Energy (kcal/day): 2774-6242 (35-40 kcals /kg)  Weight Used for Protein Requirements: Current  Protein (g/day): 100-115 (2.0-2.2gm /kg)  Method Used for Fluid Requirements: 1 ml/kcal  Fluid (ml/day): 6407-7632    Nutrition Diagnosis:   Severe malnutrition, In context of chronic illness related to catabolic illness (Metastatic rt renal cell carcinoma) as evidenced by Criteria as identified in malnutrition assessment    Nutrition Interventions:   Food and/or Nutrient Delivery: Continue Current Diet, Continue Oral Nutrition Supplement  Nutrition Education/Counseling: No recommendation at this time  Coordination of Nutrition Care: Continue to monitor while inpatient     Goals:     Goals: PO intake 75% or greater     Nutrition Monitoring and Evaluation:   Behavioral-Environmental Outcomes: None Identified  Food/Nutrient Intake Outcomes: Food and Nutrient Intake, Supplement Intake  Physical Signs/Symptoms Outcomes: Biochemical Data, Chewing or Swallowing, GI Status, Fluid Status or Edema, Weight, Skin, Nutrition Focused Physical Findings    Discharge Planning:     Too soon to determine     Fam Marks RD  Contact: [phone] impairments found

## 2023-01-25 NOTE — CONSULTS
[facility]  Hematology/Oncology  Consult      Patient Name: Yang Dickson  YOB: 1950  PCP: SHI Barlow NP   Referring Provider:      Reason for Consultation:   Chief Complaint   Patient presents with    Oral Swelling     Inflammation on right side of mouth. Sent by cancer center. Swelling started 2 days ago. History of Present Illness:  82-year-old man hospitalized through ED with painful mass in the right mandible of several days duration. He follows with Dr. Demond Díaz at the oncology clinic for metastatic renal cell carcinoma. He had presented with atypical chest wall pain mainly localized to the right side. CT chest had shown multiple necrotic masses in the right kidney with tumor invasion of the right vein with a large 9 x 4 cm soft tissue necrotic mass involving the fourth rib there were also lytic destructive lesions in the right eighth and ninth rib along with right lung nodules largest measuring up to 1.5 cm. Multiple liver nodules also noted as well as suspicion for bilateral adrenal metastasis. PET CT scan was done and confirmed hypermetabolic lesions in the right kidney as well as left adrenal gland with diffuse skeletal metastasis and scattered pulmonary nodules one of them in the right lung hypermetabolic with high SUV uptake. MRI of the brain was negative for intracranial metastasis. He underwent a right chest wall mass biopsy in November 2021 and the pathology was consistent with clear cell renal cell carcinoma. He started immunotherapy with ipilimumab/nivolumab in November 2021 and after 4 cycles he was switched sequentially to monotherapy with nivolumab. Follow-up PET CT scan in August 2022 showed no hypermetabolic lesions and no activity in the residual renal mass consistent with excellent response to immunotherapy.   He had developed left facial abscess status post incision and drainage and he was given antibiotics and was followed by ENT.    CT of soft tissue of the neck done yesterday showed extensive osseous destructive process with periosteal reaction involving the mandible worse on the left side with soft tissue and fluid density mass suspicious for necrosis and possibly metastatic disease.  Osteonecrosis of the jaw and osteomyelitis in the differential.  There were multiple right submandibular lymph node reactive versus metastatic.    Patient had been on subcutaneous Xgeva for palliation of skeletal metastasis discontinued in August 2022        Diagnostic Data:     Past Medical History:   Diagnosis Date    Anticoagulant long-term use 2007    aspirin    CAD (coronary artery disease)     Cancer (HCC)     kidney    Deep vein thrombosis (DVT) (HCC)     leg    [place] (hard of hearing)     Hx of blood clots     Hyperlipidemia     Hypertension     Mentally challenged     information per patient's niece.       Patient Active Problem List    Diagnosis Date Noted    Facial abscess 01/24/2023    Mandibular mass 01/24/2023    Metastatic renal cell carcinoma to bone (HCC) 01/24/2023    Hyponatremia 01/24/2023    Poor venous access     Renal cell cancer, right (HCC) 11/11/2021        Past Surgical History:   Procedure Laterality Date    ABDOMEN SURGERY  1954    patient's sister said they were told a twin was removed from paitent's abdomen.    CARDIAC SURGERY  2007    bypass    CATHETER INSERTION Left 11/16/2021    MEDIPORT CATHETER INSERTION performed by Jose Ramon Wray MD at [facility] OR    FEMORAL BYPASS      bifemoral    IR PERCUT BX LUNG/MEDIASTINUM  11/2/2021    IR PERCUT BX LUNG/MEDIASTINUM 11/2/2021 [facility] CT       Family History  Family History   Problem Relation Age of Onset    High Blood Pressure Mother     Heart Disease Mother     Heart Attack Mother     Cancer Father 56        colon    Arthritis Sister     Heart Attack Brother     No Known Problems Maternal Uncle     No Known Problems Paternal Aunt     No Known Problems Paternal Uncle     No Known  Problems Maternal Grandmother     Cancer Maternal Grandfather         throat    Heart Attack Paternal Grandmother     No Known Problems Paternal Grandfather     Cirrhosis Paternal Cousin     Deep Vein Thrombosis Sister     Diabetes Sister     Heart Disease Sister         triple bypass    Breast Cancer Sister 61    High Blood Pressure Sister     Diabetes Sister     High Cholesterol Sister     Arthritis Sister        Social History    TOBACCO:   reports that he has been smoking cigarettes. He has a 13.50 pack-year smoking history. He has never used smokeless tobacco.  ETOH:   reports that he does not currently use alcohol. Home Medications  Prior to Admission medications    Medication Sig Start Date End Date Taking? Authorizing Provider   levothyroxine (SYNTHROID) 75 MCG tablet Take 1 tablet by mouth daily 10/27/22   Nereyda Perez MD   levothyroxine (SYNTHROID) 75 MCG tablet Take 1 tablet by mouth daily 4/14/22 7/7/22  Nereyda Perez MD   Calcium Carbonate-Vitamin D (OYSTER SHELL CALCIUM/D) 500-200 MG-UNIT TABS Take 2 tablets by mouth daily 3/31/22 3/31/23  Nereyda Perez MD   levothyroxine (SYNTHROID) 50 MCG tablet Take 1 tablet by mouth daily 3/3/22 7/7/22  Nereyda Perez MD   lidocaine-prilocaine (EMLA) 2.5-2.5 % cream Apply topically as needed.  12/9/21   SHI Covarrubias - HETAL   metoprolol tartrate (LOPRESSOR) 25 MG tablet Take 25 mg by mouth 2 times daily    Historical Provider, MD   aspirin 81 MG EC tablet Take 81 mg by mouth daily LD 10/26/21    Historical Provider, MD   simvastatin (ZOCOR) 80 MG tablet Take 80 mg by mouth daily    Historical Provider, MD   lisinopril (PRINIVIL;ZESTRIL) 20 MG tablet Take 20 mg by mouth daily    Historical Provider, MD       Allergies  No Known Allergies    Review of Systems: Relevant for what ever mentioned in the history of present illness          Objective  /67   Pulse 60   Temp 98.1 °F (36.7 °C) (Oral)   Resp 16   Ht 5' 8\" (1.727 m)   Wt 108 lb 8 oz (49.2 kg)   SpO2 99%   BMI 16.50 kg/m²     Physical Exam:    General: Alert, cachectic in no acute respiratory distress  Head and neck : PERRLA, EOMI . Sclera non icteric. Right mandibular mass with incision site glued with minimal drainage  Neck: no JVD,  no adenopathy,   Heart: Regular rate and regular rhythm, no murmur  Lungs: Clear to auscultation with decreased breath sounds at the bases  Extremities: No edema,no cyanosis, no clubbing. Abdomen: Soft, non-tender;no masses, no organomegaly  Neurologic:Cranial nerves grossly intact. No focal motor or sensory deficits . Recent Laboratory Data-   Lab Results   Component Value Date    WBC 12.6 (H) 01/25/2023    HGB 9.4 (L) 01/25/2023    HCT 29.1 (L) 01/25/2023    MCV 95.4 01/25/2023     01/25/2023    LYMPHOPCT 12.7 (L) 01/25/2023    RBC 3.05 (L) 01/25/2023    MCH 30.8 01/25/2023    MCHC 32.3 01/25/2023    RDW 16.5 (H) 01/25/2023    NEUTOPHILPCT 82.7 (H) 01/25/2023    MONOPCT 3.8 01/25/2023    BASOPCT 0.2 01/25/2023    NEUTROABS 10.43 (H) 01/25/2023    LYMPHSABS 1.60 01/25/2023    MONOSABS 0.48 01/25/2023    EOSABS 0.00 (L) 01/25/2023    BASOSABS 0.02 01/25/2023       Lab Results   Component Value Date     (L) 01/25/2023    K 4.7 01/25/2023    CL 97 (L) 01/25/2023    CO2 22 01/25/2023    BUN 11 01/25/2023    CREATININE 0.6 (L) 01/25/2023    GLUCOSE 121 (H) 01/25/2023    CALCIUM 8.4 (L) 01/25/2023    PROT 5.8 (L) 01/25/2023    LABALBU 3.0 (L) 01/25/2023    BILITOT <0.2 01/25/2023    ALKPHOS 110 01/25/2023    AST 9 01/25/2023    ALT 8 01/25/2023    LABGLOM >60 01/25/2023    GFRAA >60 08/03/2022       No results found for: IRON, TIBC, FERRITIN        Radiology-    CT SOFT TISSUE NECK W CONTRAST    Result Date: 1/24/2023  EXAMINATION: CT OF THE NECK SOFT TISSUE WITH CONTRAST  1/24/2023 TECHNIQUE: CT of the neck was performed with the administration of intravenous contrast. Multiplanar reformatted images are provided for review.  Automated exposure control, iterative reconstruction, and/or weight based adjustment of the mA/kV was utilized to reduce the radiation dose to as low as reasonably achievable. COMPARISON: CT facial bones dated 10/27/2022 HISTORY: ORDERING SYSTEM PROVIDED HISTORY: Include jaw please. Abscess/mass. Hx renal cell CA TECHNOLOGIST PROVIDED HISTORY: Please go up thru right mandible, mass/abscess Reason for exam:->Include jaw please. Abscess/mass. Hx renal cell CA Decision Support Exception - unselect if not a suspected or confirmed emergency medical condition->Emergency Medical Condition (MA) Right-sided facial swelling FINDINGS: PHARYNX/LARYNX:  The palatine tonsils are normal in appearance. The tongue is normal in appearance. The valleculae, epiglottis, aryepiglottic folds and pyriform sinuses appear unremarkable. The true and false vocal cords are normal in appearance. No mass or abscess is seen. SALIVARY GLANDS/THYROID:  The parotid and submandibular glands appear unremarkable. The thyroid gland appears unremarkable. LYMPH NODES:  Small lymph nodes are seen in the submandibular region. There is a 1.5 cm cutaneous nodule over the right submandibular region seen on series 2, image 48, likely metastatic disease. SOFT TISSUES:  There is soft tissue mass with fluid density extending medial and lateral to the right mandibular body measuring at least 5.3 x 3.6 cm. Stranding in the adjacent subcutaneous fat with soft tissue fullness extending posteriorly into the lateral parapharyngeal space. There is associated destructive process involving the mandible that is actually worse on the left side. BRAIN/ORBITS/SINUSES:  The visualized portion of the intracranial contents appear unremarkable. The visualized portion of the orbits, paranasal sinuses and mastoid air cells demonstrate no acute abnormality. LUNG APICES/SUPERIOR MEDIASTINUM:  No focal consolidation is seen within the visualized lung apices.   No superior mediastinal lymphadenopathy or mass. The visualized portion of the trachea appears unremarkable. BONES:  The mandible shows extensive mottled appearance with cortical destruction and periosteal reaction, worse on the left side. No other osseous destructive process or acute fracture is seen. 1. Extensive osseous destructive process with periosteal reaction involving the mandible, worse on the left side. This is associated with soft tissue and fluid density mass surrounding the right mandibular body. The findings are suspicious for metastatic disease with associated partially necrotic soft tissue mass. The possibility of osteomyelitis or osteonecrosis cannot be completely excluded. 2. Mild right submandibular lymphadenopathy. 1.5 cm cutaneous nodule in the right submandibular region. These findings are suspicious for metastatic disease. ASSESSMENT/PLAN : 81-year-old man    Metastatic clear-cell renal cell carcinoma by history followed by Dr. Papito Traore and diagnosed in 2021 and has achieved previously excellent response to immunotherapy with ipilimumab/nivolumab followed sequentially after 4 cycles by monotherapy with nivolumab. He has had issues with compliance. He had developed swelling of the jaw several month ago and his Yarelis Ritesh was discontinued in August 2022    He is now admitted with right facial abscess with differential diagnosis of    Osteonecrosis of the jaw with superimposed osteomyelitis  Rule out recurrent metastatic disease    To continue present IV antibiotics with Unasyn    Reconsult ENT  They already have seen the patient with a bedside biopsy done as well as aspiration with cytology and cultures pending. Normocytic anemia likely related to myelosuppression by facial abscess and possible osteomyelitis with mild neutrophilic leukocytosis      Malnutrition and cachexia. Thank you for the consult, we will follow. Yao Lin. Yasmin Steel M.D., F.A.C.P.   Electronically signed 1/25/2023 at 3:52 PM

## 2023-01-25 NOTE — CONSULTS
[address], [address] [place] [address]  Phone [phone]   [phone]      Admit Date: 2023 12:15 PM  Pt Name: Jagjit Garcia  MRN: [id number]  : 1950  Reason for Consult:    Chief Complaint   Patient presents with    Oral Swelling     Inflammation on right side of mouth. Sent by cancer center. Swelling started 2 days ago. Requesting Physician:  Ted Almonte MD  PCP: SHI Ortega NP  History Obtained From:  patient, chart   ID consulted for Facial abscess [L02.01]  Metastatic renal cell carcinoma to bone ([address] [C79.51, C64.9]  Mandibular mass [R22.0]  on hospital day 1  Face to face encounter   [facility]       Chief Complaint   Patient presents with    Oral Swelling     Inflammation on right side of mouth. Sent by cancer center. Swelling started 2 days ago. HISTORYOF PRESENT ILLNESS   Jagjit Garcia is a 81 y.o. male who presents with   has a past medical history of Anticoagulant long-term use, CAD (coronary artery disease), Cancer ([address] Deep vein thrombosis (DVT) ([address] [place] (hard of hearing), Hx of blood clots, Hyperlipidemia, Hypertension, and Mentally challenged. ED TRIAGEVITALS  BP: (!) 106/45, Temp: 97.7 °F (36.5 °C), Heart Rate: 61, Resp: 16, SpO2: 98 %  HPI:  ID was consulted on 23 for infection management  Pt presented to ER on 2023 with diagnosis of  Facial abscess [L02.01]  Metastatic renal cell carcinoma to bone (HCC) [C79.51, C64.9]  Mandibular mass [R22.0]    Patient has a history of metastatic right renal cell carcinoma. He was presented to the ER for jaw pain and evaluation of right facial abscess. He is followed by oncology. He developed right side drainage CT scan shows excessive bone destruction involving the mandible worse on the left side. ENT has seen the patient - s/p biopsy at bedside.    Patient is currently on Unasyn-   Patient was on Augmentin PO the beginning of January-   Patient was on also on amoxicillin. Patient has lost weight. His WBC is increased to 14.6 on admission  He denies any fevers. Sed rate- 100  crp- 18.3     ID has been asked to evaluate and manage. REVIEW OF SYSTEMS     CONSTITUTIONAL:   No fever, chills, ++ weight loss  ALLERGIES:    No rash or itch  EYES:     No visual changes  ENT:      No  hearing loss or sore throat  CARDIOVASCULAR:   No chest pain or palpitations  RESPIRATORY:   No cough, sob  ENDOCRINE:    No increase thirst, urination   HEME-LYMPH:   No easy bruising or bleeding disorder, + metastatic renal cell carcinoma   GI:     No nausea, vomiting or diarrhea  :     No urinary complaints  NEURO:    No seizures, stroke, HA  MUSCULOSKELETAL:  No muscle aches or pain, no joint pain  SKIN:     No rash, ulcers or wounds  PSYCH:    No depression or anxiety    Medications Prior to Admission: levothyroxine (SYNTHROID) 75 MCG tablet, Take 1 tablet by mouth daily  levothyroxine (SYNTHROID) 75 MCG tablet, Take 1 tablet by mouth daily  Calcium Carbonate-Vitamin D (OYSTER SHELL CALCIUM/D) 500-200 MG-UNIT TABS, Take 2 tablets by mouth daily  levothyroxine (SYNTHROID) 50 MCG tablet, Take 1 tablet by mouth daily  lidocaine-prilocaine (EMLA) 2.5-2.5 % cream, Apply topically as needed.   [DISCONTINUED] ARTHRITIS PAIN RELIEF 650 MG extended release tablet, TAKE TWO TABLETS BY MOUTH AT BEDTIME  metoprolol tartrate (LOPRESSOR) 25 MG tablet, Take 25 mg by mouth 2 times daily  aspirin 81 MG EC tablet, Take 81 mg by mouth daily LD 10/26/21  simvastatin (ZOCOR) 80 MG tablet, Take 80 mg by mouth daily  lisinopril (PRINIVIL;ZESTRIL) 20 MG tablet, Take 20 mg by mouth daily  CURRENT MEDICATIONS     Current Facility-Administered Medications:     ampicillin-sulbactam (UNASYN) 3,000 mg in sodium chloride 0.9 % 100 mL IVPB ([id number]), 3,000 mg, IntraVENous, Q6H, Kylie Mccoy MD, Stopped at 01/25/23 0518    aspirin EC tablet 81 mg, 81 mg, Oral, Daily, Lefty Bryan, APRN - CNP    oyster shell calcium w/D 500-5 MG-MCG tablet 2 tablet, 2 tablet, Oral, Daily, SHI Chávez CNP    levothyroxine (SYNTHROID) tablet 75 mcg, 75 mcg, Oral, Daily, SHI Chávez CNP    lisinopril (PRINIVIL;ZESTRIL) tablet 20 mg, 20 mg, Oral, Daily, SHI Chávez CNP    metoprolol tartrate (LOPRESSOR) tablet 25 mg, 25 mg, Oral, BID, SHI Chávez - CNP, 25 mg at 01/24/23 2336    atorvastatin (LIPITOR) tablet 40 mg, 40 mg, Oral, Daily, SHI Chávez CNP    0.9 % sodium chloride infusion, , IntraVENous, Continuous, SHI Chávez CNP, Last Rate: 75 mL/hr at 01/25/23 0517, New Bag at 01/25/23 0517    dexamethasone (DECADRON) injection 4 mg, 4 mg, IntraVENous, Q12H, SHI Chávez CNP, 4 mg at 01/24/23 2337    ipratropium-albuterol (DUONEB) nebulizer solution 1 ampule, 1 ampule, Inhalation, Q4H PRN, SHI Chávez CNP    sodium chloride flush 0.9 % injection 5-40 mL, 5-40 mL, IntraVENous, 2 times per day, SHI Chávez CNP, 10 mL at 01/24/23 2120    sodium chloride flush 0.9 % injection 5-40 mL, 5-40 mL, IntraVENous, PRN, SHI Chávez - CNP    0.9 % sodium chloride infusion, , IntraVENous, PRN, SHI Chávez - CNP    ondansetron (ZOFRAN-ODT) disintegrating tablet 4 mg, 4 mg, Oral, Q8H PRN **OR** ondansetron (ZOFRAN) injection 4 mg, 4 mg, IntraVENous, Q6H PRN, SHI Chávez - CNP    polyethylene glycol (GLYCOLAX) packet 17 g, 17 g, Oral, Daily PRN, SHI Chávez CNP    acetaminophen (TYLENOL) tablet 650 mg, 650 mg, Oral, Q6H PRN **OR** acetaminophen (TYLENOL) suppository 650 mg, 650 mg, Rectal, Q6H PRN, SHI Chávez CNP  ALLERGIES     Patient has no known allergies. There is no immunization history on file for this patient.    Internal Administration   First Dose      Second Dose           Last COVID Lab SARS-CoV-2, PCR (no units)   Date Value   10/26/2021 Not Detected     SARS-CoV-2, NAAT (no units)   Date Value   11/16/2021 Not Detected          PAST MEDICAL HISTORY     Past Medical History:   Diagnosis Date    Anticoagulant long-term use 2007    aspirin    CAD (coronary artery disease)     Cancer (HCC)     kidney    Deep vein thrombosis (DVT) (HCC)     leg    [place] (hard of hearing)     Hx of blood clots     Hyperlipidemia     Hypertension     Mentally challenged     information per patient's niece. SURGICAL HISTORY       Past Surgical History:   Procedure Laterality Date    [address]    patient's sister said they were told a twin was removed from paitent's abdomen.     CARDIAC SURGERY  2007    bypass    CATHETER INSERTION Left 11/16/2021    MEDIPORT CATHETER INSERTION performed by Mumtaz Younger MD at [address] [place] [address]      bifemoral    IR PERCUT BX LUNG/MEDIASTINUM  11/2/2021    IR PERCUT BX LUNG/MEDIASTINUM 11/2/2021 [facility] CT     FAMILY HISTORY       Family History   Problem Relation Age of Onset    High Blood Pressure Mother     Heart Disease Mother     Heart Attack Mother     Cancer Father 64        colon    Arthritis Sister     Heart Attack Brother     No Known Problems Maternal Uncle     No Known Problems Paternal Aunt     No Known Problems Paternal Uncle     No Known Problems Maternal Grandmother     Cancer Maternal Grandfather         throat    Heart Attack Paternal Grandmother     No Known Problems Paternal Grandfather     Cirrhosis Paternal Cousin     Deep Vein Thrombosis Sister     Diabetes Sister     Heart Disease Sister         triple bypass    Breast Cancer Sister 61    High Blood Pressure Sister     Diabetes Sister     High Cholesterol Sister     Arthritis Sister      SOCIAL HISTORY       Social History     Socioeconomic History    Marital status:      Spouse name: None    Number of children: None    Years of education: None    Highest education level: None   Tobacco Use    Smoking status: Every Day     Packs/day: 0.25     Years: 54.00     Pack years: 13.50     Types: Cigarettes    Smokeless tobacco: Never    Tobacco comments:     Informed patient of [employer] Tobacco Cessation program, gave pamphlet. Vaping Use    Vaping Use: Never used   Substance and Sexual Activity    Alcohol use: Not Currently    Drug use: Not Currently   Lives with wife  Has 2 cats     PHYSICAL EXAM        Vitals:    Vitals:    01/24/23 1628 01/24/23 1800 01/24/23 1818 01/25/23 0428   BP: 130/70 (!) 122/50  (!) 106/45   Pulse: 70 72  61   Resp: 20 18  16   Temp:  97.8 °F (36.6 °C)  97.7 °F (36.5 °C)   TempSrc:  Oral  Oral   SpO2: 99% 96%  98%   Weight:   108 lb 8 oz (49.2 kg)    Height:   5' 8\" (1.727 m)         CONSTITUTIONAL:  awake, alert, cooperative, no apparent distress, and appears stated age  ENT:  Normocephalic, without obvious abnormality, atraumatic, right side of mandibular area + tenderness. Some edema. Lesions noted to underside of chin. Ulcer noted to gumline along the right posterior jaw. NECK:  Supple, symmetrical, trachea midline  LUNGS:  No increased work of breathing, good air exchange, clear to auscultation bilaterally, no crackles or wheezing  CARDIOVASCULAR:  Normal apical impulse, regular rate and rhythm. ABDOMEN:  No scars, normal bowel sounds, soft, non-distended, non-tender, no masses palpated     MUSCULOSKELETAL:  There is no redness, warmth, or swelling of the joints. Full range of motion noted. NEUROLOGIC:  Awake, alert, oriented to name, place and time. Cranial nerves II-XII are grossly intact.      SKIN:  no bruising or bleeding and normal skin color, texture, turgor  Mediport to left chest- not accessed     Central Venous Catheter:  Implanted Venous Port (Single) 11/16/21 Chest Left (Active)   Port A Cath Status De-Accessed 01/24/23 2000   Criteria for Appropriate Use Limited/no vessel suitable for conventional peripheral access 01/05/23 0937   Site Assessment Clean, dry & intact 01/05/23 0937   Line Care Chlorhexidine wipes 01/05/23 0937   Alcohol Cap Used Yes 01/05/23 0937   Dressing Type Transparent 01/05/23 0937   Dressing Status New dressing applied 01/05/23 0937   Dressing Intervention New 01/05/23 0937   Access Attempts  1 01/05/23 0937   Access Needle Gauge 20 G 01/05/23 0937   Access Needle Length 0.75 inches 01/05/23 0937   Single Lumen Status Brisk blood return;Flushed;Heparin locked;Lab draw;Alcohol cap applied 01/05/23 0937     Peripheral Intravenous Line:  Peripheral IV 01/24/23 Left Antecubital (Active)   Site Assessment Clean, dry & intact 01/25/23 0346   Line Status Blood return noted;Normal saline locked 01/25/23 0346   Line Care Connections checked and tightened 01/25/23 0346   Phlebitis Assessment No symptoms 01/25/23 0346   Infiltration Assessment 0 01/25/23 0346   Alcohol Cap Used Yes 01/25/23 0346   Dressing Status Clean, dry & intact 01/25/23 0346   Dressing Type Transparent 01/25/23 0346        DIAGNOSTIC RESULTS   RADIOLOGY:   CT SOFT TISSUE NECK W CONTRAST    Result Date: 1/24/2023  EXAMINATION: CT OF THE NECK SOFT TISSUE WITH CONTRAST  1/24/2023 TECHNIQUE: CT of the neck was performed with the administration of intravenous contrast. Multiplanar reformatted images are provided for review. Automated exposure control, iterative reconstruction, and/or weight based adjustment of the mA/kV was utilized to reduce the radiation dose to as low as reasonably achievable. COMPARISON: CT facial bones dated 10/27/2022 HISTORY: ORDERING SYSTEM PROVIDED HISTORY: Include jaw please. Abscess/mass. Hx renal cell CA TECHNOLOGIST PROVIDED HISTORY: Please go up thru right mandible, mass/abscess Reason for exam:->Include jaw please. Abscess/mass. Hx renal cell CA Decision Support Exception - unselect if not a suspected or confirmed emergency medical condition->Emergency Medical Condition (MA) Right-sided facial swelling FINDINGS: PHARYNX/LARYNX:  The palatine tonsils are normal in appearance. The tongue is normal in appearance.   The valleculae, epiglottis, aryepiglottic folds and pyriform sinuses appear unremarkable. The true and false vocal cords are normal in appearance. No mass or abscess is seen. SALIVARY GLANDS/THYROID:  The parotid and submandibular glands appear unremarkable. The thyroid gland appears unremarkable. LYMPH NODES:  Small lymph nodes are seen in the submandibular region. There is a 1.5 cm cutaneous nodule over the right submandibular region seen on series 2, image 48, likely metastatic disease. SOFT TISSUES:  There is soft tissue mass with fluid density extending medial and lateral to the right mandibular body measuring at least 5.3 x 3.6 cm. Stranding in the adjacent subcutaneous fat with soft tissue fullness extending posteriorly into the lateral parapharyngeal space. There is associated destructive process involving the mandible that is actually worse on the left side. BRAIN/ORBITS/SINUSES:  The visualized portion of the intracranial contents appear unremarkable. The visualized portion of the orbits, paranasal sinuses and mastoid air cells demonstrate no acute abnormality. LUNG APICES/SUPERIOR MEDIASTINUM:  No focal consolidation is seen within the visualized lung apices. No superior mediastinal lymphadenopathy or mass. The visualized portion of the trachea appears unremarkable. BONES:  The mandible shows extensive mottled appearance with cortical destruction and periosteal reaction, worse on the left side. No other osseous destructive process or acute fracture is seen. 1. Extensive osseous destructive process with periosteal reaction involving the mandible, worse on the left side. This is associated with soft tissue and fluid density mass surrounding the right mandibular body. The findings are suspicious for metastatic disease with associated partially necrotic soft tissue mass. The possibility of osteomyelitis or osteonecrosis cannot be completely excluded. 2. Mild right submandibular lymphadenopathy.   1.5 cm cutaneous nodule in the right submandibular region. These findings are suspicious for metastatic disease. LABS  Recent Labs     01/24/23  1243 01/25/23  0406   WBC 14.6* 12.6*   HGB 11.8* 9.4*   HCT 35.4* 29.1*   MCV 94.9 95.4    305     Recent Labs     01/24/23  1243 01/25/23  0406   * 131*   K 4.5 4.7   CL 90* 97*   CO2 27 22   BUN 6 11   CREATININE 0.6* 0.6*   LABGLOM >60 >60   GLUCOSE 102* 121*   PROT  --  5.8*   LABALBU  --  3.0*   CALCIUM 9.1 8.4*   BILITOT  --  <0.2   ALKPHOS  --  110   AST  --  9   ALT  --  8     No results for input(s): PROCAL in the last 72 hours. Lab Results   Component Value Date    CRP 18.3 (H) 01/24/2023     Lab Results   Component Value Date    SEDRATE 100 (H) 01/24/2023     No results found for: [id number]  Lab Results   Component Value Date/Time    COVID19 Not Detected 11/16/2021 11:15 AM    COVID19 Not Detected 10/26/2021 01:21 AM        Lab Results   Component Value Date/Time    COVID19 Not Detected 11/16/2021 11:15 AM    COVID19 Not Detected 10/26/2021 01:21 AM     COVID-19/AUDREY-COV2 LABS  Recent Labs     01/24/23  1243 01/25/23  0406   CRP 18.3*  --    AST  --  9   ALT  --  8     MICROBIOLOGY:     Cultures :   Pending     FINAL IMPRESSION    Patient is a 81 y.o. male who presented with   Chief Complaint   Patient presents with    Oral Swelling     Inflammation on right side of mouth. Sent by cancer center. Swelling started 2 days ago. and admitted for Facial abscess [L02.01]  Metastatic renal cell carcinoma to bone (HCC) [C79.51, C64.9]  Mandibular mass [R22.0]  Facial abscess  Nodule right submandibular region- suspicious for metastatic disease. Leukocytosis   Was on Augmentin as an outpt    Plan:   Continue Unasyn IV for now  ENT following   Follow-up cultures   Monitor labs   Check MRSA screen   CT reviewed     Available labs, imaging studies, microbiologic studies have been reviewed.      The patient/FAMILY  was educated about the diagnosis, prognosis, indications, risks and benefits of treatment. An opportunity to ask questions was given to the patient/FAMILY and questions were answered. Thank you for involving me in the care of Isa Chaney. Please do not hesitate to call [phone]  for any questions or concerns. Electronically signed by SHI Garcia on 1/25/2023 at 9:01 AM      As above      This is a face to face encounter with Isa Chaney on 01/25/23. I discussed the findings and plans with  NURSE PRACTITIONER, SHI Garcia and agree as documented in her note. See below for additional details and treatment plan. Isa Chaney is a 81 y.o. male who presents with   Chief Complaint   Patient presents with    Oral Swelling     Inflammation on right side of mouth. Sent by cancer center. Swelling started 2 days ago. and has  has a past medical history of Anticoagulant long-term use, CAD (coronary artery disease), Cancer ([address] Deep vein thrombosis (DVT) [address] [address] [place] (hard of hearing), Hx of blood clots, Hyperlipidemia, Hypertension, and Mentally challenged. ID was consulted for   Admit DX:  Facial abscess [L02.01]  Metastatic renal cell carcinoma to bone (HCC) [C79.51, C64.9]  Mandibular mass [R22.0]    Id following for  LEUKOCYTOSIS   FACIAL CELLULITIS  POSS METS TO RIGHT SUBMANIDULAR   -FAILED ORAL ATBX\EDENTULOUS   CONT ATBX  MRSA SCREEN   ASO   T-SPOT     H/O metastatic renal cell carcinoma       ampicillin-sulbactam (UNASYN) 3,000 mg in sodium chloride 0.9 % 100 mL IVPB (Kont0Hbp), Q6H         Imaging and labs were reviewed. Isa Chaney was informed of their diagnosis, indications, risks and benefits of treatment. Isa Chaney had the opportunity to ask questions. All questions were answered. Thank you for involving me in the care of Isa Chaney. Please do not hesitate to call for any questions or concerns.     Electronically signed by Ade Iniguez MD on 1/25/2023 at 4:03 PM

## 2023-01-25 NOTE — CARE COORDINATION
Case Management Assessment  Initial Evaluation    Date/Time of Evaluation: 1/25/2023 1:39 PM  Assessment Completed by: Lynette De Leon RN    If patient is discharged prior to next notation, then this note serves as note for discharge by case management. Patient Name: Tiney Dakin                   YOB: 1950  Diagnosis: Facial abscess [L02.01]  Metastatic renal cell carcinoma to bone ([address] [C79.51, C64.9]  Mandibular mass [R22.0]                   Date / Time: 1/24/2023 12:15 PM    Patient Admission Status: Inpatient   Readmission Risk (Low < 19, Mod (19-27), High > 27): Readmission Risk Score: 16.7    Current PCP: SHI Veronica NP  PCP verified by CM? Yes    Chart Reviewed: Yes      History Provided by: Patient, Spouse  Patient Orientation: Alert and Oriented    Patient Cognition: Alert    Hospitalization in the last 30 days (Readmission):  No    If yes, Readmission Assessment in CM Navigator will be completed. Advance Directives:      Code Status: Full Code   Patient's Primary Decision Maker is: Legal Next of Kin    Primary Decision MakerDanielle Prajapati Spouse - [phone]    Discharge Planning:    Patient lives with: Spouse/Significant Other Type of Home: House  Primary Care Giver: Self  Patient Support Systems include: Spouse/Significant Other, Family Members, Friends/Neighbors   Current Financial resources: Medicaid, Medicare  Current community resources: None  Current services prior to admission: None            Current DME:              Type of Home Care services:  None    ADLS  Prior functional level: Independent in ADLs/IADLs  Current functional level: Independent in ADLs/IADLs      Family can provide assistance at DC: Yes  Would you like Case Management to discuss the discharge plan with any other family members/significant others, and if so, who?  Yes (asked CM to call wife for questions he did'nt answer)  Plans to Return to Present Housing: Yes  Potential Assistance needed at discharge: N/A (per pt , no needs for home)               Patient expects to discharge to: Trailer/mobile home  Plan for transportation at discharge: Family    Financial    Payor: Иван Brown / Plan: Yola Arevalo / Product Type: *No Product type* /       Potential assistance Purchasing Medications: No        [facility] [address] [address]  Phone: [phone] Fax: [phone]      Notes:    Factors facilitating achievement of predicted outcomes: Family support, Friend support, Motivated, Cooperative, and Pleasan        Additional Case Management Notes: 1-25-Cm note: met with pt for transiton of care, pt lives with his wife in a mobile home with 3 steps to enter, pt has no DME, no hx of home care or SNF, he is independent, drives himself to [facility] monthly for Chemo, next appt is Feb. 2nd. Pt and wife deny any needs for homegoing. Pt's friend will transport home .  Electronically signed by Lola Landis RN on 1/25/2023 at 1:44 PM      The Plan for Transition of Care is related to the following treatment goals of Facial abscess [L02.01]  Metastatic renal cell carcinoma to bone ([facility] [address] [C79.51, C64.9]  Mandibular mass [R22.0]        Lola Landis RN  Case Management Department  Ph: [phone] Fax: [phone]

## 2023-01-25 NOTE — ACP (ADVANCE CARE PLANNING)
Advance Care Planning   Healthcare Decision Maker:    Primary Decision Maker: Mina Arreola [facility] - [phone]

## 2023-01-25 NOTE — PLAN OF CARE
Problem: Discharge Planning  Goal: Discharge to home or other facility with appropriate resources  Outcome: Progressing     Problem: Pain  Goal: Verbalizes/displays adequate comfort level or baseline comfort level  Outcome: Progressing     Problem: ABCDS Injury Assessment  Goal: Absence of physical injury  Outcome: Progressing     Problem: Nutrition Deficit:  Goal: Optimize nutritional status  1/25/2023 1520 by Benjie Councilman, RN  Outcome: Progressing     Problem: Safety - Adult  Goal: Free from fall injury  Outcome: Progressing

## 2023-01-26 ENCOUNTER — APPOINTMENT (OUTPATIENT)
Dept: CT IMAGING | Age: 73
DRG: 987 | End: 2023-01-26
Payer: MEDICARE

## 2023-01-26 LAB
ANION GAP SERPL CALCULATED.3IONS-SCNC: 10 MMOL/L (ref 7–16)
ANTISTREPTOLYSIN-O: <20 IU/ML (ref 0–200)
BASOPHILS ABSOLUTE: 0.01 E9/L (ref 0–0.2)
BASOPHILS RELATIVE PERCENT: 0.1 % (ref 0–2)
BUN BLDV-MCNC: 11 MG/DL (ref 6–23)
CALCIUM SERPL-MCNC: 8.4 MG/DL (ref 8.6–10.2)
CHLORIDE BLD-SCNC: 99 MMOL/L (ref 98–107)
CO2: 23 MMOL/L (ref 22–29)
CREAT SERPL-MCNC: 0.6 MG/DL (ref 0.7–1.2)
EOSINOPHILS ABSOLUTE: 0 E9/L (ref 0.05–0.5)
EOSINOPHILS RELATIVE PERCENT: 0 % (ref 0–6)
FERRITIN: 389 NG/ML
FOLATE: 3.7 NG/ML (ref 4.8–24.2)
GFR SERPL CREATININE-BSD FRML MDRD: >60 ML/MIN/1.73
GLUCOSE BLD-MCNC: 127 MG/DL (ref 74–99)
HCT VFR BLD CALC: 27.2 % (ref 37–54)
HEMOGLOBIN: 9.4 G/DL (ref 12.5–16.5)
IGA: 158 MG/DL (ref 70–400)
IGG: 613 MG/DL (ref 700–1600)
IGM: 47 MG/DL (ref 40–230)
IMMATURE GRANULOCYTES #: 0.06 E9/L
IMMATURE GRANULOCYTES %: 0.5 % (ref 0–5)
IRON SATURATION: 53 % (ref 20–55)
IRON: 102 MCG/DL (ref 59–158)
LACTATE DEHYDROGENASE: 136 U/L (ref 135–225)
LYMPHOCYTES ABSOLUTE: 1.55 E9/L (ref 1.5–4)
LYMPHOCYTES RELATIVE PERCENT: 11.9 % (ref 20–42)
MCH RBC QN AUTO: 32.1 PG (ref 26–35)
MCHC RBC AUTO-ENTMCNC: 34.6 % (ref 32–34.5)
MCV RBC AUTO: 92.8 FL (ref 80–99.9)
MONOCYTES ABSOLUTE: 0.63 E9/L (ref 0.1–0.95)
MONOCYTES RELATIVE PERCENT: 4.9 % (ref 2–12)
NEUTROPHILS ABSOLUTE: 10.73 E9/L (ref 1.8–7.3)
NEUTROPHILS RELATIVE PERCENT: 82.6 % (ref 43–80)
PDW BLD-RTO: 16.4 FL (ref 11.5–15)
PLATELET # BLD: 301 E9/L (ref 130–450)
PMV BLD AUTO: 9.9 FL (ref 7–12)
POTASSIUM SERPL-SCNC: 4.1 MMOL/L (ref 3.5–5)
RBC # BLD: 2.93 E12/L (ref 3.8–5.8)
SODIUM BLD-SCNC: 132 MMOL/L (ref 132–146)
TOTAL IRON BINDING CAPACITY: 193 MCG/DL (ref 250–450)
VITAMIN B-12: 330 PG/ML (ref 211–946)
WBC # BLD: 13 E9/L (ref 4.5–11.5)

## 2023-01-26 PROCEDURE — 83550 IRON BINDING TEST: CPT

## 2023-01-26 PROCEDURE — 82784 ASSAY IGA/IGD/IGG/IGM EACH: CPT

## 2023-01-26 PROCEDURE — 2580000003 HC RX 258: Performed by: INTERNAL MEDICINE

## 2023-01-26 PROCEDURE — 86359 T CELLS TOTAL COUNT: CPT

## 2023-01-26 PROCEDURE — 36415 COLL VENOUS BLD VENIPUNCTURE: CPT

## 2023-01-26 PROCEDURE — 82607 VITAMIN B-12: CPT

## 2023-01-26 PROCEDURE — 1200000000 HC SEMI PRIVATE

## 2023-01-26 PROCEDURE — 87070 CULTURE OTHR SPECIMN AEROBIC: CPT

## 2023-01-26 PROCEDURE — 6360000002 HC RX W HCPCS

## 2023-01-26 PROCEDURE — 6370000000 HC RX 637 (ALT 250 FOR IP)

## 2023-01-26 PROCEDURE — 6360000004 HC RX CONTRAST MEDICATION: Performed by: RADIOLOGY

## 2023-01-26 PROCEDURE — 86360 T CELL ABSOLUTE COUNT/RATIO: CPT

## 2023-01-26 PROCEDURE — 82728 ASSAY OF FERRITIN: CPT

## 2023-01-26 PROCEDURE — 86481 TB AG RESPONSE T-CELL SUSP: CPT

## 2023-01-26 PROCEDURE — 83615 LACTATE (LD) (LDH) ENZYME: CPT

## 2023-01-26 PROCEDURE — 87449 NOS EACH ORGANISM AG IA: CPT

## 2023-01-26 PROCEDURE — 6360000002 HC RX W HCPCS: Performed by: INTERNAL MEDICINE

## 2023-01-26 PROCEDURE — 85025 COMPLETE CBC W/AUTO DIFF WBC: CPT

## 2023-01-26 PROCEDURE — 74177 CT ABD & PELVIS W/CONTRAST: CPT

## 2023-01-26 PROCEDURE — 86060 ANTISTREPTOLYSIN O TITER: CPT

## 2023-01-26 PROCEDURE — 82746 ASSAY OF FOLIC ACID SERUM: CPT

## 2023-01-26 PROCEDURE — 99232 SBSQ HOSP IP/OBS MODERATE 35: CPT | Performed by: INTERNAL MEDICINE

## 2023-01-26 PROCEDURE — 83540 ASSAY OF IRON: CPT

## 2023-01-26 PROCEDURE — 71260 CT THORAX DX C+: CPT

## 2023-01-26 PROCEDURE — 80048 BASIC METABOLIC PNL TOTAL CA: CPT

## 2023-01-26 RX ORDER — FOLIC ACID 1 MG/1
1 TABLET ORAL DAILY
Status: DISCONTINUED | OUTPATIENT
Start: 2023-01-27 | End: 2023-01-29 | Stop reason: HOSPADM

## 2023-01-26 RX ADMIN — ASPIRIN 81 MG: 81 TABLET, COATED ORAL at 08:42

## 2023-01-26 RX ADMIN — ATORVASTATIN CALCIUM 40 MG: 40 TABLET, FILM COATED ORAL at 08:42

## 2023-01-26 RX ADMIN — AMPICILLIN SODIUM AND SULBACTAM SODIUM 3000 MG: 2; 1 INJECTION, POWDER, FOR SOLUTION INTRAMUSCULAR; INTRAVENOUS at 06:40

## 2023-01-26 RX ADMIN — AMPICILLIN SODIUM AND SULBACTAM SODIUM 3000 MG: 2; 1 INJECTION, POWDER, FOR SOLUTION INTRAMUSCULAR; INTRAVENOUS at 01:06

## 2023-01-26 RX ADMIN — DEXAMETHASONE SODIUM PHOSPHATE 4 MG: 4 INJECTION, SOLUTION INTRAMUSCULAR; INTRAVENOUS at 01:06

## 2023-01-26 RX ADMIN — AMPICILLIN SODIUM AND SULBACTAM SODIUM 3000 MG: 2; 1 INJECTION, POWDER, FOR SOLUTION INTRAMUSCULAR; INTRAVENOUS at 14:31

## 2023-01-26 RX ADMIN — IOPAMIDOL 75 ML: 755 INJECTION, SOLUTION INTRAVENOUS at 16:07

## 2023-01-26 RX ADMIN — ACETAMINOPHEN 650 MG: 325 TABLET ORAL at 04:43

## 2023-01-26 RX ADMIN — Medication 2 TABLET: at 08:41

## 2023-01-26 RX ADMIN — AMPICILLIN SODIUM AND SULBACTAM SODIUM 3000 MG: 2; 1 INJECTION, POWDER, FOR SOLUTION INTRAMUSCULAR; INTRAVENOUS at 21:00

## 2023-01-26 RX ADMIN — ACETAMINOPHEN 650 MG: 325 TABLET ORAL at 21:47

## 2023-01-26 RX ADMIN — LEVOTHYROXINE SODIUM 75 MCG: 0.07 TABLET ORAL at 08:41

## 2023-01-26 ASSESSMENT — PAIN SCALES - GENERAL
PAINLEVEL_OUTOF10: 9
PAINLEVEL_OUTOF10: 3
PAINLEVEL_OUTOF10: 2

## 2023-01-26 ASSESSMENT — PAIN DESCRIPTION - DESCRIPTORS: DESCRIPTORS: TENDER;SORE

## 2023-01-26 ASSESSMENT — PAIN - FUNCTIONAL ASSESSMENT: PAIN_FUNCTIONAL_ASSESSMENT: ACTIVITIES ARE NOT PREVENTED

## 2023-01-26 ASSESSMENT — PAIN DESCRIPTION - ORIENTATION: ORIENTATION: RIGHT

## 2023-01-26 ASSESSMENT — PAIN DESCRIPTION - LOCATION
LOCATION: FACE
LOCATION: HEAD

## 2023-01-26 NOTE — PLAN OF CARE
Problem: Discharge Planning  Goal: Discharge to home or other facility with appropriate resources  1/25/2023 2145 by Yesi Pickering RN  Outcome: Progressing     Problem: Pain  Goal: Verbalizes/displays adequate comfort level or baseline comfort level  1/25/2023 2145 by Yesi Pickering RN  Outcome: Progressing     Problem: ABCDS Injury Assessment  Goal: Absence of physical injury  1/25/2023 2145 by Yesi Pickering RN  Outcome: Progressing     Problem: Nutrition Deficit:  Goal: Optimize nutritional status  1/25/2023 2145 by Yesi Pickering RN  Outcome: Progressing     Problem: Safety - Adult  Goal: Free from fall injury  1/25/2023 2145 by Yesi Pickering RN  Outcome: Progressing

## 2023-01-26 NOTE — PLAN OF CARE
Problem: Discharge Planning  Goal: Discharge to home or other facility with appropriate resources  1/26/2023 1020 by Milagros Ruiz RN  Outcome: Progressing  1/25/2023 2145 by Lexie Chisholm RN  Outcome: Progressing     Problem: Pain  Goal: Verbalizes/displays adequate comfort level or baseline comfort level  1/26/2023 1020 by Milagros Ruiz RN  Outcome: Progressing  1/25/2023 2145 by Lexie Chisholm RN  Outcome: Progressing     Problem: ABCDS Injury Assessment  Goal: Absence of physical injury  1/26/2023 1020 by Milagros Ruiz RN  Outcome: Progressing  1/25/2023 2145 by Lexie Chisholm RN  Outcome: Progressing     Problem: Nutrition Deficit:  Goal: Optimize nutritional status  1/26/2023 1020 by Milagros Ruiz RN  Outcome: Progressing  1/25/2023 2145 by Lexie Chisholm RN  Outcome: Progressing     Problem: Safety - Adult  Goal: Free from fall injury  1/26/2023 1020 by Milagros Ruiz RN  Outcome: Progressing  1/25/2023 2145 by Lexie Chisholm RN  Outcome: Progressing

## 2023-01-26 NOTE — PROGRESS NOTES
[facility]  Hematology/Oncology  Consult      Patient Name: Carissa Soto  YOB: 1950  PCP: SHI Maldonado NP   Referring Provider:      Reason for Consultation:   Chief Complaint   Patient presents with    Oral Swelling     Inflammation on right side of mouth. Sent by cancer center. Swelling started 2 days ago. Interval history: Feels better. Has been afebrile. History of Present Illness:  62-year-old man hospitalized through ED with painful mass in the right mandible of several days duration. He follows with Dr. Silver Heredia at the oncology clinic for metastatic renal cell carcinoma. He had presented with atypical chest wall pain mainly localized to the right side. CT chest had shown multiple necrotic masses in the right kidney with tumor invasion of the right vein with a large 9 x 4 cm soft tissue necrotic mass involving the fourth rib there were also lytic destructive lesions in the right eighth and ninth rib along with right lung nodules largest measuring up to 1.5 cm. Multiple liver nodules also noted as well as suspicion for bilateral adrenal metastasis. PET CT scan was done and confirmed hypermetabolic lesions in the right kidney as well as left adrenal gland with diffuse skeletal metastasis and scattered pulmonary nodules one of them in the right lung hypermetabolic with high SUV uptake. MRI of the brain was negative for intracranial metastasis. He underwent a right chest wall mass biopsy in November 2021 and the pathology was consistent with clear cell renal cell carcinoma. He started immunotherapy with ipilimumab/nivolumab in November 2021 and after 4 cycles he was switched sequentially to monotherapy with nivolumab. Follow-up PET CT scan in August 2022 showed no hypermetabolic lesions and no activity in the residual renal mass consistent with excellent response to immunotherapy.   He had developed left facial abscess status post incision and drainage and he was given antibiotics and was followed by ENT. CT of soft tissue of the neck done yesterday showed extensive osseous destructive process with periosteal reaction involving the mandible worse on the left side with soft tissue and fluid density mass suspicious for necrosis and possibly metastatic disease. Osteonecrosis of the jaw and osteomyelitis in the differential.  There were multiple right submandibular lymph node reactive versus metastatic. Patient had been on subcutaneous Xgeva for palliation of skeletal metastasis discontinued in August 2022        Diagnostic Data:     Past Medical History:   Diagnosis Date    Anticoagulant long-term use 2007    aspirin    CAD (coronary artery disease)     Cancer (HCC)     kidney    Deep vein thrombosis (DVT) (HCC)     leg    [place] (hard of hearing)     Hx of blood clots     Hyperlipidemia     Hypertension     Mentally challenged     information per patient's niece. Patient Active Problem List    Diagnosis Date Noted    Facial abscess 01/24/2023    Mandibular mass 01/24/2023    Metastatic renal cell carcinoma to bone [facility]) 01/24/2023    Hyponatremia 01/24/2023    Poor venous access     Renal cell cancer, right ([address] 11/11/2021        Past Surgical History:   Procedure Laterality Date    [address] [place]    patient's sister said they were told [facility].     CARDIAC SURGERY  2007    bypass    CATHETER INSERTION Left 11/16/2021    MEDIPORT CATHETER INSERTION performed by Davonte Johnson MD at [address] [place] [address] [place]      bifemoral    IR PERCUT BX LUNG/MEDIASTINUM  11/2/2021    IR PERCUT BX LUNG/MEDIASTINUM 11/2/2021 [facility] CT       Family History  Family History   Problem Relation Age of Onset    High Blood Pressure Mother     Heart Disease Mother     Heart Attack Mother     Cancer Father 64        colon    Arthritis Sister     Heart Attack Brother     No Known Problems Maternal Uncle     No Known Problems Paternal Aunt     No Known Problems Paternal Uncle     No Known Problems Maternal Grandmother     Cancer Maternal Grandfather         throat    Heart Attack Paternal Grandmother     No Known Problems Paternal Grandfather     Cirrhosis Paternal Cousin     Deep Vein Thrombosis Sister     Diabetes Sister     Heart Disease Sister         triple bypass    Breast Cancer Sister 61    High Blood Pressure Sister     Diabetes Sister     High Cholesterol Sister     Arthritis Sister        Social History    TOBACCO:   reports that he has been smoking cigarettes. He has a 13.50 pack-year smoking history. He has never used smokeless tobacco.  ETOH:   reports that he does not currently use alcohol. Home Medications  Prior to Admission medications    Medication Sig Start Date End Date Taking? Authorizing Provider   levothyroxine (SYNTHROID) 75 MCG tablet Take 1 tablet by mouth daily 10/27/22   Melvi Campbell MD   levothyroxine (SYNTHROID) 75 MCG tablet Take 1 tablet by mouth daily 4/14/22 7/7/22  Melvi Campbell MD   Calcium Carbonate-Vitamin D (OYSTER SHELL CALCIUM/D) 500-200 MG-UNIT TABS Take 2 tablets by mouth daily 3/31/22 3/31/23  Melvi Campbell MD   levothyroxine (SYNTHROID) 50 MCG tablet Take 1 tablet by mouth daily 3/3/22 7/7/22  Melvi Campbell MD   lidocaine-prilocaine (EMLA) 2.5-2.5 % cream Apply topically as needed.  12/9/21   SHI Blackmon - CNP   metoprolol tartrate (LOPRESSOR) 25 MG tablet Take 25 mg by mouth 2 times daily    Historical Provider, MD   aspirin 81 MG EC tablet Take 81 mg by mouth daily LD 10/26/21    Historical Provider, MD   simvastatin (ZOCOR) 80 MG tablet Take 80 mg by mouth daily    Historical Provider, MD   lisinopril (PRINIVIL;ZESTRIL) 20 MG tablet Take 20 mg by mouth daily    Historical Provider, MD       Allergies  No Known Allergies    Review of Systems: Relevant for what ever mentioned in the history of present illness          Objective  BP (!) 109/56   Pulse 56   Temp 97.6 °F (36.4 °C) (Oral) Resp 16   Ht 5' 8\" (1.727 m)   Wt 108 lb 8 oz (49.2 kg)   SpO2 97%   BMI 16.50 kg/m²     Physical Exam:    General: Alert, cachectic in no acute respiratory distress  Head and neck : PERRLA, EOMI . Sclera non icteric. Right mandibular mass with incision site glued with minimal drainage  Neck: no JVD,  no adenopathy,   Heart: Regular rate and regular rhythm, no murmur  Lungs: Clear to auscultation with decreased breath sounds at the bases  Extremities: No edema,no cyanosis, no clubbing. Abdomen: Soft, non-tender;no masses, no organomegaly  Neurologic:Cranial nerves grossly intact. No focal motor or sensory deficits .     Recent Laboratory Data-   Lab Results   Component Value Date    WBC 13.0 (H) 01/26/2023    HGB 9.4 (L) 01/26/2023    HCT 27.2 (L) 01/26/2023    MCV 92.8 01/26/2023     01/26/2023    LYMPHOPCT 11.9 (L) 01/26/2023    RBC 2.93 (L) 01/26/2023    MCH 32.1 01/26/2023    MCHC 34.6 (H) 01/26/2023    RDW 16.4 (H) 01/26/2023    NEUTOPHILPCT 82.6 (H) 01/26/2023    MONOPCT 4.9 01/26/2023    BASOPCT 0.1 01/26/2023    NEUTROABS 10.73 (H) 01/26/2023    LYMPHSABS 1.55 01/26/2023    MONOSABS 0.63 01/26/2023    EOSABS 0.00 (L) 01/26/2023    BASOSABS 0.01 01/26/2023       Lab Results   Component Value Date     01/26/2023    K 4.1 01/26/2023    CL 99 01/26/2023    CO2 23 01/26/2023    BUN 11 01/26/2023    CREATININE 0.6 (L) 01/26/2023    GLUCOSE 127 (H) 01/26/2023    CALCIUM 8.4 (L) 01/26/2023    PROT 5.8 (L) 01/25/2023    LABALBU 3.0 (L) 01/25/2023    BILITOT <0.2 01/25/2023    ALKPHOS 110 01/25/2023    AST 9 01/25/2023    ALT 8 01/25/2023    LABGLOM >60 01/26/2023    GFRAA >60 08/03/2022       Lab Results   Component Value Date    IRON 102 01/26/2023    TIBC 193 (L) 01/26/2023    FERRITIN 389 01/26/2023           Radiology-    CT SOFT TISSUE NECK W CONTRAST    Result Date: 1/24/2023  EXAMINATION: CT OF THE NECK SOFT TISSUE WITH CONTRAST  1/24/2023 TECHNIQUE: CT of the neck was performed with the administration of intravenous contrast. Multiplanar reformatted images are provided for review. Automated exposure control, iterative reconstruction, and/or weight based adjustment of the mA/kV was utilized to reduce the radiation dose to as low as reasonably achievable. COMPARISON: CT facial bones dated 10/27/2022 HISTORY: ORDERING SYSTEM PROVIDED HISTORY: Include jaw please. Abscess/mass. Hx renal cell CA TECHNOLOGIST PROVIDED HISTORY: Please go up thru right mandible, mass/abscess Reason for exam:->Include jaw please. Abscess/mass. Hx renal cell CA Decision Support Exception - unselect if not a suspected or confirmed emergency medical condition->Emergency Medical Condition (MA) Right-sided facial swelling FINDINGS: PHARYNX/LARYNX:  The palatine tonsils are normal in appearance. The tongue is normal in appearance. The valleculae, epiglottis, aryepiglottic folds and pyriform sinuses appear unremarkable. The true and false vocal cords are normal in appearance. No mass or abscess is seen. SALIVARY GLANDS/THYROID:  The parotid and submandibular glands appear unremarkable. The thyroid gland appears unremarkable. LYMPH NODES:  Small lymph nodes are seen in the submandibular region. There is a 1.5 cm cutaneous nodule over the right submandibular region seen on series 2, image 48, likely metastatic disease. SOFT TISSUES:  There is soft tissue mass with fluid density extending medial and lateral to the right mandibular body measuring at least 5.3 x 3.6 cm. Stranding in the adjacent subcutaneous fat with soft tissue fullness extending posteriorly into the lateral parapharyngeal space. There is associated destructive process involving the mandible that is actually worse on the left side. BRAIN/ORBITS/SINUSES:  The visualized portion of the intracranial contents appear unremarkable. The visualized portion of the orbits, paranasal sinuses and mastoid air cells demonstrate no acute abnormality.  LUNG APICES/SUPERIOR MEDIASTINUM:  No focal consolidation is seen within the visualized lung apices. No superior mediastinal lymphadenopathy or mass. The visualized portion of the trachea appears unremarkable. BONES:  The mandible shows extensive mottled appearance with cortical destruction and periosteal reaction, worse on the left side. No other osseous destructive process or acute fracture is seen. 1. Extensive osseous destructive process with periosteal reaction involving the mandible, worse on the left side. This is associated with soft tissue and fluid density mass surrounding the right mandibular body. The findings are suspicious for metastatic disease with associated partially necrotic soft tissue mass. The possibility of osteomyelitis or osteonecrosis cannot be completely excluded. 2. Mild right submandibular lymphadenopathy. 1.5 cm cutaneous nodule in the right submandibular region. These findings are suspicious for metastatic disease. ASSESSMENT/PLAN : 81-year-old man    Metastatic clear-cell renal cell carcinoma by history followed by Dr. Christian Solis and diagnosed in 2021 and has achieved previously excellent response to immunotherapy with ipilimumab/nivolumab followed sequentially after 4 cycles by monotherapy with nivolumab. He has had issues with compliance. 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 Normocytic anemia likely related to myelosuppression by facial abscess and possible osteomyelitis with mild neutrophilic leukocytosis      Malnutrition and cachexia.   Thank you for the consult, we will follow. 1/26/2023  Stage IV renal cell carcinoma s/p 4 cycles of ipilimumab/nivolumab followed by monotherapy with nivolumab. Admitted with right mandibular mass/abscess. Suspicious for osteonecrosis of the jaw versus metastatic disease. On antibiotics by ID.  ENT has been consulted-no intervention recommended. S/p biopsy. We will follow final report. Might need evaluation by OMFS. CT chest abdomen pelvis to rule out progression elsewhere. Will follow.      Meme Freeman MD    Electronically signed 1/26/2023 at 1:35 PM

## 2023-01-26 NOTE — PROGRESS NOTES
NAME: Con Klinefelter  MR:  [id number]  :   1950  Admit Date:  2023      This is a face to face encounter with Con Klinefelter    Elements of this note, including Diagnosis,  Interval History, Past Medical/Surgical/Family/Social Histories, ROS, physical exam, and Assessment and Plan were copied and pasted from Previous. Updates have been made where noted and reflect current exam and medical decision making from the DOS of this encounter. CHIEF COMPLAINT     ID following for   Chief Complaint   Patient presents with    Oral Swelling     Inflammation on right side of mouth. Sent by cancer center. Swelling started 2 days ago. HISTORY OF PRESENT ILLNESS     Con Klinefelter is a 81 y.o. male who presents with   Chief Complaint   Patient presents with    Oral Swelling     Inflammation on right side of mouth. Sent by cancer center. Swelling started 2 days ago. 2023 and has  has a past medical history of Anticoagulant long-term use, CAD (coronary artery disease), Cancer ([facility] [address] Deep vein thrombosis (DVT) ([facility] [place] (hard of hearing), Hx of blood clots, Hyperlipidemia, Hypertension, and Mentally challenged. Pt seen and examined 23  Afebrile on RA   Rigth chin dressed tender no erythema     Patient is tolerating medications. No reported adverse drug reactions. Available labs, imaging studies, microbiologic studies have been reviewed with pt and family if present. Assessment & Plan   Pt was admitted with   Facial abscess [L02.01]  Metastatic renal cell carcinoma to bone (HCC) [C79.51, C64.9]  Mandibular mass [R22.0]    ID following for   leukocytosis  Facial/chin  abscess  Nodule right submandibular region- suspicious for metastatic disease.    Was on Augmentin as an outpt  ampicillin-sulbactam (UNASYN) 3,000 mg in sodium chloride 0.9 % 100 mL IVPB ([id number]), Q6H     Cx pending   Recultured   Updated sister on the phone       BP (!) 109/56   Pulse 56   Temp 97.6 °F (36.4 °C) (Oral)   Resp 16   Ht 5' 8\" (1.727 m)   Wt 108 lb 8 oz (49.2 kg)   SpO2 97%   BMI 16.50 kg/m²   Temp  Av °F (36.7 °C)  Min: 97.6 °F (36.4 °C)  Max: 98.2 °F (36.8 °C)  CONSTITUTIONAL:  no apparent distress, and appears stated age  Head: right chin has crusting ulcer pus expressed tender dec swelling   ENT:  Normocephalic, atraumatic,  external ears without lesions, oral pharynx with moist mucus membranes,    LUNGS:  No increased work of breathing, clear to auscultation bilaterally, no crackles or wheezing  CARDIOVASCULAR:  Normal apical impulse, regular rate and rhythm, normal S1 and S2,  no murmur noted  ABDOMEN:  normal bowel sounds, soft, non-distended, non-tender  MUSCULOSKELETAL:  There is no redness, warmth, or swelling  BLE. Full range of motion     NEUROLOGIC:  Awake, alert, oriented. Cranial nerves II-XII are grossly intact.      SKIN:  normal skin color, texture, turgor and no rashes  Lines:       Central Venous Catheter:  Implanted Venous Port (Single) 21 Chest Left (Active)   Port A Cath Status Not Accessed 23   Criteria for Appropriate Use Limited/no vessel suitable for conventional peripheral access 23   Site Assessment Clean, dry & intact 23   Line Care Chlorhexidine wipes 23   Alcohol Cap Used Yes 23   Dressing Type Transparent 23   Dressing Status New dressing applied 23   Dressing Intervention New 23   Access Attempts  1 23   Access Needle Gauge 20 G 23   Access Needle Length 0.75 inches 23   Single Lumen Status Brisk blood return;Flushed;Heparin locked;Lab draw;Alcohol cap applied 23           Peripheral Intravenous Line:  Peripheral IV 23 Left Antecubital (Active)   Site Assessment Clean, dry & intact 23   Line Status Infusing 23   Line Care Connections checked and tightened 23   Phlebitis Assessment No symptoms 01/26/23 0400   Infiltration Assessment 0 01/26/23 0400   Alcohol Cap Used Yes 01/26/23 0400   Dressing Status Clean, dry & intact 01/26/23 0400   Dressing Type Transparent 01/26/23 0400        Microbiology:        LABS:  Recent Labs     01/24/23  1243 01/25/23  0406 01/26/23  0334   WBC 14.6* 12.6* 13.0*   RBC 3.73* 3.05* 2.93*   HGB 11.8* 9.4* 9.4*   HCT 35.4* 29.1* 27.2*   MCV 94.9 95.4 92.8   MCH 31.6 30.8 32.1   MCHC 33.3 32.3 34.6*   RDW 16.7* 16.5* 16.4*    305 301   MPV 9.4 9.9 9.9     Recent Labs     01/24/23  1243 01/25/23 0406 01/26/23  0334   * 131* 132   K 4.5 4.7 4.1   CL 90* 97* 99   CO2 27 22 23   BUN 6 11 11   CREATININE 0.6* 0.6* 0.6*   GLUCOSE 102* 121* 127*   PROT  --  5.8*  --    LABALBU  --  3.0*  --    CALCIUM 9.1 8.4* 8.4*   BILITOT  --  <0.2  --    ALKPHOS  --  110  --    AST  --  9  --    ALT  --  8  --      Lab Results   Component Value Date    SEDRATE 100 (H) 01/24/2023     Lab Results   Component Value Date    CRP 18.3 (H) 01/24/2023     Lab Results   Component Value Date    PROCAL 0.06 01/25/2023     Recent Labs     01/24/23  1243 01/25/23  0406 01/26/23 0334   CRP 18.3*  --   --    LDH  --   --  136   AST  --  9  --    ALT  --  8  --        REVIEW OF SYSTEMS     As stated above in the chief complaint, otherwise negative.   CURRENT MEDICATIONS     Current Facility-Administered Medications:     fentaNYL (SUBLIMAZE) injection 25 mcg, 25 mcg, IntraVENous, Q2H PRN, Kofi Schmid MD    lisinopril (PRINIVIL;ZESTRIL) tablet 10 mg, 10 mg, Oral, Daily, SHI Denton CNP    ampicillin-sulbactam (UNASYN) 3,000 mg in sodium chloride 0.9 % 100 mL IVPB ([id number]), 3,000 mg, IntraVENous, Q6H, Kofi Schmid MD, Stopped at 01/26/23 0840    aspirin EC tablet 81 mg, 81 mg, Oral, Daily, SHI Denton CNP, 81 mg at 01/26/23 0842    oyster shell calcium w/D 500-5 MG-MCG tablet 2 tablet, 2 tablet, Oral, Daily, SHI Denton CNP, 2 tablet at 01/26/23 2072 levothyroxine (SYNTHROID) tablet 75 mcg, 75 mcg, Oral, Daily, SHI Leal - CNP, 75 mcg at 01/26/23 0841    metoprolol tartrate (LOPRESSOR) tablet 25 mg, 25 mg, Oral, BID, SHI Leal - CNP, 25 mg at 01/25/23 1954    atorvastatin (LIPITOR) tablet 40 mg, 40 mg, Oral, Daily, SHI Leal CNP, 40 mg at 01/26/23 0842    0.9 % sodium chloride infusion, , IntraVENous, Continuous, SHI Leal CNP, Last Rate: 75 mL/hr at 01/25/23 1818, Rate Verify at 01/25/23 1818    ipratropium-albuterol (DUONEB) nebulizer solution 1 ampule, 1 ampule, Inhalation, Q4H PRN, SHI Leal CNP    sodium chloride flush 0.9 % injection 5-40 mL, 5-40 mL, IntraVENous, 2 times per day, SHI Leal - CNP, 10 mL at 01/24/23 2120    sodium chloride flush 0.9 % injection 5-40 mL, 5-40 mL, IntraVENous, PRN, SHI Leal - CNP, 10 mL at 01/25/23 1251    0.9 % sodium chloride infusion, , IntraVENous, PRN, SHI Leal CNP    ondansetron (ZOFRAN-ODT) disintegrating tablet 4 mg, 4 mg, Oral, Q8H PRN **OR** ondansetron (ZOFRAN) injection 4 mg, 4 mg, IntraVENous, Q6H PRN, SHI Leal CNP    polyethylene glycol (GLYCOLAX) packet 17 g, 17 g, Oral, Daily PRN, SHI Leal CNP    acetaminophen (TYLENOL) tablet 650 mg, 650 mg, Oral, Q6H PRN, 650 mg at 01/26/23 0443 **OR** acetaminophen (TYLENOL) suppository 650 mg, 650 mg, Rectal, Q6H PRN, SHI Leal CNP  DIAGNOSTIC RESULTS   Radiology:  CT SOFT TISSUE NECK W CONTRAST    Result Date: 1/24/2023  EXAMINATION: CT OF THE NECK SOFT TISSUE WITH CONTRAST  1/24/2023 TECHNIQUE: CT of the neck was performed with the administration of intravenous contrast. Multiplanar reformatted images are provided for review. Automated exposure control, iterative reconstruction, and/or weight based adjustment of the mA/kV was utilized to reduce the radiation dose to as low as reasonably achievable.  COMPARISON: CT facial bones dated 10/27/2022 HISTORY: ORDERING SYSTEM PROVIDED HISTORY: Include jaw please. Abscess/mass. Hx renal cell CA TECHNOLOGIST PROVIDED HISTORY: Please go up thru right mandible, mass/abscess Reason for exam:->Include jaw please. Abscess/mass. Hx renal cell CA Decision Support Exception - unselect if not a suspected or confirmed emergency medical condition->Emergency Medical Condition (MA) Right-sided facial swelling FINDINGS: PHARYNX/LARYNX:  The palatine tonsils are normal in appearance. The tongue is normal in appearance. The valleculae, epiglottis, aryepiglottic folds and pyriform sinuses appear unremarkable. The true and false vocal cords are normal in appearance. No mass or abscess is seen. SALIVARY GLANDS/THYROID:  The parotid and submandibular glands appear unremarkable. The thyroid gland appears unremarkable. LYMPH NODES:  Small lymph nodes are seen in the submandibular region. There is a 1.5 cm cutaneous nodule over the right submandibular region seen on series 2, image 48, likely metastatic disease. SOFT TISSUES:  There is soft tissue mass with fluid density extending medial and lateral to the right mandibular body measuring at least 5.3 x 3.6 cm. Stranding in the adjacent subcutaneous fat with soft tissue fullness extending posteriorly into the lateral parapharyngeal space. There is associated destructive process involving the mandible that is actually worse on the left side. BRAIN/ORBITS/SINUSES:  The visualized portion of the intracranial contents appear unremarkable. The visualized portion of the orbits, paranasal sinuses and mastoid air cells demonstrate no acute abnormality. LUNG APICES/SUPERIOR MEDIASTINUM:  No focal consolidation is seen within the visualized lung apices. No superior mediastinal lymphadenopathy or mass. The visualized portion of the trachea appears unremarkable.  BONES:  The mandible shows extensive mottled appearance with cortical destruction and periosteal reaction, worse on the left side. No other osseous destructive process or acute fracture is seen. 1. Extensive osseous destructive process with periosteal reaction involving the mandible, worse on the left side. This is associated with soft tissue and fluid density mass surrounding the right mandibular body. The findings are suspicious for metastatic disease with associated partially necrotic soft tissue mass. The possibility of osteomyelitis or osteonecrosis cannot be completely excluded. 2. Mild right submandibular lymphadenopathy. 1.5 cm cutaneous nodule in the right submandibular region. These findings are suspicious for metastatic disease. Imaging and labs were reviewed per medical records. Thank you for involving me in the care of Diana Woo I will continue to follow. Please do not hesitate to call [phone] for any questions or concerns.     Electronically signed by Kerri Ruff MD on 1/26/2023 at 10:14 AM

## 2023-01-26 NOTE — CARE COORDINATION
CM note: Following for antibiotics, currently on IV Unasyn. Pt denies any discharge needs and will return home at discharge. Follows at [facility].

## 2023-01-27 LAB
MRSA CULTURE ONLY: NORMAL
ORGANISM: ABNORMAL
WOUND/ABSCESS: ABNORMAL

## 2023-01-27 PROCEDURE — 6370000000 HC RX 637 (ALT 250 FOR IP)

## 2023-01-27 PROCEDURE — 6360000002 HC RX W HCPCS: Performed by: INTERNAL MEDICINE

## 2023-01-27 PROCEDURE — 6370000000 HC RX 637 (ALT 250 FOR IP): Performed by: INTERNAL MEDICINE

## 2023-01-27 PROCEDURE — 2580000003 HC RX 258: Performed by: INTERNAL MEDICINE

## 2023-01-27 PROCEDURE — 1200000000 HC SEMI PRIVATE

## 2023-01-27 PROCEDURE — APPSS30 APP SPLIT SHARED TIME 16-30 MINUTES

## 2023-01-27 PROCEDURE — 99232 SBSQ HOSP IP/OBS MODERATE 35: CPT | Performed by: INTERNAL MEDICINE

## 2023-01-27 RX ORDER — HEPARIN SODIUM (PORCINE) LOCK FLUSH IV SOLN 100 UNIT/ML 100 UNIT/ML
1 SOLUTION INTRAVENOUS EVERY 12 HOURS SCHEDULED
Status: DISCONTINUED | OUTPATIENT
Start: 2023-01-27 | End: 2023-01-29 | Stop reason: HOSPADM

## 2023-01-27 RX ORDER — ONDANSETRON 2 MG/ML
8 INJECTION INTRAMUSCULAR; INTRAVENOUS
OUTPATIENT
Start: 2023-01-28

## 2023-01-27 RX ORDER — ALBUTEROL SULFATE 90 UG/1
4 AEROSOL, METERED RESPIRATORY (INHALATION) PRN
OUTPATIENT
Start: 2023-01-28

## 2023-01-27 RX ORDER — HEPARIN SODIUM (PORCINE) LOCK FLUSH IV SOLN 100 UNIT/ML 100 UNIT/ML
1 SOLUTION INTRAVENOUS PRN
Status: DISCONTINUED | OUTPATIENT
Start: 2023-01-27 | End: 2023-01-29 | Stop reason: HOSPADM

## 2023-01-27 RX ORDER — SODIUM CHLORIDE 0.9 % (FLUSH) 0.9 %
5-40 SYRINGE (ML) INJECTION PRN
OUTPATIENT
Start: 2023-01-28

## 2023-01-27 RX ORDER — HEPARIN SODIUM (PORCINE) LOCK FLUSH IV SOLN 100 UNIT/ML 100 UNIT/ML
500 SOLUTION INTRAVENOUS PRN
OUTPATIENT
Start: 2023-01-28

## 2023-01-27 RX ORDER — SODIUM CHLORIDE 0.9 % (FLUSH) 0.9 %
5-40 SYRINGE (ML) INJECTION PRN
Status: DISCONTINUED | OUTPATIENT
Start: 2023-01-27 | End: 2023-01-29 | Stop reason: HOSPADM

## 2023-01-27 RX ORDER — EPINEPHRINE 1 MG/ML
0.3 INJECTION, SOLUTION, CONCENTRATE INTRAVENOUS PRN
OUTPATIENT
Start: 2023-01-28

## 2023-01-27 RX ORDER — DIPHENHYDRAMINE HYDROCHLORIDE 50 MG/ML
50 INJECTION INTRAMUSCULAR; INTRAVENOUS
OUTPATIENT
Start: 2023-01-28

## 2023-01-27 RX ORDER — SODIUM CHLORIDE 9 MG/ML
5-250 INJECTION, SOLUTION INTRAVENOUS PRN
OUTPATIENT
Start: 2023-01-28

## 2023-01-27 RX ORDER — SODIUM CHLORIDE 9 MG/ML
INJECTION, SOLUTION INTRAVENOUS PRN
Status: DISCONTINUED | OUTPATIENT
Start: 2023-01-27 | End: 2023-01-29 | Stop reason: HOSPADM

## 2023-01-27 RX ORDER — ACETAMINOPHEN 325 MG/1
650 TABLET ORAL
OUTPATIENT
Start: 2023-01-28

## 2023-01-27 RX ORDER — SODIUM CHLORIDE 9 MG/ML
INJECTION, SOLUTION INTRAVENOUS CONTINUOUS
OUTPATIENT
Start: 2023-01-28

## 2023-01-27 RX ORDER — SODIUM CHLORIDE 0.9 % (FLUSH) 0.9 %
5-40 SYRINGE (ML) INJECTION EVERY 12 HOURS SCHEDULED
Status: DISCONTINUED | OUTPATIENT
Start: 2023-01-27 | End: 2023-01-29 | Stop reason: HOSPADM

## 2023-01-27 RX ADMIN — FOLIC ACID 1 MG: 1 TABLET ORAL at 09:14

## 2023-01-27 RX ADMIN — ATORVASTATIN CALCIUM 40 MG: 40 TABLET, FILM COATED ORAL at 09:14

## 2023-01-27 RX ADMIN — AMPICILLIN SODIUM AND SULBACTAM SODIUM 3000 MG: 2; 1 INJECTION, POWDER, FOR SOLUTION INTRAMUSCULAR; INTRAVENOUS at 20:56

## 2023-01-27 RX ADMIN — AMPICILLIN SODIUM AND SULBACTAM SODIUM 3000 MG: 2; 1 INJECTION, POWDER, FOR SOLUTION INTRAMUSCULAR; INTRAVENOUS at 16:08

## 2023-01-27 RX ADMIN — METOPROLOL TARTRATE 25 MG: 25 TABLET, FILM COATED ORAL at 09:14

## 2023-01-27 RX ADMIN — Medication 2 TABLET: at 09:14

## 2023-01-27 RX ADMIN — AMPICILLIN SODIUM AND SULBACTAM SODIUM 3000 MG: 2; 1 INJECTION, POWDER, FOR SOLUTION INTRAMUSCULAR; INTRAVENOUS at 02:47

## 2023-01-27 RX ADMIN — LEVOTHYROXINE SODIUM 75 MCG: 0.07 TABLET ORAL at 09:14

## 2023-01-27 RX ADMIN — LISINOPRIL 10 MG: 10 TABLET ORAL at 09:14

## 2023-01-27 RX ADMIN — AMPICILLIN SODIUM AND SULBACTAM SODIUM 3000 MG: 2; 1 INJECTION, POWDER, FOR SOLUTION INTRAMUSCULAR; INTRAVENOUS at 09:21

## 2023-01-27 RX ADMIN — ASPIRIN 81 MG: 81 TABLET, COATED ORAL at 09:14

## 2023-01-27 RX ADMIN — FENTANYL CITRATE 25 MCG: 0.05 INJECTION, SOLUTION INTRAMUSCULAR; INTRAVENOUS at 20:52

## 2023-01-27 ASSESSMENT — PAIN DESCRIPTION - LOCATION: LOCATION: JAW

## 2023-01-27 ASSESSMENT — PAIN DESCRIPTION - ORIENTATION: ORIENTATION: RIGHT

## 2023-01-27 ASSESSMENT — PAIN DESCRIPTION - DESCRIPTORS: DESCRIPTORS: SORE;SHARP

## 2023-01-27 ASSESSMENT — PAIN SCALES - GENERAL: PAINLEVEL_OUTOF10: 7

## 2023-01-27 NOTE — PROGRESS NOTES
Went to see pt for midline, pt very upset and concerned about getting line for antibiotics, and taking care of wife, wants to talk to cancer doctor first and will decide later if he wants line.    Charge RN aware

## 2023-01-27 NOTE — CARE COORDINATION
1/27  CM note: [place] home care arranged will need notified of discharge, cannot start on Saturday, faxed script to [facility] for cost.  Pt to return home with spouse, friend will provide transportation. [facility] [address] orders written, patient will need line and first dose here.   Electronically signed by Clemencia Harvey RN on 1/27/2023 at 3:47 PM

## 2023-01-27 NOTE — PROGRESS NOTES
NAME: Jazzmine Emerson  MR:  [id number]  :   1950  Admit Date:  2023      This is a face to face encounter with Jazzmine Emerson    Elements of this note, including Diagnosis,  Interval History, Past Medical/Surgical/Family/Social Histories, ROS, physical exam, and Assessment and Plan were copied and pasted from Previous. Updates have been made where noted and reflect current exam and medical decision making from the DOS of this encounter. CHIEF COMPLAINT     ID following for   Chief Complaint   Patient presents with    Oral Swelling     Inflammation on right side of mouth. Sent by cancer center. Swelling started 2 days ago. HISTORY OF PRESENT ILLNESS     Jazzmine Emerson is a 81 y.o. male who presents with   Chief Complaint   Patient presents with    Oral Swelling     Inflammation on right side of mouth. Sent by cancer center. Swelling started 2 days ago. 2023 and has  has a past medical history of Anticoagulant long-term use, CAD (coronary artery disease), Cancer ([facility] [address] Deep vein thrombosis (DVT) ([facility] [address] [place] (hard of hearing), Hx of blood clots, Hyperlipidemia, Hypertension, and Mentally challenged. Pt seen and examined 23  Afebrile on RA   Rigth chin dressed tender no erythema     Patient is tolerating medications. No reported adverse drug reactions. Available labs, imaging studies, microbiologic studies have been reviewed with pt and family if present. Assessment & Plan   Pt was admitted with   Facial abscess [L02.01]  Metastatic renal cell carcinoma to bone (HCC) [C79.51, C64.9]  Mandibular mass [R22.0]    ID following for   leukocytosis  Facial/chin  abscess  Nodule right submandibular region- suspicious for metastatic disease.    Was on Augmentin as an outpt    Plan:   Continue Unasyn IV  Wound cx- with streptococcus intermedius   Path report pending   WBC- 13.0 today   Discharge planning   Will give Invanz IV every 24 hours for 6 weeks  Referral to HBOT-   Place midline     As above      This is a face to face encounter with Velma Diaz on 23. I discussed the findings and plans with  NURSE PRACTITIONER, SHI Liu and agree as documented in her note. See below for additional details and treatment plan. Velma Diaz is a 81 y.o. male who presents with   Chief Complaint   Patient presents with    Oral Swelling     Inflammation on right side of mouth. Sent by cancer center. Swelling started 2 days ago. and has  has a past medical history of Anticoagulant long-term use, CAD (coronary artery disease), Cancer ([facility] [address] Deep vein thrombosis (DVT) ([facility] [address] [place] (hard of hearing), Hx of blood clots, Hyperlipidemia, Hypertension, and Mentally challenged. 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 Velma Diaz was informed of their diagnosis, indications, risks and benefits of treatment. Velma Diaz had the opportunity to ask questions. All questions were answered. Thank you for involving me in the care of Velma Diaz. Please do not hesitate to call for any questions or concerns.     Electronically signed by Roxie Iglesias MD on 2023 at 2:07 PM             /63   Pulse 50   Temp 97.9 °F (36.6 °C) (Oral)   Resp 16   Ht 5' 8\" (1.727 m)   Wt 108 lb 8 oz (49.2 kg)   SpO2 97%   BMI 16.50 kg/m²   Temp  Av.9 °F (36.6 °C)  Min: 97.3 °F (36.3 °C)  Max: 98.4 °F (36.9 °C)  CONSTITUTIONAL:  no apparent distress, and appears stated age  Head: right chin has crusting ulcer- with dressing in place- less drainage today,  dec swelling   ENT:  Normocephalic, atraumatic,  external ears without lesions, oral pharynx with moist mucus membranes,    LUNGS:  No increased work of breathing, clear to auscultation bilaterally, no crackles or wheezing  CARDIOVASCULAR:  Normal apical impulse, regular rate and rhythm  ABDOMEN:  normal bowel sounds, soft, non-distended, non-tender  MUSCULOSKELETAL:  There is no redness, warmth, or swelling  BLE. Full range of motion     NEUROLOGIC:  Awake, alert, oriented. Cranial nerves II-XII are grossly intact.      SKIN:  normal skin color, texture, turgor and no rashes    Lines:     Central Venous Catheter:  Implanted Venous Port (Single) 11/16/21 Chest Left (Active)   Port A Cath Status Not Accessed 01/26/23 0400   Criteria for Appropriate Use Limited/no vessel suitable for conventional peripheral access 01/05/23 0937   Site Assessment Clean, dry & intact 01/05/23 0937   Line Care Chlorhexidine wipes 01/05/23 0937   Alcohol Cap Used Yes 01/05/23 0937   Dressing Type Transparent 01/05/23 0937   Dressing Status New dressing applied 01/05/23 0937   Dressing Intervention New 01/05/23 0937   Access Attempts  1 01/05/23 0937   Access Needle Gauge 20 G 01/05/23 0937   Access Needle Length 0.75 inches 01/05/23 0937   Single Lumen Status Brisk blood return;Flushed;Heparin locked;Lab draw;Alcohol cap applied 01/05/23 0937     Peripheral Intravenous Line:  Peripheral IV 01/24/23 Left Antecubital (Active)   Site Assessment Clean, dry & intact 01/26/23 0400   Line Status Infusing 01/26/23 0400   Line Care Connections checked and tightened 01/26/23 0400   Phlebitis Assessment No symptoms 01/26/23 0400   Infiltration Assessment 0 01/26/23 0400   Alcohol Cap Used Yes 01/26/23 0400   Dressing Status Clean, dry & intact 01/26/23 0400   Dressing Type Transparent 01/26/23 0400 Microbiology:        LABS:  Recent Labs     01/24/23  1243 01/25/23  0406 01/26/23  0334   WBC 14.6* 12.6* 13.0*   RBC 3.73* 3.05* 2.93*   HGB 11.8* 9.4* 9.4*   HCT 35.4* 29.1* 27.2*   MCV 94.9 95.4 92.8   MCH 31.6 30.8 32.1   MCHC 33.3 32.3 34.6*   RDW 16.7* 16.5* 16.4*    305 301   MPV 9.4 9.9 9.9       Recent Labs     01/24/23  1243 01/25/23  0406 01/26/23  0334   * 131* 132   K 4.5 4.7 4.1   CL 90* 97* 99   CO2 27 22 23   BUN 6 11 11   CREATININE 0.6* 0.6* 0.6*   GLUCOSE 102* 121* 127*   PROT  --  5.8*  --    LABALBU  --  3.0*  --    CALCIUM 9.1 8.4* 8.4*   BILITOT  --  <0.2  --    ALKPHOS  --  110  --    AST  --  9  --    ALT  --  8  --        Lab Results   Component Value Date    SEDRATE 100 (H) 01/24/2023     Lab Results   Component Value Date    CRP 18.3 (H) 01/24/2023     Lab Results   Component Value Date    PROCAL 0.06 01/25/2023     Recent Labs     01/24/23  1243 01/25/23  0406 01/26/23  0334   CRP 18.3*  --   --    FERRITIN  --   --  389   LDH  --   --  136   AST  --  9  --    ALT  --  8  --          REVIEW OF SYSTEMS     As stated above in the chief complaint, otherwise negative.   CURRENT MEDICATIONS     Current Facility-Administered Medications:     folic acid (FOLVITE) tablet 1 mg, 1 mg, Oral, Daily, Roxanne Velasquez MD, 1 mg at 01/27/23 0914    fentaNYL (SUBLIMAZE) injection 25 mcg, 25 mcg, IntraVENous, Q2H PRN, Roxanne Velasquez MD    lisinopril (PRINIVIL;ZESTRIL) tablet 10 mg, 10 mg, Oral, Daily, SHI Cornelius - CNP, 10 mg at 01/27/23 0914    ampicillin-sulbactam (UNASYN) 3,000 mg in sodium chloride 0.9 % 100 mL IVPB ([id number]), 3,000 mg, IntraVENous, Q6H, Roxanne Velasquez MD, Last Rate: 200 mL/hr at 01/27/23 0921, 3,000 mg at 01/27/23 7454    aspirin EC tablet 81 mg, 81 mg, Oral, Daily, SHI Cornelius CNP, 81 mg at 01/27/23 0914    oyster shell calcium w/D 500-5 MG-MCG tablet 2 tablet, 2 tablet, Oral, Daily, SHI Cornelius CNP, 2 tablet at 01/27/23 5371    levothyroxine (SYNTHROID) tablet 75 mcg, 75 mcg, Oral, Daily, Wilhelmenia Spoon, APRN - CNP, 75 mcg at 01/27/23 0914    metoprolol tartrate (LOPRESSOR) tablet 25 mg, 25 mg, Oral, BID, Wilhelmenia Spoon, APRN - CNP, 25 mg at 01/27/23 0914    atorvastatin (LIPITOR) tablet 40 mg, 40 mg, Oral, Daily, Wilhelmenia Spoon, APRN - CNP, 40 mg at 01/27/23 0914    0.9 % sodium chloride infusion, , IntraVENous, Continuous, Wilhelmenia Spoon, APRN - CNP, Last Rate: 75 mL/hr at 01/25/23 1818, Rate Verify at 01/25/23 1818    ipratropium-albuterol (DUONEB) nebulizer solution 1 ampule, 1 ampule, Inhalation, Q4H PRN, Wilhelmenia Spoon, APRN - CNP    sodium chloride flush 0.9 % injection 5-40 mL, 5-40 mL, IntraVENous, 2 times per day, Wilhelmenia Spoon, APRN - CNP, 10 mL at 01/24/23 2120    sodium chloride flush 0.9 % injection 5-40 mL, 5-40 mL, IntraVENous, PRN, Wilhelmenia Spoon, APRN - CNP, 10 mL at 01/25/23 1251    0.9 % sodium chloride infusion, , IntraVENous, PRN, Wilhelmenia Spoon, APRN - CNP    ondansetron (ZOFRAN-ODT) disintegrating tablet 4 mg, 4 mg, Oral, Q8H PRN **OR** ondansetron (ZOFRAN) injection 4 mg, 4 mg, IntraVENous, Q6H PRN, Wilhelmenia Spoon, APRN - CNP    polyethylene glycol (GLYCOLAX) packet 17 g, 17 g, Oral, Daily PRN, Wilhelmenia Spoon, APRN - CNP    acetaminophen (TYLENOL) tablet 650 mg, 650 mg, Oral, Q6H PRN, 650 mg at 01/26/23 2147 **OR** acetaminophen (TYLENOL) suppository 650 mg, 650 mg, Rectal, Q6H PRN, Wilhelmenia Spoon, APRN - CNP  DIAGNOSTIC RESULTS   Radiology:  CT SOFT TISSUE NECK W CONTRAST    Result Date: 1/24/2023  EXAMINATION: CT OF THE NECK SOFT TISSUE WITH CONTRAST  1/24/2023 TECHNIQUE: CT of the neck was performed with the administration of intravenous contrast. Multiplanar reformatted images are provided for review. Automated exposure control, iterative reconstruction, and/or weight based adjustment of the mA/kV was utilized to reduce the radiation dose to as low as reasonably achievable.  COMPARISON: CT facial bones dated 10/27/2022 HISTORY: ORDERING SYSTEM PROVIDED HISTORY: Include jaw please. Abscess/mass. Hx renal cell CA TECHNOLOGIST PROVIDED HISTORY: Please go up thru right mandible, mass/abscess Reason for exam:->Include jaw please. Abscess/mass. Hx renal cell CA Decision Support Exception - unselect if not a suspected or confirmed emergency medical condition->Emergency Medical Condition (MA) Right-sided facial swelling FINDINGS: PHARYNX/LARYNX:  The palatine tonsils are normal in appearance. The tongue is normal in appearance. The valleculae, epiglottis, aryepiglottic folds and pyriform sinuses appear unremarkable. The true and false vocal cords are normal in appearance. No mass or abscess is seen. SALIVARY GLANDS/THYROID:  The parotid and submandibular glands appear unremarkable. The thyroid gland appears unremarkable. LYMPH NODES:  Small lymph nodes are seen in the submandibular region. There is a 1.5 cm cutaneous nodule over the right submandibular region seen on series 2, image 48, likely metastatic disease. SOFT TISSUES:  There is soft tissue mass with fluid density extending medial and lateral to the right mandibular body measuring at least 5.3 x 3.6 cm. Stranding in the adjacent subcutaneous fat with soft tissue fullness extending posteriorly into the lateral parapharyngeal space. There is associated destructive process involving the mandible that is actually worse on the left side. BRAIN/ORBITS/SINUSES:  The visualized portion of the intracranial contents appear unremarkable. The visualized portion of the orbits, paranasal sinuses and mastoid air cells demonstrate no acute abnormality. LUNG APICES/SUPERIOR MEDIASTINUM:  No focal consolidation is seen within the visualized lung apices. No superior mediastinal lymphadenopathy or mass. The visualized portion of the trachea appears unremarkable.  BONES:  The mandible shows extensive mottled appearance with cortical destruction and periosteal reaction, worse on the left side. No other osseous destructive process or acute fracture is seen. 1. Extensive osseous destructive process with periosteal reaction involving the mandible, worse on the left side. This is associated with soft tissue and fluid density mass surrounding the right mandibular body. The findings are suspicious for metastatic disease with associated partially necrotic soft tissue mass. The possibility of osteomyelitis or osteonecrosis cannot be completely excluded. 2. Mild right submandibular lymphadenopathy. 1.5 cm cutaneous nodule in the right submandibular region. These findings are suspicious for metastatic disease. Imaging and labs were reviewed per medical records. Thank you for involving me in the care of Jaki Walls I will continue to follow. Please do not hesitate to call [phone] for any questions or concerns.     Electronically signed by SHI Sage on 1/27/2023 at 11:58 AM

## 2023-01-27 NOTE — PROGRESS NOTES
[facility] [place]  Hematology/Oncology  Consult      Patient Name: Junior Peña  YOB: 1950  PCP: SHI Taylor NP   Referring Provider:      Reason for Consultation:   Chief Complaint   Patient presents with    Oral Swelling     Inflammation on right side of mouth. Sent by cancer center. Swelling started 2 days ago. Interval history: Feels well with no new issues reported    History of Present Illness:  22-year-old man hospitalized through ED with painful mass in the right mandible of several days duration. He follows with Dr. Robby Starkey at the oncology clinic for metastatic renal cell carcinoma. He had presented with atypical chest wall pain mainly localized to the right side. CT chest had shown multiple necrotic masses in the right kidney with tumor invasion of the right vein with a large 9 x 4 cm soft tissue necrotic mass involving the fourth rib there were also lytic destructive lesions in the right eighth and ninth rib along with right lung nodules largest measuring up to 1.5 cm. Multiple liver nodules also noted as well as suspicion for bilateral adrenal metastasis. PET CT scan was done and confirmed hypermetabolic lesions in the right kidney as well as left adrenal gland with diffuse skeletal metastasis and scattered pulmonary nodules one of them in the right lung hypermetabolic with high SUV uptake. MRI of the brain was negative for intracranial metastasis. He underwent a right chest wall mass biopsy in November 2021 and the pathology was consistent with clear cell renal cell carcinoma. He started immunotherapy with ipilimumab/nivolumab in November 2021 and after 4 cycles he was switched sequentially to monotherapy with nivolumab. Follow-up PET CT scan in August 2022 showed no hypermetabolic lesions and no activity in the residual renal mass consistent with excellent response to immunotherapy.   He had developed left facial abscess status post incision and drainage and he was given antibiotics and was followed by ENT. CT of soft tissue of the neck done yesterday showed extensive osseous destructive process with periosteal reaction involving the mandible worse on the left side with soft tissue and fluid density mass suspicious for necrosis and possibly metastatic disease. Osteonecrosis of the jaw and osteomyelitis in the differential.  There were multiple right submandibular lymph node reactive versus metastatic. Patient had been on subcutaneous Xgeva for palliation of skeletal metastasis discontinued in August 2022        Diagnostic Data:     Past Medical History:   Diagnosis Date    Anticoagulant long-term use 2007    aspirin    CAD (coronary artery disease)     Cancer (HCC)     kidney    Deep vein thrombosis (DVT) (HCC)     leg    [place] (hard of hearing)     Hx of blood clots     Hyperlipidemia     Hypertension     Mentally challenged     information per patient's niece. Patient Active Problem List    Diagnosis Date Noted    Facial abscess 01/24/2023    Mandibular mass 01/24/2023    Metastatic renal cell carcinoma to bone [facility]) 01/24/2023    Hyponatremia 01/24/2023    Poor venous access     Renal cell cancer, right ([address] 11/11/2021        Past Surgical History:   Procedure Laterality Date    [facility]    patient's sister said they were told a twin was [facility] abdomen.     CARDIAC SURGERY  2007    bypass    CATHETER INSERTION Left 11/16/2021    MEDIPORT CATHETER INSERTION performed by Rocio Anton MD at [address] [place] [address] [place]      bifemoral    IR PERCUT BX LUNG/MEDIASTINUM  11/2/2021    IR PERCUT BX LUNG/MEDIASTINUM 11/2/2021 [facility] CT       Family History  Family History   Problem Relation Age of Onset    High Blood Pressure Mother     Heart Disease Mother     Heart Attack Mother     Cancer Father 64        colon    Arthritis Sister     Heart Attack Brother     No Known Problems Maternal Uncle     No Known Problems Paternal Aunt No Known Problems Paternal Uncle     No Known Problems Maternal Grandmother     Cancer Maternal Grandfather         throat    Heart Attack Paternal Grandmother     No Known Problems Paternal Grandfather     Cirrhosis Paternal Cousin     Deep Vein Thrombosis Sister     Diabetes Sister     Heart Disease Sister         triple bypass    Breast Cancer Sister 61    High Blood Pressure Sister     Diabetes Sister     High Cholesterol Sister     Arthritis Sister        Social History    TOBACCO:   reports that he has been smoking cigarettes. He has a 13.50 pack-year smoking history. He has never used smokeless tobacco.  ETOH:   reports that he does not currently use alcohol. Home Medications  Prior to Admission medications    Medication Sig Start Date End Date Taking? Authorizing Provider   levothyroxine (SYNTHROID) 75 MCG tablet Take 1 tablet by mouth daily 10/27/22   Melvi Campbell MD   levothyroxine (SYNTHROID) 75 MCG tablet Take 1 tablet by mouth daily 4/14/22 7/7/22  Melvi Campbell MD   Calcium Carbonate-Vitamin D (OYSTER SHELL CALCIUM/D) 500-200 MG-UNIT TABS Take 2 tablets by mouth daily 3/31/22 3/31/23  Melvi Campbell MD   levothyroxine (SYNTHROID) 50 MCG tablet Take 1 tablet by mouth daily 3/3/22 7/7/22  Melvi Campbell MD   lidocaine-prilocaine (EMLA) 2.5-2.5 % cream Apply topically as needed.  12/9/21   SHI Blackmon - CNP   metoprolol tartrate (LOPRESSOR) 25 MG tablet Take 25 mg by mouth 2 times daily    Historical Provider, MD   aspirin 81 MG EC tablet Take 81 mg by mouth daily LD 10/26/21    Historical Provider, MD   simvastatin (ZOCOR) 80 MG tablet Take 80 mg by mouth daily    Historical Provider, MD   lisinopril (PRINIVIL;ZESTRIL) 20 MG tablet Take 20 mg by mouth daily    Historical Provider, MD       Allergies  No Known Allergies    Review of Systems: Relevant for what ever mentioned in the history of present illness          Objective  /63   Pulse 50   Temp 97.9 °F (36.6 °C) (Oral)   Resp 16   Ht 5' 8\" (1.727 m)   Wt 108 lb 8 oz (49.2 kg)   SpO2 97%   BMI 16.50 kg/m²     Physical Exam:    General: Alert, cachectic in no acute respiratory distress  Head and neck : PERRLA, EOMI . Sclera non icteric. Right mandibular mass with incision site glued with minimal drainage  Neck: no JVD,  no adenopathy,   Heart: Regular rate and regular rhythm, no murmur  Lungs: Clear to auscultation with decreased breath sounds at the bases  Extremities: No edema,no cyanosis, no clubbing. Abdomen: Soft, non-tender;no masses, no organomegaly  Neurologic:Cranial nerves grossly intact. No focal motor or sensory deficits .     Recent Laboratory Data-   Lab Results   Component Value Date    WBC 13.0 (H) 01/26/2023    HGB 9.4 (L) 01/26/2023    HCT 27.2 (L) 01/26/2023    MCV 92.8 01/26/2023     01/26/2023    LYMPHOPCT 11.9 (L) 01/26/2023    RBC 2.93 (L) 01/26/2023    MCH 32.1 01/26/2023    MCHC 34.6 (H) 01/26/2023    RDW 16.4 (H) 01/26/2023    NEUTOPHILPCT 82.6 (H) 01/26/2023    MONOPCT 4.9 01/26/2023    BASOPCT 0.1 01/26/2023    NEUTROABS 10.73 (H) 01/26/2023    LYMPHSABS 1.55 01/26/2023    MONOSABS 0.63 01/26/2023    EOSABS 0.00 (L) 01/26/2023    BASOSABS 0.01 01/26/2023       Lab Results   Component Value Date     01/26/2023    K 4.1 01/26/2023    CL 99 01/26/2023    CO2 23 01/26/2023    BUN 11 01/26/2023    CREATININE 0.6 (L) 01/26/2023    GLUCOSE 127 (H) 01/26/2023    CALCIUM 8.4 (L) 01/26/2023    PROT 5.8 (L) 01/25/2023    LABALBU 3.0 (L) 01/25/2023    BILITOT <0.2 01/25/2023    ALKPHOS 110 01/25/2023    AST 9 01/25/2023    ALT 8 01/25/2023    LABGLOM >60 01/26/2023    GFRAA >60 08/03/2022       Lab Results   Component Value Date    IRON 102 01/26/2023    TIBC 193 (L) 01/26/2023    FERRITIN 389 01/26/2023           Radiology-    CT SOFT TISSUE NECK W CONTRAST    Result Date: 1/24/2023  EXAMINATION: CT OF THE NECK SOFT TISSUE WITH CONTRAST  1/24/2023 TECHNIQUE: CT of the neck was performed with the administration of intravenous contrast. Multiplanar reformatted images are provided for review. Automated exposure control, iterative reconstruction, and/or weight based adjustment of the mA/kV was utilized to reduce the radiation dose to as low as reasonably achievable. COMPARISON: CT facial bones dated 10/27/2022 HISTORY: ORDERING SYSTEM PROVIDED HISTORY: Include jaw please. Abscess/mass. Hx renal cell CA TECHNOLOGIST PROVIDED HISTORY: Please go up thru right mandible, mass/abscess Reason for exam:->Include jaw please. Abscess/mass. Hx renal cell CA Decision Support Exception - unselect if not a suspected or confirmed emergency medical condition->Emergency Medical Condition (MA) Right-sided facial swelling FINDINGS: PHARYNX/LARYNX:  The palatine tonsils are normal in appearance. The tongue is normal in appearance. The valleculae, epiglottis, aryepiglottic folds and pyriform sinuses appear unremarkable. The true and false vocal cords are normal in appearance. No mass or abscess is seen. SALIVARY GLANDS/THYROID:  The parotid and submandibular glands appear unremarkable. The thyroid gland appears unremarkable. LYMPH NODES:  Small lymph nodes are seen in the submandibular region. There is a 1.5 cm cutaneous nodule over the right submandibular region seen on series 2, image 48, likely metastatic disease. SOFT TISSUES:  There is soft tissue mass with fluid density extending medial and lateral to the right mandibular body measuring at least 5.3 x 3.6 cm. Stranding in the adjacent subcutaneous fat with soft tissue fullness extending posteriorly into the lateral parapharyngeal space. There is associated destructive process involving the mandible that is actually worse on the left side. BRAIN/ORBITS/SINUSES:  The visualized portion of the intracranial contents appear unremarkable. The visualized portion of the orbits, paranasal sinuses and mastoid air cells demonstrate no acute abnormality.  LUNG APICES/SUPERIOR MEDIASTINUM:  No focal consolidation is seen within the visualized lung apices. No superior mediastinal lymphadenopathy or mass. The visualized portion of the trachea appears unremarkable. BONES:  The mandible shows extensive mottled appearance with cortical destruction and periosteal reaction, worse on the left side. No other osseous destructive process or acute fracture is seen. 1. Extensive osseous destructive process with periosteal reaction involving the mandible, worse on the left side. This is associated with soft tissue and fluid density mass surrounding the right mandibular body. The findings are suspicious for metastatic disease with associated partially necrotic soft tissue mass. The possibility of osteomyelitis or osteonecrosis cannot be completely excluded. 2. Mild right submandibular lymphadenopathy. 1.5 cm cutaneous nodule in the right submandibular region. These findings are suspicious for metastatic disease. ASSESSMENT/PLAN : 28-year-old man    Metastatic clear-cell renal cell carcinoma by history followed by Dr. Silver Heredia and diagnosed in 2021 and has achieved previously excellent response to immunotherapy with ipilimumab/nivolumab followed sequentially after 4 cycles by monotherapy with nivolumab. He has had issues with compliance. 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 Normocytic anemia likely related to myelosuppression by facial abscess and possible osteomyelitis with mild neutrophilic leukocytosis      Malnutrition and cachexia.   Thank you for the consult, we will follow. 1/26/2023  Stage IV renal cell carcinoma s/p 4 cycles of ipilimumab/nivolumab followed by monotherapy with nivolumab. Admitted with right mandibular mass/abscess. Suspicious for osteonecrosis of the jaw versus metastatic disease. On antibiotics by ID.  ENT has been consulted-no intervention recommended. S/p biopsy. We will follow final report. Might need evaluation by OMFS. CT chest abdomen pelvis to rule out progression elsewhere. Will follow. 1/27/23  - CT chest with no visceral progression but enlargement of L scaphoid osseous lesion and likely new R 4th rib lesion. CT A/P with enlarging R renal mass and progressive lytic lesions in the L spine and pelvis  - ID following, on unasyn for mandibular abscess. Pathology from biopsy pending  - CBC stable, neutrophilic leukocytosis.    - Second line oral therapy such as Levell Douse will be further discussed outpatient  - Will follow    Jamison Da Silva MD  Electronically signed 1/27/2023 at 12:59 PM

## 2023-01-27 NOTE — PROGRESS NOTES
[address] [place], [place] [address]ist   Progress Note    Admitting Date and Time: 1/24/2023 12:15 PM  Admit Dx: Facial abscess [L02.01]  Metastatic renal cell carcinoma to bone ([address] [C79.51, C64.9]  Mandibular mass [R22.0]    Subjective:    1/25: Patient seen this afternoon laying in bed finishing up lunch, states hot foods bother the inside of his mouth. He also has an episode of hypotension this morning following his morning medications. Was given fluids and hold parameters were placed. 1/26:  Patient resting comfortably. He had CT chest/abd/pelvis this afternoon. lisinopril  10 mg Oral Daily    ampicillin-sulbactam  3,000 mg IntraVENous Q6H    aspirin  81 mg Oral Daily    oyster shell calcium w/D  2 tablet Oral Daily    levothyroxine  75 mcg Oral Daily    metoprolol tartrate  25 mg Oral BID    atorvastatin  40 mg Oral Daily    sodium chloride flush  5-40 mL IntraVENous 2 times per day     fentanNYL, 25 mcg, Q2H PRN  ipratropium-albuterol, 1 ampule, Q4H PRN  sodium chloride flush, 5-40 mL, PRN  sodium chloride, , PRN  ondansetron, 4 mg, Q8H PRN   Or  ondansetron, 4 mg, Q6H PRN  polyethylene glycol, 17 g, Daily PRN  acetaminophen, 650 mg, Q6H PRN   Or  acetaminophen, 650 mg, Q6H PRN       Objective:    /63   Pulse 50   Temp 97.3 °F (36.3 °C) (Oral)   Resp 17   Ht 5' 8\" (1.727 m)   Wt 108 lb 8 oz (49.2 kg)   SpO2 98%   BMI 16.50 kg/m²   General Appearance: alert and in no acute distress; cachectic ill-appearing. Temporal and supraclavicular wasting. Skin: warm and dry, no rash or erythema  Head: normocephalic and atraumatic  Eyes: pupils equal, round, and reactive to light, extraocular eye movements intact, conjunctivae normal  ENT: external ear and ear canal normal bilaterally, nose without deformity  Neck: Right mandible/submandibular area swollen and tender to touch.   Pulmonary/Chest: clear to auscultation bilaterally- no wheezes, rales or rhonchi  Cardiovascular: normal rate, regular rhythm, normal S1 and S2, no murmurs, rubs, clicks, or gallops  Abdomen: soft, non-tender, non-distended, normal bowel sounds, no masses or organomegaly  Extremities: no cyanosis, clubbing or edema  Musculoskeletal: normal range of motion, no joint swelling, deformity or tenderness  Neurologic: reflexes normal and symmetric, no cranial nerve deficit, gait, coordination and speech normal    Recent Labs     01/24/23  1243 01/25/23  0406 01/26/23  0334   * 131* 132   K 4.5 4.7 4.1   CL 90* 97* 99   CO2 27 22 23   BUN 6 11 11   CREATININE 0.6* 0.6* 0.6*   GLUCOSE 102* 121* 127*   CALCIUM 9.1 8.4* 8.4*         Recent Labs     01/25/23  0406   ALKPHOS 110   PROT 5.8*   LABALBU 3.0*   BILITOT <0.2   AST 9   ALT 8         Recent Labs     01/24/23  1243 01/25/23  0406 01/26/23  0334   WBC 14.6* 12.6* 13.0*   RBC 3.73* 3.05* 2.93*   HGB 11.8* 9.4* 9.4*   HCT 35.4* 29.1* 27.2*   MCV 94.9 95.4 92.8   MCH 31.6 30.8 32.1   MCHC 33.3 32.3 34.6*   RDW 16.7* 16.5* 16.4*    305 301   MPV 9.4 9.9 9.9             Radiology:   CT ABDOMEN PELVIS W IV CONTRAST Additional Contrast? None   Final Result   Chest: No pulmonary nodule or mass however progression of enlargement left   scaphoid osseous lesion and probable new right rib lesion with sclerotic   areas of the right anterior rib again noted correlate with nuclear medicine   bone scan for osseous involvement or repeat PET-CT      Abdomen and pelvis: Enlarging right renal mass consistent with malignancy of   primary involvement along with progressive osseous metastatic disease with   lytic areas throughout the lumbar spine and pelvis more notable than on prior   dominant area right iliac wing however no evidence for pathologic fracture.          CT CHEST W CONTRAST   Final Result   Chest: No pulmonary nodule or mass however progression of enlargement left   scaphoid osseous lesion and probable new right rib lesion with sclerotic   areas of the right anterior rib again noted correlate with nuclear medicine   bone scan for osseous involvement or repeat PET-CT      Abdomen and pelvis: Enlarging right renal mass consistent with malignancy of   primary involvement along with progressive osseous metastatic disease with   lytic areas throughout the lumbar spine and pelvis more notable than on prior   dominant area right iliac wing however no evidence for pathologic fracture. CT SOFT TISSUE NECK W CONTRAST   Final Result   1. Extensive osseous destructive process with periosteal reaction involving   the mandible, worse on the left side. This is associated with soft tissue   and fluid density mass surrounding the right mandibular body. The findings   are suspicious for metastatic disease with associated partially necrotic soft   tissue mass. The possibility of osteomyelitis or osteonecrosis cannot be   completely excluded. 2. Mild right submandibular lymphadenopathy. 1.5 cm cutaneous nodule in the   right submandibular region. These findings are suspicious for metastatic   disease. Assessment:  Principal Problem:    Facial abscess  Active Problems:    Mandibular mass    Metastatic renal cell carcinoma to bone (HCC)    Hyponatremia  Resolved Problems:    * No resolved hospital problems.  *    MALNUTRITION ASSESSMENT AND PLAN    The following was documented by the Dietitian:    Malnutrition Assessment  Context of Malnutrition: Chronic Illness (01/25/23 1238)  Chronic Illness - Energy Intake : 75% or less estimated energy requirements for 1 month or longer (01/25/23 1238)  Chronic Illness - Weight Loss : 10% over 6 months (12% wt loss x 6 mos per wt hx in EMR) (01/25/23 1238)  Chronic Illness - Body Fat Loss: Severe body fat loss (01/25/23 1238)  Chronic Illness - Body Fat Loss Locations: Triceps;Orbital;Buccal region (01/25/23 1238)  Chronic Illness - Muscle Mass Loss: Severe muscle mass loss (01/25/23 1238)  Chronic Illness - Muscle Mass Loss Location: Clavicles (pectoralis & deltoids); Temples (temporalis); Hand (interosseous) (01/25/23 1238)  Chronic Illness - Fluid Accumulation : No significant fluid accumulation (01/25/23 1238)  Chronic Illness -  Strength: Not Performed (01/25/23 1238)  Chronic Illness - Malnutrition Score: 22 (01/25/23 1238)  Malnutrition Status: Severe malnutrition (01/25/23 1238)    I agree with the dietitian's malnutrition assessment. Medical Nutrition Therapy: oral supplements    Electronically signed by Roxanne Velasquez MD on 1/26/23 at 8:09 PM EST      Plan:    Right mandibular mass  - Afebrile, LA normal, mild leukocytosis (14.6), CRP 18.3, , check PCT, and Blood cultures   - S/p incision and drainage of left mandible back in December with purulent drainage was started on Augmentin 875/125 twice daily for 7 days  - ENT performed a bedside biopsy of lesion as well as needle aspiration of likely metastasis with superimposed infection. Cultures and pathology pending   - Decadron 4 mg q12h x 3 doses. Continue Unasyn 3 g every 6 hours consult infectious disease appreciate recommendation  - ENT and Hem/onc consulted appreciate input. Follow cultures      2. 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 S/p 4 cycles. Switched to single agent Opdivo in March 2022. Opdivo 480 mg q 4 weeks till progression. Xgeva discontinued   - Hem/Onc following and ordered CT chest/abd/pelvis to rule out progression elsewhere. Unfortunately, this is showing progression and will need to know how this changes treatment plan for patient. 3.   Hypothyroidism  - Continue Synthroid 75 mcg daily. TSH 1.03 (1/5/23)     4.    Probable COPD  - Current life long smoker . 5 dayna/day  - Declined nicotine patch, DuoNebs as needed     5. HTN/CAD  - Continue metoprolol, lisinopril and statin   - Hypotension. Hold parameters placed and lisinopril reduced to 10 mg daily for now     6. H/x of DVT  - Continue aspirin and Lovenox for DVT prophylaxis    7. Normocytic anemia  - Hgb 9.4 again today. - Checked iron levels which were wnl. Vitamin B12 330 low normal but folate deficient at 3.7; will start folic acid 1 mg daily  - trend daily and transfuse for Hgb < 7     8. Severe protein calorie malnutrition  - Reports 20 to 30 pound weight loss over the past couple months 2/2 mandibular masses  - Currently tolerating soft diet, consulted dietitian     Code Status: Full code  DVT prophylaxis: Lovenox     Disposition: Continue IV antibiotics follow cultures for sensitivity likely discharge home on oral antibiotics once appropriate       NOTE: This report was transcribed using voice recognition software. Every effort was made to ensure accuracy; however, inadvertent computerized transcription errors may be present.      Electronically signed by Roseanne Cespedes MD on 1/26/2023 at 8:07 PM

## 2023-01-27 NOTE — PROGRESS NOTES
[address] [place], [place] [address]ist   Progress Note    Admitting Date and Time: 1/24/2023 12:15 PM  Admit Dx: Facial abscess [L02.01]  Metastatic renal cell carcinoma to bone ([address] [C79.51, C64.9]  Mandibular mass [R22.0]    Subjective:    1/25: Patient seen this afternoon laying in bed finishing up lunch, states hot foods bother the inside of his mouth. He also has an episode of hypotension this morning following his morning medications. Was given fluids and hold parameters were placed. 1/26:  Patient resting comfortably. He had CT chest/abd/pelvis this afternoon. 1/27: Mr. Jaylene Brown seen at bedside this afternoon sleeping. Voices no new complaints at this time. Reviewed findings of CT scans this afternoon. Awaiting hem/onc input on progression of metastatic disease       folic acid  1 mg Oral Daily    lisinopril  10 mg Oral Daily    ampicillin-sulbactam  3,000 mg IntraVENous Q6H    aspirin  81 mg Oral Daily    oyster shell calcium w/D  2 tablet Oral Daily    levothyroxine  75 mcg Oral Daily    metoprolol tartrate  25 mg Oral BID    atorvastatin  40 mg Oral Daily    sodium chloride flush  5-40 mL IntraVENous 2 times per day     fentanNYL, 25 mcg, Q2H PRN  ipratropium-albuterol, 1 ampule, Q4H PRN  sodium chloride flush, 5-40 mL, PRN  sodium chloride, , PRN  ondansetron, 4 mg, Q8H PRN   Or  ondansetron, 4 mg, Q6H PRN  polyethylene glycol, 17 g, Daily PRN  acetaminophen, 650 mg, Q6H PRN   Or  acetaminophen, 650 mg, Q6H PRN       Objective:    /63   Pulse 50   Temp 97.9 °F (36.6 °C) (Oral)   Resp 16   Ht 5' 8\" (1.727 m)   Wt 108 lb 8 oz (49.2 kg)   SpO2 97%   BMI 16.50 kg/m²   General Appearance: alert and in no acute distress; cachectic ill-appearing. Temporal and supraclavicular wasting.   Skin: warm and dry, no rash or erythema  Head: normocephalic and atraumatic  Eyes: pupils equal, round, and reactive to light, extraocular eye movements intact, conjunctivae normal  ENT: external ear and ear canal normal bilaterally, nose without deformity  Neck: Right mandible/submandibular area swollen and tender to touch. Pulmonary/Chest: clear to auscultation bilaterally- no wheezes, rales or rhonchi  Cardiovascular: normal rate, regular rhythm, normal S1 and S2, no murmurs, rubs, clicks, or gallops  Abdomen: soft, non-tender, non-distended, normal bowel sounds, no masses or organomegaly  Extremities: no cyanosis, clubbing or edema  Musculoskeletal: normal range of motion, no joint swelling, deformity or tenderness  Neurologic: reflexes normal and symmetric, no cranial nerve deficit, gait, coordination and speech normal    Recent Labs     01/24/23  1243 01/25/23  0406 01/26/23  0334   * 131* 132   K 4.5 4.7 4.1   CL 90* 97* 99   CO2 27 22 23   BUN 6 11 11   CREATININE 0.6* 0.6* 0.6*   GLUCOSE 102* 121* 127*   CALCIUM 9.1 8.4* 8.4*       Recent Labs     01/25/23  0406   ALKPHOS 110   PROT 5.8*   LABALBU 3.0*   BILITOT <0.2   AST 9   ALT 8       Recent Labs     01/24/23  1243 01/25/23  0406 01/26/23  0334   WBC 14.6* 12.6* 13.0*   RBC 3.73* 3.05* 2.93*   HGB 11.8* 9.4* 9.4*   HCT 35.4* 29.1* 27.2*   MCV 94.9 95.4 92.8   MCH 31.6 30.8 32.1   MCHC 33.3 32.3 34.6*   RDW 16.7* 16.5* 16.4*    305 301   MPV 9.4 9.9 9.9           Radiology:   CT ABDOMEN PELVIS W IV CONTRAST Additional Contrast? None   Final Result   Chest: No pulmonary nodule or mass however progression of enlargement left   scaphoid osseous lesion and probable new right rib lesion with sclerotic   areas of the right anterior rib again noted correlate with nuclear medicine   bone scan for osseous involvement or repeat PET-CT      Abdomen and pelvis: Enlarging right renal mass consistent with malignancy of   primary involvement along with progressive osseous metastatic disease with   lytic areas throughout the lumbar spine and pelvis more notable than on prior   dominant area right iliac wing however no evidence for pathologic fracture. CT CHEST W CONTRAST   Final Result   Chest: No pulmonary nodule or mass however progression of enlargement left   scaphoid osseous lesion and probable new right rib lesion with sclerotic   areas of the right anterior rib again noted correlate with nuclear medicine   bone scan for osseous involvement or repeat PET-CT      Abdomen and pelvis: Enlarging right renal mass consistent with malignancy of   primary involvement along with progressive osseous metastatic disease with   lytic areas throughout the lumbar spine and pelvis more notable than on prior   dominant area right iliac wing however no evidence for pathologic fracture. CT SOFT TISSUE NECK W CONTRAST   Final Result   1. Extensive osseous destructive process with periosteal reaction involving   the mandible, worse on the left side. This is associated with soft tissue   and fluid density mass surrounding the right mandibular body. The findings   are suspicious for metastatic disease with associated partially necrotic soft   tissue mass. The possibility of osteomyelitis or osteonecrosis cannot be   completely excluded. 2. Mild right submandibular lymphadenopathy. 1.5 cm cutaneous nodule in the   right submandibular region. These findings are suspicious for metastatic   disease. Assessment:  Principal Problem:    Facial abscess  Active Problems:    Mandibular mass    Metastatic renal cell carcinoma to bone (HCC)    Hyponatremia  Resolved Problems:    * No resolved hospital problems.  *    MALNUTRITION ASSESSMENT AND PLAN    The following was documented by the Dietitian:    Malnutrition Assessment  Context of Malnutrition: Chronic Illness (01/25/23 1238)  Chronic Illness - Energy Intake : 75% or less estimated energy requirements for 1 month or longer (01/25/23 1238)  Chronic Illness - Weight Loss : 10% over 6 months (12% wt loss x 6 mos per wt hx in EMR) (01/25/23 1238)  Chronic Illness - Body Fat Loss: Severe body fat loss (01/25/23 1238)  Chronic Illness - Body Fat Loss Locations: Triceps;Orbital;Buccal region (01/25/23 1238)  Chronic Illness - Muscle Mass Loss: Severe muscle mass loss (01/25/23 1238)  Chronic Illness - Muscle Mass Loss Location: Clavicles (pectoralis & deltoids); Temples (temporalis); Hand (interosseous) (01/25/23 1238)  Chronic Illness - Fluid Accumulation : No significant fluid accumulation (01/25/23 1238)  Chronic Illness -  Strength: Not Performed (01/25/23 1238)  Chronic Illness - Malnutrition Score: 22 (01/25/23 1238)  Malnutrition Status: Severe malnutrition (01/25/23 1238)    I agree with the dietitian's malnutrition assessment. Medical Nutrition Therapy: oral supplements    Electronically signed by Ashley Briones MD on 1/26/23 at 8:09 PM EST      Plan:    Right mandibular mass  - Afebrile, LA normal, mild leukocytosis (14.6), CRP 18.3, , pct 0.06, Blood cultures negative   - S/p incision and drainage of left mandible back in December with purulent drainage was started on Augmentin 875/125 twice daily for 7 days  - ENT performed a bedside biopsy of lesion as well as needle aspiration of likely metastasis with superimposed infection.    - Decadron 4 mg q12h x 3 doses. - Continue Unasyn per ID 3 g every 6 hours day # 4  - No further work-up per ENT. Hem/onc following and ordered CT scans which show increasing right renal mass awaiting further recommendations   - Infectious disease following still with mild leukocytosis (12.6-->13.0) . Initial culture positive for Streptococcus intermedius repeat cultures pending. De-escalation of IV antibiotics per ID     2.     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 S/p 4 cycles. Switched to single agent Opdivo in March 2022. Opdivo 480 mg q 4 weeks till progression. Xgeva discontinued   - Hem/Onc following and ordered CT chest/abd/pelvis to rule out progression elsewhere. Unfortunately, this is showing progression. Enlargement of the left scaphoid osseous lesion and likely new right fourth rib lesion. As well as enlarging right renal mass and progressive lytic lesions in the left spine and pelvis  - Follow-up outpatient with heme-onc for further treatment     3. Hypothyroidism  - Continue Synthroid 75 mcg daily. TSH 1.03 (1/5/23)     4. Probable COPD  - Current life long smoker . 5 dayna/day  - Declined nicotine patch, DuoNebs as needed     5. HTN/CAD  - Continue metoprolol, lisinopril and statin   - Hypotension. Hold parameters placed and lisinopril reduced to 10 mg daily for now     6. H/x of DVT  - Continue aspirin and Lovenox for DVT prophylaxis    7. Normocytic anemia  - Hgb stable  - Checked iron levels which were wnl. Vitamin B12 330 low normal but folate deficient at 3.7; will start folic acid 1 mg daily  - trend daily and transfuse for Hgb < 7     8. Severe protein calorie malnutrition  - Reports 20 to 30 pound weight loss over the past couple months 2/2 mandibular masses  - Currently tolerating soft diet, consulted dietitian     Code Status: Full code  DVT prophylaxis: Lovenox     Disposition: Continue IV antibiotics follow cultures for sensitivity likely discharge home on antibiotics pending ID input. NOTE: This report was transcribed using voice recognition software. Every effort was made to ensure accuracy; however, inadvertent computerized transcription errors may be present.      Electronically signed by SHI Chávez CNP on 1/27/2023 at 12:38 PM

## 2023-01-27 NOTE — CARE COORDINATION
CM note: Spoke with patient re:options for daily Invanz. He became quite overwhelmed and requested CM discuss with his wife, Fredrik Essex. Placed a call to Fredrik Essex, she feels that home health care is their best option as she states patient may not always be able to drive to hospital every day and they do not have any family support to assist.  Informed patient and he was agreeable with wife's plan. Explained to patient and wife that nursing would not come in every day but would teach them how to give the medications. Referral called to [facility], liaison checking if in network with insurance provider.

## 2023-01-28 LAB
(1,3)-BETA-D-GLUCAN (FUNGITELL) INTERPRETATION: NEGATIVE
(1,3)-BETA-D-GLUCAN (FUNGITELL): <31 PG/ML
ABSOLUTE CD 3: 455 CELLS/UL (ref 660–2200)
ABSOLUTE CD 4 HELPER: 172 CELLS/UL (ref 490–1600)
ABSOLUTE CD 8 (SUPP): 286 CELLS/UL (ref 150–1050)
ANION GAP SERPL CALCULATED.3IONS-SCNC: 8 MMOL/L (ref 7–16)
BUN BLDV-MCNC: 6 MG/DL (ref 6–23)
CALCIUM SERPL-MCNC: 8.3 MG/DL (ref 8.6–10.2)
CD4/CD8 RATIO: 0.6 RATIO (ref 0.8–6.17)
CHLORIDE BLD-SCNC: 102 MMOL/L (ref 98–107)
CO2: 29 MMOL/L (ref 22–29)
CREAT SERPL-MCNC: 0.8 MG/DL (ref 0.7–1.2)
GFR SERPL CREATININE-BSD FRML MDRD: >60 ML/MIN/1.73
GLUCOSE BLD-MCNC: 79 MG/DL (ref 74–99)
HCT VFR BLD CALC: 29.2 % (ref 37–54)
HEMOGLOBIN: 9.9 G/DL (ref 12.5–16.5)
LYMPH SUBSET INFORMATION: ABNORMAL
MCH RBC QN AUTO: 31.6 PG (ref 26–35)
MCHC RBC AUTO-ENTMCNC: 33.9 % (ref 32–34.5)
MCV RBC AUTO: 93.3 FL (ref 80–99.9)
PDW BLD-RTO: 16.7 FL (ref 11.5–15)
PLATELET # BLD: 334 E9/L (ref 130–450)
PMV BLD AUTO: 9.4 FL (ref 7–12)
POTASSIUM SERPL-SCNC: 3.6 MMOL/L (ref 3.5–5)
RBC # BLD: 3.13 E12/L (ref 3.8–5.8)
SODIUM BLD-SCNC: 139 MMOL/L (ref 132–146)
WBC # BLD: 7.7 E9/L (ref 4.5–11.5)

## 2023-01-28 PROCEDURE — 99233 SBSQ HOSP IP/OBS HIGH 50: CPT | Performed by: OTOLARYNGOLOGY

## 2023-01-28 PROCEDURE — 2580000003 HC RX 258: Performed by: CLINICAL NURSE SPECIALIST

## 2023-01-28 PROCEDURE — 2580000003 HC RX 258: Performed by: INTERNAL MEDICINE

## 2023-01-28 PROCEDURE — 1200000000 HC SEMI PRIVATE

## 2023-01-28 PROCEDURE — 99232 SBSQ HOSP IP/OBS MODERATE 35: CPT | Performed by: INTERNAL MEDICINE

## 2023-01-28 PROCEDURE — 6360000002 HC RX W HCPCS: Performed by: INTERNAL MEDICINE

## 2023-01-28 PROCEDURE — 36415 COLL VENOUS BLD VENIPUNCTURE: CPT

## 2023-01-28 PROCEDURE — 6370000000 HC RX 637 (ALT 250 FOR IP): Performed by: INTERNAL MEDICINE

## 2023-01-28 PROCEDURE — 2500000003 HC RX 250 WO HCPCS: Performed by: CLINICAL NURSE SPECIALIST

## 2023-01-28 PROCEDURE — 2580000003 HC RX 258

## 2023-01-28 PROCEDURE — C1751 CATH, INF, PER/CENT/MIDLINE: HCPCS

## 2023-01-28 PROCEDURE — 76937 US GUIDE VASCULAR ACCESS: CPT

## 2023-01-28 PROCEDURE — 85027 COMPLETE CBC AUTOMATED: CPT

## 2023-01-28 PROCEDURE — 05HA33Z INSERTION OF INFUSION DEVICE INTO LEFT BRACHIAL VEIN, PERCUTANEOUS APPROACH: ICD-10-PCS | Performed by: OPTOMETRIST

## 2023-01-28 PROCEDURE — 6370000000 HC RX 637 (ALT 250 FOR IP)

## 2023-01-28 PROCEDURE — 80048 BASIC METABOLIC PNL TOTAL CA: CPT

## 2023-01-28 PROCEDURE — 6360000002 HC RX W HCPCS: Performed by: CLINICAL NURSE SPECIALIST

## 2023-01-28 PROCEDURE — 36410 VNPNXR 3YR/> PHY/QHP DX/THER: CPT

## 2023-01-28 RX ADMIN — ACETAMINOPHEN 650 MG: 325 TABLET ORAL at 14:34

## 2023-01-28 RX ADMIN — Medication 2 TABLET: at 09:40

## 2023-01-28 RX ADMIN — AMPICILLIN SODIUM AND SULBACTAM SODIUM 3000 MG: 2; 1 INJECTION, POWDER, FOR SOLUTION INTRAMUSCULAR; INTRAVENOUS at 14:29

## 2023-01-28 RX ADMIN — LISINOPRIL 10 MG: 10 TABLET ORAL at 09:40

## 2023-01-28 RX ADMIN — LEVOTHYROXINE SODIUM 75 MCG: 0.07 TABLET ORAL at 09:40

## 2023-01-28 RX ADMIN — METOPROLOL TARTRATE 25 MG: 25 TABLET, FILM COATED ORAL at 09:40

## 2023-01-28 RX ADMIN — ASPIRIN 81 MG: 81 TABLET, COATED ORAL at 09:40

## 2023-01-28 RX ADMIN — AMPICILLIN SODIUM AND SULBACTAM SODIUM 3000 MG: 2; 1 INJECTION, POWDER, FOR SOLUTION INTRAMUSCULAR; INTRAVENOUS at 04:36

## 2023-01-28 RX ADMIN — Medication 10 ML: at 21:41

## 2023-01-28 RX ADMIN — AMPICILLIN SODIUM AND SULBACTAM SODIUM 3000 MG: 2; 1 INJECTION, POWDER, FOR SOLUTION INTRAMUSCULAR; INTRAVENOUS at 21:24

## 2023-01-28 RX ADMIN — Medication 10 ML: at 21:29

## 2023-01-28 RX ADMIN — FOLIC ACID 1 MG: 1 TABLET ORAL at 09:40

## 2023-01-28 RX ADMIN — AMPICILLIN SODIUM AND SULBACTAM SODIUM 3000 MG: 2; 1 INJECTION, POWDER, FOR SOLUTION INTRAMUSCULAR; INTRAVENOUS at 09:51

## 2023-01-28 RX ADMIN — LIDOCAINE HYDROCHLORIDE 5 ML: 10 SOLUTION INTRAVENOUS at 09:23

## 2023-01-28 RX ADMIN — Medication 10 ML: at 21:32

## 2023-01-28 RX ADMIN — METOPROLOL TARTRATE 25 MG: 25 TABLET, FILM COATED ORAL at 21:28

## 2023-01-28 RX ADMIN — Medication 100 UNITS: at 21:30

## 2023-01-28 RX ADMIN — ATORVASTATIN CALCIUM 40 MG: 40 TABLET, FILM COATED ORAL at 09:40

## 2023-01-28 RX ADMIN — ACETAMINOPHEN 650 MG: 325 TABLET ORAL at 22:00

## 2023-01-28 RX ADMIN — ACETAMINOPHEN 650 MG: 325 TABLET ORAL at 00:07

## 2023-01-28 ASSESSMENT — PAIN SCALES - GENERAL
PAINLEVEL_OUTOF10: 4
PAINLEVEL_OUTOF10: 2
PAINLEVEL_OUTOF10: 7
PAINLEVEL_OUTOF10: 6

## 2023-01-28 ASSESSMENT — PAIN DESCRIPTION - LOCATION
LOCATION: HEAD
LOCATION: HEAD
LOCATION: EAR

## 2023-01-28 ASSESSMENT — PAIN DESCRIPTION - DESCRIPTORS
DESCRIPTORS: ACHING
DESCRIPTORS: ACHING

## 2023-01-28 ASSESSMENT — PAIN DESCRIPTION - ORIENTATION: ORIENTATION: LEFT

## 2023-01-28 NOTE — PLAN OF CARE
Problem: Discharge Planning  Goal: Discharge to home or other facility with appropriate resources  Outcome: Progressing     Problem: Pain  Goal: Verbalizes/displays adequate comfort level or baseline comfort level  Outcome: Progressing     Problem: ABCDS Injury Assessment  Goal: Absence of physical injury  Outcome: Progressing     Problem: Nutrition Deficit:  Goal: Optimize nutritional status  Outcome: Progressing     Problem: Safety - Adult  Goal: Free from fall injury  Outcome: Progressing

## 2023-01-28 NOTE — PROCEDURES
[employer] MIDLINE Placement 1/28/2023    Product number: [id number]   Lot Number: [id number]      Ultrasound: yes   L Brachial      Upper Arm Circumference: 22cm    Size: 4.5FR    Exposed Length:     Internal Length: 15cm   Cut:    Vein Measurement: 0.5cm    Kalyan Lugo RN  1/28/2023  9:03 AM      Brisk blood return  Flushed well/capped  Pt tolerated well  RN notified

## 2023-01-28 NOTE — PROGRESS NOTES
OTOLARYNGOLOGY  DAILY PROGRESS NOTE  2023    Subjective:     Complains of right ear pain, otherwise stable. Desires to go home. Objective:     /77   Pulse 55   Temp 97.9 °F (36.6 °C)   Resp 17   Ht 5' 8\" (1.727 m)   Wt 108 lb 8 oz (49.2 kg)   SpO2 97%   BMI 16.50 kg/m²   PULSE OXIMETRY RANGE: SpO2  Av.3 %  Min: 97 %  Max: 98 %  I/O last 3 completed shifts: In: 3323.1 [P.O.:780; I.V.:1343.1; IV Piggyback:1200]  Out: 2700 [Urine:2700]    GENERAL:  Awake, alert, cooperative, no apparent distress  HENT: Right ulcerative lesion with possible bone over right mandibular alveolar ridge  Right submandibular region tender, indurated with area of crusting at site of previous aspiration. EYES: No sclera icterus, pupils equal  LUNGS:  No increased work of breathing, no stridor  CARDIOVASCULAR:  RR      Assessment/Plan:     81 y.o. 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 - continue current care  - cytology/surgical pathology pending  - ok for discharge from ENT standpoint with PICC line and abx coverage.  - follow up with Dr. Wilfrid Cullen outpatient within 1-2 weeks. Patient seen and examined by resident and with attending on call Dr. Ralph Seen.      Electronically signed by Swati Mario DO on 2023 at 11:11 AM

## 2023-01-28 NOTE — PROGRESS NOTES
NAME: Junior Peña  MR:  [id number]  :   1950  Admit Date:  2023      This is a face to face encounter with Junior Peña    Elements of this note, including Diagnosis,  Interval History, Past Medical/Surgical/Family/Social Histories, ROS, physical exam, and Assessment and Plan were copied and pasted from Previous. Updates have been made where noted and reflect current exam and medical decision making from the DOS of this encounter. CHIEF COMPLAINT     ID following for   Chief Complaint   Patient presents with    Oral Swelling     Inflammation on right side of mouth. Sent by cancer center. Swelling started 2 days ago. HISTORY OF PRESENT ILLNESS     Junior Peña is a 81 y.o. male who presents with   Chief Complaint   Patient presents with    Oral Swelling     Inflammation on right side of mouth. Sent by cancer center. Swelling started 2 days ago. 2023 and has  has a past medical history of Anticoagulant long-term use, CAD (coronary artery disease), Cancer ([address] Deep vein thrombosis (DVT) ([address] [place] (hard of hearing), Hx of blood clots, Hyperlipidemia, Hypertension, and Mentally challenged. Pt seen and examined 23  Afebrile on RA   Rigth chin tender dry crust no erythema    Patient is tolerating medications. No reported adverse drug reactions. Available labs, imaging studies, microbiologic studies have been reviewed      Assessment & Plan   Pt was admitted with   Facial abscess [L02.01]  Metastatic renal cell carcinoma to bone (HCC) [C79.51, C64.9]  Mandibular mass [R22.0]    ID following for   Leukocytosis resolved  Facial/chin  abscess  Nodule right submandibular region- suspicious for metastatic disease. Was on Augmentin as an outpt    Plan:    On Unasyn IV d/c with ertapenem   Midline   Wound cx- with streptococcus intermedius   Path report pending      Discharge planning   Will give Invanz IV every 24 hours for 6 weeks  Referral to [facility]-        /77 Pulse 55   Temp 97.9 °F (36.6 °C)   Resp 17   Ht 5' 8\" (1.727 m)   Wt 108 lb 8 oz (49.2 kg)   SpO2 97%   BMI 16.50 kg/m²   Temp  Av.6 °F (36.4 °C)  Min: 97.3 °F (36.3 °C)  Max: 97.9 °F (36.6 °C)  CONSTITUTIONAL:  no apparent distress, and appears stated age  Head: right chin has crusting ulcer-  tender  ENT:  Normocephalic, atraumatic,     LUNGS:  No increased work of breathing, clear to auscultation bilaterally,   CARDIOVASCULAR:    regular rate and rhythm  ABDOMEN:  normal bowel sounds, soft, non-distended, non-tender  MUSCULOSKELETAL:  There is no redness, warmth, or swelling  BLE. Full range of motion     NEUROLOGIC:  Awake, alert, oriented. Cranial nerves II-XII are grossly intact.      SKIN:  normal skin color, texture, turgor and no rashes    Lines:     Central Venous Catheter:  Implanted Venous Port (Single) 21 Chest Left (Active)   Port A Cath Status Not Accessed 23   Criteria for Appropriate Use Limited/no vessel suitable for conventional peripheral access 23   Site Assessment Clean, dry & intact 23   Line Care Chlorhexidine wipes 23   Alcohol Cap Used Yes 23   Dressing Type Transparent 23   Dressing Status New dressing applied 23   Dressing Intervention New 23   Access Attempts  1 23   Access Needle Gauge 20 G 23   Access Needle Length 0.75 inches 23   Single Lumen Status Brisk blood return;Flushed;Heparin locked;Lab draw;Alcohol cap applied 23     Peripheral Intravenous Line:  Peripheral IV 23 Left Antecubital (Active)   Site Assessment Clean, dry & intact 23   Line Status Infusing 23   Line Care Connections checked and tightened 23   Phlebitis Assessment No symptoms 23   Infiltration Assessment 0 23   Alcohol Cap Used Yes 23   Dressing Status Clean, dry & intact 23 1514 Dressing Type Transparent 23 0400        Microbiology:        LABS:  Recent Labs     23  0334 23   WBC 13.0* 7.7   RBC 2.93* 3.13*   HGB 9.4* 9.9*   HCT 27.2* 29.2*   MCV 92.8 93.3   MCH 32.1 31.6   MCHC 34.6* 33.9   RDW 16.4* 16.7*    334   MPV 9.9 9.4       Recent Labs     23  0334 23    139   K 4.1 3.6   CL 99 102   CO2 23 29   BUN 11 6   CREATININE 0.6* 0.8   GLUCOSE 127* 79   CALCIUM 8.4* 8.3*       Lab Results   Component Value Date    SEDRATE 100 (H) 2023     Lab Results   Component Value Date    CRP 18.3 (H) 2023     Lab Results   Component Value Date    PROCAL 0.06 2023     Recent Labs     23   FERRITIN 389            REVIEW OF SYSTEMS     As stated above in the chief complaint, otherwise negative.   CURRENT MEDICATIONS     Current Facility-Administered Medications:     sodium chloride flush 0.9 % injection 5-40 mL, 5-40 mL, IntraVENous, 2 times per day, Rogena , APRN - CNS    sodium chloride flush 0.9 % injection 5-40 mL, 5-40 mL, IntraVENous, PRN, Rogena Lenox, APRN - CNS    0.9 % sodium chloride infusion, , IntraVENous, PRN, Rogena , APRN - CNS    heparin flush 100 UNIT/ML injection 100 Units, 1 mL, IntraVENous, 2 times per day, Rogena Lenox, APRN - CNS    heparin flush 100 UNIT/ML injection 100 Units, 1 mL, IntraCATHeter, PRN, Rogena Lenox, APRN - CNS    folic acid (FOLVITE) tablet 1 mg, 1 mg, Oral, Daily, Felicita Amaya MD, 1 mg at 23    fentaNYL (SUBLIMAZE) injection 25 mcg, 25 mcg, IntraVENous, Q2H PRN, Felicita Amaya MD, 25 mcg at 23    lisinopril (PRINIVIL;ZESTRIL) tablet 10 mg, 10 mg, Oral, Daily, Giacomo Caldera, APRN - CNP, 10 mg at 23 0940    ampicillin-sulbactam (UNASYN) 3,000 mg in sodium chloride 0.9 % 100 mL IVPB (Arkz0Mkb), 3,000 mg, IntraVENous, Q6H, Felicita Amaya MD, Stopped at 23 1024    aspirin EC tablet 81 mg, 81 mg, Oral, Daily, Rochele Plate, APRN - CNP, 81 mg at 01/28/23 0940    oyster shell calcium w/D 500-5 MG-MCG tablet 2 tablet, 2 tablet, Oral, Daily, Rochele Plate, APRN - CNP, 2 tablet at 01/28/23 0940    levothyroxine (SYNTHROID) tablet 75 mcg, 75 mcg, Oral, Daily, Rochele Plate, APRN - CNP, 75 mcg at 01/28/23 0940    metoprolol tartrate (LOPRESSOR) tablet 25 mg, 25 mg, Oral, BID, Rochele Plate, APRN - CNP, 25 mg at 01/28/23 0940    atorvastatin (LIPITOR) tablet 40 mg, 40 mg, Oral, Daily, Rochele Plate, APRN - CNP, 40 mg at 01/28/23 0940    0.9 % sodium chloride infusion, , IntraVENous, Continuous, Rochele Plate, APRN - CNP, Last Rate: 75 mL/hr at 01/26/23 0239, Rate Change at 01/26/23 0239    ipratropium-albuterol (DUONEB) nebulizer solution 1 ampule, 1 ampule, Inhalation, Q4H PRN, Rochele Plate, APRN - CNP    sodium chloride flush 0.9 % injection 5-40 mL, 5-40 mL, IntraVENous, 2 times per day, Rochele Plate, APRN - CNP, 10 mL at 01/24/23 2120    sodium chloride flush 0.9 % injection 5-40 mL, 5-40 mL, IntraVENous, PRN, Rochele Plate, APRN - CNP, 10 mL at 01/25/23 1251    0.9 % sodium chloride infusion, , IntraVENous, PRN, Rochele Plate, APRN - CNP    ondansetron (ZOFRAN-ODT) disintegrating tablet 4 mg, 4 mg, Oral, Q8H PRN **OR** ondansetron (ZOFRAN) injection 4 mg, 4 mg, IntraVENous, Q6H PRN, Rochele Plate, APRN - CNP    polyethylene glycol (GLYCOLAX) packet 17 g, 17 g, Oral, Daily PRN, Rochele Plate, APRN - CNP    acetaminophen (TYLENOL) tablet 650 mg, 650 mg, Oral, Q6H PRN, 650 mg at 01/28/23 0007 **OR** acetaminophen (TYLENOL) suppository 650 mg, 650 mg, Rectal, Q6H PRN, Rochele Plate, APRN - CNP  DIAGNOSTIC RESULTS   Radiology:  CT SOFT TISSUE NECK W CONTRAST    Result Date: 1/24/2023  EXAMINATION: CT OF THE NECK SOFT TISSUE WITH CONTRAST  1/24/2023 TECHNIQUE: CT of the neck was performed with the administration of intravenous contrast. Multiplanar reformatted images are provided for review. Automated exposure control, iterative reconstruction, and/or weight based adjustment of the mA/kV was utilized to reduce the radiation dose to as low as reasonably achievable. COMPARISON: CT facial bones dated 10/27/2022 HISTORY: ORDERING SYSTEM PROVIDED HISTORY: Include jaw please. Abscess/mass. Hx renal cell CA TECHNOLOGIST PROVIDED HISTORY: Please go up thru right mandible, mass/abscess Reason for exam:->Include jaw please. Abscess/mass. Hx renal cell CA Decision Support Exception - unselect if not a suspected or confirmed emergency medical condition->Emergency Medical Condition (MA) Right-sided facial swelling FINDINGS: PHARYNX/LARYNX:  The palatine tonsils are normal in appearance. The tongue is normal in appearance. The valleculae, epiglottis, aryepiglottic folds and pyriform sinuses appear unremarkable. The true and false vocal cords are normal in appearance. No mass or abscess is seen. SALIVARY GLANDS/THYROID:  The parotid and submandibular glands appear unremarkable. The thyroid gland appears unremarkable. LYMPH NODES:  Small lymph nodes are seen in the submandibular region. There is a 1.5 cm cutaneous nodule over the right submandibular region seen on series 2, image 48, likely metastatic disease. SOFT TISSUES:  There is soft tissue mass with fluid density extending medial and lateral to the right mandibular body measuring at least 5.3 x 3.6 cm. Stranding in the adjacent subcutaneous fat with soft tissue fullness extending posteriorly into the lateral parapharyngeal space. There is associated destructive process involving the mandible that is actually worse on the left side. BRAIN/ORBITS/SINUSES:  The visualized portion of the intracranial contents appear unremarkable. The visualized portion of the orbits, paranasal sinuses and mastoid air cells demonstrate no acute abnormality.  LUNG APICES/SUPERIOR MEDIASTINUM:  No focal consolidation is seen within the visualized lung apices. No superior mediastinal lymphadenopathy or mass. The visualized portion of the trachea appears unremarkable. BONES:  The mandible shows extensive mottled appearance with cortical destruction and periosteal reaction, worse on the left side. No other osseous destructive process or acute fracture is seen. 1. Extensive osseous destructive process with periosteal reaction involving the mandible, worse on the left side. This is associated with soft tissue and fluid density mass surrounding the right mandibular body. The findings are suspicious for metastatic disease with associated partially necrotic soft tissue mass. The possibility of osteomyelitis or osteonecrosis cannot be completely excluded. 2. Mild right submandibular lymphadenopathy. 1.5 cm cutaneous nodule in the right submandibular region. These findings are suspicious for metastatic disease. Imaging and labs were reviewed per medical records. Thank you for involving me in the care of Jaki Walls I will continue to follow. Please do not hesitate to call [phone] for any questions or concerns.     Electronically signed by Marcos Pacheco MD on 1/28/2023 at 11:39 AM

## 2023-01-29 VITALS
SYSTOLIC BLOOD PRESSURE: 116 MMHG | WEIGHT: 108.5 LBS | RESPIRATION RATE: 16 BRPM | BODY MASS INDEX: 16.44 KG/M2 | TEMPERATURE: 97.7 F | DIASTOLIC BLOOD PRESSURE: 72 MMHG | OXYGEN SATURATION: 97 % | HEIGHT: 68 IN | HEART RATE: 60 BPM

## 2023-01-29 LAB
ANION GAP SERPL CALCULATED.3IONS-SCNC: 8 MMOL/L (ref 7–16)
BUN BLDV-MCNC: 5 MG/DL (ref 6–23)
CALCIUM SERPL-MCNC: 8.4 MG/DL (ref 8.6–10.2)
CHLORIDE BLD-SCNC: 100 MMOL/L (ref 98–107)
CO2: 27 MMOL/L (ref 22–29)
CREAT SERPL-MCNC: 0.8 MG/DL (ref 0.7–1.2)
GFR SERPL CREATININE-BSD FRML MDRD: >60 ML/MIN/1.73
GLUCOSE BLD-MCNC: 82 MG/DL (ref 74–99)
ORGANISM: ABNORMAL
ORGANISM: ABNORMAL
POTASSIUM SERPL-SCNC: 3.5 MMOL/L (ref 3.5–5)
SODIUM BLD-SCNC: 135 MMOL/L (ref 132–146)
WOUND/ABSCESS: ABNORMAL
WOUND/ABSCESS: ABNORMAL

## 2023-01-29 PROCEDURE — 2580000003 HC RX 258: Performed by: INTERNAL MEDICINE

## 2023-01-29 PROCEDURE — 6370000000 HC RX 637 (ALT 250 FOR IP): Performed by: INTERNAL MEDICINE

## 2023-01-29 PROCEDURE — 80048 BASIC METABOLIC PNL TOTAL CA: CPT

## 2023-01-29 PROCEDURE — 6360000002 HC RX W HCPCS: Performed by: CLINICAL NURSE SPECIALIST

## 2023-01-29 PROCEDURE — 6360000002 HC RX W HCPCS: Performed by: INTERNAL MEDICINE

## 2023-01-29 PROCEDURE — 2580000003 HC RX 258: Performed by: CLINICAL NURSE SPECIALIST

## 2023-01-29 PROCEDURE — 99239 HOSP IP/OBS DSCHRG MGMT >30: CPT | Performed by: INTERNAL MEDICINE

## 2023-01-29 PROCEDURE — 6370000000 HC RX 637 (ALT 250 FOR IP)

## 2023-01-29 PROCEDURE — A4216 STERILE WATER/SALINE, 10 ML: HCPCS | Performed by: CLINICAL NURSE SPECIALIST

## 2023-01-29 PROCEDURE — 36415 COLL VENOUS BLD VENIPUNCTURE: CPT

## 2023-01-29 RX ORDER — FOLIC ACID 1 MG/1
1 TABLET ORAL DAILY
Qty: 30 TABLET | Refills: 0 | Status: SHIPPED | OUTPATIENT
Start: 2023-01-30

## 2023-01-29 RX ORDER — LISINOPRIL 10 MG/1
10 TABLET ORAL DAILY
Qty: 30 TABLET | Refills: 3 | Status: SHIPPED | OUTPATIENT
Start: 2023-01-30

## 2023-01-29 RX ADMIN — FOLIC ACID 1 MG: 1 TABLET ORAL at 09:00

## 2023-01-29 RX ADMIN — LEVOTHYROXINE SODIUM 75 MCG: 0.07 TABLET ORAL at 08:08

## 2023-01-29 RX ADMIN — AMPICILLIN SODIUM AND SULBACTAM SODIUM 3000 MG: 2; 1 INJECTION, POWDER, FOR SOLUTION INTRAMUSCULAR; INTRAVENOUS at 03:10

## 2023-01-29 RX ADMIN — ASPIRIN 81 MG: 81 TABLET, COATED ORAL at 08:08

## 2023-01-29 RX ADMIN — LISINOPRIL 10 MG: 10 TABLET ORAL at 08:08

## 2023-01-29 RX ADMIN — FENTANYL CITRATE 25 MCG: 0.05 INJECTION, SOLUTION INTRAMUSCULAR; INTRAVENOUS at 04:12

## 2023-01-29 RX ADMIN — METOPROLOL TARTRATE 25 MG: 25 TABLET, FILM COATED ORAL at 08:08

## 2023-01-29 RX ADMIN — ERTAPENEM SODIUM 1000 MG: 1 INJECTION, POWDER, LYOPHILIZED, FOR SOLUTION INTRAMUSCULAR; INTRAVENOUS at 15:10

## 2023-01-29 RX ADMIN — Medication 2 TABLET: at 08:08

## 2023-01-29 RX ADMIN — Medication 100 UNITS: at 15:12

## 2023-01-29 RX ADMIN — ATORVASTATIN CALCIUM 40 MG: 40 TABLET, FILM COATED ORAL at 08:08

## 2023-01-29 RX ADMIN — AMPICILLIN SODIUM AND SULBACTAM SODIUM 3000 MG: 2; 1 INJECTION, POWDER, FOR SOLUTION INTRAMUSCULAR; INTRAVENOUS at 08:11

## 2023-01-29 ASSESSMENT — PAIN SCALES - GENERAL
PAINLEVEL_OUTOF10: 2
PAINLEVEL_OUTOF10: 2
PAINLEVEL_OUTOF10: 6

## 2023-01-29 ASSESSMENT — PAIN DESCRIPTION - ORIENTATION: ORIENTATION: RIGHT

## 2023-01-29 ASSESSMENT — PAIN DESCRIPTION - LOCATION
LOCATION: HEAD
LOCATION: HEAD

## 2023-01-29 NOTE — PLAN OF CARE
Problem: Discharge Planning  Goal: Discharge to home or other facility with appropriate resources  1/28/2023 2320 by Alize Caldwell RN  Outcome: Progressing  1/28/2023 2309 by Alize Caldwell RN  Outcome: Progressing     Problem: Pain  Goal: Verbalizes/displays adequate comfort level or baseline comfort level  1/28/2023 2320 by Alize Caldwell RN  Outcome: Progressing  1/28/2023 2309 by Alize Caldwell RN  Outcome: Progressing     Problem: ABCDS Injury Assessment  Goal: Absence of physical injury  1/28/2023 2320 by Alize Caldwell RN  Outcome: Progressing  1/28/2023 2309 by Alize Caldwell RN  Outcome: Progressing     Problem: Nutrition Deficit:  Goal: Optimize nutritional status  1/28/2023 2320 by Alize Caldwell RN  Outcome: Progressing  1/28/2023 2309 by Alize Caldwell RN  Outcome: Progressing     Problem: Safety - Adult  Goal: Free from fall injury  1/28/2023 2320 by Alize Caldwell RN  Outcome: Progressing  1/28/2023 2309 by Alize Caldwell RN  Outcome: Progressing

## 2023-01-29 NOTE — CARE COORDINATION
CM note: Notified [place] home care liaison of discharge home today, she is checking if able to start care tomorrow as patient was originally scheduled to start today. [employer] [employer] orders written on Friday. Pt got midline placed over the weekend.   Script for [employer] faxed to [facility] on Friday and patient has no cost.  Received first dose of Invanz today at 3 PM.

## 2023-01-29 NOTE — DISCHARGE SUMMARY
[facility] Physician Discharge Summary        [address] [id number]  24 Nimesh Lennon, APRN - NP  [facility] [phone]    Schedule an appointment as soon as possible for a visit in 1 week(s)      Rojelio Caldera MD  [facility] [address]  [address] [address]  [phone]    Schedule an appointment as soon as possible for a visit in 2 week(s)      Sukhdev Dinero, [address]  [phone]    Schedule an appointment as soon as possible for a visit in 1 week(s)      Redd Merino MD  [address] [facility] [phone]    Schedule an appointment as soon as possible for a visit in 1 week(s)        Activity level: ***    Diet: ADULT ORAL NUTRITION SUPPLEMENT; Breakfast, Lunch, Dinner; Standard High Calorie/High Protein Oral Supplement  ADULT DIET; Easy to [employer]:***    Condition at discharge: ***    Dispo:***    Continue supplemental oxygen via nasal canula @ 2 LPM round-the-clock. Continue CPAP / BiPAP during sleep as prior to admission. Patient ID:  Gio Chamberlain  [id number]  81 y.o.  1950    Admit date: 1/24/2023    Discharge date and time:  1/29/2023  3:30 PM    Admission Diagnoses: Principal Problem:    Facial abscess  Active Problems:    Mandibular mass    Metastatic renal cell carcinoma to bone (HCC)    Hyponatremia  Resolved Problems:    * No resolved hospital problems. *      Discharge Diagnoses: Principal Problem:    Facial abscess  Active Problems:    Mandibular mass    Metastatic renal cell carcinoma to bone (HCC)    Hyponatremia  Resolved Problems:    * No resolved hospital problems.  *      Consults:  IP CONSULT TO OTOLARYNGOLOGY  IP CONSULT TO INFECTIOUS DISEASES  IP CONSULT TO ONCOLOGY  IP CONSULT TO DIETITIAN  IP CONSULT TO HYPERBARIC  IP CONSULT TO HOME CARE NEEDS    Procedures: ***    Hospital Course: ***    Discharge Exam:  Vitals:    01/29/23 0442 01/29/23 0516 01/29/23 0800 01/29/23 0808   BP:  116/72     Pulse:  54  60   Resp: 16 16     Temp:  97.7 °F (36.5 °C)     TempSrc:  Oral     SpO2:  98% 97%    Weight:       Height:           {GENERAL PHYSICAL EXAM:19990}  I/O last 3 completed shifts: In: 960 [P.O.:960]  Out: 600 [Urine:600]  I/O this shift: In: 480 [P.O.:480]  Out: -       LABS:  Recent Labs     01/28/23  0412 01/29/23  0358    135   K 3.6 3.5    100   CO2 29 27   BUN 6 5*   CREATININE 0.8 0.8   GLUCOSE 79 82   CALCIUM 8.3* 8.4*       Recent Labs     01/28/23  0412   WBC 7.7   RBC 3.13*   HGB 9.9*   HCT 29.2*   MCV 93.3   MCH 31.6   MCHC 33.9   RDW 16.7*      MPV 9.4       No results for input(s): POCGLU in the last 72 hours. {ICIA:[id number]}    Imaging:  CT SOFT TISSUE NECK W CONTRAST    Result Date: 1/24/2023  EXAMINATION: CT OF THE NECK SOFT TISSUE WITH CONTRAST  1/24/2023 TECHNIQUE: CT of the neck was performed with the administration of intravenous contrast. Multiplanar reformatted images are provided for review. Automated exposure control, iterative reconstruction, and/or weight based adjustment of the mA/kV was utilized to reduce the radiation dose to as low as reasonably achievable. COMPARISON: CT facial bones dated 10/27/2022 HISTORY: ORDERING SYSTEM PROVIDED HISTORY: Include jaw please. Abscess/mass. Hx renal cell CA TECHNOLOGIST PROVIDED HISTORY: Please go up thru right mandible, mass/abscess Reason for exam:->Include jaw please. Abscess/mass. Hx renal cell CA Decision Support Exception - unselect if not a suspected or confirmed emergency medical condition->Emergency Medical Condition (MA) Right-sided facial swelling FINDINGS: PHARYNX/LARYNX:  The palatine tonsils are normal in appearance. The tongue is normal in appearance. The valleculae, epiglottis, aryepiglottic folds and pyriform sinuses appear unremarkable.   The true and false vocal cords are normal in appearance. No mass or abscess is seen. SALIVARY GLANDS/THYROID:  The parotid and submandibular glands appear unremarkable. The thyroid gland appears unremarkable. LYMPH NODES:  Small lymph nodes are seen in the submandibular region. There is a 1.5 cm cutaneous nodule over the right submandibular region seen on series 2, image 48, likely metastatic disease. SOFT TISSUES:  There is soft tissue mass with fluid density extending medial and lateral to the right mandibular body measuring at least 5.3 x 3.6 cm. Stranding in the adjacent subcutaneous fat with soft tissue fullness extending posteriorly into the lateral parapharyngeal space. There is associated destructive process involving the mandible that is actually worse on the left side. BRAIN/ORBITS/SINUSES:  The visualized portion of the intracranial contents appear unremarkable. The visualized portion of the orbits, paranasal sinuses and mastoid air cells demonstrate no acute abnormality. LUNG APICES/SUPERIOR MEDIASTINUM:  No focal consolidation is seen within the visualized lung apices. No superior mediastinal lymphadenopathy or mass. The visualized portion of the trachea appears unremarkable. BONES:  The mandible shows extensive mottled appearance with cortical destruction and periosteal reaction, worse on the left side. No other osseous destructive process or acute fracture is seen. 1. Extensive osseous destructive process with periosteal reaction involving the mandible, worse on the left side. This is associated with soft tissue and fluid density mass surrounding the right mandibular body. The findings are suspicious for metastatic disease with associated partially necrotic soft tissue mass. The possibility of osteomyelitis or osteonecrosis cannot be completely excluded. 2. Mild right submandibular lymphadenopathy. 1.5 cm cutaneous nodule in the right submandibular region. These findings are suspicious for metastatic disease.          Patient Instructions:   Current Discharge Medication List        START taking these medications    Details   folic acid (FOLVITE) 1 MG tablet Take 1 tablet by mouth daily  Qty: 30 tablet, Refills: 0    Associated Diagnoses: Folate deficiency      ertapenem (INVANZ) infusion Infuse 1,000 mg intravenously every 24 hours Compound per protocol. Qty: 30 g, Refills: 1           CONTINUE these medications which have CHANGED    Details   lisinopril (PRINIVIL;ZESTRIL) 10 MG tablet Take 1 tablet by mouth daily New  lower dose  Qty: 30 tablet, Refills: 3           CONTINUE these medications which have NOT CHANGED    Details   levothyroxine (SYNTHROID) 75 MCG tablet Take 1 tablet by mouth daily  Qty: 90 tablet, Refills: 1      Calcium Carbonate-Vitamin D (OYSTER SHELL CALCIUM/D) 500-200 MG-UNIT TABS Take 2 tablets by mouth daily  Qty: 90 tablet, Refills: 3      lidocaine-prilocaine (EMLA) 2.5-2.5 % cream Apply topically as needed. Qty: 30 g, Refills: 1    Associated Diagnoses: Renal cell cancer, right (HCC)      metoprolol tartrate (LOPRESSOR) 25 MG tablet Take 25 mg by mouth 2 times daily      aspirin 81 MG EC tablet Take 81 mg by mouth daily LD 10/26/21      simvastatin (ZOCOR) 80 MG tablet Take 80 mg by mouth daily           STOP taking these medications       ARTHRITIS PAIN RELIEF 650 MG extended release tablet Comments:   Reason for Stopping:                 Note  that  ***  minutes were spent in preparing discharge papers, discussing discharge with patient, medication review, etc.    NOTE: This report was transcribed using voice recognition software. Every effort was made to ensure accuracy; however, inadvertent computerized transcription errors may be present.      Signed:  Electronically signed by Martha Sanchez MD on 1/29/2023 at 3:30 PM

## 2023-01-29 NOTE — CARE COORDINATION
CM note; order noted for DME of hospital bed, however patient does not qualify for insurance coverage as he is not bed bound and is able to reposition himself.

## 2023-01-29 NOTE — PROGRESS NOTES
NAME: Orlando Ledesma  MR:  [id number]  :   1950  Admit Date:  2023      This is a face to face encounter with Orlando Ledesma    Elements of this note, including Diagnosis,  Interval History, Past Medical/Surgical/Family/Social Histories, ROS, physical exam, and Assessment and Plan were copied and pasted from Previous. Updates have been made where noted and reflect current exam and medical decision making from the DOS of this encounter. CHIEF COMPLAINT     ID following for   Chief Complaint   Patient presents with    Oral Swelling     Inflammation on right side of mouth. Sent by cancer center. Swelling started 2 days ago. HISTORY OF PRESENT ILLNESS     Orlando Ledesma is a 81 y.o. male who presents with   Chief Complaint   Patient presents with    Oral Swelling     Inflammation on right side of mouth. Sent by cancer center. Swelling started 2 days ago. 2023 and has  has a past medical history of Anticoagulant long-term use, CAD (coronary artery disease), Cancer ([address] Deep vein thrombosis (DVT) ([facility] [address] [place] (hard of hearing), Hx of blood clots, Hyperlipidemia, Hypertension, and Mentally challenged. Pt seen and examined 23  Afebrile on RA   Rigth chin tender dry crust no erythema    Patient is tolerating medications. No reported adverse drug reactions. Available labs, imaging studies, microbiologic studies have been reviewed      Assessment & Plan   Pt was admitted with   Facial abscess [L02.01]  Metastatic renal cell carcinoma to bone (HCC) [C79.51, C64.9]  Mandibular mass [R22.0]    ID following for   Leukocytosis resolved  Facial/chin  abscess  Nodule right submandibular region- suspicious for metastatic disease.    Was on Augmentin as an outpt    Plan:   Change to ertapenem in preparation for discharge   Midline placed  Wound cx- with streptococcus intermedius   Path report pending   Discharge planning   Will give Invanz IV every 24 hours for 6 weeks  Referral to HBOT  Discharge planning is home with homecare   As above    This is a face to face encounter with Sim Fuentes on 23. I discussed the findings and plans with  NURSE PRACTITIONER, SHI Marie and agree as documented in her note. See below for additional details and treatment plan. Sim Fuentes is a 81 y.o. male who presents with   Chief Complaint   Patient presents with    Oral Swelling     Inflammation on right side of mouth. Sent by cancer center. Swelling started 2 days ago. and has  has a past medical history of Anticoagulant long-term use, CAD (coronary artery disease), Cancer ([facility] [address] Deep vein thrombosis (DVT) ([facility] [address] [place] (hard of hearing), Hx of blood clots, Hyperlipidemia, Hypertension, and Mentally challenged. ID was consulted for   Admit DX:  Facial abscess [L02.01]  Metastatic renal cell carcinoma to bone (HCC) [C79.51, C64.9]  Mandibular mass [R22.0]    Id following for  Right chin infection poss OM     Cont atbx pain improved   Can d/c  F/u 2 weeks     ertapenem (INVanz) 1,000 mg in sodium chloride (PF) 0.9 % 10 mL IV syringe, Q24H         Imaging and labs were reviewed. Sim Fuentes was informed of their diagnosis, indications, risks and benefits of treatment. Sim Fuentes had the opportunity to ask questions. All questions were answered. Thank you for involving me in the care of Sim Fuentes. Please do not hesitate to call for any questions or concerns.     Electronically signed by Jersey Ivan MD on 2023 at 3:31 PM               /72   Pulse 60   Temp 97.7 °F (36.5 °C) (Oral)   Resp 16   Ht 5' 8\" (1.727 m)   Wt 108 lb 8 oz (49.2 kg)   SpO2 97%   BMI 16.50 kg/m²   Temp  Av.9 °F (36.6 °C)  Min: 97.7 °F (36.5 °C)  Max: 98.1 °F (36.7 °C)  CONSTITUTIONAL:  no apparent distress, and appears stated age  Head: right chin has crusting ulcer-  tender  ENT:  Normocephalic, atraumatic,     LUNGS:  No increased work of breathing, clear to auscultation bilaterally,   CARDIOVASCULAR:    regular rate and rhythm  ABDOMEN:  normal bowel sounds, soft, non-distended, non-tender  MUSCULOSKELETAL:  There is no redness, warmth, or swelling  BLE. Full range of motion     NEUROLOGIC:  Awake, alert, oriented. Cranial nerves II-XII are grossly intact.      SKIN:  normal skin color, texture, turgor and no rashes    Lines:     Central Venous Catheter:  Implanted Venous Port (Single) 11/16/21 Chest Left (Active)   Port A Cath Status Not Accessed 01/26/23 0400   Criteria for Appropriate Use Limited/no vessel suitable for conventional peripheral access 01/05/23 0937   Site Assessment Clean, dry & intact 01/05/23 0937   Line Care Chlorhexidine wipes 01/05/23 0937   Alcohol Cap Used Yes 01/05/23 0937   Dressing Type Transparent 01/05/23 0937   Dressing Status New dressing applied 01/05/23 0937   Dressing Intervention New 01/05/23 0937   Access Attempts  1 01/05/23 0937   Access Needle Gauge 20 G 01/05/23 0937   Access Needle Length 0.75 inches 01/05/23 0937   Single Lumen Status Brisk blood return;Flushed;Heparin locked;Lab draw;Alcohol cap applied 01/05/23 0937     Peripheral Intravenous Line:  Peripheral IV 01/24/23 Left Antecubital (Active)   Site Assessment Clean, dry & intact 01/26/23 0400   Line Status Infusing 01/26/23 0400   Line Care Connections checked and tightened 01/26/23 0400   Phlebitis Assessment No symptoms 01/26/23 0400   Infiltration Assessment 0 01/26/23 0400   Alcohol Cap Used Yes 01/26/23 0400   Dressing Status Clean, dry & intact 01/26/23 0400   Dressing Type Transparent 01/26/23 0400     Microbiology:   1/26/2023- wound cx-  Alpha hemolytic streptococcus, CONs    1/24/2023- Wound cx-streptococcus intermedius    1/24/2023 blood cx- no growth     LABS:  Recent Labs     01/28/23 0412   WBC 7.7   RBC 3.13*   HGB 9.9*   HCT 29.2*   MCV 93.3   MCH 31.6   MCHC 33.9   RDW 16.7*      MPV 9.4       Recent Labs     01/28/23 0412 01/29/23  0358    135   K 3.6 3.5    100   CO2 29 27   BUN 6 5*   CREATININE 0.8 0.8   GLUCOSE 79 82   CALCIUM 8.3* 8.4*       Lab Results   Component Value Date    SEDRATE 100 (H) 01/24/2023     Lab Results   Component Value Date    CRP 18.3 (H) 01/24/2023     Lab Results   Component Value Date    PROCAL 0.06 01/25/2023     No results for input(s): CRP, FERRITIN, LDH, TROPONINI, DDIMER, FIBRINOGEN, INR, PROTIME, AST, ALT, TRIG in the last 72 hours. REVIEW OF SYSTEMS     As stated above in the chief complaint, otherwise negative.   CURRENT MEDICATIONS     Current Facility-Administered Medications:     sodium chloride flush 0.9 % injection 5-40 mL, 5-40 mL, IntraVENous, 2 times per day, SHI Chacon CNS, 10 mL at 01/28/23 2132    sodium chloride flush 0.9 % injection 5-40 mL, 5-40 mL, IntraVENous, PRN, SHI Chacon    0.9 % sodium chloride infusion, , IntraVENous, PRN, SHI Chacon    heparin flush 100 UNIT/ML injection 100 Units, 1 mL, IntraVENous, 2 times per day, SHI Chacon, 100 Units at 01/28/23 2130    heparin flush 100 UNIT/ML injection 100 Units, 1 mL, IntraCATHeter, PRN, SHI Chacon CNS    folic acid (FOLVITE) tablet 1 mg, 1 mg, Oral, Daily, Elayne Hung MD, 1 mg at 01/29/23 0900    fentaNYL (SUBLIMAZE) injection 25 mcg, 25 mcg, IntraVENous, Q2H PRN, Elayne Hung MD, 25 mcg at 01/29/23 0412    lisinopril (PRINIVIL;ZESTRIL) tablet 10 mg, 10 mg, Oral, Daily, Christ Renata, APRN - CNP, 10 mg at 01/29/23 8742    aspirin EC tablet 81 mg, 81 mg, Oral, Daily, Christ Renata, APRN - CNP, 81 mg at 01/29/23 1327    oyster shell calcium w/D 500-5 MG-MCG tablet 2 tablet, 2 tablet, Oral, Daily, Christ Renata, APRN - CNP, 2 tablet at 01/29/23 3718    levothyroxine (SYNTHROID) tablet 75 mcg, 75 mcg, Oral, Daily, Christ Rentaa, APRN - CNP, 75 mcg at 01/29/23 0808    metoprolol tartrate (LOPRESSOR) tablet 25 mg, 25 mg, Oral, BID, India Modest, APRN - CNP, 25 mg at 01/29/23 5811    atorvastatin (LIPITOR) tablet 40 mg, 40 mg, Oral, Daily, India Modest, APRN - CNP, 40 mg at 01/29/23 0808    0.9 % sodium chloride infusion, , IntraVENous, Continuous, India Modest, APRN - CNP, Last Rate: 75 mL/hr at 01/26/23 0239, Rate Change at 01/26/23 0239    ipratropium-albuterol (DUONEB) nebulizer solution 1 ampule, 1 ampule, Inhalation, Q4H PRN, India Modest, APRN - CNP    sodium chloride flush 0.9 % injection 5-40 mL, 5-40 mL, IntraVENous, 2 times per day, India Modest, APRN - CNP, 10 mL at 01/28/23 2141    sodium chloride flush 0.9 % injection 5-40 mL, 5-40 mL, IntraVENous, PRN, India Modest, APRN - CNP, 10 mL at 01/25/23 1251    0.9 % sodium chloride infusion, , IntraVENous, PRN, India Modest, APRN - CNP    ondansetron (ZOFRAN-ODT) disintegrating tablet 4 mg, 4 mg, Oral, Q8H PRN **OR** ondansetron (ZOFRAN) injection 4 mg, 4 mg, IntraVENous, Q6H PRN, India Modest, APRN - CNP    polyethylene glycol (GLYCOLAX) packet 17 g, 17 g, Oral, Daily PRN, India Modest, APRN - CNP    acetaminophen (TYLENOL) tablet 650 mg, 650 mg, Oral, Q6H PRN, 650 mg at 01/28/23 2200 **OR** acetaminophen (TYLENOL) suppository 650 mg, 650 mg, Rectal, Q6H PRN, India Modest, APRN - CNP  DIAGNOSTIC RESULTS   Radiology:  CT SOFT TISSUE NECK W CONTRAST    Result Date: 1/24/2023  EXAMINATION: CT OF THE NECK SOFT TISSUE WITH CONTRAST  1/24/2023 TECHNIQUE: CT of the neck was performed with the administration of intravenous contrast. Multiplanar reformatted images are provided for review. Automated exposure control, iterative reconstruction, and/or weight based adjustment of the mA/kV was utilized to reduce the radiation dose to as low as reasonably achievable. COMPARISON: CT facial bones dated 10/27/2022 HISTORY: ORDERING SYSTEM PROVIDED HISTORY: Include jaw please. Abscess/mass.    Hx renal cell CA TECHNOLOGIST PROVIDED HISTORY: Please go up thru right mandible, mass/abscess Reason for exam:->Include jaw please. Abscess/mass. Hx renal cell CA Decision Support Exception - unselect if not a suspected or confirmed emergency medical condition->Emergency Medical Condition (MA) Right-sided facial swelling FINDINGS: PHARYNX/LARYNX:  The palatine tonsils are normal in appearance. The tongue is normal in appearance. The valleculae, epiglottis, aryepiglottic folds and pyriform sinuses appear unremarkable. The true and false vocal cords are normal in appearance. No mass or abscess is seen. SALIVARY GLANDS/THYROID:  The parotid and submandibular glands appear unremarkable. The thyroid gland appears unremarkable. LYMPH NODES:  Small lymph nodes are seen in the submandibular region. There is a 1.5 cm cutaneous nodule over the right submandibular region seen on series 2, image 48, likely metastatic disease. SOFT TISSUES:  There is soft tissue mass with fluid density extending medial and lateral to the right mandibular body measuring at least 5.3 x 3.6 cm. Stranding in the adjacent subcutaneous fat with soft tissue fullness extending posteriorly into the lateral parapharyngeal space. There is associated destructive process involving the mandible that is actually worse on the left side. BRAIN/ORBITS/SINUSES:  The visualized portion of the intracranial contents appear unremarkable. The visualized portion of the orbits, paranasal sinuses and mastoid air cells demonstrate no acute abnormality. LUNG APICES/SUPERIOR MEDIASTINUM:  No focal consolidation is seen within the visualized lung apices. No superior mediastinal lymphadenopathy or mass. The visualized portion of the trachea appears unremarkable. BONES:  The mandible shows extensive mottled appearance with cortical destruction and periosteal reaction, worse on the left side. No other osseous destructive process or acute fracture is seen.      1. Extensive osseous destructive process with periosteal reaction involving the mandible, worse on the left side. This is associated with soft tissue and fluid density mass surrounding the right mandibular body. The findings are suspicious for metastatic disease with associated partially necrotic soft tissue mass. The possibility of osteomyelitis or osteonecrosis cannot be completely excluded. 2. Mild right submandibular lymphadenopathy. 1.5 cm cutaneous nodule in the right submandibular region. These findings are suspicious for metastatic disease. Imaging and labs were reviewed per medical records. Thank you for involving me in the care of [facility] I will continue to follow. Please do not hesitate to call [phone] for any questions or concerns.     Electronically signed by SHI Esteban on 1/29/2023 at 12:08 PM

## 2023-01-29 NOTE — PROGRESS NOTES
[facility] Hospitalist   Progress Note    Admitting Date and Time: 1/24/2023 12:15 PM  Admit Dx: Facial abscess [L02.01]  Metastatic renal cell carcinoma to bone ([address] [C79.51, C64.9]  Mandibular mass [R22.0]    Subjective:    1/25: Patient seen this afternoon laying in bed finishing up lunch, states hot foods bother the inside of his mouth. He also has an episode of hypotension this morning following his morning medications. Was given fluids and hold parameters were placed. 1/26:  Patient resting comfortably. He had CT chest/abd/pelvis this afternoon. 1/27: Mr. Gregorio Mami seen at bedside this afternoon sleeping. Voices no new complaints at this time. Reviewed findings of CT scans this afternoon. Awaiting hem/onc input on progression of metastatic disease       sodium chloride flush  5-40 mL IntraVENous 2 times per day    heparin flush  1 mL IntraVENous 2 times per day    folic acid  1 mg Oral Daily    lisinopril  10 mg Oral Daily    ampicillin-sulbactam  3,000 mg IntraVENous Q6H    aspirin  81 mg Oral Daily    oyster shell calcium w/D  2 tablet Oral Daily    levothyroxine  75 mcg Oral Daily    metoprolol tartrate  25 mg Oral BID    atorvastatin  40 mg Oral Daily    sodium chloride flush  5-40 mL IntraVENous 2 times per day     sodium chloride flush, 5-40 mL, PRN  sodium chloride, , PRN  heparin flush, 1 mL, PRN  fentanNYL, 25 mcg, Q2H PRN  ipratropium-albuterol, 1 ampule, Q4H PRN  sodium chloride flush, 5-40 mL, PRN  sodium chloride, , PRN  ondansetron, 4 mg, Q8H PRN   Or  ondansetron, 4 mg, Q6H PRN  polyethylene glycol, 17 g, Daily PRN  acetaminophen, 650 mg, Q6H PRN   Or  acetaminophen, 650 mg, Q6H PRN       Objective:    /75   Pulse 60   Temp 98.1 °F (36.7 °C) (Oral)   Resp 16   Ht 5' 8\" (1.727 m)   Wt 108 lb 8 oz (49.2 kg)   SpO2 99%   BMI 16.50 kg/m²   General Appearance: alert and in no acute distress; cachectic ill-appearing. Temporal and supraclavicular wasting.   Skin: warm and dry, no rash or erythema  Head: normocephalic and atraumatic  Eyes: pupils equal, round, and reactive to light, extraocular eye movements intact, conjunctivae normal  ENT: external ear and ear canal normal bilaterally, nose without deformity  Neck: Right mandible/submandibular area swollen and tender to touch. Pulmonary/Chest: clear to auscultation bilaterally- no wheezes, rales or rhonchi  Cardiovascular: normal rate, regular rhythm, normal S1 and S2, no murmurs, rubs, clicks, or gallops  Abdomen: soft, non-tender, non-distended, normal bowel sounds, no masses or organomegaly  Extremities: no cyanosis, clubbing or edema  Musculoskeletal: normal range of motion, no joint swelling, deformity or tenderness  Neurologic: reflexes normal and symmetric, no cranial nerve deficit, gait, coordination and speech normal    Recent Labs     01/26/23  0334 01/28/23  0412    139   K 4.1 3.6   CL 99 102   CO2 23 29   BUN 11 6   CREATININE 0.6* 0.8   GLUCOSE 127* 79   CALCIUM 8.4* 8.3*         No results for input(s): ALKPHOS, PROT, LABALBU, BILITOT, AST, ALT in the last 72 hours.       Recent Labs     01/26/23  0334 01/28/23  0412   WBC 13.0* 7.7   RBC 2.93* 3.13*   HGB 9.4* 9.9*   HCT 27.2* 29.2*   MCV 92.8 93.3   MCH 32.1 31.6   MCHC 34.6* 33.9   RDW 16.4* 16.7*    334   MPV 9.9 9.4             Radiology:   CT ABDOMEN PELVIS W IV CONTRAST Additional Contrast? None   Final Result   Chest: No pulmonary nodule or mass however progression of enlargement left   scaphoid osseous lesion and probable new right rib lesion with sclerotic   areas of the right anterior rib again noted correlate with nuclear medicine   bone scan for osseous involvement or repeat PET-CT      Abdomen and pelvis: Enlarging right renal mass consistent with malignancy of   primary involvement along with progressive osseous metastatic disease with   lytic areas throughout the lumbar spine and pelvis more notable than on prior   dominant area right iliac wing however no evidence for pathologic fracture. CT CHEST W CONTRAST   Final Result   Chest: No pulmonary nodule or mass however progression of enlargement left   scaphoid osseous lesion and probable new right rib lesion with sclerotic   areas of the right anterior rib again noted correlate with nuclear medicine   bone scan for osseous involvement or repeat PET-CT      Abdomen and pelvis: Enlarging right renal mass consistent with malignancy of   primary involvement along with progressive osseous metastatic disease with   lytic areas throughout the lumbar spine and pelvis more notable than on prior   dominant area right iliac wing however no evidence for pathologic fracture. CT SOFT TISSUE NECK W CONTRAST   Final Result   1. Extensive osseous destructive process with periosteal reaction involving   the mandible, worse on the left side. This is associated with soft tissue   and fluid density mass surrounding the right mandibular body. The findings   are suspicious for metastatic disease with associated partially necrotic soft   tissue mass. The possibility of osteomyelitis or osteonecrosis cannot be   completely excluded. 2. Mild right submandibular lymphadenopathy. 1.5 cm cutaneous nodule in the   right submandibular region. These findings are suspicious for metastatic   disease. Assessment:  Principal Problem:    Facial abscess  Active Problems:    Mandibular mass    Metastatic renal cell carcinoma to bone (HCC)    Hyponatremia  Resolved Problems:    * No resolved hospital problems.  *    MALNUTRITION ASSESSMENT AND PLAN    The following was documented by the Dietitian:    Malnutrition Assessment  Context of Malnutrition: Chronic Illness (01/25/23 1238)  Chronic Illness - Energy Intake : 75% or less estimated energy requirements for 1 month or longer (01/25/23 1238)  Chronic Illness - Weight Loss : 10% over 6 months (12% wt loss x 6 mos per wt hx in EMR) (01/25/23 1238)  Chronic Illness - Body Fat Loss: Severe body fat loss (01/25/23 1238)  Chronic Illness - Body Fat Loss Locations: Triceps;Orbital;Buccal region (01/25/23 1238)  Chronic Illness - Muscle Mass Loss: Severe muscle mass loss (01/25/23 1238)  Chronic Illness - Muscle Mass Loss Location: Clavicles (pectoralis & deltoids); Temples (temporalis); Hand (interosseous) (01/25/23 1238)  Chronic Illness - Fluid Accumulation : No significant fluid accumulation (01/25/23 1238)  Chronic Illness -  Strength: Not Performed (01/25/23 1238)  Chronic Illness - Malnutrition Score: 22 (01/25/23 1238)  Malnutrition Status: Severe malnutrition (01/25/23 1238)    I agree with the dietitian's malnutrition assessment. Medical Nutrition Therapy: oral supplements    Electronically signed by Kofi Schmid MD on 1/26/23 at 8:09 PM EST      Plan:    Right mandibular mass  - Afebrile, LA normal, mild leukocytosis (14.6), CRP 18.3, , pct 0.06, Blood cultures negative   - S/p incision and drainage of left mandible back in December with purulent drainage was started on Augmentin 875/125 twice daily for 7 days  - ENT performed a bedside biopsy of lesion as well as needle aspiration of likely metastasis with superimposed infection.    - Decadron 4 mg q12h x 3 doses. - Continue Unasyn per ID 3 g every 6 hours day # 4  - No further work-up per ENT. Hem/onc following and ordered CT scans which show increasing right renal mass awaiting further recommendations   - Infectious disease following still with mild leukocytosis (12.6-->13.0) . Initial culture positive for Streptococcus intermedius repeat cultures pending. De-escalation of IV antibiotics per ID     2.     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 S/p 4 cycles. Switched to single agent Opdivo in March 2022. Opdivo 480 mg q 4 weeks till progression. Xgeva discontinued   - Hem/Onc following and ordered CT chest/abd/pelvis to rule out progression elsewhere. Unfortunately, this is showing progression. Enlargement of the left scaphoid osseous lesion and likely new right fourth rib lesion. As well as enlarging right renal mass and progressive lytic lesions in the left spine and pelvis  - Follow-up outpatient with heme-onc for further treatment     3. Hypothyroidism  - Continue Synthroid 75 mcg daily. TSH 1.03 (1/5/23)     4. Probable COPD  - Current life long smoker . 5 dayna/day  - Declined nicotine patch, DuoNebs as needed     5. HTN/CAD  - Continue metoprolol, lisinopril and statin   - Hypotension. Hold parameters placed and lisinopril reduced to 10 mg daily for now     6. H/x of DVT  - Continue aspirin and Lovenox for DVT prophylaxis    7. Normocytic anemia  - Hgb stable  - Checked iron levels which were wnl. Vitamin B12 330 low normal but folate deficient at 3.7; will start folic acid 1 mg daily  - trend daily and transfuse for Hgb < 7     8. Severe protein calorie malnutrition  - Reports 20 to 30 pound weight loss over the past couple months 2/2 mandibular masses  - Currently tolerating soft diet, consulted dietitian     Code Status: Full code  DVT prophylaxis: Lovenox     Disposition: Continue IV antibiotics follow cultures for sensitivity likely discharge home on antibiotics pending ID input. NOTE: This report was transcribed using voice recognition software. Every effort was made to ensure accuracy; however, inadvertent computerized transcription errors may be present.      Electronically signed by Lida Romo MD on 1/28/2023 at 9:00 PM

## 2023-01-29 NOTE — PLAN OF CARE
Problem: Discharge Planning  Goal: Discharge to home or other facility with appropriate resources  1/29/2023 1114 by Karolina Lucas RN  Outcome: Progressing  1/28/2023 2320 by Sincere Goodson RN  Outcome: Progressing  1/28/2023 2309 by Sincere Goodson RN  Outcome: Progressing     Problem: Pain  Goal: Verbalizes/displays adequate comfort level or baseline comfort level  1/29/2023 1114 by Karolina Lucas RN  Outcome: Progressing  1/28/2023 2320 by Sincere Goodson RN  Outcome: Progressing  1/28/2023 2309 by Sincere Goodson RN  Outcome: Progressing     Problem: ABCDS Injury Assessment  Goal: Absence of physical injury  1/29/2023 1114 by Karolina Lucas RN  Outcome: Progressing  1/28/2023 2320 by Sincere Goodson RN  Outcome: Progressing  1/28/2023 2309 by Sincere Goodson RN  Outcome: Progressing     Problem: Nutrition Deficit:  Goal: Optimize nutritional status  1/29/2023 1114 by Karolina Lucas RN  Outcome: Progressing  1/28/2023 2320 by Sincere Goodson RN  Outcome: Progressing  1/28/2023 2309 by Sincere Goodson RN  Outcome: Progressing     Problem: Safety - Adult  Goal: Free from fall injury  1/29/2023 1114 by Karolina Lucas RN  Outcome: Progressing  1/28/2023 2320 by Sincere Goodson RN  Outcome: Progressing  1/28/2023 2309 by Sincere Goodson RN  Outcome: Progressing

## 2023-01-29 NOTE — DISCHARGE INSTRUCTIONS
Your information:  Name: Junior Peña  : 1950    Your instructions:  Discharge Home with [address]    What to do after you leave the hospital:    Recommended diet: regular diet; easy to chew    Recommended activity: activity as tolerated    The following personal items were collected during your admission and were returned to you:    Belongings  Dental Appliances: None  Vision - Corrective Lenses: None  Hearing Aid: None  Clothing: Pants, Shirt, Socks, Undergarments, Footwear  Jewelry: None  Body Piercings Removed: N/A  Electronic Devices: None  Weapons (Notify Protective Services/Security): None  Other Valuables: At home  Home Medications: None  Valuables Given To: Patient  Provide Name(s) of Who Valuable(s) Were Given To: self  Responsible person(s) in the waiting room: none  Patient approves for provider to speak to responsible person post operatively: Yes    Information obtained by:  By signing below, I understand that if any problems occur once I leave the hospital I am to contact PCP. I understand and acknowledge receipt of the instructions indicated above. When should you call for help? Call 911 anytime you think you may need emergency care. For example, call if:    You passed out (lost consciousness). You have severe trouble breathing. You have sudden chest pain and shortness of breath, or you cough up blood. You have a fast or uneven pulse. Call your doctor now or seek immediate medical care if:    You have signs of infection, such as: Increased pain, swelling, warmth, or redness. Red streaks leading from the area. Pus or blood draining from the area. A fever. You have swelling in your face, chest, neck, or arm on the side where the catheter is. You have signs of a blood clot, such as bulging veins near the catheter. Your catheter is leaking, cracked, or clogged. You feel resistance when you inject medicine or fluids into your catheter.      Your catheter is out of place. This may happen after severe coughing or vomiting, or if you pull on the catheter. You have chest pain or shortness of breath. Watch closely for changes in your health, and be sure to contact your doctor if:    You have any concerns about your catheter.

## 2023-01-30 LAB
BLOOD CULTURE, ROUTINE: NORMAL
COMMENT: NORMAL
CULTURE, BLOOD 2: NORMAL
REPORT: NORMAL

## 2023-02-02 ENCOUNTER — OFFICE VISIT (OUTPATIENT)
Dept: ONCOLOGY | Age: 73
End: 2023-02-02

## 2023-02-02 ENCOUNTER — HOSPITAL ENCOUNTER (OUTPATIENT)
Dept: INFUSION THERAPY | Age: 73
Discharge: HOME | End: 2023-02-02
Payer: MEDICARE

## 2023-02-02 ENCOUNTER — TELEPHONE (OUTPATIENT)
Dept: INFUSION THERAPY | Age: 73
End: 2023-02-02

## 2023-02-02 VITALS
HEART RATE: 69 BPM | DIASTOLIC BLOOD PRESSURE: 69 MMHG | OXYGEN SATURATION: 95 % | SYSTOLIC BLOOD PRESSURE: 146 MMHG | TEMPERATURE: 97.4 F | RESPIRATION RATE: 16 BRPM

## 2023-02-02 VITALS
TEMPERATURE: 97.5 F | SYSTOLIC BLOOD PRESSURE: 114 MMHG | WEIGHT: 117.7 LBS | OXYGEN SATURATION: 97 % | HEIGHT: 68 IN | HEART RATE: 50 BPM | BODY MASS INDEX: 17.84 KG/M2 | DIASTOLIC BLOOD PRESSURE: 66 MMHG

## 2023-02-02 DIAGNOSIS — C64.1 RENAL CELL CANCER, RIGHT (HCC): ICD-10-CM

## 2023-02-02 DIAGNOSIS — C64.1 RENAL CELL CANCER, RIGHT (HCC): Primary | ICD-10-CM

## 2023-02-02 DIAGNOSIS — L02.01 FACIAL ABSCESS: Primary | ICD-10-CM

## 2023-02-02 DIAGNOSIS — R22.0 MANDIBULAR MASS: ICD-10-CM

## 2023-02-02 LAB
ALBUMIN SERPL-MCNC: 3.6 G/DL (ref 3.5–5.2)
ALP BLD-CCNC: 95 U/L (ref 40–129)
ALT SERPL-CCNC: 20 U/L (ref 0–40)
ANION GAP SERPL CALCULATED.3IONS-SCNC: 9 MMOL/L (ref 7–16)
AST SERPL-CCNC: 17 U/L (ref 0–39)
BILIRUB SERPL-MCNC: <0.2 MG/DL (ref 0–1.2)
BUN BLDV-MCNC: 5 MG/DL (ref 6–23)
CALCIUM SERPL-MCNC: 8.5 MG/DL (ref 8.6–10.2)
CHLORIDE BLD-SCNC: 101 MMOL/L (ref 98–107)
CO2: 27 MMOL/L (ref 22–29)
CREAT SERPL-MCNC: 0.7 MG/DL (ref 0.7–1.2)
GFR SERPL CREATININE-BSD FRML MDRD: >60 ML/MIN/1.73
GLUCOSE BLD-MCNC: 100 MG/DL (ref 74–99)
HCT VFR BLD CALC: 32.3 % (ref 37–54)
HEMOGLOBIN: 10.2 G/DL (ref 12.5–16.5)
MCH RBC QN AUTO: 30.8 PG (ref 26–35)
MCHC RBC AUTO-ENTMCNC: 31.6 % (ref 32–34.5)
MCV RBC AUTO: 97.6 FL (ref 80–99.9)
PDW BLD-RTO: 17.1 FL (ref 11.5–15)
PLATELET # BLD: 401 E9/L (ref 130–450)
PMV BLD AUTO: 9.2 FL (ref 7–12)
POTASSIUM SERPL-SCNC: 3.7 MMOL/L (ref 3.5–5)
RBC # BLD: 3.31 E12/L (ref 3.8–5.8)
SODIUM BLD-SCNC: 137 MMOL/L (ref 132–146)
TOTAL PROTEIN: 6.3 G/DL (ref 6.4–8.3)
WBC # BLD: 10.5 E9/L (ref 4.5–11.5)

## 2023-02-02 PROCEDURE — 85027 COMPLETE CBC AUTOMATED: CPT

## 2023-02-02 PROCEDURE — 6360000002 HC RX W HCPCS: Performed by: INTERNAL MEDICINE

## 2023-02-02 PROCEDURE — 2580000003 HC RX 258: Performed by: INTERNAL MEDICINE

## 2023-02-02 PROCEDURE — 6360000002 HC RX W HCPCS

## 2023-02-02 PROCEDURE — 96413 CHEMO IV INFUSION 1 HR: CPT

## 2023-02-02 PROCEDURE — 80053 COMPREHEN METABOLIC PANEL: CPT

## 2023-02-02 RX ORDER — ALBUTEROL SULFATE 90 UG/1
4 AEROSOL, METERED RESPIRATORY (INHALATION) PRN
Status: CANCELLED | OUTPATIENT
Start: 2023-02-02

## 2023-02-02 RX ORDER — ACETAMINOPHEN 325 MG/1
650 TABLET ORAL
Status: CANCELLED | OUTPATIENT
Start: 2023-02-02

## 2023-02-02 RX ORDER — HEPARIN SODIUM (PORCINE) LOCK FLUSH IV SOLN 100 UNIT/ML 100 UNIT/ML
SOLUTION INTRAVENOUS
Status: COMPLETED
Start: 2023-02-02 | End: 2023-02-02

## 2023-02-02 RX ORDER — SODIUM CHLORIDE 9 MG/ML
5-250 INJECTION, SOLUTION INTRAVENOUS PRN
Status: CANCELLED | OUTPATIENT
Start: 2023-02-02

## 2023-02-02 RX ORDER — SODIUM CHLORIDE 9 MG/ML
5-40 INJECTION INTRAVENOUS PRN
Status: CANCELLED | OUTPATIENT
Start: 2023-02-02

## 2023-02-02 RX ORDER — SODIUM CHLORIDE 0.9 % (FLUSH) 0.9 %
5-40 SYRINGE (ML) INJECTION PRN
Status: CANCELLED | OUTPATIENT
Start: 2023-02-02

## 2023-02-02 RX ORDER — EPINEPHRINE 1 MG/ML
0.3 INJECTION, SOLUTION, CONCENTRATE INTRAVENOUS PRN
Status: CANCELLED | OUTPATIENT
Start: 2023-02-02

## 2023-02-02 RX ORDER — HEPARIN SODIUM (PORCINE) LOCK FLUSH IV SOLN 100 UNIT/ML 100 UNIT/ML
500 SOLUTION INTRAVENOUS PRN
Status: CANCELLED | OUTPATIENT
Start: 2023-02-02

## 2023-02-02 RX ORDER — SODIUM CHLORIDE 9 MG/ML
5-250 INJECTION, SOLUTION INTRAVENOUS PRN
Status: DISCONTINUED | OUTPATIENT
Start: 2023-02-02 | End: 2023-02-03 | Stop reason: HOSPADM

## 2023-02-02 RX ORDER — MEPERIDINE HYDROCHLORIDE 25 MG/ML
12.5 INJECTION INTRAMUSCULAR; INTRAVENOUS; SUBCUTANEOUS PRN
Status: CANCELLED | OUTPATIENT
Start: 2023-02-02

## 2023-02-02 RX ORDER — DIPHENHYDRAMINE HYDROCHLORIDE 50 MG/ML
50 INJECTION INTRAMUSCULAR; INTRAVENOUS
Status: CANCELLED | OUTPATIENT
Start: 2023-02-02

## 2023-02-02 RX ORDER — SODIUM CHLORIDE 9 MG/ML
INJECTION, SOLUTION INTRAVENOUS CONTINUOUS
Status: CANCELLED | OUTPATIENT
Start: 2023-02-02

## 2023-02-02 RX ORDER — HEPARIN SODIUM (PORCINE) LOCK FLUSH IV SOLN 100 UNIT/ML 100 UNIT/ML
500 SOLUTION INTRAVENOUS PRN
Status: DISCONTINUED | OUTPATIENT
Start: 2023-02-02 | End: 2023-02-03 | Stop reason: HOSPADM

## 2023-02-02 RX ORDER — ONDANSETRON 2 MG/ML
8 INJECTION INTRAMUSCULAR; INTRAVENOUS
Status: CANCELLED | OUTPATIENT
Start: 2023-02-02

## 2023-02-02 RX ADMIN — SODIUM CHLORIDE 20 ML/HR: 9 INJECTION, SOLUTION INTRAVENOUS at 11:35

## 2023-02-02 RX ADMIN — HEPARIN 500 UNITS: 100 SYRINGE at 12:45

## 2023-02-02 RX ADMIN — HEPARIN SODIUM (PORCINE) LOCK FLUSH IV SOLN 100 UNIT/ML 500 UNITS: 100 SOLUTION at 12:45

## 2023-02-02 RX ADMIN — SODIUM CHLORIDE 480 MG: 9 INJECTION, SOLUTION INTRAVENOUS at 12:02

## 2023-02-02 NOTE — TELEPHONE ENCOUNTER
Spoke with patient about re applying for financial aid. Patient states that he will gather the information and bring it in ASAP. Patient verbalizes understanding and is appreciative.

## 2023-02-02 NOTE — PROGRESS NOTES
Patient presents to clinic for labs today. L chest  SQ port accessed per policy using 20 G, 0.63 inches Zapata needle for good blood return. Aspirate for waste and specimen sent to lab. Site flushed easily with 10 mL NSS   Dry sterile dressing to area. Tolerated procedure well. Encouraged to schedule port flush every 4 weeks.

## 2023-02-02 NOTE — PROGRESS NOTES
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 History of Present Illness:  73years old male referred for possible metastatic renal cell carcinoma. Patient presented to his PCP, HETAL Pathak, complaining of right side chest wall mass which he noticed about a month ago and had been gradually increasing in size. CT chest from October 2021 showed mltiple heterogenous and necrotic masses in the right kidney with possible tumor invasion of the right renal vein. In addition to this there was a large 4 by 9 cm soft tissue necrotic mass involving the fourth rib. There were other lytic destructive lesions in the right eighth and ninth rib. There were multiple right lung nodules ranging upto 1.5 cm. Few liver nodules were noted as well the largest being 7 mm. 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 PET scan showed hypermetabolic lesions in the right kidney, left adrenal diffuse skeletal metastasis, scattered pulmonary nodules most of them subcentimeter except for 1 right lung hypermetabolic nodule. MRI brain reviewed no intracranial metastasis. S/p right chest wall mass biopsy on 11/2/2021. Consistent with clear-cell renal cell carcinoma. Performance status seems fair Karnofsky performance status 80/70. Intermediate risk based on [facility] risk factors although the disease seems to have progressed rapidly over the past month. His rapid progression as well as renal vein involvement probably precludes debulking nephrectomy.   Tumor burden is not high and patient is asymptomatic. Recommended treatment with ipilimumab/nivolumab combination. Started ipilimumab/nivolumab in November 2021. Switch to nivolumab monotherapy after 4 cycles and 3/2022. Patient reports pain in the right side chest wall mass which is well controlled with Tylenol as needed. Denies recent weight loss, loss of appetite night sweats, back pain. Review of systems: Over 10 systems were reviewed and all were negative except as mentioned above. Past medical history: Hypertension. Coronary artery disease, peripheral arterial disease, hyperlipidemia. Past surgical history: History of CABG, femoral arterial bypass, hernia repair. Social history: Smokes half a pack a day, denies alcohol or drug abuse. Family history Sister had breast cancer, father had colon cancer. Diagnostic Data:     Past Medical History:   Diagnosis Date    Anticoagulant long-term use 2007    aspirin    CAD (coronary artery disease)     Cancer (HCC)     kidney    Deep vein thrombosis (DVT) (HCC)     leg    [place] (hard of hearing)     Hx of blood clots     Hyperlipidemia     Hypertension     Mentally challenged     information per patient's niece. Patient Active Problem List    Diagnosis Date Noted    Facial abscess 01/24/2023    Mandibular mass 01/24/2023    Metastatic renal cell carcinoma to bone [facility]) 01/24/2023    Hyponatremia 01/24/2023    Poor venous access     Renal cell cancer, right ([address] 11/11/2021        Past Surgical History:   Procedure Laterality Date    [address]    patient's sister said they were [facility].     CARDIAC SURGERY  2007    bypass    CATHETER INSERTION Left 11/16/2021    MEDIPORT CATHETER INSERTION performed by Lisa Wintre MD at [address] [place] [address]      bifemoral    IR PERCUT BX LUNG/MEDIASTINUM  11/2/2021    IR PERCUT BX LUNG/MEDIASTINUM 11/2/2021 [facility] CT       Family History  Family History   Problem Relation Age of Onset    High Blood Pressure Mother     Heart Disease Mother     Heart Attack Mother     Cancer Father 64        colon    Arthritis Sister     Heart Attack Brother     No Known Problems Maternal Uncle     No Known Problems Paternal Aunt     No Known Problems Paternal Uncle     No Known Problems Maternal Grandmother     Cancer Maternal Grandfather         throat    Heart Attack Paternal Grandmother     No Known Problems Paternal Grandfather     Cirrhosis Paternal Cousin     Deep Vein Thrombosis Sister     Diabetes Sister     Heart Disease Sister         triple bypass    Breast Cancer Sister 61    High Blood Pressure Sister     Diabetes Sister     High Cholesterol Sister     Arthritis Sister        Social History    TOBACCO:   reports that he has been smoking cigarettes. He has a 13.50 pack-year smoking history. He has never used smokeless tobacco.  ETOH:   reports that he does not currently use alcohol. Home Medications  Prior to Admission medications    Medication Sig Start Date End Date Taking? Authorizing Provider   lisinopril (PRINIVIL;ZESTRIL) 10 MG tablet Take 1 tablet by mouth daily New  lower dose 1/30/23  Yes Laure Robins MD   folic acid (FOLVITE) 1 MG tablet Take 1 tablet by mouth daily 1/30/23  Yes Laure Robins MD   ertapenem [facility]) infusion Infuse 1,000 mg intravenously every 24 hours Compound per protocol. 1/27/23 3/10/23 Yes SHI Weaver - CNS   levothyroxine (SYNTHROID) 75 MCG tablet Take 1 tablet by mouth daily 10/27/22  Yes Oneal Vidal MD   Calcium Carbonate-Vitamin D (OYSTER SHELL CALCIUM/D) 500-200 MG-UNIT TABS Take 2 tablets by mouth daily 3/31/22 3/31/23 Yes Oneal Vidal MD   lidocaine-prilocaine (EMLA) 2.5-2.5 % cream Apply topically as needed.  12/9/21  Yes Batsheva Reddy APRN - CNP   metoprolol tartrate (LOPRESSOR) 25 MG tablet Take 25 mg by mouth 2 times daily   Yes Historical Provider, MD   aspirin 81 MG EC tablet Take 81 mg by mouth daily LD 10/26/21 Yes Historical Provider, MD   simvastatin (ZOCOR) 80 MG tablet Take 80 mg by mouth daily   Yes Historical Provider, MD       Allergies  No Known Allergies    Review of Systems:      Objective  /66   Pulse 50   Temp 97.5 °F (36.4 °C)   Ht 5' 8\" (1.727 m)   Wt 117 lb 11.2 oz (53.4 kg)   SpO2 97%   BMI 17.90 kg/m²     Physical Performance Status    Physical Exam:  General: AAO to person, place, time, and purpose, appears stated age, cooperative, no acute distress, pleasant   Head and neck : PERRLA, EOMI . Sclera non icteric. Oropharynx : Clear. No exposed bones or discharge. Neck: Abscess noted in submandibular region has resolved. LYMPHATICS : no lap  Lungs: Clear to auscultation. Right side chest wall globular mass, 4-5 cm in diamter has completely resolved. Heart: Regular rate and regular rhythm, no murmur, normal S1, S2  Abdomen: Soft, non-tender;no masses, no organomegaly  Extremities: No edema,no cyanosis, no clubbing. Skin:  No rash. Neurologic:Cranial nerves grossly intact. No focal motor or sensory deficits .     Recent Laboratory Data-   Lab Results   Component Value Date    WBC 10.5 02/02/2023    HGB 10.2 (L) 02/02/2023    HCT 32.3 (L) 02/02/2023    MCV 97.6 02/02/2023     02/02/2023    LYMPHOPCT 11.9 (L) 01/26/2023    RBC 3.31 (L) 02/02/2023    MCH 30.8 02/02/2023    MCHC 31.6 (L) 02/02/2023    RDW 17.1 (H) 02/02/2023    NEUTOPHILPCT 82.6 (H) 01/26/2023    MONOPCT 4.9 01/26/2023    BASOPCT 0.1 01/26/2023    NEUTROABS 10.73 (H) 01/26/2023    LYMPHSABS 1.55 01/26/2023    MONOSABS 0.63 01/26/2023    EOSABS 0.00 (L) 01/26/2023    BASOSABS 0.01 01/26/2023       Lab Results   Component Value Date     02/02/2023    K 3.7 02/02/2023     02/02/2023    CO2 27 02/02/2023    BUN 5 (L) 02/02/2023    CREATININE 0.7 02/02/2023    GLUCOSE 100 (H) 02/02/2023    CALCIUM 8.5 (L) 02/02/2023    PROT 6.3 (L) 02/02/2023    LABALBU 3.6 02/02/2023    BILITOT <0.2 02/02/2023    ALKPHOS 95 02/02/2023    AST 17 02/02/2023    ALT 20 02/02/2023    LABGLOM >60 02/02/2023    GFRAA >60 08/03/2022       Lab Results   Component Value Date    IRON 102 01/26/2023    TIBC 193 (L) 01/26/2023    FERRITIN 389 01/26/2023           Radiology-    Echo Complete    Result Date: 10/8/2021  Transthoracic Echocardiography Report (TTE)  Demographics   Patient Name    [facility]        Gender            Male                  Marcio Prater  [id number]      Room Number  Number   Account #       [de-identified]     Procedure Date    10/08/2021   Corporate ID                  Ordering          Юлия Flowers DO                                Physician   Accession       [id number]    Referring         Юлия Flowers DO  Number                        Physician   Date of Birth   1950    Sonographer       Jesi Castillo RDCS   Age             73 year(s)    Interpreting      Himanshu 66                                Physician         Physician Cardiology                                                  Elisha Johnson MD                                 Any Other  Procedure Type of Study   TTE procedure:Echo Complete W/Doppler & Color Flow. Procedure Date Date: 10/08/2021 Start: 09:57 AM Study Location: Echo Lab Technical Quality: Poor visualization due to poor acoustical window. Indications:Heart murmur. Patient Status: Routine Height: 68 inches Weight: 120 pounds BSA: 1.65 m^2 BMI: 18.25 kg/m^2  Findings   Left Ventricle  Normal left ventricular chamber size. Normal left ventricular systolic function. Visually estimated LVEF 65%. Mild left ventricular concentric hypertrophy noted. Normal diastolic function. Right Ventricle  Normal right ventricle size and function. Left Atrium  The left atrium is mildly dilated. Mildly increased left atrial volume. Interatrial septum not well visualized but appears intact. Right Atrium  Normal right atrium. Mitral Valve  Normal mitral valve structure.   There is moderate mitral regurgitation - ERO 0.23cm2, PISA 0.9cm, RV 51cc. No mitral valve prolapse. Tricuspid Valve  Normal tricuspid valve structure. There is mild tricuspid regurgitation. Mild pulmonary hypertension, RVSP 39mmHg. Aortic Valve  Normal aortic valve structure. There is trace to mild aortic regurgitation, pressure 1/2 time 718ms. Pulmonic Valve  The pulmonic valve was not well visualized. Pericardial Effusion  No evidence of pericardial effusion. Pericardium appears normal.   Pleural Effusion  No evidence of pleural effusion. Aorta  Aortic root 3.5cm. Miscellaneous  The inferior vena cava diameter is normal with normal respiratory  variation. Conclusions   Summary  Normal left ventricular chamber size. Normal left ventricular systolic function, LVEF 47%. Mild left ventricular concentric hypertrophy noted. Normal diastolic function. The left atrium is mildly dilated. Mildly increased left atrial volume. Interatrial septum not well visualized but appears intact. Normal right ventricle size and function. There is moderate mitral regurgitation - ERO 0.23cm2, PISA 0.9cm, RV 51cc. No mitral valve prolapse. There is trace to mild aortic regurgitation, pressure 1/2 time 718ms. There is mild tricuspid regurgitation. Mild pulmonary hypertension, RVSP 39mmHg. The pulmonic valve was not well visualized. Aortic root 3.5cm. No evidence of pericardial effusion. No intra cardiac mass or thrombus. No comparison study available.    Signature   ----------------------------------------------------------------  Electronically signed by Kori Loving MD(Interpreting  physician) on 10/08/2021 03:52 PM  ----------------------------------------------------------------  M-Mode/2D Measurements & Calculations   LV Diastolic    LV Systolic Dimension: 3.2   AV Cusp Separation: 1.9 cmLA  Dimension: 5.3  cm                           Dimension: 3.9 cmAO Root  cm              LV Volume Diastolic: 710.9 Dimension: 3.5 cm  LV FS:39.6 %    ml  LV PW           LV Volume Systolic: 68.9 ml  Diastolic: 1.2  LV EDV/LV EDV Index: 134.5  cm              ml/82 ml/m^2LV ESV/LV ESV    RV Diastolic Dimension: 2.8  LV PW Systolic: Index: 48.7 RM/84KG/ m^2     cm  1.5 cm          EF Calculated: 70 %          RV Systolic Dimension: 2.4 cm  Septum          LV Mass Index: 147 l/min*m^2 LA/Aorta: 1.2  Diastolic: 1.1  cm                                           LA volume/Index: 79.7 ml  Septum          LVOT: 2 cm  Systolic: 1.2  cm   LV Mass: 241.96  g  Doppler Measurements & Calculations   MV Peak E-Wave: 1.42   AV Peak Velocity: 1.87 m/s  LVOT Peak Velocity:  m/s                    AV Peak Gradient: 14.05     1.55 m/s  MV Peak A-Wave: 0.82   mmHg                        LVOT Mean Velocity:  m/s                    AV Mean Velocity: 1.27 m/s  0.81 m/s  MV E/A Ratio: 1.73     AV Mean Gradient: 7.2 mmHg  LVOT Peak Gradient: 9.6  MV Peak Gradient: 9.2  AV VTI: 42.1 cm             mmHgLVOT Mean Gradient:  mmHg                   AV Area (Continuity):2.37   3.5 mmHg  MV Mean Gradient: 2.9  cm^2                        Estimated RVSP: 39 mmHg  mmHg                   AV Deceleration Time:       Estimated RAP:10 mmHg  MV Mean Velocity: 0.75 2474.3 msec  m/s                    LVOT VTI: 31.8 cm  MV Deceleration Time:                              TR Velocity:2.69 m/s  271.4 msec             Estimated PASP: 39.03 mmHg  TR Gradient:29.03 mmHg  MV P1/2t: 101.8 msec   Pulm. Vein A Reversal       PV Peak Velocity: 0.51  MVA by PHT:2.16 cm^2   Duration:175.3 msec         m/s  MV Area (continuity):  Pulm. Vein D Velocity:0.74  PV Peak Gradient: 1.02  1.9 cm^2               m/sPulm. Vein A Reversal    mmHg                         Velocity:0.38 m/s           PV Mean Velocity: 0.36                         Pulm.  Vein S Velocity: 0.44 m/s  MR Velocity: 5.37 m/s  m/s                         PV Mean Gradient: 0.6  MV RICARDO PISA: 0.23 cm^2 mmHg  MR VTI: 221.9 cm  Alias Velocity: 0.25  m/sPISA Radius: 0.9 cm   PISA area: 5.09 cm^2MR  flow rate: 125.72  ml/sMR volume:51.04 ml  [id number]    CT CHEST W CONTRAST    Addendum Date: 10/13/2021    ADDENDUM: 6 mm indeterminate hypodense lesions are identified in the body and tail of the pancreas. Consider surveillance     Result Date: 10/13/2021  EXAMINATION: CT OF THE CHEST WITH CONTRAST 10/13/2021 10:59 am TECHNIQUE: CT of the chest was performed with the administration of intravenous contrast. Multiplanar reformatted images are provided for review. Dose modulation, iterative reconstruction, and/or weight based adjustment of the mA/kV was utilized to reduce the radiation dose to as low as reasonably achievable. COMPARISON: None. HISTORY: ORDERING SYSTEM PROVIDED HISTORY: Chest wall mass FINDINGS: There is cardiomegaly. There is coronary artery calcification. The great vessels are normal.  Moderately enlarged mediastinal and hilar lymph nodes are noted with lymph nodes measuring up to 1.3 cm in the right hilum. Trachea and major bronchi are patent. Multiple bilateral pulmonary nodules are identified with nodules measuring up to 1.5 cm in the right lower lobe and smaller ones in the right middle lobe and right upper lobe. The pulmonary nodules in the left lower lobe ranges from 5 mm up to 8 mm. There is no focal consolidation or pleural effusion. Punctate enhancing nodules are identified in the posterior right hepatic lobe, largest 1 measuring up to 7 mm. Multiple heterogeneously enhancing right renal masses are identified largest 1 measuring 3.8 x 3.3 cm which is partially necrotic with additional smaller nodules concerning for multifocal malignancy like renal cell carcinoma.   There is filling defects in the right renal vein concerning for tumor invasion versus tumor embolism of the renal vein. There is bilateral adrenal metastasis with the largest 1 in the left adrenal gland measuring 1.6 x 2.7 cm. There is bone metastasis with the destructive soft tissue mass involving the right 4th rib anterolaterally with adjacent soft tissue mass which is partially necrotic measuring 4.5 x 9.2 cm. Lytic destructive lesions are also identified in the right 8th and 9th rib near the costo vertebral junction. Multiple heterogeneous  enhancing and necrotic masses in the right kidney concerning for multifocal renal cell carcinoma with  possible tumor invasion or tumor embolism of the right renal vein. There is diffuse metastasis with the involvement of the right and left adrenal glands, possible hepatic metastasis, widespread pulmonary and rib involvement as noted. Further assessment by PET-CT scan is recommended. Findings were telephoned to licensed caregiver. ASSESSMENT/PLAN :    Pete Lemus was seen today for cancer and follow-up. Diagnoses and all orders for this visit:    Renal cell cancer, right ([facility] [address]  -     CBC With Auto Differential; Future  -     Comprehensive metabolic panel; Future  -     PET CT GALLIUM 68 SCAN; Future  -     CBC with Auto Differential; Future  -     Comprehensive Metabolic Panel; Future  -     TSH;  Future    Other orders  -     0.9 % sodium chloride infusion  -     nivolumab (OPDIVO) 480 mg in sodium chloride 0.9 % 100 mL chemo IVPB  -     sodium chloride flush 0.9 % injection 5-40 mL  -     sodium chloride (PF) 0.9 % injection 5-40 mL  -     0.9 % sodium chloride infusion  -     heparin flush 100 UNIT/ML injection 500 Units  -     alteplase (CATHFLO) 2 mg in sterile water 2 mL injection  -     0.9 % sodium chloride infusion  -     diphenhydrAMINE (BENADRYL) injection 50 mg  -     famotidine (PEPCID) 20 mg in sodium chloride (PF) 0.9 % 10 mL injection  -     hydrocortisone sodium succinate PF (SOLU-CORTEF) injection 100 mg  -     acetaminophen (TYLENOL) tablet 650 mg  -     meperidine (DEMEROL) injection 12.5 mg  -     ondansetron (ZOFRAN) injection 8 mg  -     EPINEPHrine PF 1 MG/ML injection (Anaphylaxis) 0.3 mg  -     albuterol sulfate HFA (PROVENTIL;VENTOLIN;PROAIR) 108 (90 Base) MCG/ACT inhaler 4 puff       73years old male with Stage IV renal cell carcinoma (diagnosed 11/2021- chest wall mass biopsy) -right chest wall mass, multiple right kidney necrotic lesions in addition to large soft tissue destructive mass of right fourth rib, multiple lung nodules up to 1.5 cm, bilateral adrenal metastasis up to 2.5 cm. ECOG 1/2. Started ipilimumab/nivolumab combination in November 2021. S/p 4 cycles. Switched to single agent Opdivo in March 2022. 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 CT of neck showed extensive osseous destructive process with periosteal reaction involving the mandible. Right mandibular alveolar ridge biopsy was done which did not show any malignancy. Patient was treated with meropenem by ID. Evaluated by ENT. Has a follow-up in a week. Feels better today. Jaw swelling is improved. Afebrile. CT chest abdomen pelvis while in hospital showed a few millimeters increase in size of left scaphoid osseous lesion as well as tiny new rib lesion as well as about a centimeter increase in size of right renal mass. Seems to have a stable disease. We will continue treatment with Opdivo. 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 We will get a PET scan. Sharthaisn Floro has been discontinued. Primary hypothyroidism: With low normal T4, elevated TSH. Related to immunotherapy. Asymptomatic. On 75 mcg daily Synthroid. TSH has come down to normal. We will continue with same dose and reassess in a month. Return to clinic in 4 weeks.      Return in about 4 weeks (around 3/2/2023).     Simone Christensen MD   Electronically signed 2/2/2023 at 11:29 AM

## 2023-02-27 LAB
BASOPHILS ABSOLUTE: 0.1 E9/L (ref 0–0.2)
BASOPHILS RELATIVE PERCENT: 0.9 % (ref 0–2)
EOSINOPHILS ABSOLUTE: 0.2 E9/L (ref 0.05–0.5)
EOSINOPHILS RELATIVE PERCENT: 1.8 % (ref 0–6)
HCT VFR BLD CALC: 30.5 % (ref 37–54)
HEMOGLOBIN: 9.9 G/DL (ref 12.5–16.5)
IMMATURE GRANULOCYTES #: 0.05 E9/L
IMMATURE GRANULOCYTES %: 0.4 % (ref 0–5)
LYMPHOCYTES ABSOLUTE: 3.18 E9/L (ref 1.5–4)
LYMPHOCYTES RELATIVE PERCENT: 28.5 % (ref 20–42)
MCH RBC QN AUTO: 31.8 PG (ref 26–35)
MCHC RBC AUTO-ENTMCNC: 32.5 % (ref 32–34.5)
MCV RBC AUTO: 98.1 FL (ref 80–99.9)
MONOCYTES ABSOLUTE: 0.82 E9/L (ref 0.1–0.95)
MONOCYTES RELATIVE PERCENT: 7.3 % (ref 2–12)
NEUTROPHILS ABSOLUTE: 6.82 E9/L (ref 1.8–7.3)
NEUTROPHILS RELATIVE PERCENT: 61.1 % (ref 43–80)
PDW BLD-RTO: 16.8 FL (ref 11.5–15)
PLATELET # BLD: 291 E9/L (ref 130–450)
PMV BLD AUTO: 10.9 FL (ref 7–12)
RBC # BLD: 3.11 E12/L (ref 3.8–5.8)
WBC # BLD: 11.2 E9/L (ref 4.5–11.5)

## 2023-02-28 LAB
ALBUMIN SERPL-MCNC: 3.8 G/DL (ref 3.5–5.2)
ALP BLD-CCNC: 134 U/L (ref 40–129)
ALT SERPL-CCNC: 11 U/L (ref 0–40)
ANION GAP SERPL CALCULATED.3IONS-SCNC: 8 MMOL/L (ref 7–16)
AST SERPL-CCNC: 13 U/L (ref 0–39)
BILIRUB SERPL-MCNC: <0.2 MG/DL (ref 0–1.2)
BUN BLDV-MCNC: 7 MG/DL (ref 6–23)
C-REACTIVE PROTEIN: 1.3 MG/DL (ref 0–0.4)
CALCIUM SERPL-MCNC: 8.9 MG/DL (ref 8.6–10.2)
CHLORIDE BLD-SCNC: 99 MMOL/L (ref 98–107)
CO2: 29 MMOL/L (ref 22–29)
CREAT SERPL-MCNC: 1.1 MG/DL (ref 0.7–1.2)
GFR SERPL CREATININE-BSD FRML MDRD: >60 ML/MIN/1.73
GLUCOSE BLD-MCNC: 138 MG/DL (ref 74–99)
POTASSIUM SERPL-SCNC: 3.6 MMOL/L (ref 3.5–5)
SEDIMENTATION RATE, ERYTHROCYTE: 60 MM/HR (ref 0–15)
SODIUM BLD-SCNC: 136 MMOL/L (ref 132–146)
TOTAL PROTEIN: 6.2 G/DL (ref 6.4–8.3)

## 2023-03-02 ENCOUNTER — TELEPHONE (OUTPATIENT)
Dept: INFUSION THERAPY | Age: 73
End: 2023-03-02

## 2023-03-02 ENCOUNTER — HOSPITAL ENCOUNTER (OUTPATIENT)
Dept: INFUSION THERAPY | Age: 73
Discharge: HOME | End: 2023-03-02
Payer: MEDICARE

## 2023-03-02 ENCOUNTER — TELEPHONE (OUTPATIENT)
Dept: ONCOLOGY | Age: 73
End: 2023-03-02

## 2023-03-02 ENCOUNTER — OFFICE VISIT (OUTPATIENT)
Dept: ONCOLOGY | Age: 73
End: 2023-03-02

## 2023-03-02 VITALS
HEART RATE: 51 BPM | HEIGHT: 68 IN | BODY MASS INDEX: 18.57 KG/M2 | WEIGHT: 122.5 LBS | SYSTOLIC BLOOD PRESSURE: 142 MMHG | TEMPERATURE: 98.2 F | DIASTOLIC BLOOD PRESSURE: 68 MMHG | OXYGEN SATURATION: 99 %

## 2023-03-02 VITALS
DIASTOLIC BLOOD PRESSURE: 64 MMHG | RESPIRATION RATE: 16 BRPM | SYSTOLIC BLOOD PRESSURE: 122 MMHG | TEMPERATURE: 97.4 F | HEART RATE: 55 BPM | OXYGEN SATURATION: 100 %

## 2023-03-02 DIAGNOSIS — C64.1 RENAL CELL CANCER, RIGHT (HCC): Primary | ICD-10-CM

## 2023-03-02 PROCEDURE — 6360000002 HC RX W HCPCS: Performed by: INTERNAL MEDICINE

## 2023-03-02 PROCEDURE — 96413 CHEMO IV INFUSION 1 HR: CPT

## 2023-03-02 PROCEDURE — 2580000003 HC RX 258: Performed by: INTERNAL MEDICINE

## 2023-03-02 RX ORDER — EPINEPHRINE 1 MG/ML
0.3 INJECTION, SOLUTION, CONCENTRATE INTRAVENOUS PRN
Status: CANCELLED | OUTPATIENT
Start: 2023-03-02

## 2023-03-02 RX ORDER — SODIUM CHLORIDE 9 MG/ML
5-250 INJECTION, SOLUTION INTRAVENOUS PRN
Status: CANCELLED | OUTPATIENT
Start: 2023-03-02

## 2023-03-02 RX ORDER — SODIUM CHLORIDE 0.9 % (FLUSH) 0.9 %
5-40 SYRINGE (ML) INJECTION PRN
Status: CANCELLED | OUTPATIENT
Start: 2023-03-02

## 2023-03-02 RX ORDER — MEPERIDINE HYDROCHLORIDE 25 MG/ML
12.5 INJECTION INTRAMUSCULAR; INTRAVENOUS; SUBCUTANEOUS PRN
Status: CANCELLED | OUTPATIENT
Start: 2023-03-02

## 2023-03-02 RX ORDER — SODIUM CHLORIDE 9 MG/ML
5-40 INJECTION INTRAVENOUS PRN
Status: CANCELLED | OUTPATIENT
Start: 2023-03-02

## 2023-03-02 RX ORDER — DIPHENHYDRAMINE HYDROCHLORIDE 50 MG/ML
50 INJECTION INTRAMUSCULAR; INTRAVENOUS
Status: CANCELLED | OUTPATIENT
Start: 2023-03-02

## 2023-03-02 RX ORDER — ACETAMINOPHEN 325 MG/1
650 TABLET ORAL
Status: CANCELLED | OUTPATIENT
Start: 2023-03-02

## 2023-03-02 RX ORDER — SODIUM CHLORIDE 9 MG/ML
5-250 INJECTION, SOLUTION INTRAVENOUS PRN
Status: DISCONTINUED | OUTPATIENT
Start: 2023-03-02 | End: 2023-03-03 | Stop reason: HOSPADM

## 2023-03-02 RX ORDER — ONDANSETRON 2 MG/ML
8 INJECTION INTRAMUSCULAR; INTRAVENOUS
Status: CANCELLED | OUTPATIENT
Start: 2023-03-02

## 2023-03-02 RX ORDER — HEPARIN SODIUM (PORCINE) LOCK FLUSH IV SOLN 100 UNIT/ML 100 UNIT/ML
500 SOLUTION INTRAVENOUS PRN
Status: DISCONTINUED | OUTPATIENT
Start: 2023-03-02 | End: 2023-03-03 | Stop reason: HOSPADM

## 2023-03-02 RX ORDER — SODIUM CHLORIDE 0.9 % (FLUSH) 0.9 %
5-40 SYRINGE (ML) INJECTION PRN
Status: DISCONTINUED | OUTPATIENT
Start: 2023-03-02 | End: 2023-03-03 | Stop reason: HOSPADM

## 2023-03-02 RX ORDER — WATER 1000 ML/1000ML
2.2 INJECTION, SOLUTION INTRAVENOUS ONCE
OUTPATIENT
Start: 2023-03-02 | End: 2023-03-02

## 2023-03-02 RX ORDER — HEPARIN SODIUM (PORCINE) LOCK FLUSH IV SOLN 100 UNIT/ML 100 UNIT/ML
500 SOLUTION INTRAVENOUS PRN
OUTPATIENT
Start: 2023-03-02

## 2023-03-02 RX ORDER — SODIUM CHLORIDE 9 MG/ML
INJECTION, SOLUTION INTRAVENOUS CONTINUOUS
Status: CANCELLED | OUTPATIENT
Start: 2023-03-02

## 2023-03-02 RX ORDER — HEPARIN SODIUM (PORCINE) LOCK FLUSH IV SOLN 100 UNIT/ML 100 UNIT/ML
500 SOLUTION INTRAVENOUS PRN
Status: CANCELLED | OUTPATIENT
Start: 2023-03-02

## 2023-03-02 RX ORDER — ALBUTEROL SULFATE 90 UG/1
4 AEROSOL, METERED RESPIRATORY (INHALATION) PRN
Status: CANCELLED | OUTPATIENT
Start: 2023-03-02

## 2023-03-02 RX ORDER — SODIUM CHLORIDE 0.9 % (FLUSH) 0.9 %
5-40 SYRINGE (ML) INJECTION PRN
OUTPATIENT
Start: 2023-03-02

## 2023-03-02 RX ADMIN — Medication 500 UNITS: at 11:34

## 2023-03-02 RX ADMIN — SODIUM CHLORIDE 480 MG: 9 INJECTION, SOLUTION INTRAVENOUS at 10:49

## 2023-03-02 RX ADMIN — SODIUM CHLORIDE 20 ML/HR: 9 INJECTION, SOLUTION INTRAVENOUS at 10:49

## 2023-03-02 NOTE — TELEPHONE ENCOUNTER
Pt stopped by and dropped off social security papers for SW.  Pt stated that he would stop back after treatment. Pt did not return to office prior to leaving. SW contacted pt's home number and spoke to pt's wife as pt was laying down. SW informed wife that pt had left some paperwork and would need to sign a new document prior to his paperwork being sent out and complete. Wife agreed and stated that pt might be in the area in the morning and could stop by. SW to follow up with pt.        Chao HSU, ANGELA-S  Oncology Social Worker

## 2023-03-02 NOTE — TELEPHONE ENCOUNTER
Lm Orozco  3/2/2023  Wt Readings from Last 10 Encounters:   02/02/23 117 lb 11.2 oz (53.4 kg)   01/24/23 108 lb 8 oz (49.2 kg)   01/05/23 112 lb 1.6 oz (50.8 kg)   12/08/22 114 lb 9.6 oz (52 kg)   12/01/22 113 lb 11.2 oz (51.6 kg)   11/03/22 121 lb 14.4 oz (55.3 kg)   10/27/22 120 lb 6.4 oz (54.6 kg)   08/04/22 125 lb 9.6 oz (57 kg)   07/07/22 124 lb 6.4 oz (56.4 kg)   06/09/22 130 lb (59 kg)     Ht Readings from Last 1 Encounters:   02/02/23 5' 8\" (1.727 m)     BMI=18.63    Assessment: Met with Sherley Alfredo while in for OTV with Dr. Isaias Grimes. Sherley Alfredo reports his mouth is feeling better and the swelling has decreased. He continues on IV ATB for sepsis from submandibular abscess. He reports he is eating every consistency without problems or pain. Eating 3 meals per day and snack. Does report he fills up quickly but continues to eat because he know he needs to. Offered suggestions such as adding snacks between meals or properly storing half of meal and returning 2 hours later to finish meal. He said he prefers on his current eating pattern. He said he liked the Ensure Clear and [place] Essential but keeps forgetting to buy more when they go grocery shopping. Provided him with more samples of Ensure Clear and coupons. He was appreciative of samples and follow up visit. Encouraged him to call with questions or concerns. Weight change: 3.91% weight gain x 1 month, 6.72% weight gain x 3 months  Appetite: good appetite, eating 3 meals per day and snack in the evening. Not taking any ONS on a routine basis. Nutritional Side Effects: early satiety  Calculated Needs: 35-40kcal/kg/JTK=2629-2231sbzpf, 1.3-1.5gm/kg/IBW=90-105gm protein, 1ml/kcal=1950-2250ml fluids  Malnutrition Status:  Moderate  Nutrition Diagnosis: Moderate malnutrition r/t renal cell cancer AEB moderate subcutaneous fat loss noted to triceps, orbital/buccal fat pads, and moderate muscle wasting noted to temple and clavicle region. Recommendations: Continue 3 meals per day with at least 2 snacks per day. Add 1-2 ONS of choice per day.      Nav Bryan RD

## 2023-03-02 NOTE — PROGRESS NOTES
[address]  [address] [place] [address]   Hematology/Oncology  Consult      Patient Name: Gloria Finley  YOB: 1950  PCP: SHI Fournier NP   Referring Provider:      Reason for Consultation:   Chief Complaint   Patient presents with    Cancer     Renal cell cancer    Follow-up      Interval history: Reports hematuria. No other complaints. No new pain. History of Present Illness:  73years old male referred for possible metastatic renal cell carcinoma. Patient presented to his PCP, HETAL Pathak, complaining of right side chest wall mass which he noticed about a month ago and had been gradually increasing in size. CT chest from October 2021 showed mltiple heterogenous and necrotic masses in the right kidney with possible tumor invasion of the right renal vein. In addition to this there was a large 4 by 9 cm soft tissue necrotic mass involving the fourth rib. There were other lytic destructive lesions in the right eighth and ninth rib. There were multiple right lung nodules ranging upto 1.5 cm. Few liver nodules were noted as well the largest being 7 mm. 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 PET scan showed hypermetabolic lesions in the right kidney, left adrenal diffuse skeletal metastasis, scattered pulmonary nodules most of them subcentimeter except for 1 right lung hypermetabolic nodule. MRI brain reviewed no intracranial metastasis. S/p right chest wall mass biopsy on 11/2/2021. Consistent with clear-cell renal cell carcinoma. Performance status seems fair Karnofsky performance status 80/70. Intermediate risk based on [facility] risk factors although the disease seems to have progressed rapidly over the past month. His rapid progression as well as renal vein involvement probably precludes debulking nephrectomy. Tumor burden is not high and patient is asymptomatic.   Recommended treatment with ipilimumab/nivolumab combination. Started ipilimumab/nivolumab in November 2021. Switch to nivolumab monotherapy after 4 cycles and 3/2022. Patient reports pain in the right side chest wall mass which is well controlled with Tylenol as needed. Denies recent weight loss, loss of appetite night sweats, back pain. Review of systems: Over 10 systems were reviewed and all were negative except as mentioned above. Past medical history: Hypertension. Coronary artery disease, peripheral arterial disease, hyperlipidemia. Past surgical history: History of CABG, femoral arterial bypass, hernia repair. Social history: Smokes half a pack a day, denies alcohol or drug abuse. Family history Sister had breast cancer, father had colon cancer. Diagnostic Data:     Past Medical History:   Diagnosis Date    Anticoagulant long-term use 2007    aspirin    CAD (coronary artery disease)     Cancer (HCC)     kidney    Deep vein thrombosis (DVT) (HCC)     leg    [place] ([place] of hearing)     Hx of blood clots     Hyperlipidemia     Hypertension     Mentally challenged     information per patient's niece. Patient Active Problem List    Diagnosis Date Noted    Facial abscess 01/24/2023    Mandibular mass 01/24/2023    Metastatic renal cell carcinoma to bone [facility]) 01/24/2023    Hyponatremia 01/24/2023    Poor venous access     Renal cell cancer, right ([address] 11/11/2021        Past Surgical History:   Procedure Laterality Date    [address]    patient's sister said they were told a twin was removed from paitent's abdomen.     CARDIAC SURGERY  2007    bypass    CATHETER INSERTION Left 11/16/2021    MEDIPORT CATHETER INSERTION performed by Sudeep Marcus MD at [address] [place] [address]      bifemoral    IR PERCUT BX LUNG/MEDIASTINUM  11/2/2021    IR PERCUT BX LUNG/MEDIASTINUM 11/2/2021 [facility] CT       Family History  Family History   Problem Relation Age of Onset    High Blood Pressure Mother     Heart Disease Mother     Heart Attack Mother     Cancer Father 64        colon    Arthritis Sister     Heart Attack Brother     No Known Problems Maternal Uncle     No Known Problems Paternal Aunt     No Known Problems Paternal Uncle     No Known Problems Maternal Grandmother     Cancer Maternal Grandfather         throat    Heart Attack Paternal Grandmother     No Known Problems Paternal Grandfather     Cirrhosis Paternal Cousin     Deep Vein Thrombosis Sister     Diabetes Sister     Heart Disease Sister         triple bypass    Breast Cancer Sister 61    High Blood Pressure Sister     Diabetes Sister     High Cholesterol Sister     Arthritis Sister        Social History    TOBACCO:   reports that he has been smoking cigarettes. He has a 13.50 pack-year smoking history. He has never used smokeless tobacco.  ETOH:   reports that he does not currently use alcohol. Home Medications  Prior to Admission medications    Medication Sig Start Date End Date Taking? Authorizing Provider   lisinopril (PRINIVIL;ZESTRIL) 10 MG tablet Take 1 tablet by mouth daily New  lower dose 1/30/23  Yes Marycarmen Hayden MD   folic acid (FOLVITE) 1 MG tablet Take 1 tablet by mouth daily 1/30/23  Yes Marycarmen Hayden MD   ertapenem [facility]) infusion Infuse 1,000 mg intravenously every 24 hours Compound per protocol. 1/27/23 3/10/23 Yes SHI Weaver - CNS   levothyroxine (SYNTHROID) 75 MCG tablet Take 1 tablet by mouth daily 10/27/22  Yes Minda Stevens MD   Calcium Carbonate-Vitamin D (OYSTER SHELL CALCIUM/D) 500-200 MG-UNIT TABS Take 2 tablets by mouth daily 3/31/22 3/31/23 Yes Minda Stevens MD   lidocaine-prilocaine (EMLA) 2.5-2.5 % cream Apply topically as needed.  12/9/21  Yes SHI Roy - CNP   metoprolol tartrate (LOPRESSOR) 25 MG tablet Take 25 mg by mouth 2 times daily   Yes Historical Provider, MD   aspirin 81 MG EC tablet Take 81 mg by mouth daily LD 10/26/21   Yes Historical Provider, MD   simvastatin (ZOCOR) 80 MG tablet Take 80 mg by mouth daily   Yes Historical Provider, MD       Allergies  No Known Allergies    Review of Systems:      Objective  BP (!) 142/68   Pulse 51   Temp 98.2 °F (36.8 °C)   Ht 5' 8\" (1.727 m)   Wt 122 lb 8 oz (55.6 kg)   SpO2 99%   BMI 18.63 kg/m²     Physical Performance Status    Physical Exam:  General: AAO to person, place, time, and purpose, appears stated age, cooperative, no acute distress, pleasant   Head and neck : PERRLA, EOMI . Sclera non icteric. Oropharynx : Clear. No exposed bones or discharge. Neck: Abscess noted in submandibular region has resolved. LYMPHATICS : no lap  Lungs: Clear to auscultation. Right side chest wall globular mass, 4-5 cm in diamter has completely resolved. Heart: Regular rate and regular rhythm, no murmur, normal S1, S2  Abdomen: Soft, non-tender;no masses, no organomegaly  Extremities: No edema,no cyanosis, no clubbing. Skin:  No rash. Neurologic:Cranial nerves grossly intact. No focal motor or sensory deficits .     Recent Laboratory Data-   Lab Results   Component Value Date    WBC 11.2 02/27/2023    HGB 9.9 (L) 02/27/2023    HCT 30.5 (L) 02/27/2023    MCV 98.1 02/27/2023     02/27/2023    LYMPHOPCT 28.5 02/27/2023    RBC 3.11 (L) 02/27/2023    MCH 31.8 02/27/2023    MCHC 32.5 02/27/2023    RDW 16.8 (H) 02/27/2023    NEUTOPHILPCT 61.1 02/27/2023    MONOPCT 7.3 02/27/2023    BASOPCT 0.9 02/27/2023    NEUTROABS 6.82 02/27/2023    LYMPHSABS 3.18 02/27/2023    MONOSABS 0.82 02/27/2023    EOSABS 0.20 02/27/2023    BASOSABS 0.10 02/27/2023       Lab Results   Component Value Date     02/27/2023    K 3.6 02/27/2023    CL 99 02/27/2023    CO2 29 02/27/2023    BUN 7 02/27/2023    CREATININE 1.1 02/27/2023    GLUCOSE 138 (H) 02/27/2023    CALCIUM 8.9 02/27/2023    PROT 6.2 (L) 02/27/2023    LABALBU 3.8 02/27/2023    BILITOT <0.2 02/27/2023    ALKPHOS 134 (H) 02/27/2023    AST 13 02/27/2023    ALT 11 02/27/2023    LABGLOM >60 02/27/2023 GFRAA >60 08/03/2022       Lab Results   Component Value Date    IRON 102 01/26/2023    TIBC 193 (L) 01/26/2023    FERRITIN 389 01/26/2023           Radiology-    Echo Complete    Result Date: 10/8/2021  Transthoracic Echocardiography Report (TTE)  Demographics   Patient Name    LEIGH Turner            Male                  CHEY GREGG   Medical Record  [id number]      Room Number  Number   Account #       [id number]     Procedure Date    10/08/2021   Corporate ID                  Ordering          Juwan Swartz                                 Physician   Accession       [id number]    Referring         Juwan Swartz   Number                        Physician   Date of Birth   1950    Sonographer       Stephen Vasquez RDCS   Age             71 year(s)    Interpreting      [facility]                                Physician         Physician Cardiology                                                  Ambrose Lancaster MD                                 Any Other  Procedure Type of Study   TTE procedure:Echo Complete W/Doppler & Color Flow.  Procedure Date Date: 10/08/2021 Start: 09:57 AM Study Location: Echo Lab Technical Quality: Poor visualization due to poor acoustical window. Indications:Heart murmur. Patient Status: Routine Height: 68 inches Weight: 120 pounds BSA: 1.65 m^2 BMI: 18.25 kg/m^2  Findings   Left Ventricle  Normal left ventricular chamber size.  Normal left ventricular systolic function.  Visually estimated LVEF 65%.  Mild left ventricular concentric hypertrophy noted.  Normal diastolic function.   Right Ventricle  Normal right ventricle size and function.   Left Atrium  The left atrium is mildly dilated.  Mildly increased left atrial volume.  Interatrial septum not well visualized but appears intact.   Right Atrium  Normal right atrium.   Mitral Valve  Normal mitral valve structure.  There is moderate mitral regurgitation - ERO 0.23cm2, PISA 0.9cm, RV 51cc.  No mitral valve  prolapse. Tricuspid Valve  Normal tricuspid valve structure. There is mild tricuspid regurgitation. Mild pulmonary hypertension, RVSP 39mmHg. Aortic Valve  Normal aortic valve structure. There is trace to mild aortic regurgitation, pressure 1/2 time 718ms. Pulmonic Valve  The pulmonic valve was not well visualized. Pericardial Effusion  No evidence of pericardial effusion. Pericardium appears normal.   Pleural Effusion  No evidence of pleural effusion. Aorta  Aortic root 3.5cm. Miscellaneous  The inferior vena cava diameter is normal with normal respiratory  variation. Conclusions   Summary  Normal left ventricular chamber size. Normal left ventricular systolic function, LVEF 60%. Mild left ventricular concentric hypertrophy noted. Normal diastolic function. The left atrium is mildly dilated. Mildly increased left atrial volume. Interatrial septum not well visualized but appears intact. Normal right ventricle size and function. There is moderate mitral regurgitation - ERO 0.23cm2, PISA 0.9cm, RV 51cc. No mitral valve prolapse. There is trace to mild aortic regurgitation, pressure 1/2 time 718ms. There is mild tricuspid regurgitation. Mild pulmonary hypertension, RVSP 39mmHg. The pulmonic valve was not well visualized. Aortic root 3.5cm. No evidence of pericardial effusion. No intra cardiac mass or thrombus. No comparison study available.    Signature   ----------------------------------------------------------------  Electronically signed by Alona Liu MD(Interpreting  physician) on 10/08/2021 03:52 PM  ----------------------------------------------------------------  M-Mode/2D Measurements & Calculations   LV Diastolic    LV Systolic Dimension: 3.2   AV Cusp Separation: 1.9 cmLA  Dimension: 5.3  cm                           Dimension: 3.9 cmAO Root  cm              LV Volume Diastolic: 583.6   Dimension: 3.5 cm  LV FS:39.6 %    ml  LV PW           LV Volume Systolic: 36.5 ml Diastolic: 1.2  LV EDV/LV EDV Index: 134.5  cm              ml/82 ml/m^2LV ESV/LV ESV    RV Diastolic Dimension: 2.8  LV PW Systolic: Index: 37.4 EH/97ZV/ m^2     cm  1.5 cm          EF Calculated: 70 %          RV Systolic Dimension: 2.4 cm  Septum          LV Mass Index: 147 l/min*m^2 LA/Aorta: 1.2  Diastolic: 1.1  cm                                           LA volume/Index: 79.7 ml  Septum          LVOT: 2 cm  Systolic: 1.2  cm   LV Mass: 241.96  g  Doppler Measurements & Calculations   MV Peak E-Wave: 1.42   AV Peak Velocity: 1.87 m/s  LVOT Peak Velocity:  m/s                    AV Peak Gradient: 14.05     1.55 m/s  MV Peak A-Wave: 0.82   mmHg                        LVOT Mean Velocity:  m/s                    AV Mean Velocity: 1.27 m/s  0.81 m/s  MV E/A Ratio: 1.73     AV Mean Gradient: 7.2 mmHg  LVOT Peak Gradient: 9.6  MV Peak Gradient: 9.2  AV VTI: 42.1 cm             mmHgLVOT Mean Gradient:  mmHg                   AV Area (Continuity):2.37   3.5 mmHg  MV Mean Gradient: 2.9  cm^2                        Estimated RVSP: 39 mmHg  mmHg                   AV Deceleration Time:       Estimated RAP:10 mmHg  MV Mean Velocity: 0.75 2474.3 msec  m/s                    LVOT VTI: 31.8 cm  MV Deceleration Time:                              TR Velocity:2.69 m/s  271.4 msec             Estimated PASP: 39.03 mmHg  TR Gradient:29.03 mmHg  MV P1/2t: 101.8 msec   Pulm. Vein A Reversal       PV Peak Velocity: 0.51  MVA by PHT:2.16 cm^2   Duration:175.3 msec         m/s  MV Area (continuity):  Pulm. Vein D Velocity:0.74  PV Peak Gradient: 1.02  1.9 cm^2               m/sPulm. Vein A Reversal    mmHg                         Velocity:0.38 m/s           PV Mean Velocity: 0.36                         Pulm.  Vein S Velocity: 0.44 m/s  MR Velocity: 5.37 m/s  m/s                         PV Mean Gradient: 0.6  MV RICARDO PISA: 0.23 cm^2                             mmHg  MR VTI: 221.9 cm  Alias Velocity: 0.25  m/sPISA Radius: 0.9 cm   PISA area: 5.09 cm^2MR  flow rate: 125.72  ml/sMR volume:51.04 ml  [id number]    CT CHEST W CONTRAST    Addendum Date: 10/13/2021    ADDENDUM: 6 mm indeterminate hypodense lesions are identified in the body and tail of the pancreas. Consider surveillance     Result Date: 10/13/2021  EXAMINATION: CT OF THE CHEST WITH CONTRAST 10/13/2021 10:59 am TECHNIQUE: CT of the chest was performed with the administration of intravenous contrast. Multiplanar reformatted images are provided for review. Dose modulation, iterative reconstruction, and/or weight based adjustment of the mA/kV was utilized to reduce the radiation dose to as low as reasonably achievable. COMPARISON: None. HISTORY: ORDERING SYSTEM PROVIDED HISTORY: Chest wall mass FINDINGS: There is cardiomegaly. There is coronary artery calcification. The great vessels are normal.  Moderately enlarged mediastinal and hilar lymph nodes are noted with lymph nodes measuring up to 1.3 cm in the right hilum. Trachea and major bronchi are patent. Multiple bilateral pulmonary nodules are identified with nodules measuring up to 1.5 cm in the right lower lobe and smaller ones in the right middle lobe and right upper lobe. The pulmonary nodules in the left lower lobe ranges from 5 mm up to 8 mm. There is no focal consolidation or pleural effusion. Punctate enhancing nodules are identified in the posterior right hepatic lobe, largest 1 measuring up to 7 mm. Multiple heterogeneously enhancing right renal masses are identified largest 1 measuring 3.8 x 3.3 cm which is partially necrotic with additional smaller nodules concerning for multifocal malignancy like renal cell carcinoma. There is filling defects in the right renal vein concerning for tumor invasion versus tumor embolism of the renal vein.   There is bilateral adrenal metastasis with the largest 1 in the left adrenal gland measuring 1.6 x 2.7 cm. There is bone metastasis with the destructive soft tissue mass involving the right 4th rib anterolaterally with adjacent soft tissue mass which is partially necrotic measuring 4.5 x 9.2 cm. Lytic destructive lesions are also identified in the right 8th and 9th rib near the costo vertebral junction. Multiple heterogeneous  enhancing and necrotic masses in the right kidney concerning for multifocal renal cell carcinoma with  possible tumor invasion or tumor embolism of the right renal vein. There is diffuse metastasis with the involvement of the right and left adrenal glands, possible hepatic metastasis, widespread pulmonary and rib involvement as noted. Further assessment by PET-CT scan is recommended. Findings were telephoned to licensed caregiver. ASSESSMENT/PLAN :    Yazmin Singh was seen today for cancer and follow-up. Diagnoses and all orders for this visit:    Renal cell cancer, right ([address]  -     CBC with Auto Differential; Future  -     Comprehensive Metabolic Panel; Future  -     TSH; Future  -     AFL - Leeanna Zimmerman MD, Urology, [place]  -     CBC With Auto Differential; Future  -     Comprehensive metabolic panel; Future  -     TSH without Reflex;  Future  -     Cancel: 0.9 % sodium chloride infusion  -     Cancel: nivolumab (OPDIVO) 480 mg in sodium chloride 0.9 % 100 mL chemo IVPB  -     Cancel: sodium chloride flush 0.9 % injection 5-40 mL  -     Cancel: heparin flush 100 UNIT/ML injection 500 Units  -     PET CT SKULL BASE TO MID THIGH; Future    Other orders  -     sodium chloride (PF) 0.9 % injection 5-40 mL  -     0.9 % sodium chloride infusion  -     alteplase (CATHFLO) 2 mg in sterile water 2 mL injection  -     0.9 % sodium chloride infusion  -     diphenhydrAMINE (BENADRYL) injection 50 mg  -     famotidine (PEPCID) 20 mg in sodium chloride (PF) 0.9 % 10 mL injection  -     hydrocortisone sodium succinate PF (SOLU-CORTEF) injection 100 mg  -     acetaminophen (TYLENOL) tablet 650 mg  -     meperidine (DEMEROL) injection 12.5 mg  -     ondansetron (ZOFRAN) injection 8 mg  -     EPINEPHrine PF 1 MG/ML injection (Anaphylaxis) 0.3 mg  -     albuterol sulfate HFA (PROVENTIL;VENTOLIN;PROAIR) 108 (90 Base) MCG/ACT inhaler 4 puff       73years old male with Stage IV renal cell carcinoma (diagnosed 11/2021- chest wall mass biopsy) -right chest wall mass, multiple right kidney necrotic lesions in addition to large soft tissue destructive mass of right fourth rib, multiple lung nodules up to 1.5 cm, bilateral adrenal metastasis up to 2.5 cm. ECOG 1/2. Started ipilimumab/nivolumab combination in November 2021. S/p 4 cycles. Switched to single agent Opdivo in March 2022. Reviewed labs which were unremarkable. Continue Opdivo. Hematuria: Likely from renal mass. Urology referral made. CT chest abdomen pelvis 1/2023 showed a few millimeters increase in size of left scaphoid osseous lesion as well as tiny new rib lesion as well as about a centimeter increase in size of right renal mass. Seems to have a stable disease. 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 Pet scan ordered. Pending. Scheduled in a couple of weeks. [place] has been discontinued. 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 CT of neck showed extensive osseous destructive process with periosteal reaction involving the mandible. Right mandibular alveolar ridge biopsy was done which did not show any malignancy. Patient continues to be on ertapenem by ID for a total of 6 weeks. Evaluated by ENT. Has a follow-up in a week. 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 Feels better today. Jaw swelling is improved. Afebrile. Primary hypothyroidism: With low normal T4, elevated TSH. Related to immunotherapy. Asymptomatic. On 75 mcg daily Synthroid. TSH has come down to normal. We will continue with same dose and reassess in a month. Return to clinic in 4 weeks. Return in about 4 weeks (around 3/30/2023).      Aaron Moseley MD   Electronically signed 3/2/2023 at 10:38 AM

## 2023-03-02 NOTE — PROGRESS NOTES
Patient tolerated infusion well. Patient alert and oriented x 3 No distress noted. Vital signs stable. Patient denies any new or worsening pain. Patient denies questions regarding treatment or medication; verbalized understanding, offered patient information and discharge material; patient declined; Patient denies needs, all questions answered.

## 2023-03-30 ENCOUNTER — TELEPHONE (OUTPATIENT)
Dept: INFUSION THERAPY | Age: 73
End: 2023-03-30

## 2023-03-30 ENCOUNTER — OFFICE VISIT (OUTPATIENT)
Dept: ONCOLOGY | Age: 73
End: 2023-03-30

## 2023-03-30 ENCOUNTER — HOSPITAL ENCOUNTER (OUTPATIENT)
Dept: INFUSION THERAPY | Age: 73
Discharge: HOME | End: 2023-03-30
Payer: MEDICARE

## 2023-03-30 VITALS
SYSTOLIC BLOOD PRESSURE: 136 MMHG | HEIGHT: 68 IN | HEART RATE: 51 BPM | OXYGEN SATURATION: 98 % | BODY MASS INDEX: 18.79 KG/M2 | WEIGHT: 124 LBS | TEMPERATURE: 98.1 F | DIASTOLIC BLOOD PRESSURE: 82 MMHG

## 2023-03-30 VITALS — HEART RATE: 55 BPM | SYSTOLIC BLOOD PRESSURE: 146 MMHG | DIASTOLIC BLOOD PRESSURE: 60 MMHG

## 2023-03-30 DIAGNOSIS — L02.01 FACIAL ABSCESS: Primary | ICD-10-CM

## 2023-03-30 DIAGNOSIS — R22.0 MANDIBULAR MASS: ICD-10-CM

## 2023-03-30 DIAGNOSIS — C64.1 RENAL CELL CANCER, RIGHT (HCC): Primary | ICD-10-CM

## 2023-03-30 DIAGNOSIS — C64.1 RENAL CELL CANCER, RIGHT (HCC): ICD-10-CM

## 2023-03-30 LAB
ALBUMIN SERPL-MCNC: 3.9 G/DL (ref 3.5–5.2)
ALP SERPL-CCNC: 98 U/L (ref 40–129)
ALT SERPL-CCNC: 10 U/L (ref 0–40)
ANION GAP SERPL CALCULATED.3IONS-SCNC: 10 MMOL/L (ref 7–16)
AST SERPL-CCNC: 16 U/L (ref 0–39)
BASOPHILS # BLD: 0.09 E9/L (ref 0–0.2)
BASOPHILS NFR BLD: 1.1 % (ref 0–2)
BILIRUB SERPL-MCNC: <0.2 MG/DL (ref 0–1.2)
BUN SERPL-MCNC: 6 MG/DL (ref 6–23)
CALCIUM SERPL-MCNC: 9 MG/DL (ref 8.6–10.2)
CHLORIDE SERPL-SCNC: 98 MMOL/L (ref 98–107)
CO2 SERPL-SCNC: 25 MMOL/L (ref 22–29)
CREAT SERPL-MCNC: 0.9 MG/DL (ref 0.7–1.2)
EOSINOPHIL # BLD: 0.5 E9/L (ref 0.05–0.5)
EOSINOPHIL NFR BLD: 6 % (ref 0–6)
ERYTHROCYTE [DISTWIDTH] IN BLOOD BY AUTOMATED COUNT: 15.6 FL (ref 11.5–15)
GLUCOSE SERPL-MCNC: 149 MG/DL (ref 74–99)
HCT VFR BLD AUTO: 32.6 % (ref 37–54)
HGB BLD-MCNC: 10.8 G/DL (ref 12.5–16.5)
IMM GRANULOCYTES # BLD: 0.02 E9/L
IMM GRANULOCYTES NFR BLD: 0.2 % (ref 0–5)
LYMPHOCYTES # BLD: 2.58 E9/L (ref 1.5–4)
LYMPHOCYTES NFR BLD: 31 % (ref 20–42)
MCH RBC QN AUTO: 32.2 PG (ref 26–35)
MCHC RBC AUTO-ENTMCNC: 33.1 % (ref 32–34.5)
MCV RBC AUTO: 97.3 FL (ref 80–99.9)
MONOCYTES # BLD: 0.7 E9/L (ref 0.1–0.95)
MONOCYTES NFR BLD: 8.4 % (ref 2–12)
NEUTROPHILS # BLD: 4.44 E9/L (ref 1.8–7.3)
NEUTS SEG NFR BLD: 53.3 % (ref 43–80)
PLATELET # BLD AUTO: 261 E9/L (ref 130–450)
PMV BLD AUTO: 9.8 FL (ref 7–12)
POTASSIUM SERPL-SCNC: 3.8 MMOL/L (ref 3.5–5)
PROT SERPL-MCNC: 6.4 G/DL (ref 6.4–8.3)
RBC # BLD AUTO: 3.35 E12/L (ref 3.8–5.8)
SODIUM SERPL-SCNC: 133 MMOL/L (ref 132–146)
TSH SERPL-MCNC: 0.74 UIU/ML (ref 0.27–4.2)
WBC # BLD: 8.3 E9/L (ref 4.5–11.5)

## 2023-03-30 PROCEDURE — 80053 COMPREHEN METABOLIC PANEL: CPT

## 2023-03-30 PROCEDURE — 84443 ASSAY THYROID STIM HORMONE: CPT

## 2023-03-30 PROCEDURE — 2580000003 HC RX 258: Performed by: INTERNAL MEDICINE

## 2023-03-30 PROCEDURE — 85025 COMPLETE CBC W/AUTO DIFF WBC: CPT

## 2023-03-30 PROCEDURE — 6360000002 HC RX W HCPCS: Performed by: INTERNAL MEDICINE

## 2023-03-30 PROCEDURE — 96413 CHEMO IV INFUSION 1 HR: CPT

## 2023-03-30 RX ORDER — HEPARIN SODIUM (PORCINE) LOCK FLUSH IV SOLN 100 UNIT/ML 100 UNIT/ML
500 SOLUTION INTRAVENOUS PRN
Status: DISCONTINUED | OUTPATIENT
Start: 2023-03-30 | End: 2023-03-31 | Stop reason: HOSPADM

## 2023-03-30 RX ORDER — DIPHENHYDRAMINE HYDROCHLORIDE 50 MG/ML
50 INJECTION INTRAMUSCULAR; INTRAVENOUS
Status: CANCELLED | OUTPATIENT
Start: 2023-03-30

## 2023-03-30 RX ORDER — SODIUM CHLORIDE 9 MG/ML
5-40 INJECTION INTRAVENOUS PRN
Status: DISCONTINUED | OUTPATIENT
Start: 2023-03-30 | End: 2023-03-31 | Stop reason: HOSPADM

## 2023-03-30 RX ORDER — HEPARIN SODIUM (PORCINE) LOCK FLUSH IV SOLN 100 UNIT/ML 100 UNIT/ML
500 SOLUTION INTRAVENOUS PRN
OUTPATIENT
Start: 2023-03-30

## 2023-03-30 RX ORDER — SODIUM CHLORIDE 9 MG/ML
INJECTION, SOLUTION INTRAVENOUS CONTINUOUS
Status: CANCELLED | OUTPATIENT
Start: 2023-03-30

## 2023-03-30 RX ORDER — SODIUM CHLORIDE 9 MG/ML
5-250 INJECTION, SOLUTION INTRAVENOUS PRN
Status: CANCELLED | OUTPATIENT
Start: 2023-03-30

## 2023-03-30 RX ORDER — EPINEPHRINE 1 MG/ML
0.3 INJECTION, SOLUTION, CONCENTRATE INTRAVENOUS PRN
Status: CANCELLED | OUTPATIENT
Start: 2023-03-30

## 2023-03-30 RX ORDER — WATER 1000 ML/1000ML
2.2 INJECTION, SOLUTION INTRAVENOUS ONCE
OUTPATIENT
Start: 2023-03-30 | End: 2023-03-30

## 2023-03-30 RX ORDER — SODIUM CHLORIDE 9 MG/ML
5-250 INJECTION, SOLUTION INTRAVENOUS PRN
Status: DISCONTINUED | OUTPATIENT
Start: 2023-03-30 | End: 2023-03-31 | Stop reason: HOSPADM

## 2023-03-30 RX ORDER — SODIUM CHLORIDE 0.9 % (FLUSH) 0.9 %
5-40 SYRINGE (ML) INJECTION PRN
Status: DISCONTINUED | OUTPATIENT
Start: 2023-03-30 | End: 2023-03-31 | Stop reason: HOSPADM

## 2023-03-30 RX ORDER — ACETAMINOPHEN 325 MG/1
650 TABLET ORAL
Status: CANCELLED | OUTPATIENT
Start: 2023-03-30

## 2023-03-30 RX ORDER — SODIUM CHLORIDE 0.9 % (FLUSH) 0.9 %
5-40 SYRINGE (ML) INJECTION PRN
Status: CANCELLED | OUTPATIENT
Start: 2023-03-30

## 2023-03-30 RX ORDER — SODIUM CHLORIDE 0.9 % (FLUSH) 0.9 %
5-40 SYRINGE (ML) INJECTION PRN
OUTPATIENT
Start: 2023-03-30

## 2023-03-30 RX ORDER — ALBUTEROL SULFATE 90 UG/1
4 AEROSOL, METERED RESPIRATORY (INHALATION) PRN
Status: CANCELLED | OUTPATIENT
Start: 2023-03-30

## 2023-03-30 RX ORDER — HEPARIN SODIUM (PORCINE) LOCK FLUSH IV SOLN 100 UNIT/ML 100 UNIT/ML
500 SOLUTION INTRAVENOUS PRN
Status: CANCELLED | OUTPATIENT
Start: 2023-03-30

## 2023-03-30 RX ORDER — ONDANSETRON 2 MG/ML
8 INJECTION INTRAMUSCULAR; INTRAVENOUS
Status: CANCELLED | OUTPATIENT
Start: 2023-03-30

## 2023-03-30 RX ORDER — SODIUM CHLORIDE 9 MG/ML
5-40 INJECTION INTRAVENOUS PRN
Status: CANCELLED | OUTPATIENT
Start: 2023-03-30

## 2023-03-30 RX ADMIN — SODIUM CHLORIDE 480 MG: 9 INJECTION, SOLUTION INTRAVENOUS at 11:49

## 2023-03-30 RX ADMIN — SODIUM CHLORIDE, PRESERVATIVE FREE 10 ML: 5 INJECTION INTRAVENOUS at 11:24

## 2023-03-30 RX ADMIN — HEPARIN 500 UNITS: 100 SYRINGE at 12:27

## 2023-03-30 RX ADMIN — SODIUM CHLORIDE, PRESERVATIVE FREE 10 ML: 5 INJECTION INTRAVENOUS at 12:26

## 2023-03-30 RX ADMIN — SODIUM CHLORIDE, PRESERVATIVE FREE 10 ML: 5 INJECTION INTRAVENOUS at 10:17

## 2023-03-30 RX ADMIN — SODIUM CHLORIDE 20 ML/HR: 9 INJECTION, SOLUTION INTRAVENOUS at 11:23

## 2023-03-30 NOTE — TELEPHONE ENCOUNTER
Kelly Orozco  3/30/2023  Wt Readings from Last 10 Encounters:   03/30/23 124 lb (56.2 kg)   03/02/23 122 lb 8 oz (55.6 kg)   02/02/23 117 lb 11.2 oz (53.4 kg)   01/24/23 108 lb 8 oz (49.2 kg)   01/05/23 112 lb 1.6 oz (50.8 kg)   12/08/22 114 lb 9.6 oz (52 kg)   12/01/22 113 lb 11.2 oz (51.6 kg)   11/03/22 121 lb 14.4 oz (55.3 kg)   10/27/22 120 lb 6.4 oz (54.6 kg)   08/04/22 125 lb 9.6 oz (57 kg)     Ht Readings from Last 1 Encounters:   03/30/23 5' 8\" (1.727 m)     BMI=18.85    Assessment: Met with Keila Martinez while in for OTV with Dr. Arenas Keavy IV renal cell cancer. Ok Kirk reports he has had a great appetite. Eating 3 meals per day, usually a bedtime snacks and drinking 1-2 [place] Essentials per day. He reports he is chewing and swallowing without difficulty. No sores and mouth and denies taste alterations. He was proud of his weigh gain. Discussed what weight he would like to achieve and he said he has always been thin and was usually around 130# most of his adult life. He voices he would like to get back to 130#. He had no further questions at this time. Encouraged him to call if he has questions or concerns. Weight change: 1.22% weight gain x 1 month, 10.62% significant weight gain x 3 months, 2.99% weight gain x 6 months  Appetite: Good appetite, 3 meals per day, 1 snack and 1-2 [place] Breakfast Essentials  Nutritional Side Effects: None  Calculated Needs: 35-40kcal/kg/DVO=9477-7424etfqd, 1.3-1.5gm/kg/IBW=90-105gm protein, 1ml/kcal=2000-2300ml fluids   Malnutrition Status: Moderate malnutrition  Nutrition Diagnosis: Moderate malnutrition r/t renal cell cancer AEB moderate subcutaneous fat loss ntoed to triceps, orbital/buccal fat pads, and moderate muscle wasting to temple and clavicle region. Recommendations: Continue current eating routine of 3 meals per day with at least 1 snack and 1-2 [place] Breakfast Essentials.      Star Tyler RD

## 2023-03-30 NOTE — PROGRESS NOTES
MAMMO     daily  Patient not taking: Reported on 3/30/2023 10/27/22   Fred Napoles MD       Allergies  No Known Allergies    Review of Systems:      Objective  /82   Pulse 51   Temp 98.1 °F (36.7 °C)   Ht 5' 8\" (1.727 m)   Wt 124 lb (56.2 kg)   SpO2 98%   BMI 18.85 kg/m²     Physical Performance Status    Physical Exam:  General: AAO to person, place, time, and purpose, appears stated age, cooperative, no acute distress, pleasant   Head and neck : PERRLA, EOMI . Sclera non icteric. Oropharynx : Clear. No exposed bones or discharge. Neck: Abscess noted in submandibular region has resolved. LYMPHATICS : no lap  Lungs: Clear to auscultation. Right side chest wall globular mass, 4-5 cm in diamter has completely resolved. Heart: Regular rate and regular rhythm, no murmur, normal S1, S2  Abdomen: Soft, non-tender;no masses, no organomegaly  Extremities: No edema,no cyanosis, no clubbing. Skin:  No rash. Neurologic:Cranial nerves grossly intact. No focal motor or sensory deficits .     Recent Laboratory Data-   Lab Results   Component Value Date    WBC 8.3 03/30/2023    HGB 10.8 (L) 03/30/2023    HCT 32.6 (L) 03/30/2023    MCV 97.3 03/30/2023     03/30/2023    LYMPHOPCT 31.0 03/30/2023    RBC 3.35 (L) 03/30/2023    MCH 32.2 03/30/2023    MCHC 33.1 03/30/2023    RDW 15.6 (H) 03/30/2023    NEUTOPHILPCT 53.3 03/30/2023    MONOPCT 8.4 03/30/2023    BASOPCT 1.1 03/30/2023    NEUTROABS 4.44 03/30/2023    LYMPHSABS 2.58 03/30/2023    MONOSABS 0.70 03/30/2023    EOSABS 0.50 03/30/2023    BASOSABS 0.09 03/30/2023       Lab Results   Component Value Date     02/27/2023    K 3.6 02/27/2023    CL 99 02/27/2023    CO2 29 02/27/2023    BUN 7 02/27/2023    CREATININE 1.1 02/27/2023    GLUCOSE 138 (H) 02/27/2023    CALCIUM 8.9 02/27/2023    PROT 6.2 (L) 02/27/2023    LABALBU 3.8 02/27/2023    BILITOT <0.2 02/27/2023    ALKPHOS 134 (H) 02/27/2023    AST 13 02/27/2023    ALT 11 02/27/2023    LABGLOM >60 02/27/2023

## 2023-04-05 ENCOUNTER — CLINICAL DOCUMENTATION (OUTPATIENT)
Dept: INFECTIOUS DISEASES | Age: 73
End: 2023-04-05

## 2023-04-05 ENCOUNTER — HOSPITAL ENCOUNTER (OUTPATIENT)
Age: 73
Discharge: HOME | End: 2023-04-05
Payer: MEDICARE

## 2023-04-05 DIAGNOSIS — R22.0 MANDIBULAR MASS: ICD-10-CM

## 2023-04-05 DIAGNOSIS — L02.01 FACIAL ABSCESS: ICD-10-CM

## 2023-04-05 LAB
ALBUMIN SERPL-MCNC: 3.9 G/DL (ref 3.5–5.2)
ALP SERPL-CCNC: 110 U/L (ref 40–129)
ALT SERPL-CCNC: 9 U/L (ref 0–40)
ANION GAP SERPL CALCULATED.3IONS-SCNC: 8 MMOL/L (ref 7–16)
AST SERPL-CCNC: 14 U/L (ref 0–39)
BILIRUB SERPL-MCNC: <0.2 MG/DL (ref 0–1.2)
BUN SERPL-MCNC: 8 MG/DL (ref 6–23)
CALCIUM SERPL-MCNC: 9.5 MG/DL (ref 8.6–10.2)
CHLORIDE SERPL-SCNC: 102 MMOL/L (ref 98–107)
CO2 SERPL-SCNC: 28 MMOL/L (ref 22–29)
CREAT SERPL-MCNC: 1 MG/DL (ref 0.7–1.2)
ERYTHROCYTE [DISTWIDTH] IN BLOOD BY AUTOMATED COUNT: 15.5 FL (ref 11.5–15)
GLUCOSE SERPL-MCNC: 94 MG/DL (ref 74–99)
HCT VFR BLD AUTO: 36.8 % (ref 37–54)
HGB BLD-MCNC: 11.8 G/DL (ref 12.5–16.5)
MCH RBC QN AUTO: 32.2 PG (ref 26–35)
MCHC RBC AUTO-ENTMCNC: 32.1 % (ref 32–34.5)
MCV RBC AUTO: 100.3 FL (ref 80–99.9)
PLATELET # BLD AUTO: 287 E9/L (ref 130–450)
PMV BLD AUTO: 9.9 FL (ref 7–12)
POTASSIUM SERPL-SCNC: 4.6 MMOL/L (ref 3.5–5)
PROT SERPL-MCNC: 6.6 G/DL (ref 6.4–8.3)
RBC # BLD AUTO: 3.67 E12/L (ref 3.8–5.8)
SODIUM SERPL-SCNC: 138 MMOL/L (ref 132–146)
WBC # BLD: 9.3 E9/L (ref 4.5–11.5)

## 2023-04-05 PROCEDURE — 85027 COMPLETE CBC AUTOMATED: CPT

## 2023-04-05 PROCEDURE — 36415 COLL VENOUS BLD VENIPUNCTURE: CPT

## 2023-04-05 PROCEDURE — 80053 COMPREHEN METABOLIC PANEL: CPT

## 2023-04-05 NOTE — PROGRESS NOTES
[employer] pt with labs  Spoke with wife  Pt is laying down he had blood work did not f/u in office due to many appointments  Pt is doing well with Augmentin has no n/v/d  Made appt with pt's wife for 4/14 at 10am  She has not head from [address] [place], [place] [address]cristhian Gomes MD  1:06 PM

## 2023-04-27 ENCOUNTER — OFFICE VISIT (OUTPATIENT)
Dept: ONCOLOGY | Age: 73
End: 2023-04-27

## 2023-04-27 ENCOUNTER — HOSPITAL ENCOUNTER (OUTPATIENT)
Dept: INFUSION THERAPY | Age: 73
Discharge: HOME | End: 2023-04-27
Payer: MEDICARE

## 2023-04-27 VITALS
WEIGHT: 121.3 LBS | TEMPERATURE: 97.6 F | HEART RATE: 54 BPM | OXYGEN SATURATION: 98 % | SYSTOLIC BLOOD PRESSURE: 98 MMHG | DIASTOLIC BLOOD PRESSURE: 60 MMHG | BODY MASS INDEX: 18.38 KG/M2 | HEIGHT: 68 IN

## 2023-04-27 VITALS
SYSTOLIC BLOOD PRESSURE: 109 MMHG | TEMPERATURE: 98.3 F | OXYGEN SATURATION: 100 % | RESPIRATION RATE: 16 BRPM | DIASTOLIC BLOOD PRESSURE: 55 MMHG | HEART RATE: 52 BPM

## 2023-04-27 DIAGNOSIS — R22.0 MANDIBULAR MASS: ICD-10-CM

## 2023-04-27 DIAGNOSIS — C64.1 RENAL CELL CANCER, RIGHT (HCC): ICD-10-CM

## 2023-04-27 DIAGNOSIS — C64.1 RENAL CELL CANCER, RIGHT (HCC): Primary | ICD-10-CM

## 2023-04-27 DIAGNOSIS — L02.01 FACIAL ABSCESS: ICD-10-CM

## 2023-04-27 LAB
ALBUMIN SERPL-MCNC: 4.1 G/DL (ref 3.5–5.2)
ALP SERPL-CCNC: 104 U/L (ref 40–129)
ALT SERPL-CCNC: 10 U/L (ref 0–40)
ANION GAP SERPL CALCULATED.3IONS-SCNC: 10 MMOL/L (ref 7–16)
AST SERPL-CCNC: 16 U/L (ref 0–39)
BASOPHILS # BLD: 0.09 E9/L (ref 0–0.2)
BASOPHILS NFR BLD: 1.1 % (ref 0–2)
BILIRUB SERPL-MCNC: <0.2 MG/DL (ref 0–1.2)
BUN SERPL-MCNC: 7 MG/DL (ref 6–23)
CALCIUM SERPL-MCNC: 9.4 MG/DL (ref 8.6–10.2)
CHLORIDE SERPL-SCNC: 100 MMOL/L (ref 98–107)
CO2 SERPL-SCNC: 25 MMOL/L (ref 22–29)
CREAT SERPL-MCNC: 0.9 MG/DL (ref 0.7–1.2)
EOSINOPHIL # BLD: 0.36 E9/L (ref 0.05–0.5)
EOSINOPHIL NFR BLD: 4.3 % (ref 0–6)
ERYTHROCYTE [DISTWIDTH] IN BLOOD BY AUTOMATED COUNT: 14.8 FL (ref 11.5–15)
GLUCOSE SERPL-MCNC: 121 MG/DL (ref 74–99)
HCT VFR BLD AUTO: 36.7 % (ref 37–54)
HGB BLD-MCNC: 11.9 G/DL (ref 12.5–16.5)
IMM GRANULOCYTES # BLD: 0.02 E9/L
IMM GRANULOCYTES NFR BLD: 0.2 % (ref 0–5)
LYMPHOCYTES # BLD: 2.86 E9/L (ref 1.5–4)
LYMPHOCYTES NFR BLD: 34.5 % (ref 20–42)
MCH RBC QN AUTO: 32 PG (ref 26–35)
MCHC RBC AUTO-ENTMCNC: 32.4 % (ref 32–34.5)
MCV RBC AUTO: 98.7 FL (ref 80–99.9)
MONOCYTES # BLD: 0.73 E9/L (ref 0.1–0.95)
MONOCYTES NFR BLD: 8.8 % (ref 2–12)
NEUTROPHILS # BLD: 4.23 E9/L (ref 1.8–7.3)
NEUTS SEG NFR BLD: 51.1 % (ref 43–80)
PLATELET # BLD AUTO: 271 E9/L (ref 130–450)
PMV BLD AUTO: 10.2 FL (ref 7–12)
POTASSIUM SERPL-SCNC: 3.9 MMOL/L (ref 3.5–5)
PROT SERPL-MCNC: 6.7 G/DL (ref 6.4–8.3)
RBC # BLD AUTO: 3.72 E12/L (ref 3.8–5.8)
SODIUM SERPL-SCNC: 135 MMOL/L (ref 132–146)
WBC # BLD: 8.3 E9/L (ref 4.5–11.5)

## 2023-04-27 PROCEDURE — 85025 COMPLETE CBC W/AUTO DIFF WBC: CPT

## 2023-04-27 PROCEDURE — 2580000003 HC RX 258: Performed by: INTERNAL MEDICINE

## 2023-04-27 PROCEDURE — 80053 COMPREHEN METABOLIC PANEL: CPT

## 2023-04-27 PROCEDURE — 6360000002 HC RX W HCPCS: Performed by: INTERNAL MEDICINE

## 2023-04-27 PROCEDURE — 96413 CHEMO IV INFUSION 1 HR: CPT

## 2023-04-27 RX ORDER — SODIUM CHLORIDE 9 MG/ML
5-250 INJECTION, SOLUTION INTRAVENOUS PRN
Status: CANCELLED | OUTPATIENT
Start: 2023-04-27

## 2023-04-27 RX ORDER — ACETAMINOPHEN 325 MG/1
650 TABLET ORAL
Status: CANCELLED | OUTPATIENT
Start: 2023-04-27

## 2023-04-27 RX ORDER — EPINEPHRINE 1 MG/ML
0.3 INJECTION, SOLUTION, CONCENTRATE INTRAVENOUS PRN
Status: CANCELLED | OUTPATIENT
Start: 2023-04-27

## 2023-04-27 RX ORDER — SODIUM CHLORIDE 0.9 % (FLUSH) 0.9 %
5-40 SYRINGE (ML) INJECTION PRN
Status: DISCONTINUED | OUTPATIENT
Start: 2023-04-27 | End: 2023-04-28 | Stop reason: HOSPADM

## 2023-04-27 RX ORDER — HEPARIN SODIUM (PORCINE) LOCK FLUSH IV SOLN 100 UNIT/ML 100 UNIT/ML
500 SOLUTION INTRAVENOUS PRN
Status: CANCELLED | OUTPATIENT
Start: 2023-04-27

## 2023-04-27 RX ORDER — SODIUM CHLORIDE 0.9 % (FLUSH) 0.9 %
5-40 SYRINGE (ML) INJECTION PRN
Status: CANCELLED | OUTPATIENT
Start: 2023-04-27

## 2023-04-27 RX ORDER — SODIUM CHLORIDE 9 MG/ML
INJECTION, SOLUTION INTRAVENOUS CONTINUOUS
Status: CANCELLED | OUTPATIENT
Start: 2023-04-27

## 2023-04-27 RX ORDER — SODIUM CHLORIDE 9 MG/ML
5-250 INJECTION, SOLUTION INTRAVENOUS PRN
Status: DISCONTINUED | OUTPATIENT
Start: 2023-04-27 | End: 2023-04-28 | Stop reason: HOSPADM

## 2023-04-27 RX ORDER — HEPARIN SODIUM (PORCINE) LOCK FLUSH IV SOLN 100 UNIT/ML 100 UNIT/ML
500 SOLUTION INTRAVENOUS PRN
Status: DISCONTINUED | OUTPATIENT
Start: 2023-04-27 | End: 2023-04-28 | Stop reason: HOSPADM

## 2023-04-27 RX ORDER — DIPHENHYDRAMINE HYDROCHLORIDE 50 MG/ML
50 INJECTION INTRAMUSCULAR; INTRAVENOUS
Status: CANCELLED | OUTPATIENT
Start: 2023-04-27

## 2023-04-27 RX ORDER — ONDANSETRON 2 MG/ML
8 INJECTION INTRAMUSCULAR; INTRAVENOUS
Status: CANCELLED | OUTPATIENT
Start: 2023-04-27

## 2023-04-27 RX ORDER — ALBUTEROL SULFATE 90 UG/1
4 AEROSOL, METERED RESPIRATORY (INHALATION) PRN
Status: CANCELLED | OUTPATIENT
Start: 2023-04-27

## 2023-04-27 RX ORDER — MEPERIDINE HYDROCHLORIDE 25 MG/ML
12.5 INJECTION INTRAMUSCULAR; INTRAVENOUS; SUBCUTANEOUS PRN
Status: CANCELLED | OUTPATIENT
Start: 2023-04-27

## 2023-04-27 RX ADMIN — SODIUM CHLORIDE 100 ML/HR: 9 INJECTION, SOLUTION INTRAVENOUS at 11:37

## 2023-04-27 RX ADMIN — SODIUM CHLORIDE 480 MG: 9 INJECTION, SOLUTION INTRAVENOUS at 12:11

## 2023-04-27 RX ADMIN — Medication 500 UNITS: at 12:48

## 2023-04-27 RX ADMIN — SODIUM CHLORIDE, PRESERVATIVE FREE 10 ML: 5 INJECTION INTRAVENOUS at 12:48

## 2023-04-27 NOTE — PROGRESS NOTES
destructive lesions are also identified in the right 8th and 9th rib near the costo vertebral junction. Multiple heterogeneous  enhancing and necrotic masses in the right kidney concerning for multifocal renal cell carcinoma with  possible tumor invasion or tumor embolism of the right renal vein. There is diffuse metastasis with the involvement of the right and left adrenal glands, possible hepatic metastasis, widespread pulmonary and rib involvement as noted. Further assessment by PET-CT scan is recommended. Findings were telephoned to licensed caregiver. ASSESSMENT/PLAN :    Kathy Chapman was seen today for cancer and follow-up. Diagnoses and all orders for this visit:    Renal cell cancer, right ([address]  -     CBC With Auto Differential; Future  -     Comprehensive metabolic panel; Future  -     TSH without Reflex; Future  -     CBC with Auto Differential; Future  -     Comprehensive Metabolic Panel; Future  -     TSH;  Future    Other orders  -     0.9 % sodium chloride infusion  -     nivolumab (OPDIVO) 480 mg in sodium chloride 0.9 % 100 mL chemo IVPB  -     sodium chloride flush 0.9 % injection 5-40 mL  -     sodium chloride flush 0.9 % injection 5-40 mL  -     0.9 % sodium chloride infusion  -     heparin flush 100 UNIT/ML injection 500 Units  -     alteplase (CATHFLO) 2 mg in sterile water 2 mL injection  -     0.9 % sodium chloride infusion  -     diphenhydrAMINE (BENADRYL) injection 50 mg  -     famotidine (PEPCID) 20 mg in sodium chloride (PF) 0.9 % 10 mL injection  -     hydrocortisone sodium succinate PF (SOLU-CORTEF) injection 100 mg  -     acetaminophen (TYLENOL) tablet 650 mg  -     meperidine (DEMEROL) injection 12.5 mg  -     ondansetron (ZOFRAN) injection 8 mg  -     EPINEPHrine PF 1 MG/ML injection (Anaphylaxis) 0.3 mg  -     albuterol sulfate HFA (PROVENTIL;VENTOLIN;PROAIR) 108 (90 Base) MCG/ACT inhaler 4 puff         73years old male with Stage IV renal cell carcinoma (diagnosed

## 2023-04-27 NOTE — PROGRESS NOTES
Patient presents to clinic for labs today. L chest  SQ port accessed per policy using 20 G, 9.54 inches Zapata needle for good blood return. Aspirate for waste and specimen sent to lab. Site flushed easily with 10 mL NSS   Dry sterile dressing to area. Tolerated procedure well. Erick Crawford

## 2023-05-02 RX ORDER — LEVOTHYROXINE SODIUM 0.07 MG/1
75 TABLET ORAL DAILY
Qty: 90 TABLET | Refills: 1 | Status: SHIPPED | OUTPATIENT
Start: 2023-05-02

## 2023-05-18 ENCOUNTER — HOSPITAL ENCOUNTER (OUTPATIENT)
Dept: NUCLEAR MEDICINE | Age: 73
Discharge: HOME | End: 2023-05-18
Payer: MEDICARE

## 2023-05-18 DIAGNOSIS — C64.1 RENAL CELL CANCER, RIGHT (HCC): ICD-10-CM

## 2023-05-18 PROCEDURE — A9552 F18 FDG: HCPCS | Performed by: RADIOLOGY

## 2023-05-18 PROCEDURE — 3430000000 HC RX DIAGNOSTIC RADIOPHARMACEUTICAL: Performed by: RADIOLOGY

## 2023-05-18 RX ORDER — FLUDEOXYGLUCOSE F 18 200 MCI/ML
15 INJECTION, SOLUTION INTRAVENOUS
Status: COMPLETED | OUTPATIENT
Start: 2023-05-18 | End: 2023-05-18

## 2023-05-18 RX ADMIN — FLUDEOXYGLUCOSE F 18 15 MILLICURIE: 200 INJECTION, SOLUTION INTRAVENOUS at 12:42

## 2023-05-25 ENCOUNTER — HOSPITAL ENCOUNTER (OUTPATIENT)
Dept: INFUSION THERAPY | Age: 73
Discharge: HOME | End: 2023-05-25
Payer: MEDICARE

## 2023-05-25 ENCOUNTER — OFFICE VISIT (OUTPATIENT)
Dept: ONCOLOGY | Age: 73
End: 2023-05-25
Payer: MEDICARE

## 2023-05-25 VITALS
OXYGEN SATURATION: 100 % | DIASTOLIC BLOOD PRESSURE: 63 MMHG | SYSTOLIC BLOOD PRESSURE: 127 MMHG | TEMPERATURE: 97.6 F | HEART RATE: 47 BPM | HEIGHT: 68 IN | BODY MASS INDEX: 18.64 KG/M2 | WEIGHT: 123 LBS

## 2023-05-25 VITALS
HEART RATE: 51 BPM | TEMPERATURE: 97.8 F | RESPIRATION RATE: 16 BRPM | DIASTOLIC BLOOD PRESSURE: 65 MMHG | OXYGEN SATURATION: 97 % | SYSTOLIC BLOOD PRESSURE: 127 MMHG

## 2023-05-25 DIAGNOSIS — C64.1 RENAL CELL CANCER, RIGHT (HCC): Primary | ICD-10-CM

## 2023-05-25 LAB
ALBUMIN SERPL-MCNC: 3.9 G/DL (ref 3.5–5.2)
ALP SERPL-CCNC: 98 U/L (ref 40–129)
ALT SERPL-CCNC: 5 U/L (ref 0–40)
ANION GAP SERPL CALCULATED.3IONS-SCNC: 10 MMOL/L (ref 7–16)
AST SERPL-CCNC: 12 U/L (ref 0–39)
BASOPHILS # BLD: 0.1 E9/L (ref 0–0.2)
BASOPHILS NFR BLD: 1.2 % (ref 0–2)
BILIRUB SERPL-MCNC: 0.3 MG/DL (ref 0–1.2)
BUN SERPL-MCNC: 8 MG/DL (ref 6–23)
CALCIUM SERPL-MCNC: 9.3 MG/DL (ref 8.6–10.2)
CHLORIDE SERPL-SCNC: 103 MMOL/L (ref 98–107)
CO2 SERPL-SCNC: 25 MMOL/L (ref 22–29)
CREAT SERPL-MCNC: 1 MG/DL (ref 0.7–1.2)
EOSINOPHIL # BLD: 0.22 E9/L (ref 0.05–0.5)
EOSINOPHIL NFR BLD: 2.7 % (ref 0–6)
ERYTHROCYTE [DISTWIDTH] IN BLOOD BY AUTOMATED COUNT: 14.6 FL (ref 11.5–15)
GLUCOSE SERPL-MCNC: 136 MG/DL (ref 74–99)
HCT VFR BLD AUTO: 35.4 % (ref 37–54)
HGB BLD-MCNC: 11.3 G/DL (ref 12.5–16.5)
IMM GRANULOCYTES # BLD: 0.02 E9/L
IMM GRANULOCYTES NFR BLD: 0.2 % (ref 0–5)
LYMPHOCYTES # BLD: 2.72 E9/L (ref 1.5–4)
LYMPHOCYTES NFR BLD: 34 % (ref 20–42)
MCH RBC QN AUTO: 31.9 PG (ref 26–35)
MCHC RBC AUTO-ENTMCNC: 31.9 % (ref 32–34.5)
MCV RBC AUTO: 100 FL (ref 80–99.9)
MONOCYTES # BLD: 0.66 E9/L (ref 0.1–0.95)
MONOCYTES NFR BLD: 8.2 % (ref 2–12)
NEUTROPHILS # BLD: 4.29 E9/L (ref 1.8–7.3)
NEUTS SEG NFR BLD: 53.7 % (ref 43–80)
PLATELET # BLD AUTO: 261 E9/L (ref 130–450)
PMV BLD AUTO: 10.4 FL (ref 7–12)
POTASSIUM SERPL-SCNC: 3.8 MMOL/L (ref 3.5–5)
PROT SERPL-MCNC: 6.5 G/DL (ref 6.4–8.3)
RBC # BLD AUTO: 3.54 E12/L (ref 3.8–5.8)
SODIUM SERPL-SCNC: 138 MMOL/L (ref 132–146)
TSH SERPL-MCNC: 0.94 UIU/ML (ref 0.27–4.2)
WBC # BLD: 8 E9/L (ref 4.5–11.5)

## 2023-05-25 PROCEDURE — 6360000002 HC RX W HCPCS: Performed by: INTERNAL MEDICINE

## 2023-05-25 PROCEDURE — 2580000003 HC RX 258: Performed by: INTERNAL MEDICINE

## 2023-05-25 PROCEDURE — 80053 COMPREHEN METABOLIC PANEL: CPT

## 2023-05-25 PROCEDURE — 84443 ASSAY THYROID STIM HORMONE: CPT

## 2023-05-25 PROCEDURE — 85025 COMPLETE CBC W/AUTO DIFF WBC: CPT

## 2023-05-25 PROCEDURE — 96413 CHEMO IV INFUSION 1 HR: CPT

## 2023-05-25 PROCEDURE — 99212 OFFICE O/P EST SF 10 MIN: CPT

## 2023-05-25 RX ORDER — SODIUM CHLORIDE 0.9 % (FLUSH) 0.9 %
5-40 SYRINGE (ML) INJECTION PRN
Status: CANCELLED | OUTPATIENT
Start: 2023-05-25

## 2023-05-25 RX ORDER — EPINEPHRINE 1 MG/ML
0.3 INJECTION, SOLUTION, CONCENTRATE INTRAVENOUS PRN
Status: CANCELLED | OUTPATIENT
Start: 2023-05-25

## 2023-05-25 RX ORDER — SODIUM CHLORIDE 0.9 % (FLUSH) 0.9 %
5-40 SYRINGE (ML) INJECTION PRN
OUTPATIENT
Start: 2023-05-25

## 2023-05-25 RX ORDER — HEPARIN SODIUM (PORCINE) LOCK FLUSH IV SOLN 100 UNIT/ML 100 UNIT/ML
500 SOLUTION INTRAVENOUS PRN
OUTPATIENT
Start: 2023-05-25

## 2023-05-25 RX ORDER — SODIUM CHLORIDE 9 MG/ML
5-250 INJECTION, SOLUTION INTRAVENOUS PRN
Status: CANCELLED | OUTPATIENT
Start: 2023-05-25

## 2023-05-25 RX ORDER — HEPARIN SODIUM (PORCINE) LOCK FLUSH IV SOLN 100 UNIT/ML 100 UNIT/ML
500 SOLUTION INTRAVENOUS PRN
Status: CANCELLED | OUTPATIENT
Start: 2023-05-25

## 2023-05-25 RX ORDER — ALBUTEROL SULFATE 90 UG/1
4 AEROSOL, METERED RESPIRATORY (INHALATION) PRN
Status: CANCELLED | OUTPATIENT
Start: 2023-05-25

## 2023-05-25 RX ORDER — SODIUM CHLORIDE 9 MG/ML
INJECTION, SOLUTION INTRAVENOUS CONTINUOUS
Status: CANCELLED | OUTPATIENT
Start: 2023-05-25

## 2023-05-25 RX ORDER — SODIUM CHLORIDE 0.9 % (FLUSH) 0.9 %
5-40 SYRINGE (ML) INJECTION PRN
Status: DISCONTINUED | OUTPATIENT
Start: 2023-05-25 | End: 2023-05-26 | Stop reason: HOSPADM

## 2023-05-25 RX ORDER — HEPARIN SODIUM (PORCINE) LOCK FLUSH IV SOLN 100 UNIT/ML 100 UNIT/ML
500 SOLUTION INTRAVENOUS PRN
Status: DISCONTINUED | OUTPATIENT
Start: 2023-05-25 | End: 2023-05-26 | Stop reason: HOSPADM

## 2023-05-25 RX ORDER — WATER 1000 ML/1000ML
2.2 INJECTION, SOLUTION INTRAVENOUS ONCE
OUTPATIENT
Start: 2023-05-25 | End: 2023-05-25

## 2023-05-25 RX ORDER — ACETAMINOPHEN 325 MG/1
650 TABLET ORAL
Status: CANCELLED | OUTPATIENT
Start: 2023-05-25

## 2023-05-25 RX ORDER — ONDANSETRON 2 MG/ML
8 INJECTION INTRAMUSCULAR; INTRAVENOUS
Status: CANCELLED | OUTPATIENT
Start: 2023-05-25

## 2023-05-25 RX ORDER — SODIUM CHLORIDE 9 MG/ML
5-250 INJECTION, SOLUTION INTRAVENOUS PRN
Status: DISCONTINUED | OUTPATIENT
Start: 2023-05-25 | End: 2023-05-26 | Stop reason: HOSPADM

## 2023-05-25 RX ORDER — DIPHENHYDRAMINE HYDROCHLORIDE 50 MG/ML
50 INJECTION INTRAMUSCULAR; INTRAVENOUS
Status: CANCELLED | OUTPATIENT
Start: 2023-05-25

## 2023-05-25 RX ADMIN — SODIUM CHLORIDE, PRESERVATIVE FREE 10 ML: 5 INJECTION INTRAVENOUS at 10:19

## 2023-05-25 RX ADMIN — SODIUM CHLORIDE 20 ML/HR: 9 INJECTION, SOLUTION INTRAVENOUS at 12:01

## 2023-05-25 RX ADMIN — SODIUM CHLORIDE, PRESERVATIVE FREE 10 ML: 5 INJECTION INTRAVENOUS at 12:01

## 2023-05-25 RX ADMIN — SODIUM CHLORIDE 480 MG: 9 INJECTION, SOLUTION INTRAVENOUS at 12:19

## 2023-05-25 RX ADMIN — Medication 500 UNITS: at 13:04

## 2023-05-25 RX ADMIN — Medication 500 UNITS: at 10:19

## 2023-05-25 NOTE — PROGRESS NOTES
[address]  [address] [place] [address]   Hematology/Oncology  Consult      Patient Name: Valentino Garcia  YOB: 1950  PCP: SHI Veras NP   Referring Provider:      Reason for Consultation:   Chief Complaint   Patient presents with    Cancer    Follow-up     Renal cell cancer      Interval history: No new complaints. History of Present Illness:  73years old male referred for possible metastatic renal cell carcinoma. Patient presented to his PCP, HETAL Pathak, complaining of right side chest wall mass which he noticed about a month ago and had been gradually increasing in size. CT chest from October 2021 showed mltiple heterogenous and necrotic masses in the right kidney with possible tumor invasion of the right renal vein. In addition to this there was a large 4 by 9 cm soft tissue necrotic mass involving the fourth rib. There were other lytic destructive lesions in the right eighth and ninth rib. There were multiple right lung nodules ranging upto 1.5 cm. Few liver nodules were noted as well the largest being 7 mm. 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 PET scan showed hypermetabolic lesions in the right kidney, left adrenal diffuse skeletal metastasis, scattered pulmonary nodules most of them subcentimeter except for 1 right lung hypermetabolic nodule. MRI brain reviewed no intracranial metastasis. S/p right chest wall mass biopsy on 11/2/2021. Consistent with clear-cell renal cell carcinoma. Performance status seems fair Karnofsky performance status 80/70. Intermediate risk based on MSKCC risk factors although the disease seems to have progressed rapidly over the past month. His rapid progression as well as renal vein involvement probably precludes debulking nephrectomy. Tumor burden is not high and patient is asymptomatic. Recommended treatment with ipilimumab/nivolumab combination.       Started

## 2023-05-25 NOTE — PROGRESS NOTES
Patient presents to clinic for labs today. Left chest  SQ port accessed per policy using 20 G 0.84 inch Zapata needle for good blood return. Aspirate for waste and specimen sent to lab. Site flushed easily with 10 mL NSS followed by 5 mL Heparin solution 100 units/ml rinse prior to de-access. Dry sterile dressing to area. Tolerated procedure well. Encouraged to schedule port flush every 4 weeks.

## 2023-06-22 ENCOUNTER — HOSPITAL ENCOUNTER (OUTPATIENT)
Dept: INFUSION THERAPY | Age: 73
Discharge: HOME | End: 2023-06-22
Payer: MEDICARE

## 2023-06-22 ENCOUNTER — OFFICE VISIT (OUTPATIENT)
Dept: ONCOLOGY | Age: 73
End: 2023-06-22

## 2023-06-22 ENCOUNTER — TELEPHONE (OUTPATIENT)
Dept: ONCOLOGY | Age: 73
End: 2023-06-22

## 2023-06-22 VITALS
SYSTOLIC BLOOD PRESSURE: 120 MMHG | TEMPERATURE: 96.9 F | WEIGHT: 122.6 LBS | BODY MASS INDEX: 18.58 KG/M2 | HEIGHT: 68 IN | HEART RATE: 57 BPM | OXYGEN SATURATION: 100 % | DIASTOLIC BLOOD PRESSURE: 58 MMHG

## 2023-06-22 VITALS — SYSTOLIC BLOOD PRESSURE: 100 MMHG | DIASTOLIC BLOOD PRESSURE: 58 MMHG | HEART RATE: 58 BPM | RESPIRATION RATE: 16 BRPM

## 2023-06-22 DIAGNOSIS — C64.1 RENAL CELL CANCER, RIGHT (HCC): Primary | ICD-10-CM

## 2023-06-22 DIAGNOSIS — C64.1 RENAL CELL CANCER, RIGHT (HCC): ICD-10-CM

## 2023-06-22 LAB
ALBUMIN SERPL-MCNC: 4.2 G/DL (ref 3.5–5.2)
ALP SERPL-CCNC: 93 U/L (ref 40–129)
ALT SERPL-CCNC: 9 U/L (ref 0–40)
ANION GAP SERPL CALCULATED.3IONS-SCNC: 12 MMOL/L (ref 7–16)
AST SERPL-CCNC: 14 U/L (ref 0–39)
BASOPHILS # BLD: 0.1 E9/L (ref 0–0.2)
BASOPHILS NFR BLD: 1.1 % (ref 0–2)
BILIRUB SERPL-MCNC: 0.2 MG/DL (ref 0–1.2)
BUN SERPL-MCNC: 5 MG/DL (ref 6–23)
CALCIUM SERPL-MCNC: 9.3 MG/DL (ref 8.6–10.2)
CHLORIDE SERPL-SCNC: 98 MMOL/L (ref 98–107)
CO2 SERPL-SCNC: 25 MMOL/L (ref 22–29)
CREAT SERPL-MCNC: 0.8 MG/DL (ref 0.7–1.2)
EOSINOPHIL # BLD: 0.39 E9/L (ref 0.05–0.5)
EOSINOPHIL NFR BLD: 4.2 % (ref 0–6)
ERYTHROCYTE [DISTWIDTH] IN BLOOD BY AUTOMATED COUNT: 14.4 FL (ref 11.5–15)
GLUCOSE SERPL-MCNC: 97 MG/DL (ref 74–99)
HCT VFR BLD AUTO: 36 % (ref 37–54)
HGB BLD-MCNC: 12.2 G/DL (ref 12.5–16.5)
IMM GRANULOCYTES # BLD: 0.03 E9/L
IMM GRANULOCYTES NFR BLD: 0.3 % (ref 0–5)
LYMPHOCYTES # BLD: 3.22 E9/L (ref 1.5–4)
LYMPHOCYTES NFR BLD: 34.9 % (ref 20–42)
MCH RBC QN AUTO: 32.4 PG (ref 26–35)
MCHC RBC AUTO-ENTMCNC: 33.9 % (ref 32–34.5)
MCV RBC AUTO: 95.5 FL (ref 80–99.9)
MONOCYTES # BLD: 0.71 E9/L (ref 0.1–0.95)
MONOCYTES NFR BLD: 7.7 % (ref 2–12)
NEUTROPHILS # BLD: 4.78 E9/L (ref 1.8–7.3)
NEUTS SEG NFR BLD: 51.8 % (ref 43–80)
PLATELET # BLD AUTO: 266 E9/L (ref 130–450)
PMV BLD AUTO: 9.6 FL (ref 7–12)
POTASSIUM SERPL-SCNC: 4.1 MMOL/L (ref 3.5–5)
PROT SERPL-MCNC: 6.7 G/DL (ref 6.4–8.3)
RBC # BLD AUTO: 3.77 E12/L (ref 3.8–5.8)
SODIUM SERPL-SCNC: 135 MMOL/L (ref 132–146)
TSH SERPL-MCNC: 0.71 UIU/ML (ref 0.27–4.2)
WBC # BLD: 9.2 E9/L (ref 4.5–11.5)

## 2023-06-22 PROCEDURE — 80053 COMPREHEN METABOLIC PANEL: CPT

## 2023-06-22 PROCEDURE — 96413 CHEMO IV INFUSION 1 HR: CPT

## 2023-06-22 PROCEDURE — 84443 ASSAY THYROID STIM HORMONE: CPT

## 2023-06-22 PROCEDURE — 85025 COMPLETE CBC W/AUTO DIFF WBC: CPT

## 2023-06-22 PROCEDURE — 2580000003 HC RX 258: Performed by: INTERNAL MEDICINE

## 2023-06-22 PROCEDURE — 6360000002 HC RX W HCPCS: Performed by: INTERNAL MEDICINE

## 2023-06-22 RX ORDER — SODIUM CHLORIDE 9 MG/ML
5-250 INJECTION, SOLUTION INTRAVENOUS PRN
Status: CANCELLED | OUTPATIENT
Start: 2023-06-22

## 2023-06-22 RX ORDER — SODIUM CHLORIDE 0.9 % (FLUSH) 0.9 %
5-40 SYRINGE (ML) INJECTION PRN
Status: CANCELLED | OUTPATIENT
Start: 2023-06-22

## 2023-06-22 RX ORDER — HEPARIN SODIUM 100 [USP'U]/ML
500 INJECTION, SOLUTION INTRAVENOUS PRN
Status: CANCELLED | OUTPATIENT
Start: 2023-06-22

## 2023-06-22 RX ORDER — ONDANSETRON 2 MG/ML
8 INJECTION INTRAMUSCULAR; INTRAVENOUS
Status: CANCELLED | OUTPATIENT
Start: 2023-06-22

## 2023-06-22 RX ORDER — ALBUTEROL SULFATE 90 UG/1
4 AEROSOL, METERED RESPIRATORY (INHALATION) PRN
Status: CANCELLED | OUTPATIENT
Start: 2023-06-22

## 2023-06-22 RX ORDER — ACETAMINOPHEN 325 MG/1
650 TABLET ORAL
Status: CANCELLED | OUTPATIENT
Start: 2023-06-22

## 2023-06-22 RX ORDER — SODIUM CHLORIDE 9 MG/ML
5-250 INJECTION, SOLUTION INTRAVENOUS PRN
Status: DISCONTINUED | OUTPATIENT
Start: 2023-06-22 | End: 2023-06-23 | Stop reason: HOSPADM

## 2023-06-22 RX ORDER — SODIUM CHLORIDE 0.9 % (FLUSH) 0.9 %
5-40 SYRINGE (ML) INJECTION PRN
Status: DISCONTINUED | OUTPATIENT
Start: 2023-06-22 | End: 2023-06-23 | Stop reason: HOSPADM

## 2023-06-22 RX ORDER — EPINEPHRINE 1 MG/ML
0.3 INJECTION, SOLUTION, CONCENTRATE INTRAVENOUS PRN
Status: CANCELLED | OUTPATIENT
Start: 2023-06-22

## 2023-06-22 RX ORDER — MEPERIDINE HYDROCHLORIDE 25 MG/ML
12.5 INJECTION INTRAMUSCULAR; INTRAVENOUS; SUBCUTANEOUS PRN
Status: CANCELLED | OUTPATIENT
Start: 2023-06-22

## 2023-06-22 RX ORDER — DIPHENHYDRAMINE HYDROCHLORIDE 50 MG/ML
50 INJECTION INTRAMUSCULAR; INTRAVENOUS
Status: CANCELLED | OUTPATIENT
Start: 2023-06-22

## 2023-06-22 RX ORDER — HEPARIN SODIUM 100 [USP'U]/ML
500 INJECTION, SOLUTION INTRAVENOUS PRN
Status: DISCONTINUED | OUTPATIENT
Start: 2023-06-22 | End: 2023-06-23 | Stop reason: HOSPADM

## 2023-06-22 RX ORDER — SODIUM CHLORIDE 9 MG/ML
INJECTION, SOLUTION INTRAVENOUS CONTINUOUS
Status: CANCELLED | OUTPATIENT
Start: 2023-06-22

## 2023-06-22 RX ADMIN — SODIUM CHLORIDE, PRESERVATIVE FREE 10 ML: 5 INJECTION INTRAVENOUS at 13:36

## 2023-06-22 RX ADMIN — SODIUM CHLORIDE 20 ML/HR: 9 INJECTION, SOLUTION INTRAVENOUS at 13:36

## 2023-06-22 RX ADMIN — SODIUM CHLORIDE 480 MG: 9 INJECTION, SOLUTION INTRAVENOUS at 13:56

## 2023-06-22 NOTE — PROGRESS NOTES
[address]  [address] [place] [address]   Hematology/Oncology  Consult      Patient Name: Alcon Novoa  YOB: 1950  PCP: SHI Marino NP   Referring Provider:      Reason for Consultation:   Chief Complaint   Patient presents with    Cancer    Follow-up     Renal cell cancer      Interval history: Complaining of new pain in right flank for the past couple of weeks. History of Present Illness:  73years old male referred for possible metastatic renal cell carcinoma. Patient presented to his PCP, HETAL Pathak, complaining of right side chest wall mass which he noticed about a month ago and had been gradually increasing in size. CT chest from October 2021 showed mltiple heterogenous and necrotic masses in the right kidney with possible tumor invasion of the right renal vein. In addition to this there was a large 4 by 9 cm soft tissue necrotic mass involving the fourth rib. There were other lytic destructive lesions in the right eighth and ninth rib. There were multiple right lung nodules ranging upto 1.5 cm. Few liver nodules were noted as well the largest being 7 mm. 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 PET scan showed hypermetabolic lesions in the right kidney, left adrenal diffuse skeletal metastasis, scattered pulmonary nodules most of them subcentimeter except for 1 right lung hypermetabolic nodule. MRI brain reviewed no intracranial metastasis. S/p right chest wall mass biopsy on 11/2/2021. Consistent with clear-cell renal cell carcinoma. Performance status seems fair Karnofsky performance status 80/70. Intermediate risk based on MSKCC risk factors although the disease seems to have progressed rapidly over the past month. His rapid progression as well as renal vein involvement probably precludes debulking nephrectomy. Tumor burden is not high and patient is asymptomatic.   Recommended treatment with

## 2023-06-22 NOTE — TELEPHONE ENCOUNTER
SW briefly followed up with pt. Pt reported struggling with his finances at this time and stated that this was not a good month for him. Pt was offered a gas card to assist with needs at this time. Pt was informed that he would need to bring in a receipt for the card and was encouraged to reach out to this provider should any other needs arise.        Alex Love MSW, [facility]  Oncology Social Worker

## 2023-06-22 NOTE — PROGRESS NOTES
Patient presents to clinic for labs today. L chest  SQ port accessed per policy using 20 G, 8.79 inches Zapata needle for good blood return. Aspirate for waste and specimen sent to lab. Site flushed easily with 10 mL NSS   Dry sterile dressing to area. Tolerated procedure well. Patient to see doctor and get treatment.

## 2023-07-10 ENCOUNTER — TELEPHONE (OUTPATIENT)
Dept: INFUSION THERAPY | Age: 73
End: 2023-07-10

## 2023-07-10 NOTE — TELEPHONE ENCOUNTER
This nurse returned patient's call , he was wondering if his mediport could be used for his CT scan at the end of the month. Per Nino Landeros NP it's alright to use as long as it's a power port . Patient verbalized understanding.

## 2023-07-13 ENCOUNTER — HOSPITAL ENCOUNTER (OUTPATIENT)
Dept: INFUSION THERAPY | Age: 73
End: 2023-07-13

## 2023-07-31 ENCOUNTER — HOSPITAL ENCOUNTER (OUTPATIENT)
Dept: CT IMAGING | Age: 73
Discharge: HOME | End: 2023-07-31
Attending: INTERNAL MEDICINE
Payer: MEDICARE

## 2023-07-31 ENCOUNTER — HOSPITAL ENCOUNTER (OUTPATIENT)
Age: 73
Discharge: HOME | End: 2023-07-31
Attending: INTERNAL MEDICINE
Payer: MEDICARE

## 2023-07-31 DIAGNOSIS — C64.1 RENAL CELL CANCER, RIGHT (HCC): ICD-10-CM

## 2023-07-31 LAB
ALBUMIN SERPL-MCNC: 4.6 G/DL (ref 3.5–5.2)
ALP SERPL-CCNC: 121 U/L (ref 40–129)
ALT SERPL-CCNC: 11 U/L (ref 0–40)
ANION GAP SERPL CALCULATED.3IONS-SCNC: 10 MMOL/L (ref 7–16)
AST SERPL-CCNC: 15 U/L (ref 0–39)
BASOPHILS # BLD: 0.09 K/UL (ref 0–0.2)
BASOPHILS NFR BLD: 1 % (ref 0–2)
BILIRUB SERPL-MCNC: 0.2 MG/DL (ref 0–1.2)
BUN SERPL-MCNC: 18 MG/DL (ref 6–23)
CALCIUM SERPL-MCNC: 10.6 MG/DL (ref 8.6–10.2)
CHLORIDE SERPL-SCNC: 97 MMOL/L (ref 98–107)
CO2 SERPL-SCNC: 26 MMOL/L (ref 22–29)
CREAT SERPL-MCNC: 1.1 MG/DL (ref 0.7–1.2)
EOSINOPHIL # BLD: 0.28 K/UL (ref 0.05–0.5)
EOSINOPHILS RELATIVE PERCENT: 4 % (ref 0–6)
ERYTHROCYTE [DISTWIDTH] IN BLOOD BY AUTOMATED COUNT: 14.8 % (ref 11.5–15)
GFR SERPL CREATININE-BSD FRML MDRD: >60 ML/MIN/1.73M2
GLUCOSE SERPL-MCNC: 103 MG/DL (ref 74–99)
HCT VFR BLD AUTO: 44.4 % (ref 37–54)
HGB BLD-MCNC: 14.8 G/DL (ref 12.5–16.5)
IMM GRANULOCYTES # BLD AUTO: <0.03 K/UL (ref 0–0.58)
IMM GRANULOCYTES NFR BLD: 0 % (ref 0–5)
LYMPHOCYTES NFR BLD: 2.66 K/UL (ref 1.5–4)
LYMPHOCYTES RELATIVE PERCENT: 33 % (ref 20–42)
MCH RBC QN AUTO: 32.4 PG (ref 26–35)
MCHC RBC AUTO-ENTMCNC: 33.3 G/DL (ref 32–34.5)
MCV RBC AUTO: 97.2 FL (ref 80–99.9)
MONOCYTES NFR BLD: 0.56 K/UL (ref 0.1–0.95)
MONOCYTES NFR BLD: 7 % (ref 2–12)
NEUTROPHILS NFR BLD: 55 % (ref 43–80)
NEUTS SEG NFR BLD: 4.39 K/UL (ref 1.8–7.3)
PLATELET # BLD AUTO: 278 K/UL (ref 130–450)
PMV BLD AUTO: 9.2 FL (ref 7–12)
POTASSIUM SERPL-SCNC: 5.6 MMOL/L (ref 3.5–5)
PROT SERPL-MCNC: 7.9 G/DL (ref 6.4–8.3)
RBC # BLD AUTO: 4.57 M/UL (ref 3.8–5.8)
SODIUM SERPL-SCNC: 133 MMOL/L (ref 132–146)
TSH SERPL DL<=0.05 MIU/L-ACNC: 1.28 UIU/ML (ref 0.27–4.2)
WBC OTHER # BLD: 8 K/UL (ref 4.5–11.5)

## 2023-07-31 PROCEDURE — 85027 COMPLETE CBC AUTOMATED: CPT

## 2023-07-31 PROCEDURE — 6360000004 HC RX CONTRAST MEDICATION: Performed by: RADIOLOGY

## 2023-07-31 PROCEDURE — 74177 CT ABD & PELVIS W/CONTRAST: CPT

## 2023-07-31 PROCEDURE — 84443 ASSAY THYROID STIM HORMONE: CPT

## 2023-07-31 PROCEDURE — 80053 COMPREHEN METABOLIC PANEL: CPT

## 2023-07-31 PROCEDURE — 36415 COLL VENOUS BLD VENIPUNCTURE: CPT

## 2023-07-31 RX ADMIN — IOPAMIDOL 75 ML: 755 INJECTION, SOLUTION INTRAVENOUS at 13:45

## 2023-07-31 RX ADMIN — IOPAMIDOL 18 ML: 755 INJECTION, SOLUTION INTRAVENOUS at 13:45

## 2023-08-02 ENCOUNTER — TELEPHONE (OUTPATIENT)
Dept: ONCOLOGY | Age: 73
End: 2023-08-02

## 2023-08-02 NOTE — TELEPHONE ENCOUNTER
Pt stopped by clinic to ask about his last application for [facility]. Pt applied in December and received a letter that he would be able to apply again as of July of this year. Form was completed and signed by pt and put in the mail. SW informed pt that he does not get updates on this and that pt will get updates through the mail. Pt reported understanding.        Zach Mcdaniel MSW, DERICW-S  Oncology Social Worker

## 2023-08-03 ENCOUNTER — HOSPITAL ENCOUNTER (OUTPATIENT)
Dept: INFUSION THERAPY | Age: 73
Discharge: HOME | End: 2023-08-03
Payer: MEDICARE

## 2023-08-03 ENCOUNTER — TELEPHONE (OUTPATIENT)
Dept: ONCOLOGY | Age: 73
End: 2023-08-03

## 2023-08-03 ENCOUNTER — OFFICE VISIT (OUTPATIENT)
Dept: ONCOLOGY | Age: 73
End: 2023-08-03

## 2023-08-03 VITALS
DIASTOLIC BLOOD PRESSURE: 55 MMHG | OXYGEN SATURATION: 95 % | TEMPERATURE: 97.1 F | SYSTOLIC BLOOD PRESSURE: 95 MMHG | RESPIRATION RATE: 18 BRPM | HEART RATE: 82 BPM

## 2023-08-03 VITALS
OXYGEN SATURATION: 94 % | WEIGHT: 118.9 LBS | SYSTOLIC BLOOD PRESSURE: 93 MMHG | DIASTOLIC BLOOD PRESSURE: 63 MMHG | BODY MASS INDEX: 18.08 KG/M2 | HEART RATE: 105 BPM | RESPIRATION RATE: 16 BRPM | TEMPERATURE: 96.3 F

## 2023-08-03 DIAGNOSIS — C64.1 RENAL CELL CANCER, RIGHT (HCC): Primary | ICD-10-CM

## 2023-08-03 LAB
ALBUMIN SERPL-MCNC: 4.3 G/DL (ref 3.5–5.2)
ALP SERPL-CCNC: 108 U/L (ref 40–129)
ALT SERPL-CCNC: 11 U/L (ref 0–40)
ANION GAP SERPL CALCULATED.3IONS-SCNC: 9 MMOL/L (ref 7–16)
AST SERPL-CCNC: 17 U/L (ref 0–39)
BASOPHILS # BLD: 0.1 K/UL (ref 0–0.2)
BASOPHILS NFR BLD: 1 % (ref 0–2)
BILIRUB SERPL-MCNC: 0.2 MG/DL (ref 0–1.2)
BUN SERPL-MCNC: 19 MG/DL (ref 6–23)
CALCIUM SERPL-MCNC: 9.5 MG/DL (ref 8.6–10.2)
CHLORIDE SERPL-SCNC: 97 MMOL/L (ref 98–107)
CO2 SERPL-SCNC: 25 MMOL/L (ref 22–29)
CREAT SERPL-MCNC: 0.9 MG/DL (ref 0.7–1.2)
EOSINOPHIL # BLD: 0.31 K/UL (ref 0.05–0.5)
EOSINOPHILS RELATIVE PERCENT: 4 % (ref 0–6)
ERYTHROCYTE [DISTWIDTH] IN BLOOD BY AUTOMATED COUNT: 14.6 % (ref 11.5–15)
GFR SERPL CREATININE-BSD FRML MDRD: >60 ML/MIN/1.73M2
GLUCOSE SERPL-MCNC: 109 MG/DL (ref 74–107)
HCT VFR BLD AUTO: 37.9 % (ref 37–54)
HGB BLD-MCNC: 12.7 G/DL (ref 12.5–16.5)
IMM GRANULOCYTES # BLD AUTO: <0.03 K/UL (ref 0–0.58)
IMM GRANULOCYTES NFR BLD: 0 % (ref 0–5)
LYMPHOCYTES NFR BLD: 2.23 K/UL (ref 1.5–4)
LYMPHOCYTES RELATIVE PERCENT: 29 % (ref 20–42)
MCH RBC QN AUTO: 31.8 PG (ref 26–35)
MCHC RBC AUTO-ENTMCNC: 33.5 G/DL (ref 32–34.5)
MCV RBC AUTO: 94.8 FL (ref 80–99.9)
MONOCYTES NFR BLD: 0.72 K/UL (ref 0.1–0.95)
MONOCYTES NFR BLD: 9 % (ref 2–12)
NEUTROPHILS NFR BLD: 56 % (ref 43–80)
NEUTS SEG NFR BLD: 4.32 K/UL (ref 1.8–7.3)
PLATELET # BLD AUTO: 256 K/UL (ref 130–450)
PMV BLD AUTO: 9.1 FL (ref 7–12)
POTASSIUM SERPL-SCNC: 4.5 MMOL/L (ref 3.5–5)
PROT SERPL-MCNC: 7 G/DL (ref 6.4–8.3)
RBC # BLD AUTO: 4 M/UL (ref 3.8–5.8)
SODIUM SERPL-SCNC: 131 MMOL/L (ref 132–146)
TSH SERPL DL<=0.05 MIU/L-ACNC: 2.13 UIU/ML (ref 0.76–16.11)
WBC OTHER # BLD: 7.7 K/UL (ref 4.5–11.5)

## 2023-08-03 PROCEDURE — 96413 CHEMO IV INFUSION 1 HR: CPT

## 2023-08-03 PROCEDURE — 6360000002 HC RX W HCPCS: Performed by: INTERNAL MEDICINE

## 2023-08-03 PROCEDURE — 2580000003 HC RX 258: Performed by: INTERNAL MEDICINE

## 2023-08-03 PROCEDURE — 85025 COMPLETE CBC W/AUTO DIFF WBC: CPT

## 2023-08-03 PROCEDURE — 80053 COMPREHEN METABOLIC PANEL: CPT

## 2023-08-03 PROCEDURE — 84443 ASSAY THYROID STIM HORMONE: CPT

## 2023-08-03 RX ORDER — SODIUM CHLORIDE 0.9 % (FLUSH) 0.9 %
5-40 SYRINGE (ML) INJECTION PRN
OUTPATIENT
Start: 2023-08-03

## 2023-08-03 RX ORDER — SODIUM CHLORIDE 0.9 % (FLUSH) 0.9 %
5-40 SYRINGE (ML) INJECTION PRN
Status: CANCELLED | OUTPATIENT
Start: 2023-08-03

## 2023-08-03 RX ORDER — SODIUM CHLORIDE 9 MG/ML
5-250 INJECTION, SOLUTION INTRAVENOUS PRN
Status: CANCELLED | OUTPATIENT
Start: 2023-08-03

## 2023-08-03 RX ORDER — SODIUM CHLORIDE 9 MG/ML
5-250 INJECTION, SOLUTION INTRAVENOUS PRN
Status: DISCONTINUED | OUTPATIENT
Start: 2023-08-03 | End: 2023-08-04 | Stop reason: HOSPADM

## 2023-08-03 RX ORDER — HEPARIN 100 UNIT/ML
500 SYRINGE INTRAVENOUS PRN
Status: DISCONTINUED | OUTPATIENT
Start: 2023-08-03 | End: 2023-08-04 | Stop reason: HOSPADM

## 2023-08-03 RX ORDER — MEPERIDINE HYDROCHLORIDE 25 MG/ML
12.5 INJECTION INTRAMUSCULAR; INTRAVENOUS; SUBCUTANEOUS PRN
Status: CANCELLED | OUTPATIENT
Start: 2023-08-03

## 2023-08-03 RX ORDER — DIPHENHYDRAMINE HYDROCHLORIDE 50 MG/ML
50 INJECTION INTRAMUSCULAR; INTRAVENOUS
Status: CANCELLED | OUTPATIENT
Start: 2023-08-03

## 2023-08-03 RX ORDER — SODIUM CHLORIDE 0.9 % (FLUSH) 0.9 %
5-40 SYRINGE (ML) INJECTION PRN
Status: DISCONTINUED | OUTPATIENT
Start: 2023-08-03 | End: 2023-08-04 | Stop reason: HOSPADM

## 2023-08-03 RX ORDER — EPINEPHRINE 1 MG/ML
0.3 INJECTION, SOLUTION, CONCENTRATE INTRAVENOUS PRN
Status: CANCELLED | OUTPATIENT
Start: 2023-08-03

## 2023-08-03 RX ORDER — HEPARIN 100 UNIT/ML
500 SYRINGE INTRAVENOUS PRN
OUTPATIENT
Start: 2023-08-03

## 2023-08-03 RX ORDER — OXYCODONE HYDROCHLORIDE 5 MG/1
5 TABLET ORAL EVERY 6 HOURS PRN
Qty: 28 TABLET | Refills: 0 | Status: SHIPPED | OUTPATIENT
Start: 2023-08-03 | End: 2023-08-10

## 2023-08-03 RX ORDER — HEPARIN 100 UNIT/ML
500 SYRINGE INTRAVENOUS PRN
Status: CANCELLED | OUTPATIENT
Start: 2023-08-03

## 2023-08-03 RX ORDER — ONDANSETRON 2 MG/ML
8 INJECTION INTRAMUSCULAR; INTRAVENOUS
Status: CANCELLED | OUTPATIENT
Start: 2023-08-03

## 2023-08-03 RX ORDER — SODIUM CHLORIDE 9 MG/ML
INJECTION, SOLUTION INTRAVENOUS CONTINUOUS
Status: CANCELLED | OUTPATIENT
Start: 2023-08-03

## 2023-08-03 RX ORDER — ACETAMINOPHEN 325 MG/1
650 TABLET ORAL
Status: CANCELLED | OUTPATIENT
Start: 2023-08-03

## 2023-08-03 RX ORDER — ALBUTEROL SULFATE 90 UG/1
4 AEROSOL, METERED RESPIRATORY (INHALATION) PRN
Status: CANCELLED | OUTPATIENT
Start: 2023-08-03

## 2023-08-03 RX ORDER — WATER 1000 ML/1000ML
2.2 INJECTION, SOLUTION INTRAVENOUS ONCE
OUTPATIENT
Start: 2023-08-03 | End: 2023-08-03

## 2023-08-03 RX ADMIN — SODIUM CHLORIDE 480 MG: 9 INJECTION, SOLUTION INTRAVENOUS at 12:20

## 2023-08-03 RX ADMIN — SODIUM CHLORIDE, PRESERVATIVE FREE 10 ML: 5 INJECTION INTRAVENOUS at 10:20

## 2023-08-03 RX ADMIN — SODIUM CHLORIDE 20 ML/HR: 9 INJECTION, SOLUTION INTRAVENOUS at 12:21

## 2023-08-03 NOTE — PROGRESS NOTES
Patient was treated for osteomyelitis with ertapenem by ID. Finished ertapenem. Is on amoxicillin. Patient is feeling better. Jaw swelling/abscess resolved. [facility] has been discontinued. Primary hypothyroidism: With low normal T4, elevated TSH. Related to immunotherapy. Asymptomatic. On 75 mcg daily Synthroid. TSH has come down to normal. We will continue with same dose and reassess in a month. Return to clinic in 4 weeks. Addendum: 8/4/2023- CT abdomen showed increasing size of right kidney mass, with stable lytic bone lesions. Urology referal made to assess for nephrectomy for isolated progression in right kidney. Return in about 4 weeks (around 8/31/2023).      Nahed Mendenhall MD   Electronically signed 8/3/2023 at 12:03 PM

## 2023-08-03 NOTE — TELEPHONE ENCOUNTER
Pt stopped by office to drop off receipts from gift cards given to pt previously. Pt was encouraged to reach out to this provider should any other needs arise.      Doc HSU, [facility]-S  Oncology Social Worker

## 2023-08-04 ENCOUNTER — TELEPHONE (OUTPATIENT)
Dept: PALLATIVE CARE | Age: 73
End: 2023-08-04

## 2023-08-04 NOTE — TELEPHONE ENCOUNTER
spoke to Osvaldo regarding referral to Palliative medicine from Dr. Alma Ann office. Pt has already been seen by PM back in October 2021, pt declined appt at this time. Stated he was doing okay and did not want to schedule an appt. Advised him to let Dr. Alma Ann office know if he felt in the future that he needed to see us or he could call us on his own, pt acknowledged.       Twila Sarabia MSW,LSW  Palliative Medicine

## 2023-08-29 ENCOUNTER — HOSPITAL ENCOUNTER (EMERGENCY)
Age: 73
Discharge: HOME | End: 2023-08-29
Attending: STUDENT IN AN ORGANIZED HEALTH CARE EDUCATION/TRAINING PROGRAM
Payer: MEDICARE

## 2023-08-29 ENCOUNTER — APPOINTMENT (OUTPATIENT)
Dept: CT IMAGING | Age: 73
End: 2023-08-29
Payer: MEDICARE

## 2023-08-29 ENCOUNTER — APPOINTMENT (OUTPATIENT)
Dept: GENERAL RADIOLOGY | Age: 73
End: 2023-08-29
Payer: MEDICARE

## 2023-08-29 VITALS
BODY MASS INDEX: 18.19 KG/M2 | DIASTOLIC BLOOD PRESSURE: 67 MMHG | WEIGHT: 120 LBS | RESPIRATION RATE: 17 BRPM | TEMPERATURE: 98.8 F | HEIGHT: 68 IN | SYSTOLIC BLOOD PRESSURE: 132 MMHG | HEART RATE: 69 BPM | OXYGEN SATURATION: 98 %

## 2023-08-29 DIAGNOSIS — Z85.528 HISTORY OF RENAL CELL CARCINOMA: ICD-10-CM

## 2023-08-29 DIAGNOSIS — I71.40 ABDOMINAL AORTIC ANEURYSM (AAA) WITHOUT RUPTURE, UNSPECIFIED PART (HCC): ICD-10-CM

## 2023-08-29 DIAGNOSIS — R10.9 FLANK PAIN: Primary | ICD-10-CM

## 2023-08-29 LAB
ALBUMIN SERPL-MCNC: 3.6 G/DL (ref 3.5–5.2)
ALP SERPL-CCNC: 86 U/L (ref 40–129)
ALT SERPL-CCNC: 9 U/L (ref 0–40)
ANION GAP SERPL CALCULATED.3IONS-SCNC: 7 MMOL/L (ref 7–16)
AST SERPL-CCNC: 15 U/L (ref 0–39)
BASOPHILS # BLD: 0 K/UL (ref 0–0.2)
BASOPHILS NFR BLD: 0 % (ref 0–2)
BILIRUB SERPL-MCNC: 0.2 MG/DL (ref 0–1.2)
BILIRUB UR QL STRIP: NEGATIVE
BUN SERPL-MCNC: 7 MG/DL (ref 6–23)
CALCIUM SERPL-MCNC: 8.7 MG/DL (ref 8.6–10.2)
CHLORIDE SERPL-SCNC: 100 MMOL/L (ref 98–107)
CLARITY UR: CLEAR
CO2 SERPL-SCNC: 25 MMOL/L (ref 22–29)
COLOR UR: YELLOW
CREAT SERPL-MCNC: 1 MG/DL (ref 0.7–1.2)
EOSINOPHIL # BLD: 0.31 K/UL (ref 0.05–0.5)
EOSINOPHILS RELATIVE PERCENT: 4 % (ref 0–6)
ERYTHROCYTE [DISTWIDTH] IN BLOOD BY AUTOMATED COUNT: 14.4 % (ref 11.5–15)
GFR SERPL CREATININE-BSD FRML MDRD: >60 ML/MIN/1.73M2
GLUCOSE SERPL-MCNC: 119 MG/DL (ref 74–99)
GLUCOSE UR STRIP-MCNC: NEGATIVE MG/DL
HCT VFR BLD AUTO: 34 % (ref 37–54)
HGB BLD-MCNC: 11.6 G/DL (ref 12.5–16.5)
HGB UR QL STRIP.AUTO: ABNORMAL
KETONES UR STRIP-MCNC: NEGATIVE MG/DL
LACTATE BLDV-SCNC: 1 MMOL/L (ref 0.5–2.2)
LEUKOCYTE ESTERASE UR QL STRIP: NEGATIVE
LIPASE SERPL-CCNC: 51 U/L (ref 13–60)
LYMPHOCYTES NFR BLD: 1.25 K/UL (ref 1.5–4)
LYMPHOCYTES RELATIVE PERCENT: 16 % (ref 20–42)
MCH RBC QN AUTO: 32.8 PG (ref 26–35)
MCHC RBC AUTO-ENTMCNC: 34.1 G/DL (ref 32–34.5)
MCV RBC AUTO: 96 FL (ref 80–99.9)
MONOCYTES NFR BLD: 0.55 K/UL (ref 0.1–0.95)
MONOCYTES NFR BLD: 7 % (ref 2–12)
NEUTROPHILS NFR BLD: 73 % (ref 43–80)
NEUTS SEG NFR BLD: 5.69 K/UL (ref 1.8–7.3)
NITRITE UR QL STRIP: NEGATIVE
PH UR STRIP: 6.5 [PH] (ref 5–9)
PLATELET CONFIRMATION: NORMAL
PLATELET, FLUORESCENCE: 225 K/UL (ref 130–450)
PMV BLD AUTO: 10.5 FL (ref 7–12)
POTASSIUM SERPL-SCNC: 4.2 MMOL/L (ref 3.5–5)
PROT SERPL-MCNC: 5.8 G/DL (ref 6.4–8.3)
PROT UR STRIP-MCNC: NEGATIVE MG/DL
RBC # BLD AUTO: 3.54 M/UL (ref 3.8–5.8)
RBC # BLD: ABNORMAL 10*6/UL
RBC #/AREA URNS HPF: ABNORMAL /HPF
SODIUM SERPL-SCNC: 132 MMOL/L (ref 132–146)
SP GR UR STRIP: 1.01 (ref 1–1.03)
TROPONIN I SERPL HS-MCNC: 10 NG/L (ref 0–11)
TROPONIN I SERPL HS-MCNC: 14 NG/L (ref 0–11)
UROBILINOGEN UR STRIP-ACNC: 0.2 EU/DL (ref 0–1)
WBC #/AREA URNS HPF: ABNORMAL /HPF
WBC OTHER # BLD: 7.8 K/UL (ref 4.5–11.5)

## 2023-08-29 PROCEDURE — 83605 ASSAY OF LACTIC ACID: CPT

## 2023-08-29 PROCEDURE — 93005 ELECTROCARDIOGRAM TRACING: CPT | Performed by: STUDENT IN AN ORGANIZED HEALTH CARE EDUCATION/TRAINING PROGRAM

## 2023-08-29 PROCEDURE — 96374 THER/PROPH/DIAG INJ IV PUSH: CPT

## 2023-08-29 PROCEDURE — 71045 X-RAY EXAM CHEST 1 VIEW: CPT

## 2023-08-29 PROCEDURE — 84484 ASSAY OF TROPONIN QUANT: CPT

## 2023-08-29 PROCEDURE — 83690 ASSAY OF LIPASE: CPT

## 2023-08-29 PROCEDURE — 6360000002 HC RX W HCPCS: Performed by: STUDENT IN AN ORGANIZED HEALTH CARE EDUCATION/TRAINING PROGRAM

## 2023-08-29 PROCEDURE — 74177 CT ABD & PELVIS W/CONTRAST: CPT

## 2023-08-29 PROCEDURE — 2580000003 HC RX 258: Performed by: STUDENT IN AN ORGANIZED HEALTH CARE EDUCATION/TRAINING PROGRAM

## 2023-08-29 PROCEDURE — 96375 TX/PRO/DX INJ NEW DRUG ADDON: CPT

## 2023-08-29 PROCEDURE — 85025 COMPLETE CBC W/AUTO DIFF WBC: CPT

## 2023-08-29 PROCEDURE — 6360000004 HC RX CONTRAST MEDICATION: Performed by: RADIOLOGY

## 2023-08-29 PROCEDURE — 71275 CT ANGIOGRAPHY CHEST: CPT

## 2023-08-29 PROCEDURE — 80053 COMPREHEN METABOLIC PANEL: CPT

## 2023-08-29 PROCEDURE — 96361 HYDRATE IV INFUSION ADD-ON: CPT

## 2023-08-29 PROCEDURE — 99285 EMERGENCY DEPT VISIT HI MDM: CPT

## 2023-08-29 PROCEDURE — 81001 URINALYSIS AUTO W/SCOPE: CPT

## 2023-08-29 RX ORDER — ONDANSETRON 2 MG/ML
4 INJECTION INTRAMUSCULAR; INTRAVENOUS ONCE
Status: COMPLETED | OUTPATIENT
Start: 2023-08-29 | End: 2023-08-29

## 2023-08-29 RX ORDER — FENTANYL CITRATE 0.05 MG/ML
25 INJECTION, SOLUTION INTRAMUSCULAR; INTRAVENOUS ONCE
Status: COMPLETED | OUTPATIENT
Start: 2023-08-29 | End: 2023-08-29

## 2023-08-29 RX ORDER — OXYCODONE HYDROCHLORIDE 5 MG/1
5 TABLET ORAL EVERY 6 HOURS PRN
Qty: 8 TABLET | Refills: 0 | Status: SHIPPED | OUTPATIENT
Start: 2023-08-29 | End: 2023-08-31

## 2023-08-29 RX ORDER — 0.9 % SODIUM CHLORIDE 0.9 %
1000 INTRAVENOUS SOLUTION INTRAVENOUS ONCE
Status: COMPLETED | OUTPATIENT
Start: 2023-08-29 | End: 2023-08-29

## 2023-08-29 RX ADMIN — IOPAMIDOL 90 ML: 612 INJECTION, SOLUTION INTRAVENOUS at 18:49

## 2023-08-29 RX ADMIN — FENTANYL CITRATE 25 MCG: 0.05 INJECTION, SOLUTION INTRAMUSCULAR; INTRAVENOUS at 17:08

## 2023-08-29 RX ADMIN — ONDANSETRON 4 MG: 2 INJECTION INTRAMUSCULAR; INTRAVENOUS at 17:08

## 2023-08-29 RX ADMIN — SODIUM CHLORIDE 1000 ML: 9 INJECTION, SOLUTION INTRAVENOUS at 17:06

## 2023-08-29 ASSESSMENT — ENCOUNTER SYMPTOMS
COUGH: 0
SORE THROAT: 0
ABDOMINAL PAIN: 0
SHORTNESS OF BREATH: 0
BACK PAIN: 0
NAUSEA: 1
DIARRHEA: 0
PHOTOPHOBIA: 0

## 2023-08-29 ASSESSMENT — PAIN SCALES - GENERAL: PAINLEVEL_OUTOF10: 8

## 2023-08-29 ASSESSMENT — LIFESTYLE VARIABLES
HOW OFTEN DO YOU HAVE A DRINK CONTAINING ALCOHOL: NEVER
HOW MANY STANDARD DRINKS CONTAINING ALCOHOL DO YOU HAVE ON A TYPICAL DAY: PATIENT DOES NOT DRINK

## 2023-08-29 ASSESSMENT — PAIN - FUNCTIONAL ASSESSMENT: PAIN_FUNCTIONAL_ASSESSMENT: 0-10

## 2023-08-29 NOTE — ED PROVIDER NOTES
abdomen pelvis-right renal mass appreciated  ECG/medicine tests: ordered and independent interpretation performed. Details: Rate 67, normal sinus rhythm, no STEMI, normal intervals, grossly stable compared to most recent EKG    Risk  Prescription drug management. ED Course as of 08/29/23 2258 Tue Aug 29, 2023   1814 CBC with Auto Differential(!):    WBC 7.8   RBC 3.54(!)   Hemoglobin Quant 11.6(!)   Hematocrit 34. 0(!)   MCV 96.0   MCH 32.8   MCHC 34.1   RDW 14.4   MPV 10.5   Platelet, Fluorescence 225   Neutrophils % PENDING   Lymphocyte % PENDING   Monocytes % PENDING   Eosinophils % PENDING   Basophils % PENDING   Immature Granulocytes PENDING   Metamyelocytes PENDING   Myelocyte Percent PENDING   Promyelocytes Percent PENDING   Blasts PENDING   Atypical Lymphocytes PENDING   PLASMA CELLS,PLSM PENDING   Nucleated Red Blood Cells PENDING   Neutrophils Absolute PENDING   Lymphocytes Absolute PENDING   Monocytes Absolute PENDING   Eosinophils Absolute PENDING   Basophils Absolute PENDING   Absolute Immature Granulocyte PENDING   Absolute Bands # PENDING   Metamyelocytes Absolute PENDING   Myelocytes Absolute PENDING   Promyelocytes Absolute PENDING   Blasts Absolute PENDING   Atypical Lymphocytes Absolute PENDING   ABSOLUTE PLASMA CELLS PENDING   WBC Morphology PENDING   RBC Morphology PENDING   Platelet Estimate PENDING [AP]   1826 Comprehensive Metabolic Panel w/ Reflex to MG(!):    Glucose, Random 119(!)   BUN,BUNPL 7   Creatinine 1.0   Est, Glom Filt Rate >60   CALCIUM, SERUM, 638209 8.7   Sodium 132   Potassium 4.2   Chloride 100   CO2 25   Anion Gap 7   Alk Phos 86   ALT 9   AST 15   BILIRUBIN TOTAL 0.2   Total Protein 5.8(!)   Albumin 3.6 [AP]   1826 Troponin(!):    Troponin, High Sensitivity 14(!) [AP]   1826 Lipase:    Lipase 51 [AP]   1826 Lactic Acid:    Lactic Acid 1.0 [AP]      ED Course User Index  [AP] Alondra Smith MD          ------------------------------------------ PROGRESS NOTES

## 2023-08-30 LAB
EKG ATRIAL RATE: 67 BPM
EKG P AXIS: 73 DEGREES
EKG P-R INTERVAL: 176 MS
EKG Q-T INTERVAL: 406 MS
EKG QRS DURATION: 78 MS
EKG QTC CALCULATION (BAZETT): 429 MS
EKG R AXIS: 77 DEGREES
EKG T AXIS: 69 DEGREES
EKG VENTRICULAR RATE: 67 BPM

## 2023-08-30 PROCEDURE — 93010 ELECTROCARDIOGRAM REPORT: CPT | Performed by: INTERNAL MEDICINE

## 2023-08-31 ENCOUNTER — HOSPITAL ENCOUNTER (OUTPATIENT)
Dept: INFUSION THERAPY | Age: 73
Discharge: HOME | End: 2023-08-31
Payer: MEDICARE

## 2023-08-31 ENCOUNTER — OFFICE VISIT (OUTPATIENT)
Dept: ONCOLOGY | Age: 73
End: 2023-08-31

## 2023-08-31 VITALS
RESPIRATION RATE: 18 BRPM | TEMPERATURE: 98.2 F | SYSTOLIC BLOOD PRESSURE: 113 MMHG | OXYGEN SATURATION: 97 % | HEART RATE: 57 BPM | DIASTOLIC BLOOD PRESSURE: 59 MMHG

## 2023-08-31 VITALS
WEIGHT: 122.8 LBS | HEART RATE: 58 BPM | SYSTOLIC BLOOD PRESSURE: 134 MMHG | HEIGHT: 68 IN | DIASTOLIC BLOOD PRESSURE: 80 MMHG | BODY MASS INDEX: 18.61 KG/M2 | TEMPERATURE: 98.1 F | OXYGEN SATURATION: 98 %

## 2023-08-31 DIAGNOSIS — C64.1 RENAL CELL CANCER, RIGHT (HCC): Primary | ICD-10-CM

## 2023-08-31 LAB
ALBUMIN SERPL-MCNC: 3.9 G/DL (ref 3.5–5.2)
ALP SERPL-CCNC: 99 U/L (ref 40–129)
ALT SERPL-CCNC: 10 U/L (ref 0–40)
ANION GAP SERPL CALCULATED.3IONS-SCNC: 9 MMOL/L (ref 7–16)
AST SERPL-CCNC: 16 U/L (ref 0–39)
BASOPHILS # BLD: 0.07 K/UL (ref 0–0.2)
BASOPHILS NFR BLD: 1 % (ref 0–2)
BILIRUB SERPL-MCNC: 0.2 MG/DL (ref 0–1.2)
BUN SERPL-MCNC: 7 MG/DL (ref 6–23)
CALCIUM SERPL-MCNC: 9 MG/DL (ref 8.6–10.2)
CHLORIDE SERPL-SCNC: 101 MMOL/L (ref 98–107)
CO2 SERPL-SCNC: 25 MMOL/L (ref 22–29)
CREAT SERPL-MCNC: 0.9 MG/DL (ref 0.7–1.2)
EOSINOPHIL # BLD: 0.24 K/UL (ref 0.05–0.5)
EOSINOPHILS RELATIVE PERCENT: 3 % (ref 0–6)
ERYTHROCYTE [DISTWIDTH] IN BLOOD BY AUTOMATED COUNT: 15.1 % (ref 11.5–15)
GFR SERPL CREATININE-BSD FRML MDRD: >60 ML/MIN/1.73M2
GLUCOSE SERPL-MCNC: 99 MG/DL (ref 74–99)
HCT VFR BLD AUTO: 36.8 % (ref 37–54)
HGB BLD-MCNC: 12.3 G/DL (ref 12.5–16.5)
IMM GRANULOCYTES # BLD AUTO: <0.03 K/UL (ref 0–0.58)
IMM GRANULOCYTES NFR BLD: 0 % (ref 0–5)
LYMPHOCYTES NFR BLD: 2.45 K/UL (ref 1.5–4)
LYMPHOCYTES RELATIVE PERCENT: 30 % (ref 20–42)
MCH RBC QN AUTO: 32 PG (ref 26–35)
MCHC RBC AUTO-ENTMCNC: 33.4 G/DL (ref 32–34.5)
MCV RBC AUTO: 95.8 FL (ref 80–99.9)
MONOCYTES NFR BLD: 0.76 K/UL (ref 0.1–0.95)
MONOCYTES NFR BLD: 9 % (ref 2–12)
NEUTROPHILS NFR BLD: 57 % (ref 43–80)
NEUTS SEG NFR BLD: 4.59 K/UL (ref 1.8–7.3)
PLATELET # BLD AUTO: 229 K/UL (ref 130–450)
PMV BLD AUTO: 9.7 FL (ref 7–12)
POTASSIUM SERPL-SCNC: 4.2 MMOL/L (ref 3.5–5)
PROT SERPL-MCNC: 6.3 G/DL (ref 6.4–8.3)
RBC # BLD AUTO: 3.84 M/UL (ref 3.8–5.8)
SODIUM SERPL-SCNC: 135 MMOL/L (ref 132–146)
TSH SERPL DL<=0.05 MIU/L-ACNC: 1.65 UIU/ML (ref 0.27–4.2)
WBC OTHER # BLD: 8.1 K/UL (ref 4.5–11.5)

## 2023-08-31 PROCEDURE — 80053 COMPREHEN METABOLIC PANEL: CPT

## 2023-08-31 PROCEDURE — 84443 ASSAY THYROID STIM HORMONE: CPT

## 2023-08-31 PROCEDURE — 2580000003 HC RX 258: Performed by: INTERNAL MEDICINE

## 2023-08-31 PROCEDURE — 96413 CHEMO IV INFUSION 1 HR: CPT

## 2023-08-31 PROCEDURE — 6360000002 HC RX W HCPCS: Performed by: INTERNAL MEDICINE

## 2023-08-31 PROCEDURE — 85025 COMPLETE CBC W/AUTO DIFF WBC: CPT

## 2023-08-31 RX ORDER — EPINEPHRINE 1 MG/ML
0.3 INJECTION, SOLUTION, CONCENTRATE INTRAVENOUS PRN
Status: CANCELLED | OUTPATIENT
Start: 2023-08-31

## 2023-08-31 RX ORDER — HEPARIN 100 UNIT/ML
500 SYRINGE INTRAVENOUS PRN
Status: CANCELLED | OUTPATIENT
Start: 2023-08-31

## 2023-08-31 RX ORDER — ONDANSETRON 2 MG/ML
8 INJECTION INTRAMUSCULAR; INTRAVENOUS
Status: CANCELLED | OUTPATIENT
Start: 2023-08-31

## 2023-08-31 RX ORDER — SODIUM CHLORIDE 0.9 % (FLUSH) 0.9 %
5-40 SYRINGE (ML) INJECTION PRN
Status: DISCONTINUED | OUTPATIENT
Start: 2023-08-31 | End: 2023-09-01 | Stop reason: HOSPADM

## 2023-08-31 RX ORDER — HEPARIN 100 UNIT/ML
500 SYRINGE INTRAVENOUS PRN
OUTPATIENT
Start: 2023-08-31

## 2023-08-31 RX ORDER — HEPARIN 100 UNIT/ML
500 SYRINGE INTRAVENOUS PRN
Status: DISCONTINUED | OUTPATIENT
Start: 2023-08-31 | End: 2023-09-01 | Stop reason: HOSPADM

## 2023-08-31 RX ORDER — SODIUM CHLORIDE 0.9 % (FLUSH) 0.9 %
5-40 SYRINGE (ML) INJECTION PRN
Status: CANCELLED | OUTPATIENT
Start: 2023-08-31

## 2023-08-31 RX ORDER — SODIUM CHLORIDE 9 MG/ML
INJECTION, SOLUTION INTRAVENOUS CONTINUOUS
Status: CANCELLED | OUTPATIENT
Start: 2023-08-31

## 2023-08-31 RX ORDER — SODIUM CHLORIDE 9 MG/ML
5-250 INJECTION, SOLUTION INTRAVENOUS PRN
Status: DISCONTINUED | OUTPATIENT
Start: 2023-08-31 | End: 2023-09-01 | Stop reason: HOSPADM

## 2023-08-31 RX ORDER — SODIUM CHLORIDE 9 MG/ML
5-250 INJECTION, SOLUTION INTRAVENOUS PRN
Status: CANCELLED | OUTPATIENT
Start: 2023-08-31

## 2023-08-31 RX ORDER — ACETAMINOPHEN 325 MG/1
650 TABLET ORAL
Status: CANCELLED | OUTPATIENT
Start: 2023-08-31

## 2023-08-31 RX ORDER — WATER 1000 ML/1000ML
2.2 INJECTION, SOLUTION INTRAVENOUS ONCE
OUTPATIENT
Start: 2023-08-31 | End: 2023-08-31

## 2023-08-31 RX ORDER — ALBUTEROL SULFATE 90 UG/1
4 AEROSOL, METERED RESPIRATORY (INHALATION) PRN
Status: CANCELLED | OUTPATIENT
Start: 2023-08-31

## 2023-08-31 RX ORDER — SODIUM CHLORIDE 0.9 % (FLUSH) 0.9 %
5-40 SYRINGE (ML) INJECTION PRN
OUTPATIENT
Start: 2023-08-31

## 2023-08-31 RX ORDER — DIPHENHYDRAMINE HYDROCHLORIDE 50 MG/ML
50 INJECTION INTRAMUSCULAR; INTRAVENOUS
Status: CANCELLED | OUTPATIENT
Start: 2023-08-31

## 2023-08-31 RX ADMIN — SODIUM CHLORIDE 480 MG: 9 INJECTION, SOLUTION INTRAVENOUS at 12:57

## 2023-08-31 RX ADMIN — SODIUM CHLORIDE, PRESERVATIVE FREE 10 ML: 5 INJECTION INTRAVENOUS at 11:34

## 2023-08-31 RX ADMIN — SODIUM CHLORIDE, PRESERVATIVE FREE 10 ML: 5 INJECTION INTRAVENOUS at 13:39

## 2023-08-31 RX ADMIN — Medication 500 UNITS: at 13:39

## 2023-08-31 RX ADMIN — Medication 500 UNITS: at 11:34

## 2023-08-31 RX ADMIN — SODIUM CHLORIDE 20 ML/HR: 9 INJECTION, SOLUTION INTRAVENOUS at 12:27

## 2023-08-31 NOTE — PROGRESS NOTES
renal vein concerning for tumor invasion versus tumor embolism of the renal vein. There is bilateral adrenal metastasis with the largest 1 in the left adrenal gland measuring 1.6 x 2.7 cm. There is bone metastasis with the destructive soft tissue mass involving the right 4th rib anterolaterally with adjacent soft tissue mass which is partially necrotic measuring 4.5 x 9.2 cm. Lytic destructive lesions are also identified in the right 8th and 9th rib near the costo vertebral junction. Multiple heterogeneous  enhancing and necrotic masses in the right kidney concerning for multifocal renal cell carcinoma with  possible tumor invasion or tumor embolism of the right renal vein. There is diffuse metastasis with the involvement of the right and left adrenal glands, possible hepatic metastasis, widespread pulmonary and rib involvement as noted. Further assessment by PET-CT scan is recommended. Findings were telephoned to licensed caregiver. ASSESSMENT/PLAN :    Robert Shook was seen today for cancer and follow-up. Diagnoses and all orders for this visit:    Renal cell cancer, right ([address])  -     CBC With Auto Differential; Future  -     Comprehensive metabolic panel; Future  -     TSH without Reflex; Future  -     CBC with Auto Differential; Future  -     Comprehensive Metabolic Panel; Future  -     TSH;  Future    Other orders  -     0.9 % sodium chloride infusion  -     nivolumab (OPDIVO) 480 mg in sodium chloride 0.9 % 100 mL IVPB  -     sodium chloride flush 0.9 % injection 5-40 mL  -     sodium chloride flush 0.9 % injection 5-40 mL  -     0.9 % sodium chloride infusion  -     heparin flush (PF) 100 UNIT/ML injection 500 Units  -     alteplase (CATHFLO) 2 mg in sterile water 2 mL injection  -     0.9 % sodium chloride infusion  -     diphenhydrAMINE (BENADRYL) injection 50 mg  -     famotidine (PEPCID) 20 mg in sodium chloride (PF) 0.9 % 10 mL injection  -     hydrocortisone sodium succinate PF

## 2023-09-08 DIAGNOSIS — C64.1 RENAL CELL CANCER, RIGHT (HCC): Primary | ICD-10-CM

## 2023-09-28 ENCOUNTER — HOSPITAL ENCOUNTER (OUTPATIENT)
Dept: INFUSION THERAPY | Age: 73
Discharge: HOME | End: 2023-09-28
Payer: MEDICARE

## 2023-09-28 ENCOUNTER — OFFICE VISIT (OUTPATIENT)
Dept: ONCOLOGY | Age: 73
End: 2023-09-28

## 2023-09-28 VITALS
RESPIRATION RATE: 16 BRPM | DIASTOLIC BLOOD PRESSURE: 53 MMHG | TEMPERATURE: 97 F | SYSTOLIC BLOOD PRESSURE: 117 MMHG | HEART RATE: 60 BPM | OXYGEN SATURATION: 100 %

## 2023-09-28 VITALS
TEMPERATURE: 97.2 F | SYSTOLIC BLOOD PRESSURE: 85 MMHG | HEIGHT: 68 IN | WEIGHT: 122 LBS | BODY MASS INDEX: 18.49 KG/M2 | DIASTOLIC BLOOD PRESSURE: 70 MMHG | OXYGEN SATURATION: 98 % | HEART RATE: 106 BPM

## 2023-09-28 DIAGNOSIS — C64.1 RENAL CELL CANCER, RIGHT (HCC): Primary | ICD-10-CM

## 2023-09-28 DIAGNOSIS — C79.51 METASTATIC RENAL CELL CARCINOMA TO BONE (HCC): ICD-10-CM

## 2023-09-28 DIAGNOSIS — C64.9 METASTATIC RENAL CELL CARCINOMA TO BONE (HCC): ICD-10-CM

## 2023-09-28 LAB
ALBUMIN SERPL-MCNC: 3.8 G/DL (ref 3.5–5.2)
ALP SERPL-CCNC: 96 U/L (ref 40–129)
ALT SERPL-CCNC: 9 U/L (ref 0–40)
ANION GAP SERPL CALCULATED.3IONS-SCNC: 11 MMOL/L (ref 7–16)
AST SERPL-CCNC: 15 U/L (ref 0–39)
BASOPHILS # BLD: 0.09 K/UL (ref 0–0.2)
BASOPHILS NFR BLD: 1 % (ref 0–2)
BILIRUB SERPL-MCNC: 0.2 MG/DL (ref 0–1.2)
BUN SERPL-MCNC: 9 MG/DL (ref 6–23)
CALCIUM SERPL-MCNC: 9.2 MG/DL (ref 8.6–10.2)
CHLORIDE SERPL-SCNC: 93 MMOL/L (ref 98–107)
CO2 SERPL-SCNC: 23 MMOL/L (ref 22–29)
CREAT SERPL-MCNC: 0.9 MG/DL (ref 0.7–1.2)
EOSINOPHIL # BLD: 0.43 K/UL (ref 0.05–0.5)
EOSINOPHILS RELATIVE PERCENT: 5 % (ref 0–6)
ERYTHROCYTE [DISTWIDTH] IN BLOOD BY AUTOMATED COUNT: 15 % (ref 11.5–15)
GFR SERPL CREATININE-BSD FRML MDRD: >60 ML/MIN/1.73M2
GLUCOSE SERPL-MCNC: 118 MG/DL (ref 74–99)
HCT VFR BLD AUTO: 39.4 % (ref 37–54)
HGB BLD-MCNC: 13 G/DL (ref 12.5–16.5)
IMM GRANULOCYTES # BLD AUTO: 0.04 K/UL (ref 0–0.58)
IMM GRANULOCYTES NFR BLD: 0 % (ref 0–5)
LYMPHOCYTES NFR BLD: 2.44 K/UL (ref 1.5–4)
LYMPHOCYTES RELATIVE PERCENT: 26 % (ref 20–42)
MCH RBC QN AUTO: 31.7 PG (ref 26–35)
MCHC RBC AUTO-ENTMCNC: 33 G/DL (ref 32–34.5)
MCV RBC AUTO: 96.1 FL (ref 80–99.9)
MONOCYTES NFR BLD: 0.8 K/UL (ref 0.1–0.95)
MONOCYTES NFR BLD: 9 % (ref 2–12)
NEUTROPHILS NFR BLD: 59 % (ref 43–80)
NEUTS SEG NFR BLD: 5.45 K/UL (ref 1.8–7.3)
PLATELET # BLD AUTO: 229 K/UL (ref 130–450)
PMV BLD AUTO: 9.7 FL (ref 7–12)
POTASSIUM SERPL-SCNC: 4.2 MMOL/L (ref 3.5–5)
PROT SERPL-MCNC: 6.4 G/DL (ref 6.4–8.3)
RBC # BLD AUTO: 4.1 M/UL (ref 3.8–5.8)
SODIUM SERPL-SCNC: 127 MMOL/L (ref 132–146)
TSH SERPL DL<=0.05 MIU/L-ACNC: 1.24 UIU/ML (ref 0.27–4.2)
WBC OTHER # BLD: 9.3 K/UL (ref 4.5–11.5)

## 2023-09-28 PROCEDURE — 6360000002 HC RX W HCPCS: Performed by: INTERNAL MEDICINE

## 2023-09-28 PROCEDURE — 85025 COMPLETE CBC W/AUTO DIFF WBC: CPT

## 2023-09-28 PROCEDURE — 2580000003 HC RX 258: Performed by: INTERNAL MEDICINE

## 2023-09-28 PROCEDURE — 84443 ASSAY THYROID STIM HORMONE: CPT

## 2023-09-28 PROCEDURE — 96413 CHEMO IV INFUSION 1 HR: CPT

## 2023-09-28 PROCEDURE — 80053 COMPREHEN METABOLIC PANEL: CPT

## 2023-09-28 RX ORDER — 0.9 % SODIUM CHLORIDE 0.9 %
1000 INTRAVENOUS SOLUTION INTRAVENOUS PRN
Status: DISCONTINUED | OUTPATIENT
Start: 2023-09-28 | End: 2023-09-29 | Stop reason: HOSPADM

## 2023-09-28 RX ORDER — SODIUM CHLORIDE 9 MG/ML
5-250 INJECTION, SOLUTION INTRAVENOUS PRN
Status: CANCELLED | OUTPATIENT
Start: 2023-09-28

## 2023-09-28 RX ORDER — SODIUM CHLORIDE 9 MG/ML
5-250 INJECTION, SOLUTION INTRAVENOUS PRN
OUTPATIENT
Start: 2023-09-28

## 2023-09-28 RX ORDER — SODIUM CHLORIDE 0.9 % (FLUSH) 0.9 %
5-40 SYRINGE (ML) INJECTION PRN
Status: CANCELLED | OUTPATIENT
Start: 2023-09-28

## 2023-09-28 RX ORDER — HEPARIN 100 UNIT/ML
500 SYRINGE INTRAVENOUS PRN
OUTPATIENT
Start: 2023-09-28

## 2023-09-28 RX ORDER — HEPARIN 100 UNIT/ML
500 SYRINGE INTRAVENOUS PRN
Status: CANCELLED | OUTPATIENT
Start: 2023-09-28

## 2023-09-28 RX ORDER — 0.9 % SODIUM CHLORIDE 0.9 %
1000 INTRAVENOUS SOLUTION INTRAVENOUS PRN
OUTPATIENT
Start: 2023-09-28

## 2023-09-28 RX ORDER — ALBUTEROL SULFATE 90 UG/1
4 AEROSOL, METERED RESPIRATORY (INHALATION) PRN
Status: CANCELLED | OUTPATIENT
Start: 2023-09-28

## 2023-09-28 RX ORDER — ONDANSETRON 2 MG/ML
8 INJECTION INTRAMUSCULAR; INTRAVENOUS
Status: CANCELLED | OUTPATIENT
Start: 2023-09-28

## 2023-09-28 RX ORDER — DIPHENHYDRAMINE HYDROCHLORIDE 50 MG/ML
50 INJECTION INTRAMUSCULAR; INTRAVENOUS
Status: CANCELLED | OUTPATIENT
Start: 2023-09-28

## 2023-09-28 RX ORDER — EPINEPHRINE 1 MG/ML
0.3 INJECTION, SOLUTION, CONCENTRATE INTRAVENOUS PRN
Status: CANCELLED | OUTPATIENT
Start: 2023-09-28

## 2023-09-28 RX ORDER — SODIUM CHLORIDE 9 MG/ML
5-250 INJECTION, SOLUTION INTRAVENOUS PRN
Status: DISCONTINUED | OUTPATIENT
Start: 2023-09-28 | End: 2023-09-29 | Stop reason: HOSPADM

## 2023-09-28 RX ORDER — SODIUM CHLORIDE 9 MG/ML
INJECTION, SOLUTION INTRAVENOUS CONTINUOUS
Status: CANCELLED | OUTPATIENT
Start: 2023-09-28

## 2023-09-28 RX ORDER — HEPARIN 100 UNIT/ML
500 SYRINGE INTRAVENOUS PRN
Status: DISCONTINUED | OUTPATIENT
Start: 2023-09-28 | End: 2023-09-29 | Stop reason: HOSPADM

## 2023-09-28 RX ORDER — SODIUM CHLORIDE 0.9 % (FLUSH) 0.9 %
5-40 SYRINGE (ML) INJECTION PRN
OUTPATIENT
Start: 2023-09-28

## 2023-09-28 RX ORDER — WATER 1000 ML/1000ML
2.2 INJECTION, SOLUTION INTRAVENOUS ONCE
OUTPATIENT
Start: 2023-09-28 | End: 2023-09-28

## 2023-09-28 RX ORDER — MEPERIDINE HYDROCHLORIDE 25 MG/ML
12.5 INJECTION INTRAMUSCULAR; INTRAVENOUS; SUBCUTANEOUS PRN
Status: CANCELLED | OUTPATIENT
Start: 2023-09-28

## 2023-09-28 RX ORDER — ACETAMINOPHEN 325 MG/1
650 TABLET ORAL
Status: CANCELLED | OUTPATIENT
Start: 2023-09-28

## 2023-09-28 RX ORDER — SODIUM CHLORIDE 0.9 % (FLUSH) 0.9 %
5-40 SYRINGE (ML) INJECTION PRN
Status: DISCONTINUED | OUTPATIENT
Start: 2023-09-28 | End: 2023-09-29 | Stop reason: HOSPADM

## 2023-09-28 RX ADMIN — SODIUM CHLORIDE, PRESERVATIVE FREE 10 ML: 5 INJECTION INTRAVENOUS at 10:25

## 2023-09-28 RX ADMIN — SODIUM CHLORIDE 480 MG: 9 INJECTION, SOLUTION INTRAVENOUS at 12:42

## 2023-09-28 RX ADMIN — SODIUM CHLORIDE 1000 ML: 9 INJECTION, SOLUTION INTRAVENOUS at 11:56

## 2023-09-28 RX ADMIN — SODIUM CHLORIDE, PRESERVATIVE FREE 10 ML: 5 INJECTION INTRAVENOUS at 13:42

## 2023-09-28 RX ADMIN — Medication 500 UNITS: at 13:42

## 2023-09-28 NOTE — PROGRESS NOTES
Patient presents to clinic for labs today. Left  SQ port accessed per policy using 51V 1.08VNVL Zapata needle for good blood return. Aspirate for waste and specimen sent to lab. Site flushed easily with 10 mL NSS. Dry sterile dressing to area. Tolerated procedure well. Patient to remain accessed for treatment.

## 2023-09-28 NOTE — PROGRESS NOTES
% injection 5-40 mL  -     Cancel: 0.9 % sodium chloride infusion  -     Cancel: heparin (PF) 100 UNIT/ML injection 500 Units  -     Cancel: heparin (PF) 100 UNIT/ML injection 500 Units         73years old male with Stage IV renal cell carcinoma (diagnosed 11/2021- chest wall mass biopsy) -right chest wall mass, multiple right kidney necrotic lesions in addition to large soft tissue destructive mass of right fourth rib, multiple lung nodules up to 1.5 cm, bilateral adrenal metastasis up to 2.5 cm. ECOG 1/2. Started ipilimumab/nivolumab combination in November 2021. S/p 4 cycles. Switched to single agent Opdivo in March 2022. Reviewed labs which were unremarkable. Continue Opdivo. Reports new pain in right flank-CT abdomen showed increasing size of right kidney mass, with stable lytic bone lesions. Urology referal made to assess for nephrectomy for isolated progression in right kidney. Saw Dr. Wade Fischer. Reviewed notes. Referred to Transfer clinic urology Dr. Ray Ochoa. Pain well controlled. 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 CT of neck showed extensive osseous destructive process with periosteal reaction involving the mandible. Right mandibular alveolar ridge biopsy was done which did not show any malignancy. Patient was treated for osteomyelitis with ertapenem by ID. Finished ertapenem. Is on amoxicillin. Patient is feeling better. Jaw swelling/abscess resolved. [facility]gene Certain has been discontinued. Mild hyponatremia: Asymptomatic.  1 L normal saline ordered. Primary hypothyroidism: With low normal T4, elevated TSH. Related to immunotherapy. Asymptomatic. On 75 mcg daily Synthroid. TSH has come down to normal. We will continue with same dose and reassess in a month. Return to clinic in 4 weeks. Return in about 4 weeks (around 10/26/2023).      Maylin Qiu MD

## 2023-09-28 NOTE — PROGRESS NOTES
Patient tolerated infusion and hydration well. Patient alert and oriented x3. No distress noted. Vital signs stable. Patient denies any new or worsening pain. Patient educated on signs and symptoms of reaction to medication. Educated patient on possible side effects and treatment of medication. Patient verbalized understanding. Offered patient education an/or discharge material.  Patient declined. Patient denies any needs. All questions answered.

## 2023-10-26 ENCOUNTER — TELEPHONE (OUTPATIENT)
Dept: INFUSION THERAPY | Age: 73
End: 2023-10-26

## 2023-10-26 DIAGNOSIS — C64.1 RENAL CELL CANCER, RIGHT (HCC): Primary | ICD-10-CM

## 2023-10-26 NOTE — TELEPHONE ENCOUNTER
Spoke with patient and patients sister Noelle Walters ([phone]) to inform of MRI scheduled for 10/27/23 at 10:15am. Patient must arrive at 9:00am through lobby B and was informed to be NPO for 6 hours prior and to not wear anythin with metal. pt confirmed

## 2023-10-27 ENCOUNTER — HOSPITAL ENCOUNTER (OUTPATIENT)
Dept: MRI IMAGING | Age: 73
End: 2023-10-27
Payer: MEDICARE

## 2023-10-27 DIAGNOSIS — N28.89 RIGHT RENAL MASS: ICD-10-CM

## 2023-10-27 PROCEDURE — 6360000004 HC RX CONTRAST MEDICATION: Performed by: RADIOLOGY

## 2023-10-27 PROCEDURE — A9577 INJ MULTIHANCE: HCPCS | Performed by: RADIOLOGY

## 2023-10-27 PROCEDURE — 74183 MRI ABD W/O CNTR FLWD CNTR: CPT

## 2023-10-27 RX ADMIN — GADOBENATE DIMEGLUMINE 11 ML: 529 INJECTION, SOLUTION INTRAVENOUS at 09:09

## 2023-11-02 ENCOUNTER — HOSPITAL ENCOUNTER (OUTPATIENT)
Dept: INFUSION THERAPY | Age: 73
Discharge: HOME | End: 2023-11-02
Payer: MEDICARE

## 2023-11-02 ENCOUNTER — OFFICE VISIT (OUTPATIENT)
Dept: ONCOLOGY | Age: 73
End: 2023-11-02
Payer: MEDICARE

## 2023-11-02 VITALS
HEART RATE: 56 BPM | WEIGHT: 118.4 LBS | DIASTOLIC BLOOD PRESSURE: 53 MMHG | HEIGHT: 68 IN | BODY MASS INDEX: 17.95 KG/M2 | OXYGEN SATURATION: 98 % | SYSTOLIC BLOOD PRESSURE: 114 MMHG | TEMPERATURE: 97.2 F

## 2023-11-02 DIAGNOSIS — C64.1 RENAL CELL CANCER, RIGHT (HCC): Primary | ICD-10-CM

## 2023-11-02 LAB
ALBUMIN SERPL-MCNC: 3.9 G/DL (ref 3.5–5.2)
ALP SERPL-CCNC: 108 U/L (ref 40–129)
ALT SERPL-CCNC: 8 U/L (ref 0–40)
ANION GAP SERPL CALCULATED.3IONS-SCNC: 11 MMOL/L (ref 7–16)
AST SERPL-CCNC: 16 U/L (ref 0–39)
BASOPHILS # BLD: 0.08 K/UL (ref 0–0.2)
BASOPHILS NFR BLD: 1 % (ref 0–2)
BILIRUB SERPL-MCNC: 0.3 MG/DL (ref 0–1.2)
BUN SERPL-MCNC: 9 MG/DL (ref 6–23)
CALCIUM SERPL-MCNC: 9.7 MG/DL (ref 8.6–10.2)
CHLORIDE SERPL-SCNC: 94 MMOL/L (ref 98–107)
CO2 SERPL-SCNC: 24 MMOL/L (ref 22–29)
CREAT SERPL-MCNC: 0.9 MG/DL (ref 0.7–1.2)
EOSINOPHIL # BLD: 0.15 K/UL (ref 0.05–0.5)
EOSINOPHILS RELATIVE PERCENT: 2 % (ref 0–6)
ERYTHROCYTE [DISTWIDTH] IN BLOOD BY AUTOMATED COUNT: 14.9 % (ref 11.5–15)
GFR SERPL CREATININE-BSD FRML MDRD: >60 ML/MIN/1.73M2
GLUCOSE SERPL-MCNC: 105 MG/DL (ref 74–99)
HCT VFR BLD AUTO: 40.2 % (ref 37–54)
HGB BLD-MCNC: 13.5 G/DL (ref 12.5–16.5)
IMM GRANULOCYTES # BLD AUTO: <0.03 K/UL (ref 0–0.58)
IMM GRANULOCYTES NFR BLD: 0 % (ref 0–5)
LYMPHOCYTES NFR BLD: 2.08 K/UL (ref 1.5–4)
LYMPHOCYTES RELATIVE PERCENT: 26 % (ref 20–42)
MCH RBC QN AUTO: 31.7 PG (ref 26–35)
MCHC RBC AUTO-ENTMCNC: 33.6 G/DL (ref 32–34.5)
MCV RBC AUTO: 94.4 FL (ref 80–99.9)
MONOCYTES NFR BLD: 0.66 K/UL (ref 0.1–0.95)
MONOCYTES NFR BLD: 8 % (ref 2–12)
NEUTROPHILS NFR BLD: 63 % (ref 43–80)
NEUTS SEG NFR BLD: 5.06 K/UL (ref 1.8–7.3)
PLATELET, FLUORESCENCE: 299 K/UL (ref 130–450)
PMV BLD AUTO: 9.3 FL (ref 7–12)
POTASSIUM SERPL-SCNC: 4.5 MMOL/L (ref 3.5–5)
PROT SERPL-MCNC: 6.5 G/DL (ref 6.4–8.3)
RBC # BLD AUTO: 4.26 M/UL (ref 3.8–5.8)
SODIUM SERPL-SCNC: 129 MMOL/L (ref 132–146)
TSH SERPL DL<=0.05 MIU/L-ACNC: 0.22 UIU/ML (ref 0.27–4.2)
WBC OTHER # BLD: 8.1 K/UL (ref 4.5–11.5)

## 2023-11-02 PROCEDURE — 80053 COMPREHEN METABOLIC PANEL: CPT

## 2023-11-02 PROCEDURE — 6360000002 HC RX W HCPCS: Performed by: INTERNAL MEDICINE

## 2023-11-02 PROCEDURE — 2580000003 HC RX 258: Performed by: INTERNAL MEDICINE

## 2023-11-02 PROCEDURE — 99213 OFFICE O/P EST LOW 20 MIN: CPT

## 2023-11-02 PROCEDURE — 85025 COMPLETE CBC W/AUTO DIFF WBC: CPT

## 2023-11-02 PROCEDURE — 84443 ASSAY THYROID STIM HORMONE: CPT

## 2023-11-02 RX ORDER — WATER 10 ML/10ML
2.2 INJECTION INTRAMUSCULAR; INTRAVENOUS; SUBCUTANEOUS ONCE
OUTPATIENT
Start: 2023-11-02 | End: 2023-11-02

## 2023-11-02 RX ORDER — HEPARIN 100 UNIT/ML
500 SYRINGE INTRAVENOUS PRN
Status: DISCONTINUED | OUTPATIENT
Start: 2023-11-02 | End: 2023-11-03 | Stop reason: HOSPADM

## 2023-11-02 RX ORDER — HEPARIN 100 UNIT/ML
500 SYRINGE INTRAVENOUS PRN
OUTPATIENT
Start: 2023-11-02

## 2023-11-02 RX ORDER — SODIUM CHLORIDE 0.9 % (FLUSH) 0.9 %
5-40 SYRINGE (ML) INJECTION PRN
Status: DISCONTINUED | OUTPATIENT
Start: 2023-11-02 | End: 2023-11-03 | Stop reason: HOSPADM

## 2023-11-02 RX ORDER — LEVOTHYROXINE SODIUM 0.07 MG/1
75 TABLET ORAL DAILY
Qty: 90 TABLET | Refills: 1 | Status: SHIPPED | OUTPATIENT
Start: 2023-11-02

## 2023-11-02 RX ORDER — SODIUM CHLORIDE 0.9 % (FLUSH) 0.9 %
5-40 SYRINGE (ML) INJECTION PRN
OUTPATIENT
Start: 2023-11-02

## 2023-11-02 RX ADMIN — Medication 500 UNITS: at 14:39

## 2023-11-02 RX ADMIN — SODIUM CHLORIDE, PRESERVATIVE FREE 10 ML: 5 INJECTION INTRAVENOUS at 14:39

## 2023-11-02 NOTE — PROGRESS NOTES
Demographics   Patient Name    [facility]        Gender            Male                  [address]  [id number]      Room Number  Number   Account #       [de-identified]     Procedure Date    10/08/2021   Corporate ID                  Ordering          Randee Smiley DO                                Physician   Accession       [id number]    Referring         Randee Smiley DO  Number                        Physician   Date of Birth   1950    Sonographer       Santiago Garcias HATTIE   Age             73 year(s)    Interpreting      [facility]                                Physician         Physician Cardiology                                                  Sonia Galdamez MD                                 Any Other  Procedure Type of Study   TTE procedure:Echo Complete W/Doppler & Color Flow. Procedure Date Date: 10/08/2021 Start: 09:57 AM Study Location: Echo Lab Technical Quality: Poor visualization due to poor acoustical window. Indications:Heart murmur. Patient Status: Routine Height: 68 inches Weight: 120 pounds BSA: 1.65 m^2 BMI: 18.25 kg/m^2  Findings   Left Ventricle  Normal left ventricular chamber size. Normal left ventricular systolic function. Visually estimated LVEF 65%. Mild left ventricular concentric hypertrophy noted. Normal diastolic function. Right Ventricle  Normal right ventricle size and function. Left Atrium  The left atrium is mildly dilated. Mildly increased left atrial volume. Interatrial septum not well visualized but appears intact. Right Atrium  Normal right atrium. Mitral Valve  Normal mitral valve structure. There is moderate mitral regurgitation - ERO 0.23cm2, PISA 0.9cm, RV 51cc. No mitral valve prolapse. Tricuspid Valve  Normal tricuspid valve structure. There is mild tricuspid regurgitation. Mild pulmonary hypertension, RVSP 39mmHg. Aortic Valve  Normal aortic valve structure.   There is trace to mild aortic regurgitation, pressure

## 2023-11-02 NOTE — PROGRESS NOTES
Patient presents to clinic for labs today. Left chest  SQ port accessed per policy using 20 G, 9.99 Zapata needle for good blood return. Aspirate for waste and specimen sent to lab. Site flushed easily with 10 mL NSS followed by 5 mL Heparin solution 100 units/ml rinse prior to de-access. Needle left in place due to patient having treatment after appointment with Dr. Josee Knowles. Clean and dry tegaderm applied to site.

## 2023-11-03 ENCOUNTER — TELEPHONE (OUTPATIENT)
Dept: ONCOLOGY | Age: 73
End: 2023-11-03

## 2023-11-03 NOTE — TELEPHONE ENCOUNTER
SW spoke with pt niece re: assistance for pt and his furnace. Niece reported that pt has not had heat at this time and pt is on a fixed income. [facility] [employer] [facility] at this time and sent message to colleague to see if other resources might exist at this time. Pt has filed for silver lining fund in the past, and might be able to reapply at this time. SW to follow up with pt.        Miguel Angel Borrero MSW, LISW-S  Oncology Social Worker

## 2023-11-06 ENCOUNTER — TELEPHONE (OUTPATIENT)
Dept: INFUSION THERAPY | Age: 73
End: 2023-11-06

## 2023-11-06 NOTE — TELEPHONE ENCOUNTER
Called the Blood and Cancer office, [phone] and spoke to [facility] with regards to patient's [employer]. She stated that they are filling this med for the patient. Voiced understanding.

## 2023-11-16 ENCOUNTER — HOSPITAL ENCOUNTER (OUTPATIENT)
Dept: INFUSION THERAPY | Age: 73
Discharge: HOME | End: 2023-11-16
Payer: MEDICARE

## 2023-11-16 ENCOUNTER — OFFICE VISIT (OUTPATIENT)
Dept: ONCOLOGY | Age: 73
End: 2023-11-16

## 2023-11-16 ENCOUNTER — TELEPHONE (OUTPATIENT)
Dept: ONCOLOGY | Age: 73
End: 2023-11-16

## 2023-11-16 VITALS
DIASTOLIC BLOOD PRESSURE: 59 MMHG | HEART RATE: 60 BPM | OXYGEN SATURATION: 100 % | TEMPERATURE: 97 F | SYSTOLIC BLOOD PRESSURE: 116 MMHG

## 2023-11-16 VITALS
HEIGHT: 68 IN | SYSTOLIC BLOOD PRESSURE: 119 MMHG | WEIGHT: 119 LBS | TEMPERATURE: 97.3 F | BODY MASS INDEX: 18.04 KG/M2 | HEART RATE: 50 BPM | DIASTOLIC BLOOD PRESSURE: 60 MMHG | OXYGEN SATURATION: 100 %

## 2023-11-16 DIAGNOSIS — C64.1 RENAL CELL CANCER, RIGHT (HCC): Primary | ICD-10-CM

## 2023-11-16 LAB
ALBUMIN SERPL-MCNC: 3.9 G/DL (ref 3.5–5.2)
ALP SERPL-CCNC: 113 U/L (ref 40–129)
ALT SERPL-CCNC: 14 U/L (ref 0–40)
ANION GAP SERPL CALCULATED.3IONS-SCNC: 10 MMOL/L (ref 7–16)
AST SERPL-CCNC: 20 U/L (ref 0–39)
BASOPHILS # BLD: 0.08 K/UL (ref 0–0.2)
BASOPHILS NFR BLD: 1 % (ref 0–2)
BILIRUB SERPL-MCNC: 0.3 MG/DL (ref 0–1.2)
BUN SERPL-MCNC: 11 MG/DL (ref 6–23)
CALCIUM SERPL-MCNC: 9.3 MG/DL (ref 8.6–10.2)
CHLORIDE SERPL-SCNC: 92 MMOL/L (ref 98–107)
CO2 SERPL-SCNC: 21 MMOL/L (ref 22–29)
CREAT SERPL-MCNC: 0.9 MG/DL (ref 0.7–1.2)
EOSINOPHIL # BLD: 0.2 K/UL (ref 0.05–0.5)
EOSINOPHILS RELATIVE PERCENT: 3 % (ref 0–6)
ERYTHROCYTE [DISTWIDTH] IN BLOOD BY AUTOMATED COUNT: 14.4 % (ref 11.5–15)
GFR SERPL CREATININE-BSD FRML MDRD: >60 ML/MIN/1.73M2
GLUCOSE SERPL-MCNC: 95 MG/DL (ref 74–99)
HCT VFR BLD AUTO: 40.6 % (ref 37–54)
HGB BLD-MCNC: 13.6 G/DL (ref 12.5–16.5)
IMM GRANULOCYTES # BLD AUTO: 0.03 K/UL (ref 0–0.58)
IMM GRANULOCYTES NFR BLD: 0 % (ref 0–5)
LYMPHOCYTES NFR BLD: 1.54 K/UL (ref 1.5–4)
LYMPHOCYTES RELATIVE PERCENT: 22 % (ref 20–42)
MCH RBC QN AUTO: 30.8 PG (ref 26–35)
MCHC RBC AUTO-ENTMCNC: 33.5 G/DL (ref 32–34.5)
MCV RBC AUTO: 91.9 FL (ref 80–99.9)
MONOCYTES NFR BLD: 0.56 K/UL (ref 0.1–0.95)
MONOCYTES NFR BLD: 8 % (ref 2–12)
NEUTROPHILS NFR BLD: 66 % (ref 43–80)
NEUTS SEG NFR BLD: 4.65 K/UL (ref 1.8–7.3)
PLATELET # BLD AUTO: 303 K/UL (ref 130–450)
PMV BLD AUTO: 9.3 FL (ref 7–12)
POTASSIUM SERPL-SCNC: 4.6 MMOL/L (ref 3.5–5)
PROT SERPL-MCNC: 6.9 G/DL (ref 6.4–8.3)
RBC # BLD AUTO: 4.42 M/UL (ref 3.8–5.8)
SODIUM SERPL-SCNC: 123 MMOL/L (ref 132–146)
WBC OTHER # BLD: 7.1 K/UL (ref 4.5–11.5)

## 2023-11-16 PROCEDURE — 80053 COMPREHEN METABOLIC PANEL: CPT

## 2023-11-16 PROCEDURE — 2580000003 HC RX 258: Performed by: INTERNAL MEDICINE

## 2023-11-16 PROCEDURE — 6360000002 HC RX W HCPCS: Performed by: INTERNAL MEDICINE

## 2023-11-16 PROCEDURE — 96360 HYDRATION IV INFUSION INIT: CPT

## 2023-11-16 PROCEDURE — 85025 COMPLETE CBC W/AUTO DIFF WBC: CPT

## 2023-11-16 RX ORDER — SODIUM CHLORIDE 0.9 % (FLUSH) 0.9 %
5-40 SYRINGE (ML) INJECTION PRN
Status: DISCONTINUED | OUTPATIENT
Start: 2023-11-16 | End: 2023-11-17 | Stop reason: HOSPADM

## 2023-11-16 RX ORDER — SODIUM CHLORIDE 9 MG/ML
5-250 INJECTION, SOLUTION INTRAVENOUS PRN
Status: CANCELLED | OUTPATIENT
Start: 2023-11-16

## 2023-11-16 RX ORDER — SODIUM CHLORIDE 0.9 % (FLUSH) 0.9 %
5-40 SYRINGE (ML) INJECTION PRN
Status: CANCELLED | OUTPATIENT
Start: 2023-11-16

## 2023-11-16 RX ORDER — HEPARIN 100 UNIT/ML
500 SYRINGE INTRAVENOUS PRN
Status: CANCELLED | OUTPATIENT
Start: 2023-11-16

## 2023-11-16 RX ORDER — 0.9 % SODIUM CHLORIDE 0.9 %
1000 INTRAVENOUS SOLUTION INTRAVENOUS ONCE
Status: DISCONTINUED | OUTPATIENT
Start: 2023-11-17 | End: 2023-11-17 | Stop reason: HOSPADM

## 2023-11-16 RX ORDER — HEPARIN 100 UNIT/ML
500 SYRINGE INTRAVENOUS PRN
OUTPATIENT
Start: 2023-11-16

## 2023-11-16 RX ORDER — 0.9 % SODIUM CHLORIDE 0.9 %
1000 INTRAVENOUS SOLUTION INTRAVENOUS ONCE
Status: COMPLETED | OUTPATIENT
Start: 2023-11-16 | End: 2023-11-16

## 2023-11-16 RX ORDER — 0.9 % SODIUM CHLORIDE 0.9 %
1000 INTRAVENOUS SOLUTION INTRAVENOUS ONCE
Status: CANCELLED | OUTPATIENT
Start: 2023-11-16 | End: 2023-11-16

## 2023-11-16 RX ORDER — WATER 10 ML/10ML
2.2 INJECTION INTRAMUSCULAR; INTRAVENOUS; SUBCUTANEOUS ONCE
OUTPATIENT
Start: 2023-11-16 | End: 2023-11-16

## 2023-11-16 RX ORDER — SODIUM CHLORIDE 0.9 % (FLUSH) 0.9 %
5-40 SYRINGE (ML) INJECTION PRN
OUTPATIENT
Start: 2023-11-16

## 2023-11-16 RX ORDER — HEPARIN 100 UNIT/ML
500 SYRINGE INTRAVENOUS PRN
Status: DISCONTINUED | OUTPATIENT
Start: 2023-11-16 | End: 2023-11-17 | Stop reason: HOSPADM

## 2023-11-16 RX ADMIN — Medication 500 UNITS: at 11:59

## 2023-11-16 RX ADMIN — SODIUM CHLORIDE 1000 ML: 9 INJECTION, SOLUTION INTRAVENOUS at 10:48

## 2023-11-16 RX ADMIN — SODIUM CHLORIDE, PRESERVATIVE FREE 10 ML: 5 INJECTION INTRAVENOUS at 08:53

## 2023-11-16 RX ADMIN — Medication 500 UNITS: at 08:53

## 2023-11-16 NOTE — TELEPHONE ENCOUNTER
SW met with pt at pt request.  Pt brought in a letter from [address] stating that he needed to follow up with other services prior to him completing applications at [address]  [facility] [address]. Pt has already utilized the programs on the letter back in 2017. SW attempted to contact pt's  Shazia and left VM requesting return call.       Mignon Neville MSW, [facility]-S  Oncology Social Worker

## 2023-11-16 NOTE — PROGRESS NOTES
Patient presents to clinic for labs today. Left  chest  SQ port accessed per policy using 2.03, 20 G Zapata needle for good blood return. Aspirate for waste and specimen sent to lab. Site flushed easily with 10 mL NSS followed by 5 mL Heparin solution 100 units/ml rinse prior to de-access. Dry sterile dressing to area. Tolerated procedure well. Encouraged to schedule port flush every 4 weeks.

## 2023-11-16 NOTE — PROGRESS NOTES
Patient tolerated IV hydration well without complications or complaints. Alert and oriented x3. Vitals stable. Pain assessed, patient denies any new or worsening pain. Patient aware he is to come back tomorrow morning at 8 am for hydration and also given instructions to stop Synthroid per Dr. Germain Herrera orders. Patient was given written instructions and verbalizes his understanding. Patient left ambulatory by self.

## 2023-11-16 NOTE — PROGRESS NOTES
is bone metastasis with the destructive soft tissue mass involving the right 4th rib anterolaterally with adjacent soft tissue mass which is partially necrotic measuring 4.5 x 9.2 cm. Lytic destructive lesions are also identified in the right 8th and 9th rib near the costo vertebral junction. Multiple heterogeneous  enhancing and necrotic masses in the right kidney concerning for multifocal renal cell carcinoma with  possible tumor invasion or tumor embolism of the right renal vein. There is diffuse metastasis with the involvement of the right and left adrenal glands, possible hepatic metastasis, widespread pulmonary and rib involvement as noted. Further assessment by PET-CT scan is recommended. Findings were telephoned to licensed caregiver. ASSESSMENT/PLAN :    Denis Grullon was seen today for follow-up and discuss labs. Diagnoses and all orders for this visit:    Renal cell cancer, right ([address])  -     Sparkle Yo MD, Nephrology, [place]  -     CBC with Auto Differential; Future  -     Comprehensive Metabolic Panel; Future  -     Comprehensive Metabolic Panel; Future  -     Cancel: sodium chloride 0.9 % bolus 1,000 mL    Other orders  -     sodium chloride flush 0.9 % injection 5-40 mL  -     0.9 % sodium chloride infusion  -     heparin (PF) 100 UNIT/ML injection 500 Units  -     alteplase (CATHFLO) 2 mg in sterile water 2 mL injection         73years old male with Stage IV renal cell carcinoma (diagnosed 11/2021- chest wall mass biopsy) -right chest wall mass, multiple right kidney necrotic lesions in addition to large soft tissue destructive mass of right fourth rib, multiple lung nodules up to 1.5 cm, bilateral adrenal metastasis up to 2.5 cm. ECOG 1/2. Started ipilimumab/nivolumab combination in November 2021. S/p 4 cycles. Switched to single agent Opdivo in March 2022. Isolated progression of disease in right kidney with good systemic control otherwise.   Not a candidate for

## 2023-11-17 ENCOUNTER — HOSPITAL ENCOUNTER (OUTPATIENT)
Dept: INFUSION THERAPY | Age: 73
Discharge: HOME | End: 2023-11-17
Payer: MEDICARE

## 2023-11-17 VITALS
TEMPERATURE: 97.7 F | DIASTOLIC BLOOD PRESSURE: 60 MMHG | RESPIRATION RATE: 18 BRPM | SYSTOLIC BLOOD PRESSURE: 131 MMHG | HEART RATE: 60 BPM | OXYGEN SATURATION: 98 %

## 2023-11-17 DIAGNOSIS — C64.1 RENAL CELL CANCER, RIGHT (HCC): Primary | ICD-10-CM

## 2023-11-17 PROCEDURE — 6360000002 HC RX W HCPCS: Performed by: INTERNAL MEDICINE

## 2023-11-17 PROCEDURE — 96360 HYDRATION IV INFUSION INIT: CPT

## 2023-11-17 PROCEDURE — 2580000003 HC RX 258: Performed by: INTERNAL MEDICINE

## 2023-11-17 RX ORDER — HEPARIN 100 UNIT/ML
500 SYRINGE INTRAVENOUS PRN
OUTPATIENT
Start: 2023-11-17

## 2023-11-17 RX ORDER — 0.9 % SODIUM CHLORIDE 0.9 %
1000 INTRAVENOUS SOLUTION INTRAVENOUS PRN
Status: CANCELLED | OUTPATIENT
Start: 2023-11-17

## 2023-11-17 RX ORDER — 0.9 % SODIUM CHLORIDE 0.9 %
1000 INTRAVENOUS SOLUTION INTRAVENOUS PRN
Status: DISCONTINUED | OUTPATIENT
Start: 2023-11-17 | End: 2023-11-18 | Stop reason: HOSPADM

## 2023-11-17 RX ORDER — WATER 10 ML/10ML
2.2 INJECTION INTRAMUSCULAR; INTRAVENOUS; SUBCUTANEOUS ONCE
OUTPATIENT
Start: 2023-11-17 | End: 2023-11-17

## 2023-11-17 RX ORDER — SODIUM CHLORIDE 0.9 % (FLUSH) 0.9 %
5-40 SYRINGE (ML) INJECTION PRN
OUTPATIENT
Start: 2023-11-17

## 2023-11-17 RX ORDER — SODIUM CHLORIDE 0.9 % (FLUSH) 0.9 %
5-40 SYRINGE (ML) INJECTION PRN
Status: DISCONTINUED | OUTPATIENT
Start: 2023-11-17 | End: 2023-11-18 | Stop reason: HOSPADM

## 2023-11-17 RX ORDER — HEPARIN 100 UNIT/ML
500 SYRINGE INTRAVENOUS PRN
Status: DISCONTINUED | OUTPATIENT
Start: 2023-11-17 | End: 2023-11-18 | Stop reason: HOSPADM

## 2023-11-17 RX ORDER — SODIUM CHLORIDE 9 MG/ML
5-250 INJECTION, SOLUTION INTRAVENOUS PRN
OUTPATIENT
Start: 2023-11-17

## 2023-11-17 RX ADMIN — SODIUM CHLORIDE 1000 ML: 9 INJECTION, SOLUTION INTRAVENOUS at 08:19

## 2023-11-17 RX ADMIN — SODIUM CHLORIDE, PRESERVATIVE FREE 10 ML: 5 INJECTION INTRAVENOUS at 08:15

## 2023-11-17 RX ADMIN — Medication 500 UNITS: at 09:25

## 2023-11-17 RX ADMIN — SODIUM CHLORIDE, PRESERVATIVE FREE 10 ML: 5 INJECTION INTRAVENOUS at 09:25

## 2023-11-30 ENCOUNTER — CLINICAL DOCUMENTATION (OUTPATIENT)
Facility: HOSPITAL | Age: 73
End: 2023-11-30

## 2023-11-30 ENCOUNTER — OFFICE VISIT (OUTPATIENT)
Dept: ONCOLOGY | Age: 73
End: 2023-11-30
Payer: MEDICARE

## 2023-11-30 ENCOUNTER — HOSPITAL ENCOUNTER (OUTPATIENT)
Dept: INFUSION THERAPY | Age: 73
Discharge: HOME | End: 2023-11-30
Payer: MEDICARE

## 2023-11-30 ENCOUNTER — TELEPHONE (OUTPATIENT)
Dept: ONCOLOGY | Age: 73
End: 2023-11-30

## 2023-11-30 VITALS
WEIGHT: 118.5 LBS | TEMPERATURE: 97.1 F | SYSTOLIC BLOOD PRESSURE: 172 MMHG | DIASTOLIC BLOOD PRESSURE: 86 MMHG | BODY MASS INDEX: 17.96 KG/M2 | HEART RATE: 54 BPM | OXYGEN SATURATION: 98 % | HEIGHT: 68 IN

## 2023-11-30 DIAGNOSIS — C64.1 RENAL CELL CANCER, RIGHT (HCC): Primary | ICD-10-CM

## 2023-11-30 LAB
ALBUMIN SERPL-MCNC: 3.7 G/DL (ref 3.5–5.2)
ALP SERPL-CCNC: 134 U/L (ref 40–129)
ALT SERPL-CCNC: 19 U/L (ref 0–40)
ANION GAP SERPL CALCULATED.3IONS-SCNC: 8 MMOL/L (ref 7–16)
AST SERPL-CCNC: 23 U/L (ref 0–39)
BASOPHILS # BLD: 0.07 K/UL (ref 0–0.2)
BASOPHILS NFR BLD: 1 % (ref 0–2)
BILIRUB SERPL-MCNC: 0.2 MG/DL (ref 0–1.2)
BUN SERPL-MCNC: 13 MG/DL (ref 6–23)
CALCIUM SERPL-MCNC: 9.1 MG/DL (ref 8.6–10.2)
CHLORIDE SERPL-SCNC: 100 MMOL/L (ref 98–107)
CO2 SERPL-SCNC: 26 MMOL/L (ref 22–29)
CREAT SERPL-MCNC: 0.9 MG/DL (ref 0.7–1.2)
EOSINOPHIL # BLD: 0.18 K/UL (ref 0.05–0.5)
EOSINOPHILS RELATIVE PERCENT: 2 % (ref 0–6)
ERYTHROCYTE [DISTWIDTH] IN BLOOD BY AUTOMATED COUNT: 15.5 % (ref 11.5–15)
GFR SERPL CREATININE-BSD FRML MDRD: >60 ML/MIN/1.73M2
GLUCOSE SERPL-MCNC: 116 MG/DL (ref 74–99)
HCT VFR BLD AUTO: 43.7 % (ref 37–54)
HGB BLD-MCNC: 14.4 G/DL (ref 12.5–16.5)
IMM GRANULOCYTES # BLD AUTO: <0.03 K/UL (ref 0–0.58)
IMM GRANULOCYTES NFR BLD: 0 % (ref 0–5)
LYMPHOCYTES NFR BLD: 1.84 K/UL (ref 1.5–4)
LYMPHOCYTES RELATIVE PERCENT: 24 % (ref 20–42)
MCH RBC QN AUTO: 30 PG (ref 26–35)
MCHC RBC AUTO-ENTMCNC: 33 G/DL (ref 32–34.5)
MCV RBC AUTO: 91 FL (ref 80–99.9)
MONOCYTES NFR BLD: 0.52 K/UL (ref 0.1–0.95)
MONOCYTES NFR BLD: 7 % (ref 2–12)
NEUTROPHILS NFR BLD: 65 % (ref 43–80)
NEUTS SEG NFR BLD: 4.98 K/UL (ref 1.8–7.3)
PLATELET # BLD AUTO: 296 K/UL (ref 130–450)
PMV BLD AUTO: 8.9 FL (ref 7–12)
POTASSIUM SERPL-SCNC: 4.3 MMOL/L (ref 3.5–5)
PROT SERPL-MCNC: 6.7 G/DL (ref 6.4–8.3)
RBC # BLD AUTO: 4.8 M/UL (ref 3.8–5.8)
SODIUM SERPL-SCNC: 134 MMOL/L (ref 132–146)
TSH SERPL DL<=0.05 MIU/L-ACNC: 3.67 UIU/ML (ref 0.27–4.2)
WBC OTHER # BLD: 7.6 K/UL (ref 4.5–11.5)

## 2023-11-30 PROCEDURE — 84443 ASSAY THYROID STIM HORMONE: CPT

## 2023-11-30 PROCEDURE — 80053 COMPREHEN METABOLIC PANEL: CPT

## 2023-11-30 PROCEDURE — 2580000003 HC RX 258: Performed by: INTERNAL MEDICINE

## 2023-11-30 PROCEDURE — 99214 OFFICE O/P EST MOD 30 MIN: CPT

## 2023-11-30 PROCEDURE — 6360000002 HC RX W HCPCS: Performed by: INTERNAL MEDICINE

## 2023-11-30 PROCEDURE — 85025 COMPLETE CBC W/AUTO DIFF WBC: CPT

## 2023-11-30 RX ORDER — HEPARIN 100 UNIT/ML
500 SYRINGE INTRAVENOUS PRN
Status: DISCONTINUED | OUTPATIENT
Start: 2023-11-30 | End: 2023-12-01 | Stop reason: HOSPADM

## 2023-11-30 RX ORDER — WATER 10 ML/10ML
2.2 INJECTION INTRAMUSCULAR; INTRAVENOUS; SUBCUTANEOUS ONCE
OUTPATIENT
Start: 2023-11-30 | End: 2023-11-30

## 2023-11-30 RX ORDER — SODIUM CHLORIDE 0.9 % (FLUSH) 0.9 %
5-40 SYRINGE (ML) INJECTION PRN
OUTPATIENT
Start: 2023-11-30

## 2023-11-30 RX ORDER — HEPARIN 100 UNIT/ML
500 SYRINGE INTRAVENOUS PRN
OUTPATIENT
Start: 2023-11-30

## 2023-11-30 RX ORDER — SODIUM CHLORIDE 0.9 % (FLUSH) 0.9 %
5-40 SYRINGE (ML) INJECTION PRN
Status: DISCONTINUED | OUTPATIENT
Start: 2023-11-30 | End: 2023-12-01 | Stop reason: HOSPADM

## 2023-11-30 RX ADMIN — SODIUM CHLORIDE, PRESERVATIVE FREE 10 ML: 5 INJECTION INTRAVENOUS at 10:13

## 2023-11-30 RX ADMIN — Medication 500 UNITS: at 10:13

## 2023-11-30 NOTE — PROGRESS NOTES
Patient presents to clinic for labs today. L chest  SQ port accessed per policy using 20 G, 2.54 inches Zapata needle for good blood return. Aspirate for waste and specimen sent to lab. Site flushed easily with 10 mL NSS followed by 5 mL Heparin solution 100 units/ml rinse prior to de-access. Tolerated procedure well. Left accessed for doctor appointment.

## 2023-11-30 NOTE — TELEPHONE ENCOUNTER
SW met with pt at pt request.  Pt requested sending another [facility] application. Application was completed and put into mail for pt. Pt reports that he continues to struggle hearing back from [facility] at this time and stated that he does not have a working phone. SW to continue to attempt to reach  at [facility] to follow up on letter that was sent to pt about next steps that he has completed.       Floyd López MSW, DERICW-S  Oncology Social Worker

## 2023-11-30 NOTE — PROGRESS NOTES
Patient states that he is not taking his oral Cabometyx as he didn't realize he was supposed to be on it. Spoke with Jana Castellano from [facility] who states that the medication was delivered on 11/7 at 11:42 am and was signed for by Jenna Herr who patient states is his spouse. Patient states that he didn't know he received this medication. I asked if I could call his spouse and speak to her and patient states that all their phones are shut off at the moment. Jana Castellano states that she will send another bottle to be delivered tomorrow. Patient states that when he gets home he will speak to his wife to see where this medication is. Dr informed that patient has not started this medication.

## 2023-11-30 NOTE — PROGRESS NOTES
Radiology-    Echo Complete    Result Date: 10/8/2021  Transthoracic Echocardiography Report (TTE)  Demographics   Patient Name    [facility]        Gender            Male                  [address]  [id number]      Room Number  Number   Account #       [de-identified]     Procedure Date    10/08/2021   Corporate ID                  Ordering          Kodak Daley DO                                Physician   Accession       [id number]    Referring         Kodak Daley DO  Number                        Physician   Date of Birth   1950    Sonographer       Alexus Mendoza RDCS   Age             73 year(s)    Interpreting      [facility]                                Physician         Physician Cardiology                                                  Robin Nascimento MD                                 Any Other  Procedure Type of Study   TTE procedure:Echo Complete W/Doppler & Color Flow. Procedure Date Date: 10/08/2021 Start: 09:57 AM Study Location: Echo Lab Technical Quality: Poor visualization due to poor acoustical window. Indications:Heart murmur. Patient Status: Routine Height: 68 inches Weight: 120 pounds BSA: 1.65 m^2 BMI: 18.25 kg/m^2  Findings   Left Ventricle  Normal left ventricular chamber size. Normal left ventricular systolic function. Visually estimated LVEF 65%. Mild left ventricular concentric hypertrophy noted. Normal diastolic function. Right Ventricle  Normal right ventricle size and function. Left Atrium  The left atrium is mildly dilated. Mildly increased left atrial volume. Interatrial septum not well visualized but appears intact. Right Atrium  Normal right atrium. Mitral Valve  Normal mitral valve structure. There is moderate mitral regurgitation - ERO 0.23cm2, PISA 0.9cm, RV 51cc. No mitral valve prolapse. Tricuspid Valve  Normal tricuspid valve structure. There is mild tricuspid regurgitation.   Mild pulmonary hypertension, RVSP

## 2023-12-21 ENCOUNTER — APPOINTMENT (OUTPATIENT)
Dept: CT IMAGING | Age: 73
End: 2023-12-21
Payer: MEDICARE

## 2023-12-21 ENCOUNTER — HOSPITAL ENCOUNTER (EMERGENCY)
Age: 73
Discharge: LEFT BEFORE DISCHARGE | End: 2023-12-21
Attending: EMERGENCY MEDICINE
Payer: MEDICARE

## 2023-12-21 ENCOUNTER — APPOINTMENT (OUTPATIENT)
Dept: GENERAL RADIOLOGY | Age: 73
End: 2023-12-21
Payer: MEDICARE

## 2023-12-21 VITALS
DIASTOLIC BLOOD PRESSURE: 97 MMHG | TEMPERATURE: 97.5 F | SYSTOLIC BLOOD PRESSURE: 186 MMHG | RESPIRATION RATE: 16 BRPM | HEART RATE: 81 BPM | OXYGEN SATURATION: 97 %

## 2023-12-21 DIAGNOSIS — J02.9 ACUTE PHARYNGITIS, UNSPECIFIED ETIOLOGY: ICD-10-CM

## 2023-12-21 DIAGNOSIS — Z53.29 LEFT AGAINST MEDICAL ADVICE: ICD-10-CM

## 2023-12-21 DIAGNOSIS — R05.1 ACUTE COUGH: Primary | ICD-10-CM

## 2023-12-21 LAB
ALBUMIN SERPL-MCNC: 3.5 G/DL (ref 3.5–5.2)
ALP SERPL-CCNC: 172 U/L (ref 40–129)
ALT SERPL-CCNC: 22 U/L (ref 0–40)
ANION GAP SERPL CALCULATED.3IONS-SCNC: 13 MMOL/L (ref 7–16)
AST SERPL-CCNC: 23 U/L (ref 0–39)
BASOPHILS # BLD: 0.03 K/UL (ref 0–0.2)
BASOPHILS NFR BLD: 0 % (ref 0–2)
BILIRUB SERPL-MCNC: 0.7 MG/DL (ref 0–1.2)
BNP SERPL-MCNC: 556 PG/ML (ref 0–450)
BUN SERPL-MCNC: 18 MG/DL (ref 6–23)
CALCIUM SERPL-MCNC: 9.2 MG/DL (ref 8.6–10.2)
CHLORIDE SERPL-SCNC: 98 MMOL/L (ref 98–107)
CO2 SERPL-SCNC: 24 MMOL/L (ref 22–29)
CREAT SERPL-MCNC: 0.9 MG/DL (ref 0.7–1.2)
EOSINOPHIL # BLD: 0.01 K/UL (ref 0.05–0.5)
EOSINOPHILS RELATIVE PERCENT: 0 % (ref 0–6)
ERYTHROCYTE [DISTWIDTH] IN BLOOD BY AUTOMATED COUNT: 17.9 % (ref 11.5–15)
GFR SERPL CREATININE-BSD FRML MDRD: >60 ML/MIN/1.73M2
GLUCOSE SERPL-MCNC: 110 MG/DL (ref 74–99)
HCT VFR BLD AUTO: 48.6 % (ref 37–54)
HGB BLD-MCNC: 16.2 G/DL (ref 12.5–16.5)
IMM GRANULOCYTES # BLD AUTO: 0.03 K/UL (ref 0–0.58)
IMM GRANULOCYTES NFR BLD: 0 % (ref 0–5)
INR PPP: 1.1
LYMPHOCYTES NFR BLD: 1.55 K/UL (ref 1.5–4)
LYMPHOCYTES RELATIVE PERCENT: 16 % (ref 20–42)
MCH RBC QN AUTO: 29.3 PG (ref 26–35)
MCHC RBC AUTO-ENTMCNC: 33.3 G/DL (ref 32–34.5)
MCV RBC AUTO: 87.9 FL (ref 80–99.9)
MONOCYTES NFR BLD: 0.61 K/UL (ref 0.1–0.95)
MONOCYTES NFR BLD: 6 % (ref 2–12)
NEUTROPHILS NFR BLD: 77 % (ref 43–80)
NEUTS SEG NFR BLD: 7.41 K/UL (ref 1.8–7.3)
PLATELET # BLD AUTO: 177 K/UL (ref 130–450)
PMV BLD AUTO: 8.9 FL (ref 7–12)
POTASSIUM SERPL-SCNC: 4.7 MMOL/L (ref 3.5–5)
PROT SERPL-MCNC: 6.9 G/DL (ref 6.4–8.3)
PROTHROMBIN TIME: 12.6 SEC (ref 9.3–12.4)
RBC # BLD AUTO: 5.53 M/UL (ref 3.8–5.8)
SARS-COV-2 RDRP RESP QL NAA+PROBE: NOT DETECTED
SODIUM SERPL-SCNC: 135 MMOL/L (ref 132–146)
SPECIMEN DESCRIPTION: NORMAL
TROPONIN I SERPL HS-MCNC: 19 NG/L (ref 0–11)
WBC OTHER # BLD: 9.6 K/UL (ref 4.5–11.5)

## 2023-12-21 PROCEDURE — 6360000004 HC RX CONTRAST MEDICATION: Performed by: RADIOLOGY

## 2023-12-21 PROCEDURE — 70491 CT SOFT TISSUE NECK W/DYE: CPT

## 2023-12-21 PROCEDURE — 6360000002 HC RX W HCPCS: Performed by: EMERGENCY MEDICINE

## 2023-12-21 PROCEDURE — 93005 ELECTROCARDIOGRAM TRACING: CPT | Performed by: EMERGENCY MEDICINE

## 2023-12-21 PROCEDURE — 85025 COMPLETE CBC W/AUTO DIFF WBC: CPT

## 2023-12-21 PROCEDURE — 71275 CT ANGIOGRAPHY CHEST: CPT

## 2023-12-21 PROCEDURE — 87635 SARS-COV-2 COVID-19 AMP PRB: CPT

## 2023-12-21 PROCEDURE — 6370000000 HC RX 637 (ALT 250 FOR IP): Performed by: EMERGENCY MEDICINE

## 2023-12-21 PROCEDURE — 80053 COMPREHEN METABOLIC PANEL: CPT

## 2023-12-21 PROCEDURE — 36415 COLL VENOUS BLD VENIPUNCTURE: CPT

## 2023-12-21 PROCEDURE — 85610 PROTHROMBIN TIME: CPT

## 2023-12-21 PROCEDURE — 84484 ASSAY OF TROPONIN QUANT: CPT

## 2023-12-21 PROCEDURE — 96374 THER/PROPH/DIAG INJ IV PUSH: CPT

## 2023-12-21 PROCEDURE — 99285 EMERGENCY DEPT VISIT HI MDM: CPT

## 2023-12-21 PROCEDURE — 83880 ASSAY OF NATRIURETIC PEPTIDE: CPT

## 2023-12-21 RX ORDER — LIDOCAINE HYDROCHLORIDE 20 MG/ML
5 SOLUTION OROPHARYNGEAL
Qty: 100 ML | Refills: 0 | Status: SHIPPED | OUTPATIENT
Start: 2023-12-21

## 2023-12-21 RX ORDER — LIDOCAINE HYDROCHLORIDE 20 MG/ML
5 SOLUTION OROPHARYNGEAL ONCE
Status: COMPLETED | OUTPATIENT
Start: 2023-12-21 | End: 2023-12-21

## 2023-12-21 RX ORDER — DEXAMETHASONE SODIUM PHOSPHATE 10 MG/ML
10 INJECTION INTRAMUSCULAR; INTRAVENOUS ONCE
Status: COMPLETED | OUTPATIENT
Start: 2023-12-21 | End: 2023-12-21

## 2023-12-21 RX ORDER — PREDNISONE 50 MG/1
50 TABLET ORAL DAILY
Qty: 5 TABLET | Refills: 0 | Status: SHIPPED | OUTPATIENT
Start: 2023-12-21 | End: 2023-12-26

## 2023-12-21 RX ADMIN — LIDOCAINE HYDROCHLORIDE 5 ML: 20 SOLUTION ORAL at 18:19

## 2023-12-21 RX ADMIN — DEXAMETHASONE SODIUM PHOSPHATE 10 MG: 10 INJECTION INTRAMUSCULAR; INTRAVENOUS at 17:17

## 2023-12-21 RX ADMIN — IOPAMIDOL 70 ML: 755 INJECTION, SOLUTION INTRAVENOUS at 18:28

## 2023-12-22 NOTE — ED PROVIDER NOTES
stress unfortunately before the patient's complete workup and reevaluation were completed patient wishes signed [facility] stating he had other obligations he did understand the risks is to follow outpatient with his PCP and was given return precautions    FINAL IMPRESSION      1. Acute cough    2. Acute pharyngitis, unspecified etiology    3.  Left against medical advice          DISPOSITION/PLAN     DISPOSITION [place] 12/21/2023 09:58:05 PM    PATIENT REFERRED TO:  SHI Thakkar NP  [facility] [phone]    Schedule an appointment as soon as possible for a visit       101 D[address]  Emergency Department  [address]  Go to   If symptoms worsen      DISCHARGE MEDICATIONS:  Discharge Medication List as of 12/21/2023 10:10 PM        START taking these medications    Details   predniSONE (DELTASONE) 50 MG tablet Take 1 tablet by mouth daily for 5 days, Disp-5 tablet, R-0Normal      lidocaine viscous hcl (XYLOCAINE) 2 % SOLN solution Take 5 mLs by mouth every 3 hours as needed for Irritation, Disp-100 mL, R-0Normal             DISCONTINUED MEDICATIONS:  Discharge Medication List as of 12/21/2023 10:10 PM               (Please note that portions of this note were completed with a voice recognition program.  Efforts were made to edit the dictations but occasionally words are mis-transcribed.)    Krystle Vincent DO (electronically signed)        Krystle Vincent DO  12/24/23 1839

## 2023-12-23 LAB
EKG ATRIAL RATE: 60 BPM
EKG P AXIS: 74 DEGREES
EKG P-R INTERVAL: 160 MS
EKG Q-T INTERVAL: 406 MS
EKG QRS DURATION: 68 MS
EKG QTC CALCULATION (BAZETT): 406 MS
EKG R AXIS: 60 DEGREES
EKG T AXIS: 55 DEGREES
EKG VENTRICULAR RATE: 60 BPM

## 2023-12-23 PROCEDURE — 93010 ELECTROCARDIOGRAM REPORT: CPT | Performed by: INTERNAL MEDICINE

## 2024-01-09 ENCOUNTER — TELEPHONE (OUTPATIENT)
Dept: ONCOLOGY | Age: 74
End: 2024-01-09

## 2024-01-09 NOTE — TELEPHONE ENCOUNTER
SW received call from pt's niece stating that pt was not taking his medications and laying in bed most of the day most days and was asking about a Hospice referral.  RAMESH received a new phone number to contact pt from niece and contacted pt's wife Hortencia.  Hortencia stated that Adarsh was up at this time and gave the phone to him.  Pt stated that he was fine and asked If the cancer center had reopened since they were under construction.  RAMESH informed pt that center was not closed and pt reported that he was still looking to complete treatment and that he is still taking his cancer pills currently.  RAMESH to update [facility] staff to get pt on the schedule and update pt's phone number to [phone].  Pt was encouraged to reach out to this provider should any needs arise.        Constantine HSU, ANGELA-S  Oncology Social Worker

## 2024-01-11 ENCOUNTER — OFFICE VISIT (OUTPATIENT)
Dept: ONCOLOGY | Age: 74
End: 2024-01-11
Payer: MEDICARE

## 2024-01-11 ENCOUNTER — TELEPHONE (OUTPATIENT)
Dept: INFUSION THERAPY | Age: 74
End: 2024-01-11

## 2024-01-11 ENCOUNTER — HOSPITAL ENCOUNTER (OUTPATIENT)
Dept: INFUSION THERAPY | Age: 74
Discharge: HOME | End: 2024-01-11
Payer: MEDICARE

## 2024-01-11 VITALS
BODY MASS INDEX: 14.47 KG/M2 | TEMPERATURE: 96.8 F | DIASTOLIC BLOOD PRESSURE: 85 MMHG | HEIGHT: 68 IN | SYSTOLIC BLOOD PRESSURE: 110 MMHG | HEART RATE: 85 BPM | OXYGEN SATURATION: 100 % | WEIGHT: 95.5 LBS

## 2024-01-11 DIAGNOSIS — C79.51 METASTATIC RENAL CELL CARCINOMA TO BONE (HCC): ICD-10-CM

## 2024-01-11 DIAGNOSIS — C64.1 RENAL CELL CANCER, RIGHT (HCC): Primary | ICD-10-CM

## 2024-01-11 DIAGNOSIS — C64.9 METASTATIC RENAL CELL CARCINOMA TO BONE (HCC): ICD-10-CM

## 2024-01-11 LAB
ALBUMIN SERPL-MCNC: 3.5 G/DL (ref 3.5–5.2)
ALP SERPL-CCNC: 142 U/L (ref 40–129)
ALT SERPL-CCNC: 17 U/L (ref 0–40)
ANION GAP SERPL CALCULATED.3IONS-SCNC: 11 MMOL/L (ref 7–16)
AST SERPL-CCNC: 24 U/L (ref 0–39)
BASOPHILS # BLD: 0 K/UL (ref 0–0.2)
BASOPHILS NFR BLD: 0 % (ref 0–2)
BILIRUB SERPL-MCNC: 0.8 MG/DL (ref 0–1.2)
BUN SERPL-MCNC: 17 MG/DL (ref 6–23)
CALCIUM SERPL-MCNC: 9.2 MG/DL (ref 8.6–10.2)
CHLORIDE SERPL-SCNC: 95 MMOL/L (ref 98–107)
CO2 SERPL-SCNC: 29 MMOL/L (ref 22–29)
CREAT SERPL-MCNC: 0.9 MG/DL (ref 0.7–1.2)
EOSINOPHIL # BLD: 0.05 K/UL (ref 0.05–0.5)
EOSINOPHILS RELATIVE PERCENT: 1 % (ref 0–6)
ERYTHROCYTE [DISTWIDTH] IN BLOOD BY AUTOMATED COUNT: 21.3 % (ref 11.5–15)
GFR SERPL CREATININE-BSD FRML MDRD: >60 ML/MIN/1.73M2
GLUCOSE SERPL-MCNC: 170 MG/DL (ref 74–99)
HCT VFR BLD AUTO: 49.8 % (ref 37–54)
HGB BLD-MCNC: 16.8 G/DL (ref 12.5–16.5)
LYMPHOCYTES NFR BLD: 1.26 K/UL (ref 1.5–4)
LYMPHOCYTES RELATIVE PERCENT: 20 % (ref 20–42)
MCH RBC QN AUTO: 30 PG (ref 26–35)
MCHC RBC AUTO-ENTMCNC: 33.7 G/DL (ref 32–34.5)
MCV RBC AUTO: 88.9 FL (ref 80–99.9)
MONOCYTES NFR BLD: 0.33 K/UL (ref 0.1–0.95)
MONOCYTES NFR BLD: 5 % (ref 2–12)
NEUTROPHILS NFR BLD: 74 % (ref 43–80)
NEUTS SEG NFR BLD: 4.66 K/UL (ref 1.8–7.3)
PLATELET # BLD AUTO: 157 K/UL (ref 130–450)
PMV BLD AUTO: 9.7 FL (ref 7–12)
POTASSIUM SERPL-SCNC: 3.4 MMOL/L (ref 3.5–5)
PROT SERPL-MCNC: 6.5 G/DL (ref 6.4–8.3)
RBC # BLD AUTO: 5.6 M/UL (ref 3.8–5.8)
RBC # BLD: ABNORMAL 10*6/UL
SODIUM SERPL-SCNC: 135 MMOL/L (ref 132–146)
WBC OTHER # BLD: 6.3 K/UL (ref 4.5–11.5)

## 2024-01-11 PROCEDURE — 85025 COMPLETE CBC W/AUTO DIFF WBC: CPT

## 2024-01-11 PROCEDURE — 2580000003 HC RX 258: Performed by: INTERNAL MEDICINE

## 2024-01-11 PROCEDURE — 99214 OFFICE O/P EST MOD 30 MIN: CPT

## 2024-01-11 PROCEDURE — 80053 COMPREHEN METABOLIC PANEL: CPT

## 2024-01-11 PROCEDURE — 6360000002 HC RX W HCPCS: Performed by: INTERNAL MEDICINE

## 2024-01-11 PROCEDURE — 36591 DRAW BLOOD OFF VENOUS DEVICE: CPT

## 2024-01-11 RX ORDER — OXYCODONE HYDROCHLORIDE 5 MG/1
5 TABLET ORAL EVERY 6 HOURS PRN
Qty: 28 TABLET | Refills: 0 | Status: SHIPPED | OUTPATIENT
Start: 2024-01-11 | End: 2024-01-18

## 2024-01-11 RX ORDER — WATER 10 ML/10ML
2.2 INJECTION INTRAMUSCULAR; INTRAVENOUS; SUBCUTANEOUS ONCE
OUTPATIENT
Start: 2024-01-11 | End: 2024-01-11

## 2024-01-11 RX ORDER — HEPARIN 100 UNIT/ML
500 SYRINGE INTRAVENOUS PRN
OUTPATIENT
Start: 2024-01-11

## 2024-01-11 RX ORDER — SODIUM CHLORIDE 0.9 % (FLUSH) 0.9 %
5-40 SYRINGE (ML) INJECTION PRN
Status: DISCONTINUED | OUTPATIENT
Start: 2024-01-11 | End: 2024-01-12 | Stop reason: HOSPADM

## 2024-01-11 RX ORDER — SODIUM CHLORIDE 0.9 % (FLUSH) 0.9 %
5-40 SYRINGE (ML) INJECTION PRN
OUTPATIENT
Start: 2024-01-11

## 2024-01-11 RX ORDER — HEPARIN 100 UNIT/ML
500 SYRINGE INTRAVENOUS PRN
Status: DISCONTINUED | OUTPATIENT
Start: 2024-01-11 | End: 2024-01-12 | Stop reason: HOSPADM

## 2024-01-11 RX ADMIN — Medication 500 UNITS: at 14:35

## 2024-01-11 RX ADMIN — SODIUM CHLORIDE, PRESERVATIVE FREE 10 ML: 5 INJECTION INTRAVENOUS at 14:35

## 2024-01-11 NOTE — TELEPHONE ENCOUNTER
Adarsh Orozco  1/11/2024  Ht Readings from Last 1 Encounters:   01/11/24 1.727 m (5' 8\")     Wt Readings from Last 10 Encounters:   01/11/24 43.3 kg (95 lb 8 oz)   11/30/23 53.8 kg (118 lb 8 oz)   11/16/23 54 kg (119 lb)   11/02/23 53.7 kg (118 lb 6.4 oz)   09/28/23 55.3 kg (122 lb)   08/31/23 55.7 kg (122 lb 12.8 oz)   08/29/23 54.4 kg (120 lb)   08/03/23 53.9 kg (118 lb 14.4 oz)   06/22/23 55.6 kg (122 lb 9.6 oz)   05/25/23 55.8 kg (123 lb)     BMI=14.52    Assessment: Met with Romie, his wife and sister while in for OV with Dr. Christensen for metastatic renal cell cancer. Romie being treated with cabozantinib but family reports he did not take med for most of December and just recently started taking it again. Family reports that he did not get out of bed or eat for most of December. David said he does not remember any of the last month. He did go to the ER but appears he left AMA, but David denies leaving AMA. Previously David would drink [employer] Instant Breakfast and would accept samples of Ensure Complete when he would come to office. Wife reports he hasn't been drinking either. Multiple samples of Ensure Complete/and coupons provided today. Sister said she would pick some up at the store for Romie. David denied N/V/D/C, dysphagia, odynophagia, and mouth sores. He does report that he loses his voice at times. Dr. Christensen notified of weight loss, with poor intake and confusion.  Dr. Christensen ordered CT Abd, CT head and palliative medicine consult and labs. Did mention use of a feeding tube and briefly explained what it was, David and his family agreed to push food and ONS. Phone numbers provided for central scheduling and palliative medicine.     Weight change: 19.41% significant weight loss x 10 weeks, 21.72% significant X 3 months, 19.68% significant weight loss x 6 months  Appetite: Poor appetite, improving a little per wife and David but only ate an omlet yesterday.   Nutritional Side Effects: pain in his side,

## 2024-01-11 NOTE — PROGRESS NOTES
[facility] [address] [id number] [place] [address]   Hematology/Oncology  Consult      Patient Name: Adarsh Orozco  YOB: 1950  PCP: Mac Pathak APRN - NP   Referring Provider:      Reason for Consultation:   Chief Complaint   Patient presents with    Follow-up     Renal CA      Interval history: Lost 20 pounds in last 6 weeks.  Looks cachectic.  Continues to have right flank pain.    History of Present Illness:  71 years old male referred for possible metastatic renal cell carcinoma.  Patient presented to his PCP, HETAL Pathak, complaining of right side chest wall mass which he noticed about a month ago and had been gradually increasing in size.  CT chest from October 2021 showed mltiple heterogenous and necrotic masses in the right kidney with possible tumor invasion of the right renal vein.  In addition to this there was a large 4 by 9 cm soft tissue necrotic mass involving the fourth rib.  There were other lytic destructive lesions in the right eighth and ninth rib.  There were multiple right lung nodules ranging upto 1.5 cm.  Few liver nodules were noted as well the largest being 7 mm.  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    PET scan showed hypermetabolic lesions in the right kidney, left adrenal diffuse skeletal metastasis, scattered pulmonary nodules most of them subcentimeter except for 1 right lung hypermetabolic nodule.  MRI brain reviewed no intracranial metastasis.    S/p right chest wall mass biopsy on 11/2/2021.  Consistent with clear-cell renal cell carcinoma.    Performance status seems fair Karnofsky performance status 80/70.  Intermediate risk based on MSKCC risk factors although the disease seems to have progressed rapidly over the past month.  His rapid progression as well as renal vein involvement probably precludes debulking nephrectomy.  Tumor burden is not high and patient is asymptomatic.  Recommended treatment with

## 2024-01-12 ENCOUNTER — TELEPHONE (OUTPATIENT)
Dept: PALLATIVE CARE | Age: 74
End: 2024-01-12

## 2024-01-12 NOTE — TELEPHONE ENCOUNTER
Call to David , spoke with David and his wife Hortencia regarding referral for Palliative Care. Explained Palliative service and location of clinic, day available at [facility]. Appointment set 1/31/24.

## 2024-01-15 ENCOUNTER — TELEPHONE (OUTPATIENT)
Dept: ONCOLOGY | Age: 74
End: 2024-01-15

## 2024-01-15 NOTE — TELEPHONE ENCOUNTER
RAMESH returned call to pt's wife Hortencia,  Hortencia asked if pt's sister contacts the office that SW not give her updates.  SW explained that he typically works with pt directly and if pt wants to update his communication forms he would need to contact the front office and redo his release paperwork.  SW asked that pt contact the front office to change this document himself if that is his request and that a new document could be made available the next time pt is in the office.       Constantine HSU, ANGELA-S  Oncology Social Worker

## 2024-02-01 ENCOUNTER — HOSPITAL ENCOUNTER (OUTPATIENT)
Dept: CT IMAGING | Age: 74
Discharge: HOME | End: 2024-02-01
Attending: INTERNAL MEDICINE
Payer: MEDICARE

## 2024-02-01 DIAGNOSIS — C79.51 METASTATIC RENAL CELL CARCINOMA TO BONE (HCC): ICD-10-CM

## 2024-02-01 DIAGNOSIS — C64.9 METASTATIC RENAL CELL CARCINOMA TO BONE (HCC): ICD-10-CM

## 2024-02-01 PROCEDURE — 74177 CT ABD & PELVIS W/CONTRAST: CPT

## 2024-02-01 PROCEDURE — 70470 CT HEAD/BRAIN W/O & W/DYE: CPT

## 2024-02-01 PROCEDURE — 6360000004 HC RX CONTRAST MEDICATION: Performed by: RADIOLOGY

## 2024-02-01 RX ADMIN — IOPAMIDOL 75 ML: 755 INJECTION, SOLUTION INTRAVENOUS at 14:29

## 2024-02-08 ENCOUNTER — HOSPITAL ENCOUNTER (OUTPATIENT)
Dept: INFUSION THERAPY | Age: 74
Discharge: HOME | End: 2024-02-08
Payer: MEDICARE

## 2024-02-08 ENCOUNTER — TELEPHONE (OUTPATIENT)
Dept: INFUSION THERAPY | Age: 74
End: 2024-02-08

## 2024-02-08 ENCOUNTER — OFFICE VISIT (OUTPATIENT)
Dept: ONCOLOGY | Age: 74
End: 2024-02-08
Payer: MEDICARE

## 2024-02-08 VITALS
HEART RATE: 67 BPM | SYSTOLIC BLOOD PRESSURE: 149 MMHG | WEIGHT: 91.9 LBS | BODY MASS INDEX: 13.93 KG/M2 | DIASTOLIC BLOOD PRESSURE: 81 MMHG | OXYGEN SATURATION: 94 % | TEMPERATURE: 96.6 F | HEIGHT: 68 IN

## 2024-02-08 DIAGNOSIS — E43 SEVERE PROTEIN-CALORIE MALNUTRITION (HCC): ICD-10-CM

## 2024-02-08 DIAGNOSIS — C64.1 RENAL CELL CANCER, RIGHT (HCC): Primary | ICD-10-CM

## 2024-02-08 PROBLEM — E46 PROTEIN CALORIE MALNUTRITION (HCC): Status: ACTIVE | Noted: 2024-02-08

## 2024-02-08 LAB
ALBUMIN SERPL-MCNC: 3.2 G/DL (ref 3.5–5.2)
ALP SERPL-CCNC: 119 U/L (ref 40–129)
ALT SERPL-CCNC: 28 U/L (ref 0–40)
ANION GAP SERPL CALCULATED.3IONS-SCNC: 10 MMOL/L (ref 7–16)
AST SERPL-CCNC: 32 U/L (ref 0–39)
BASOPHILS # BLD: 0 K/UL (ref 0–0.2)
BASOPHILS NFR BLD: 0 % (ref 0–2)
BILIRUB SERPL-MCNC: 0.6 MG/DL (ref 0–1.2)
BUN SERPL-MCNC: 11 MG/DL (ref 6–23)
CALCIUM SERPL-MCNC: 8 MG/DL (ref 8.6–10.2)
CHLORIDE SERPL-SCNC: 102 MMOL/L (ref 98–107)
CO2 SERPL-SCNC: 25 MMOL/L (ref 22–29)
CREAT SERPL-MCNC: 0.8 MG/DL (ref 0.7–1.2)
EOSINOPHIL # BLD: 0.1 K/UL (ref 0.05–0.5)
EOSINOPHILS RELATIVE PERCENT: 2 % (ref 0–6)
ERYTHROCYTE [DISTWIDTH] IN BLOOD BY AUTOMATED COUNT: 25.3 % (ref 11.5–15)
GFR SERPL CREATININE-BSD FRML MDRD: >60 ML/MIN/1.73M2
GLUCOSE SERPL-MCNC: 144 MG/DL (ref 74–99)
HCT VFR BLD AUTO: 45.2 % (ref 37–54)
HGB BLD-MCNC: 15.3 G/DL (ref 12.5–16.5)
LYMPHOCYTES NFR BLD: 1.34 K/UL (ref 1.5–4)
LYMPHOCYTES RELATIVE PERCENT: 24 % (ref 20–42)
MCH RBC QN AUTO: 31.9 PG (ref 26–35)
MCHC RBC AUTO-ENTMCNC: 33.8 G/DL (ref 32–34.5)
MCV RBC AUTO: 94.4 FL (ref 80–99.9)
MONOCYTES NFR BLD: 0.64 K/UL (ref 0.1–0.95)
MONOCYTES NFR BLD: 11 % (ref 2–12)
NEUTROPHILS NFR BLD: 64 % (ref 43–80)
NEUTS SEG NFR BLD: 3.62 K/UL (ref 1.8–7.3)
PLATELET # BLD AUTO: 175 K/UL (ref 130–450)
PMV BLD AUTO: 9 FL (ref 7–12)
POTASSIUM SERPL-SCNC: 3 MMOL/L (ref 3.5–5)
PROT SERPL-MCNC: 5.4 G/DL (ref 6.4–8.3)
RBC # BLD AUTO: 4.79 M/UL (ref 3.8–5.8)
RBC # BLD: ABNORMAL 10*6/UL
SODIUM SERPL-SCNC: 137 MMOL/L (ref 132–146)
TSH SERPL DL<=0.05 MIU/L-ACNC: 8.1 UIU/ML (ref 0.27–4.2)
WBC OTHER # BLD: 5.7 K/UL (ref 4.5–11.5)

## 2024-02-08 PROCEDURE — 85025 COMPLETE CBC W/AUTO DIFF WBC: CPT

## 2024-02-08 PROCEDURE — 84443 ASSAY THYROID STIM HORMONE: CPT

## 2024-02-08 PROCEDURE — 6360000002 HC RX W HCPCS: Performed by: INTERNAL MEDICINE

## 2024-02-08 PROCEDURE — 99214 OFFICE O/P EST MOD 30 MIN: CPT

## 2024-02-08 PROCEDURE — 2580000003 HC RX 258: Performed by: INTERNAL MEDICINE

## 2024-02-08 PROCEDURE — 80053 COMPREHEN METABOLIC PANEL: CPT

## 2024-02-08 RX ORDER — WATER 10 ML/10ML
2.2 INJECTION INTRAMUSCULAR; INTRAVENOUS; SUBCUTANEOUS ONCE
OUTPATIENT
Start: 2024-02-08 | End: 2024-02-08

## 2024-02-08 RX ORDER — SODIUM CHLORIDE 0.9 % (FLUSH) 0.9 %
5-40 SYRINGE (ML) INJECTION PRN
OUTPATIENT
Start: 2024-02-08

## 2024-02-08 RX ORDER — HEPARIN 100 UNIT/ML
500 SYRINGE INTRAVENOUS PRN
Status: DISCONTINUED | OUTPATIENT
Start: 2024-02-08 | End: 2024-02-09 | Stop reason: HOSPADM

## 2024-02-08 RX ORDER — HEPARIN 100 UNIT/ML
500 SYRINGE INTRAVENOUS PRN
OUTPATIENT
Start: 2024-02-08

## 2024-02-08 RX ORDER — SODIUM CHLORIDE 0.9 % (FLUSH) 0.9 %
5-40 SYRINGE (ML) INJECTION PRN
Status: DISCONTINUED | OUTPATIENT
Start: 2024-02-08 | End: 2024-02-09 | Stop reason: HOSPADM

## 2024-02-08 RX ADMIN — SODIUM CHLORIDE, PRESERVATIVE FREE 10 ML: 5 INJECTION INTRAVENOUS at 10:11

## 2024-02-08 RX ADMIN — Medication 500 UNITS: at 10:11

## 2024-02-08 NOTE — TELEPHONE ENCOUNTER
Adarsh Orozco  2/8/2024  Ht Readings from Last 1 Encounters:   02/08/24 1.727 m (5' 8\")     Wt Readings from Last 10 Encounters:   02/08/24 41.7 kg (91 lb 14.4 oz)   01/11/24 43.3 kg (95 lb 8 oz)   11/30/23 53.8 kg (118 lb 8 oz)   11/16/23 54 kg (119 lb)   11/02/23 53.7 kg (118 lb 6.4 oz)   09/28/23 55.3 kg (122 lb)   08/31/23 55.7 kg (122 lb 12.8 oz)   08/29/23 54.4 kg (120 lb)   08/03/23 53.9 kg (118 lb 14.4 oz)   06/22/23 55.6 kg (122 lb 9.6 oz)     BMI=13.97    Assessment: Met with David, his wife and sister while in for OV with Dr. Christensen. Dr. Christensen spoke to them that David has disease progression of St IV renal cell carcinoma aeb on his CT scan. Being treated with carbozantinib, Dr. Christensen increased dose to 60mg daily. David reports still eating very little, complains of dysgeusia and diarrhea. He reports that he only eats once per day because he doesn't want to have diarrhea. Previous samples of Ensure Complete provided but he said he didn't drink them because they were spoiled. Further ONS were not purchased, as they had said they were going to do when they left clinic last visit. They denied difficulty obtaining food, stating they have plenty of food at home. David encouraged to eat what ever sounds good to him. Dr. Christensen encouraged them to call if diarrhea increases to 3-4 times per day, so he can assess medication. He also mentioned if medication not effective may need to consider hospice. Samples of Ensure Complete and [employer] Breakfast Essentials provided again today. His sister said she was going to go purchase additional supplements for him today, coupons were provided.     Weight change: 3.77% weight loss x 1 month, 22.77% significant weight loss x 3 months, 22.71% significant weight loss x 6 months  Appetite: poor  Nutritional Side Effects: anorexia, dysgeusia, diarrhea  Calculated Needs: 35-40 kcal/kg CBW = 1376-9068 kcal, 1.3-1.5 gm/kg/IBW =  gm pro, 1 ml/kcal = 7616-2870 ml fluids  Malnutrition

## 2024-02-08 NOTE — PROGRESS NOTES
Patient presents to clinic for labs today.  L  SQ port accessed per policy using 20g 0.75in Zapata needle for good blood return.  Aspirate for waste and specimen sent to lab.  Site flushed easily with 10 mL NSS followed by 5 mL Heparin solution 100 units/ml rinse prior to de-access.  Dry sterile dressing to area.  Tolerated procedure well.  Encouraged to schedule port flush every 4 weeks.

## 2024-02-08 NOTE — PROGRESS NOTES
moderate mitral regurgitation - ERO 0.23cm2, PISA 0.9cm, RV 51cc.  No mitral valve prolapse.   Tricuspid Valve  Normal tricuspid valve structure.  There is mild tricuspid regurgitation.  Mild pulmonary hypertension, RVSP 39mmHg.   Aortic Valve  Normal aortic valve structure.  There is trace to mild aortic regurgitation, pressure 1/2 time 718ms.   Pulmonic Valve  The pulmonic valve was not well visualized.   Pericardial Effusion  No evidence of pericardial effusion. Pericardium appears normal.   Pleural Effusion  No evidence of pleural effusion.   Aorta  Aortic root 3.5cm.   Miscellaneous  The inferior vena cava diameter is normal with normal respiratory  variation.   Conclusions   Summary  Normal left ventricular chamber size.  Normal left ventricular systolic function, LVEF 65%.  Mild left ventricular concentric hypertrophy noted.  Normal diastolic function.  The left atrium is mildly dilated.  Mildly increased left atrial volume.  Interatrial septum not well visualized but appears intact.  Normal right ventricle size and function.  There is moderate mitral regurgitation - ERO 0.23cm2, PISA 0.9cm, RV 51cc.  No mitral valve prolapse.  There is trace to mild aortic regurgitation, pressure 1/2 time 718ms.  There is mild tricuspid regurgitation.  Mild pulmonary hypertension, RVSP 39mmHg.  The pulmonic valve was not well visualized.  Aortic root 3.5cm.  No evidence of pericardial effusion.  No intra cardiac mass or thrombus.  No comparison study available.   Signature   ----------------------------------------------------------------  Electronically signed by Ambrose Lancaster MD(Interpreting  physician) on 10/08/2021 03:52 PM  ----------------------------------------------------------------  M-Mode/2D Measurements & Calculations   LV Diastolic    LV Systolic Dimension: 3.2   AV Cusp Separation: 1.9 cmLA  Dimension: 5.3  cm                           Dimension: 3.9 cmAO Root  cm              LV Volume Diastolic: 134.5

## 2024-03-07 ENCOUNTER — TELEPHONE (OUTPATIENT)
Dept: INFUSION THERAPY | Age: 74
End: 2024-03-07

## 2024-03-07 ENCOUNTER — TELEPHONE (OUTPATIENT)
Dept: ONCOLOGY | Age: 74
End: 2024-03-07

## 2024-03-07 ENCOUNTER — HOSPITAL ENCOUNTER (OUTPATIENT)
Dept: INFUSION THERAPY | Age: 74
Discharge: HOME | End: 2024-03-07
Payer: MEDICARE

## 2024-03-07 ENCOUNTER — OFFICE VISIT (OUTPATIENT)
Dept: ONCOLOGY | Age: 74
End: 2024-03-07
Payer: MEDICARE

## 2024-03-07 VITALS
BODY MASS INDEX: 13.38 KG/M2 | TEMPERATURE: 96.6 F | DIASTOLIC BLOOD PRESSURE: 87 MMHG | SYSTOLIC BLOOD PRESSURE: 129 MMHG | OXYGEN SATURATION: 99 % | HEIGHT: 68 IN | HEART RATE: 70 BPM | WEIGHT: 88.3 LBS

## 2024-03-07 DIAGNOSIS — C64.1 RENAL CELL CANCER, RIGHT (HCC): Primary | ICD-10-CM

## 2024-03-07 LAB
ALBUMIN SERPL-MCNC: 3.8 G/DL (ref 3.5–5.2)
ALP SERPL-CCNC: 115 U/L (ref 40–129)
ALT SERPL-CCNC: 29 U/L (ref 0–40)
ANION GAP SERPL CALCULATED.3IONS-SCNC: 12 MMOL/L (ref 7–16)
AST SERPL-CCNC: 27 U/L (ref 0–39)
BASOPHILS # BLD: 0.04 K/UL (ref 0–0.2)
BASOPHILS NFR BLD: 1 % (ref 0–2)
BILIRUB SERPL-MCNC: 1 MG/DL (ref 0–1.2)
BUN SERPL-MCNC: 9 MG/DL (ref 6–23)
CALCIUM SERPL-MCNC: 9 MG/DL (ref 8.6–10.2)
CHLORIDE SERPL-SCNC: 100 MMOL/L (ref 98–107)
CO2 SERPL-SCNC: 26 MMOL/L (ref 22–29)
CREAT SERPL-MCNC: 0.8 MG/DL (ref 0.7–1.2)
EOSINOPHIL # BLD: 0 K/UL (ref 0.05–0.5)
EOSINOPHILS RELATIVE PERCENT: 0 % (ref 0–6)
ERYTHROCYTE [DISTWIDTH] IN BLOOD BY AUTOMATED COUNT: 25.9 % (ref 11.5–15)
GFR SERPL CREATININE-BSD FRML MDRD: >60 ML/MIN/1.73M2
GLUCOSE SERPL-MCNC: 107 MG/DL (ref 74–99)
HCT VFR BLD AUTO: 48.8 % (ref 37–54)
HGB BLD-MCNC: 16.9 G/DL (ref 12.5–16.5)
LYMPHOCYTES NFR BLD: 1.44 K/UL (ref 1.5–4)
LYMPHOCYTES RELATIVE PERCENT: 29 % (ref 20–42)
MCH RBC QN AUTO: 33.2 PG (ref 26–35)
MCHC RBC AUTO-ENTMCNC: 34.6 G/DL (ref 32–34.5)
MCV RBC AUTO: 95.9 FL (ref 80–99.9)
MONOCYTES NFR BLD: 0.25 K/UL (ref 0.1–0.95)
MONOCYTES NFR BLD: 5 % (ref 2–12)
NEUTROPHILS NFR BLD: 65 % (ref 43–80)
NEUTS SEG NFR BLD: 3.17 K/UL (ref 1.8–7.3)
PLATELET # BLD AUTO: 144 K/UL (ref 130–450)
PMV BLD AUTO: 9.6 FL (ref 7–12)
POTASSIUM SERPL-SCNC: 3.5 MMOL/L (ref 3.5–5)
PROT SERPL-MCNC: 6.4 G/DL (ref 6.4–8.3)
RBC # BLD AUTO: 5.09 M/UL (ref 3.8–5.8)
RBC # BLD: ABNORMAL 10*6/UL
SODIUM SERPL-SCNC: 138 MMOL/L (ref 132–146)
TSH SERPL DL<=0.05 MIU/L-ACNC: 8.31 UIU/ML (ref 0.27–4.2)
WBC OTHER # BLD: 4.9 K/UL (ref 4.5–11.5)

## 2024-03-07 PROCEDURE — 85025 COMPLETE CBC W/AUTO DIFF WBC: CPT

## 2024-03-07 PROCEDURE — 80053 COMPREHEN METABOLIC PANEL: CPT

## 2024-03-07 PROCEDURE — 99214 OFFICE O/P EST MOD 30 MIN: CPT

## 2024-03-07 PROCEDURE — 6360000002 HC RX W HCPCS: Performed by: INTERNAL MEDICINE

## 2024-03-07 PROCEDURE — 2580000003 HC RX 258: Performed by: INTERNAL MEDICINE

## 2024-03-07 PROCEDURE — 84443 ASSAY THYROID STIM HORMONE: CPT

## 2024-03-07 RX ORDER — HEPARIN 100 UNIT/ML
500 SYRINGE INTRAVENOUS PRN
Status: DISCONTINUED | OUTPATIENT
Start: 2024-03-07 | End: 2024-03-08 | Stop reason: HOSPADM

## 2024-03-07 RX ORDER — HEPARIN 100 UNIT/ML
500 SYRINGE INTRAVENOUS PRN
Status: CANCELLED | OUTPATIENT
Start: 2024-03-07

## 2024-03-07 RX ORDER — SODIUM CHLORIDE 0.9 % (FLUSH) 0.9 %
5-40 SYRINGE (ML) INJECTION PRN
Status: CANCELLED | OUTPATIENT
Start: 2024-03-07

## 2024-03-07 RX ORDER — WATER 10 ML/10ML
2.2 INJECTION INTRAMUSCULAR; INTRAVENOUS; SUBCUTANEOUS ONCE
Status: CANCELLED | OUTPATIENT
Start: 2024-03-07 | End: 2024-03-07

## 2024-03-07 RX ORDER — SODIUM CHLORIDE 0.9 % (FLUSH) 0.9 %
5-40 SYRINGE (ML) INJECTION PRN
Status: DISCONTINUED | OUTPATIENT
Start: 2024-03-07 | End: 2024-03-08 | Stop reason: HOSPADM

## 2024-03-07 RX ADMIN — Medication 500 UNITS: at 10:54

## 2024-03-07 RX ADMIN — SODIUM CHLORIDE, PRESERVATIVE FREE 10 ML: 5 INJECTION INTRAVENOUS at 10:54

## 2024-03-07 NOTE — TELEPHONE ENCOUNTER
SW followed up with pt at pt request.  Pt asked about silver lining fund and was informed that he could reapply next month per their letter.  SW stated that he would assist pt in completing this application when he could apply.  Pt also asked about a gas card as finances are tight and he continues to get transportation from his sister and son.  Pt was given an [employer] gas card at this time.  Pt was informed that he would need to return a receipt for the card at a future appointment.        Constantine Melissa MSW, ANGELA-S  Oncology Social Worker

## 2024-03-07 NOTE — PROGRESS NOTES
Volume Diastolic: 134.5   Dimension: 3.5 cm  LV FS:39.6 %    ml  LV PW           LV Volume Systolic: 40.3 ml  Diastolic: 1.2  LV EDV/LV EDV Index: 134.5  cm              ml/82 ml/m^2LV ESV/LV ESV    RV Diastolic Dimension: 2.8  LV PW Systolic: Index: 40.3 ml/24ml/ m^2     cm  1.5 cm          EF Calculated: 70 %          RV Systolic Dimension: 2.4 cm  Septum          LV Mass Index: 147 l/min*m^2 LA/Aorta: 1.2  Diastolic: 1.1  cm                                           LA volume/Index: 79.7 ml  Septum          LVOT: 2 cm  Systolic: 1.2  cm   LV Mass: 241.96  g  Doppler Measurements & Calculations   MV Peak E-Wave: 1.42   AV Peak Velocity: 1.87 m/s  LVOT Peak Velocity:  m/s                    AV Peak Gradient: 14.05     1.55 m/s  MV Peak A-Wave: 0.82   mmHg                        LVOT Mean Velocity:  m/s                    AV Mean Velocity: 1.27 m/s  0.81 m/s  MV E/A Ratio: 1.73     AV Mean Gradient: 7.2 mmHg  LVOT Peak Gradient: 9.6  MV Peak Gradient: 9.2  AV VTI: 42.1 cm             mmHgLVOT Mean Gradient:  mmHg                   AV Area (Continuity):2.37   3.5 mmHg  MV Mean Gradient: 2.9  cm^2                        Estimated RVSP: 39 mmHg  mmHg                   AV Deceleration Time:       Estimated RAP:10 mmHg  MV Mean Velocity: 0.75 2474.3 msec  m/s                    LVOT VTI: 31.8 cm  MV Deceleration Time:                              TR Velocity:2.69 m/s  271.4 msec             Estimated PASP: 39.03 mmHg  TR Gradient:29.03 mmHg  MV P1/2t: 101.8 msec   Pulm. Vein A Reversal       PV Peak Velocity: 0.51  MVA by PHT:2.16 cm^2   Duration:175.3 msec         m/s  MV Area (continuity):  Pulm. Vein D Velocity:0.74  PV Peak Gradient: 1.02  1.9 cm^2               m/sPulm. Vein A Reversal    mmHg                         Velocity:0.38 m/s           PV Mean Velocity: 0.36                         Pulm. Vein S Velocity: 0.44 m/s  MR Velocity: 5.37 m/s  m/s                         PV Mean Gradient: 0.6  MV RICARDO PISA: 0.23

## 2024-03-07 NOTE — TELEPHONE ENCOUNTER
Adarsh Orozco  3/7/2024  Ht Readings from Last 1 Encounters:   03/07/24 1.727 m (5' 8\")     Wt Readings from Last 10 Encounters:   03/07/24 40.1 kg (88 lb 4.8 oz)   02/08/24 41.7 kg (91 lb 14.4 oz)   01/11/24 43.3 kg (95 lb 8 oz)   11/30/23 53.8 kg (118 lb 8 oz)   11/16/23 54 kg (119 lb)   11/02/23 53.7 kg (118 lb 6.4 oz)   09/28/23 55.3 kg (122 lb)   08/31/23 55.7 kg (122 lb 12.8 oz)   08/29/23 54.4 kg (120 lb)   08/03/23 53.9 kg (118 lb 14.4 oz)     BMI=13.43    Assessment: Met with David and his wife after his OV with Dr. Christensen today. 3.92% weight loss x 1 month, 7.54% significant weight loss x 2 month, 25.49% significant weight loss x 3 months, 28.09% significant weight loss x 6 months. Being treated with Cabozantinib for metastatic renal cell carcinoma. David reports that his appetite is a little better but still having diarrhea. He said he feels his diarrhea is worse with the chocolate Ensure which he has been drinking. Explained diarrhea can also a side effect of cabozantinib. Discussed with Dr. Christensen who advised that he could utilize Imodium for diarrhea and if that is not effective to call office for Lomotil order. Also provided David with samples of Vanilla Ensure Complete and written resource of \"How to Alleviate Diarrhea\". David was anxious to leave office today. Encouraged eating every 2 hours, and drink 3 ONS of choice per day.   Lynnette Castellon RD

## 2024-03-08 RX ORDER — ONDANSETRON 2 MG/ML
8 INJECTION INTRAMUSCULAR; INTRAVENOUS
OUTPATIENT
Start: 2024-03-08

## 2024-03-08 RX ORDER — SODIUM CHLORIDE 9 MG/ML
25 INJECTION, SOLUTION INTRAVENOUS PRN
OUTPATIENT
Start: 2024-03-08

## 2024-03-08 RX ORDER — HEPARIN 100 UNIT/ML
500 SYRINGE INTRAVENOUS PRN
OUTPATIENT
Start: 2024-03-08

## 2024-03-08 RX ORDER — ALBUTEROL SULFATE 90 UG/1
4 AEROSOL, METERED RESPIRATORY (INHALATION) PRN
OUTPATIENT
Start: 2024-03-08

## 2024-03-08 RX ORDER — FAMOTIDINE 10 MG/ML
20 INJECTION, SOLUTION INTRAVENOUS
OUTPATIENT
Start: 2024-03-08

## 2024-03-08 RX ORDER — DIPHENHYDRAMINE HYDROCHLORIDE 50 MG/ML
50 INJECTION INTRAMUSCULAR; INTRAVENOUS
OUTPATIENT
Start: 2024-03-08

## 2024-03-08 RX ORDER — SODIUM CHLORIDE 0.9 % (FLUSH) 0.9 %
5-40 SYRINGE (ML) INJECTION PRN
OUTPATIENT
Start: 2024-03-08

## 2024-03-08 RX ORDER — ACETAMINOPHEN 325 MG/1
650 TABLET ORAL
OUTPATIENT
Start: 2024-03-08

## 2024-03-08 RX ORDER — EPINEPHRINE 1 MG/ML
0.3 INJECTION, SOLUTION, CONCENTRATE INTRAVENOUS PRN
OUTPATIENT
Start: 2024-03-08

## 2024-03-08 RX ORDER — SODIUM CHLORIDE 9 MG/ML
INJECTION, SOLUTION INTRAVENOUS CONTINUOUS
OUTPATIENT
Start: 2024-03-08

## 2024-04-03 ENCOUNTER — HOSPITAL ENCOUNTER (EMERGENCY)
Age: 74
Discharge: HOME | End: 2024-04-03
Attending: EMERGENCY MEDICINE
Payer: MEDICARE

## 2024-04-03 ENCOUNTER — TELEPHONE (OUTPATIENT)
Dept: INFUSION THERAPY | Age: 74
End: 2024-04-03

## 2024-04-03 VITALS
WEIGHT: 80 LBS | OXYGEN SATURATION: 99 % | HEART RATE: 62 BPM | SYSTOLIC BLOOD PRESSURE: 121 MMHG | RESPIRATION RATE: 18 BRPM | BODY MASS INDEX: 12.13 KG/M2 | DIASTOLIC BLOOD PRESSURE: 87 MMHG | TEMPERATURE: 98.2 F | HEIGHT: 68 IN

## 2024-04-03 DIAGNOSIS — R62.7 FAILURE TO THRIVE IN ADULT: ICD-10-CM

## 2024-04-03 DIAGNOSIS — C80.1 CANCER (HCC): Primary | ICD-10-CM

## 2024-04-03 PROCEDURE — 6360000002 HC RX W HCPCS: Performed by: EMERGENCY MEDICINE

## 2024-04-03 PROCEDURE — 99283 EMERGENCY DEPT VISIT LOW MDM: CPT

## 2024-04-03 PROCEDURE — 2580000003 HC RX 258: Performed by: EMERGENCY MEDICINE

## 2024-04-03 PROCEDURE — 96374 THER/PROPH/DIAG INJ IV PUSH: CPT

## 2024-04-03 RX ORDER — FENTANYL CITRATE 0.05 MG/ML
25 INJECTION, SOLUTION INTRAMUSCULAR; INTRAVENOUS ONCE
Status: COMPLETED | OUTPATIENT
Start: 2024-04-03 | End: 2024-04-03

## 2024-04-03 RX ORDER — 0.9 % SODIUM CHLORIDE 0.9 %
1000 INTRAVENOUS SOLUTION INTRAVENOUS ONCE
Status: COMPLETED | OUTPATIENT
Start: 2024-04-03 | End: 2024-04-03

## 2024-04-03 RX ADMIN — FENTANYL CITRATE 25 MCG: 0.05 INJECTION, SOLUTION INTRAMUSCULAR; INTRAVENOUS at 14:37

## 2024-04-03 RX ADMIN — SODIUM CHLORIDE 1000 ML: 9 INJECTION, SOLUTION INTRAVENOUS at 14:41

## 2024-04-03 ASSESSMENT — ENCOUNTER SYMPTOMS
SHORTNESS OF BREATH: 0
ABDOMINAL PAIN: 0
NAUSEA: 0
DIARRHEA: 1

## 2024-04-03 ASSESSMENT — PAIN DESCRIPTION - LOCATION
LOCATION: GENERALIZED
LOCATION: ABDOMEN

## 2024-04-03 ASSESSMENT — PAIN - FUNCTIONAL ASSESSMENT: PAIN_FUNCTIONAL_ASSESSMENT: 0-10

## 2024-04-03 ASSESSMENT — PAIN DESCRIPTION - DESCRIPTORS: DESCRIPTORS: GNAWING

## 2024-04-03 ASSESSMENT — PAIN SCALES - GENERAL
PAINLEVEL_OUTOF10: 10
PAINLEVEL_OUTOF10: 5

## 2024-04-03 NOTE — ED PROVIDER NOTES
Patient presents with complaint of failure to thrive.  Patient has end stage renal cancer and is failing chemotherapy.  He states he has been having diarrhea and has not eaten or had anything to drink for 6 days.  His sister states the oncologist feels it is time for Hospice.     The history is provided by the patient and a relative.       Review of Systems   Constitutional:  Positive for activity change, appetite change, fatigue and unexpected weight change.   Respiratory:  Negative for shortness of breath.    Cardiovascular:  Negative for chest pain.   Gastrointestinal:  Positive for diarrhea. Negative for abdominal pain and nausea.   Genitourinary:  Positive for decreased urine volume.   Neurological:  Positive for weakness and light-headedness.       Physical Exam  Vitals and nursing note reviewed.   Constitutional:       Appearance: He is well-developed.      Comments: Cachetic and chronically ill in appearance.   HENT:      Head: Normocephalic and atraumatic.      Mouth/Throat:      Mouth: Mucous membranes are dry.   Eyes:      Extraocular Movements: Extraocular movements intact.      Conjunctiva/sclera: Conjunctivae normal.      Pupils: Pupils are equal, round, and reactive to light.   Cardiovascular:      Rate and Rhythm: Normal rate and regular rhythm.      Pulses: Normal pulses.      Heart sounds: Normal heart sounds. No murmur heard.     Comments: Port anterior left chest wall  Pulmonary:      Effort: Pulmonary effort is normal. No respiratory distress.      Breath sounds: Normal breath sounds. No wheezing or rales.   Abdominal:      General: Bowel sounds are normal.      Palpations: Abdomen is soft.      Tenderness: There is no abdominal tenderness. There is no guarding or rebound.   Musculoskeletal:      Cervical back: Normal range of motion and neck supple.   Skin:     General: Skin is warm and dry.   Neurological:      Mental Status: He is alert. Mental status is at baseline.      Cranial Nerves: No

## 2024-04-03 NOTE — ED NOTES
Patient was brought in by ambulance after patients sister called 911. Patient is A&OX4 with ABCs intact and adequate. Patient states  that he has not eaten in 6 days. Patient has advance stages of CA and family wants to discuss hospice.

## 2024-04-03 NOTE — CARE COORDINATION
SS note: Pt has not been out of bed or able to eat or drink for 4 days. Pt has Metastatic renal cell carcinoma to bone. Pt's wife here and has list of hospice agencies.     SW met w/pt and his wife & sister in room. Pt is very [place] & need to speak loudly. Sw explained role. Pt's sister, Armida, notes pt has appt w/Cancer center next Thursday and they are going to close case. They noted there is nothing else they can do for pt. Pt is in pain and they were hoping for admission for fluids and pain mgt. SW talked about pt's needs and his wife noted she can care for him @ home. She does not want hospice house or for pt to go to SNF. Armida noted she has list of hospice agencies, but they have not chosen one for pt. SW talked about hospice and pt indicated he wanted to be DNR/CC. SW noted pt may not have any criteria for admission and if that is the case, pt's wife noted she wants him home w/hospice. They have a son there too who can help. [place] [facility]. Pt has a walker but may benefit from hospital bed. SW did give list of hospice agencies and pt's wife chose [facility].     SW completed referral to [facility]-liaison [facility]. O[facility] did speak to pt's wife, completed intake, and arranged for RN assessment to complete HV tomorrow. Pt's wife was appreciative.     1520  SW set up transport home via [facility]. ETA is 1700. Form completed and placed in soft chart. RAMESH notified Jordy-Rn charge.  Electronically signed by ALICIA Martínez on 4/3/2024 at 3:44 PM

## 2024-04-03 NOTE — TELEPHONE ENCOUNTER
Patient's sister states that the patient has not been out of bed or able to eat or drink for 4 days. She states she called 911. Said RN states that she was calling her back to advise her to do so. She verbalized understanding.  Negrita Martin, RN, BSN, OCN 4/3/24 [id number]

## 2024-04-11 ENCOUNTER — HOSPITAL ENCOUNTER (OUTPATIENT)
Dept: INFUSION THERAPY | Age: 74
End: 2024-04-11

## (undated) DEVICE — GOWN,SIRUS,FABRNF,XL,20/CS: Brand: MEDLINE

## (undated) DEVICE — APPLICATOR MEDICATED 26 CC SOLUTION HI LT ORNG CHLORAPREP

## (undated) DEVICE — MARKER,SKIN,WI/RULER AND LABELS: Brand: MEDLINE

## (undated) DEVICE — SCANLAN® SUTURE BOOT™ INSTRUMENT JAW COVERS - ORIGINAL YELLOW, MINI PKG (3 PAIR/CARTRIDGE, 1 CARTRIDGE/PKG): Brand: SCANLAN® SUTURE BOOT™ INSTRUMENT JAW COVERS

## (undated) DEVICE — INTENDED FOR TISSUE SEPARATION, AND OTHER PROCEDURES THAT REQUIRE A SHARP SURGICAL BLADE TO PUNCTURE OR CUT.: Brand: BARD-PARKER ® STAINLESS STEEL BLADES

## (undated) DEVICE — BLADE ES ELASTOMERIC COAT INSUL DURABLE BEND UPTO 90DEG

## (undated) DEVICE — NEEDLE HYPO 25GA L1.5IN BLU POLYPR HUB S STL REG BVL STR

## (undated) DEVICE — CANNULA IV 18GA L15IN BLNT FILL LUERLOCK HUB MJCT

## (undated) DEVICE — TOWEL,OR,DSP,ST,BLUE,STD,6/PK,12PK/CS: Brand: MEDLINE

## (undated) DEVICE — PENCIL ES L3M BTTN SWCH HOLSTER W/ BLDE ELECTRD EDGE

## (undated) DEVICE — NDL CNTR 40CT FM MAG: Brand: MEDLINE INDUSTRIES, INC.

## (undated) DEVICE — SET SURG INSTR DISSECT

## (undated) DEVICE — CATHETER HAD L24CM DIA15.5FR BASIC LNG TERM POLYUR STR

## (undated) DEVICE — GLOVE ORANGE PI 7 1/2   MSG9075

## (undated) DEVICE — PACK,LAPAROTOMY,NO GOWNS: Brand: MEDLINE

## (undated) DEVICE — GOWN,SIRUS,NONRNF,SETINSLV,XL,20/CS: Brand: MEDLINE

## (undated) DEVICE — DOUBLE BASIN SET: Brand: MEDLINE INDUSTRIES, INC.

## (undated) DEVICE — GAUZE,SPONGE,4"X4",16PLY,XRAY,STRL,LF: Brand: MEDLINE

## (undated) DEVICE — COVER,LIGHT HANDLE,FLX,1/PK: Brand: MEDLINE INDUSTRIES, INC.

## (undated) DEVICE — SYRINGE MED 10ML TRNSLUC BRL PLUNG BLK MRK POLYPR CTRL

## (undated) DEVICE — ELECTRODE PT RET AD L9FT HI MOIST COND ADH HYDRGEL CORDED

## (undated) DEVICE — GLOVE ORANGE PI 8   MSG9080